# Patient Record
Sex: FEMALE | Race: WHITE | Employment: OTHER | URBAN - NONMETROPOLITAN AREA
[De-identification: names, ages, dates, MRNs, and addresses within clinical notes are randomized per-mention and may not be internally consistent; named-entity substitution may affect disease eponyms.]

---

## 2017-01-23 ENCOUNTER — TELEPHONE (OUTPATIENT)
Age: 47
End: 2017-01-23

## 2017-02-25 ENCOUNTER — NURSE TRIAGE (OUTPATIENT)
Dept: ADMINISTRATIVE | Age: 47
End: 2017-02-25

## 2017-02-28 PROBLEM — I82.402 ACUTE DEEP VEIN THROMBOSIS (DVT) OF LEFT LOWER EXTREMITY (HCC): Status: ACTIVE | Noted: 2017-02-28

## 2017-02-28 PROBLEM — I26.99 ACUTE PULMONARY EMBOLISM (HCC): Status: ACTIVE | Noted: 2017-02-28

## 2017-03-01 PROBLEM — I51.9 LV DYSFUNCTION: Status: ACTIVE | Noted: 2017-03-01

## 2017-03-02 PROBLEM — R00.1 BRADYCARDIA: Status: ACTIVE | Noted: 2017-03-02

## 2017-03-13 ENCOUNTER — NURSE TRIAGE (OUTPATIENT)
Dept: ADMINISTRATIVE | Age: 47
End: 2017-03-13

## 2017-03-18 ENCOUNTER — NURSE TRIAGE (OUTPATIENT)
Dept: ADMINISTRATIVE | Age: 47
End: 2017-03-18

## 2017-05-16 ENCOUNTER — OFFICE VISIT (OUTPATIENT)
Dept: CARDIOLOGY | Age: 47
End: 2017-05-16

## 2017-05-16 ENCOUNTER — TELEPHONE (OUTPATIENT)
Dept: CARDIOLOGY | Age: 47
End: 2017-05-16

## 2017-05-16 VITALS
SYSTOLIC BLOOD PRESSURE: 104 MMHG | HEIGHT: 65 IN | HEART RATE: 84 BPM | WEIGHT: 260.6 LBS | BODY MASS INDEX: 43.42 KG/M2 | DIASTOLIC BLOOD PRESSURE: 70 MMHG

## 2017-05-16 DIAGNOSIS — E78.01 FAMILIAL HYPERCHOLESTEROLEMIA: ICD-10-CM

## 2017-05-16 DIAGNOSIS — I10 ESSENTIAL HYPERTENSION: Primary | ICD-10-CM

## 2017-05-16 DIAGNOSIS — I42.9 CARDIOMYOPATHY (HCC): ICD-10-CM

## 2017-05-16 PROCEDURE — G8427 DOCREV CUR MEDS BY ELIG CLIN: HCPCS | Performed by: NUCLEAR MEDICINE

## 2017-05-16 PROCEDURE — 1036F TOBACCO NON-USER: CPT | Performed by: NUCLEAR MEDICINE

## 2017-05-16 PROCEDURE — G8419 CALC BMI OUT NRM PARAM NOF/U: HCPCS | Performed by: NUCLEAR MEDICINE

## 2017-05-16 PROCEDURE — 99204 OFFICE O/P NEW MOD 45 MIN: CPT | Performed by: NUCLEAR MEDICINE

## 2017-05-16 ASSESSMENT — ENCOUNTER SYMPTOMS
CONSTIPATION: 0
RECTAL PAIN: 0
SHORTNESS OF BREATH: 1
NAUSEA: 0
ABDOMINAL PAIN: 0
DIARRHEA: 0
ANAL BLEEDING: 0
CHEST TIGHTNESS: 1
PHOTOPHOBIA: 0
BACK PAIN: 0
BLOOD IN STOOL: 0
STRIDOR: 0
VOMITING: 0
ABDOMINAL DISTENTION: 0

## 2017-07-24 ENCOUNTER — TELEPHONE (OUTPATIENT)
Dept: CARDIOLOGY | Age: 47
End: 2017-07-24

## 2017-08-16 ENCOUNTER — TELEPHONE (OUTPATIENT)
Dept: CARDIOLOGY CLINIC | Age: 47
End: 2017-08-16

## 2017-09-11 ENCOUNTER — OFFICE VISIT (OUTPATIENT)
Dept: CARDIOLOGY CLINIC | Age: 47
End: 2017-09-11
Payer: MEDICARE

## 2017-09-11 VITALS
WEIGHT: 259 LBS | HEIGHT: 65 IN | DIASTOLIC BLOOD PRESSURE: 72 MMHG | SYSTOLIC BLOOD PRESSURE: 112 MMHG | HEART RATE: 72 BPM | BODY MASS INDEX: 43.15 KG/M2

## 2017-09-11 DIAGNOSIS — R06.09 DYSPNEA ON EXERTION: ICD-10-CM

## 2017-09-11 DIAGNOSIS — R07.2 PRECORDIAL PAIN: Primary | ICD-10-CM

## 2017-09-11 DIAGNOSIS — I10 ESSENTIAL HYPERTENSION: ICD-10-CM

## 2017-09-11 DIAGNOSIS — I42.0 DILATED CARDIOMYOPATHY (HCC): ICD-10-CM

## 2017-09-11 DIAGNOSIS — E78.01 FAMILIAL HYPERCHOLESTEROLEMIA: ICD-10-CM

## 2017-09-11 PROCEDURE — G8417 CALC BMI ABV UP PARAM F/U: HCPCS | Performed by: NUCLEAR MEDICINE

## 2017-09-11 PROCEDURE — 1036F TOBACCO NON-USER: CPT | Performed by: NUCLEAR MEDICINE

## 2017-09-11 PROCEDURE — G8427 DOCREV CUR MEDS BY ELIG CLIN: HCPCS | Performed by: NUCLEAR MEDICINE

## 2017-09-11 PROCEDURE — 99214 OFFICE O/P EST MOD 30 MIN: CPT | Performed by: NUCLEAR MEDICINE

## 2017-09-11 RX ORDER — MAGNESIUM 200 MG
250 TABLET ORAL DAILY
Status: ON HOLD | COMMUNITY
End: 2017-10-09 | Stop reason: ALTCHOICE

## 2017-09-11 RX ORDER — CYCLOBENZAPRINE HCL 5 MG
5 TABLET ORAL 3 TIMES DAILY PRN
COMMUNITY
End: 2022-07-19

## 2017-09-11 RX ORDER — TRAMADOL HYDROCHLORIDE 50 MG/1
50 TABLET ORAL EVERY 6 HOURS PRN
COMMUNITY
End: 2022-08-29

## 2017-09-11 RX ORDER — DOCUSATE SODIUM 100 MG/1
100 CAPSULE, LIQUID FILLED ORAL 2 TIMES DAILY
COMMUNITY

## 2017-09-25 ENCOUNTER — HOSPITAL ENCOUNTER (OUTPATIENT)
Dept: NON INVASIVE DIAGNOSTICS | Age: 47
Discharge: HOME | End: 2017-09-25
Payer: MEDICARE

## 2017-09-25 ENCOUNTER — TELEPHONE (OUTPATIENT)
Dept: CARDIOLOGY CLINIC | Age: 47
End: 2017-09-25

## 2017-09-25 DIAGNOSIS — R06.09 DYSPNEA ON EXERTION: ICD-10-CM

## 2017-09-25 DIAGNOSIS — R07.2 PRECORDIAL PAIN: ICD-10-CM

## 2017-09-25 DIAGNOSIS — I42.0 DILATED CARDIOMYOPATHY (HCC): ICD-10-CM

## 2017-09-25 DIAGNOSIS — R94.39 ABNORMAL STRESS TEST: Primary | ICD-10-CM

## 2017-09-25 LAB
LV EF: 55 %
LVEF MODALITY: NORMAL

## 2017-09-25 PROCEDURE — 6360000002 HC RX W HCPCS

## 2017-09-25 PROCEDURE — 3430000000 HC RX DIAGNOSTIC RADIOPHARMACEUTICAL: Performed by: NUCLEAR MEDICINE

## 2017-09-25 PROCEDURE — 78452 HT MUSCLE IMAGE SPECT MULT: CPT

## 2017-09-25 PROCEDURE — A9500 TC99M SESTAMIBI: HCPCS | Performed by: NUCLEAR MEDICINE

## 2017-09-25 PROCEDURE — 93017 CV STRESS TEST TRACING ONLY: CPT | Performed by: NUCLEAR MEDICINE

## 2017-09-25 PROCEDURE — 93306 TTE W/DOPPLER COMPLETE: CPT

## 2017-09-25 RX ADMIN — Medication 32.4 MILLICURIE: at 10:50

## 2017-09-25 RX ADMIN — Medication 9.4 MILLICURIE: at 09:55

## 2017-10-03 RX ORDER — DIPHENHYDRAMINE HCL 25 MG
25 TABLET ORAL 2 TIMES DAILY
COMMUNITY
End: 2022-08-29

## 2017-10-03 NOTE — PROGRESS NOTES
NPO after midnight  Bring drivers license and insurance information  Wear comfortable clean clothes  Shower morning of and night before with liquid antibacterial soap  Remove jewelry   May have to stay overnight if have PTCA/stent  Bring medications in original bottles  Made aware of visitors limit to 1 at a time  Follow all instructions given by your physician   needed at discharge

## 2017-10-06 ENCOUNTER — PREP FOR PROCEDURE (OUTPATIENT)
Dept: CARDIOLOGY | Age: 47
End: 2017-10-06

## 2017-10-06 RX ORDER — NITROGLYCERIN 0.4 MG/1
0.4 TABLET SUBLINGUAL EVERY 5 MIN PRN
Status: CANCELLED | OUTPATIENT
Start: 2017-10-06

## 2017-10-06 RX ORDER — SODIUM CHLORIDE 0.9 % (FLUSH) 0.9 %
10 SYRINGE (ML) INJECTION PRN
Status: CANCELLED | OUTPATIENT
Start: 2017-10-06

## 2017-10-06 RX ORDER — DIPHENHYDRAMINE HYDROCHLORIDE 50 MG/ML
25 INJECTION INTRAMUSCULAR; INTRAVENOUS ONCE
Status: CANCELLED | OUTPATIENT
Start: 2017-10-06 | End: 2017-10-06

## 2017-10-06 RX ORDER — SODIUM CHLORIDE 0.9 % (FLUSH) 0.9 %
10 SYRINGE (ML) INJECTION EVERY 12 HOURS SCHEDULED
Status: CANCELLED | OUTPATIENT
Start: 2017-10-06

## 2017-10-06 RX ORDER — SODIUM CHLORIDE 9 MG/ML
INJECTION, SOLUTION INTRAVENOUS CONTINUOUS
Status: CANCELLED | OUTPATIENT
Start: 2017-10-06

## 2017-10-09 ENCOUNTER — HOSPITAL ENCOUNTER (OUTPATIENT)
Dept: INPATIENT UNIT | Age: 47
Discharge: HOME | End: 2017-10-09
Attending: NUCLEAR MEDICINE | Admitting: NUCLEAR MEDICINE
Payer: MEDICARE

## 2017-10-09 ENCOUNTER — APPOINTMENT (OUTPATIENT)
Dept: CARDIAC CATH/INVASIVE PROCEDURES | Age: 47
End: 2017-10-09
Attending: NUCLEAR MEDICINE
Payer: MEDICARE

## 2017-10-09 VITALS
HEIGHT: 65 IN | HEART RATE: 80 BPM | RESPIRATION RATE: 16 BRPM | DIASTOLIC BLOOD PRESSURE: 81 MMHG | TEMPERATURE: 98.7 F | OXYGEN SATURATION: 95 % | BODY MASS INDEX: 43.32 KG/M2 | WEIGHT: 260 LBS | SYSTOLIC BLOOD PRESSURE: 112 MMHG

## 2017-10-09 LAB
ABO: NORMAL
ANION GAP SERPL CALCULATED.3IONS-SCNC: 11 MEQ/L (ref 8–16)
ANTIBODY SCREEN: NORMAL
BUN BLDV-MCNC: 12 MG/DL (ref 7–22)
CALCIUM SERPL-MCNC: 9.8 MG/DL (ref 8.5–10.5)
CHLORIDE BLD-SCNC: 101 MEQ/L (ref 98–111)
CHOLESTEROL, TOTAL: 175 MG/DL (ref 100–199)
CO2: 26 MEQ/L (ref 23–33)
CREAT SERPL-MCNC: 0.6 MG/DL (ref 0.4–1.2)
GFR SERPL CREATININE-BSD FRML MDRD: > 90 ML/MIN/1.73M2
GLUCOSE BLD-MCNC: 99 MG/DL (ref 70–108)
HCT VFR BLD CALC: 39.5 % (ref 37–47)
HDLC SERPL-MCNC: 59 MG/DL
HEMOGLOBIN: 13.5 GM/DL (ref 12–16)
LDL CHOLESTEROL CALCULATED: 87 MG/DL
MCH RBC QN AUTO: 30.3 PG (ref 27–31)
MCHC RBC AUTO-ENTMCNC: 34.2 GM/DL (ref 33–37)
MCV RBC AUTO: 88.6 FL (ref 81–99)
PDW BLD-RTO: 14.6 % (ref 11.5–14.5)
PLATELET # BLD: 288 THOU/MM3 (ref 130–400)
PMV BLD AUTO: 7.8 MCM (ref 7.4–10.4)
POTASSIUM SERPL-SCNC: 4.4 MEQ/L (ref 3.5–5.2)
RBC # BLD: 4.46 MILL/MM3 (ref 4.2–5.4)
RH FACTOR: NORMAL
SODIUM BLD-SCNC: 138 MEQ/L (ref 135–145)
TRIGL SERPL-MCNC: 147 MG/DL (ref 0–199)
WBC # BLD: 6.7 THOU/MM3 (ref 4.8–10.8)

## 2017-10-09 PROCEDURE — 86901 BLOOD TYPING SEROLOGIC RH(D): CPT

## 2017-10-09 PROCEDURE — 86850 RBC ANTIBODY SCREEN: CPT

## 2017-10-09 PROCEDURE — 80061 LIPID PANEL: CPT

## 2017-10-09 PROCEDURE — 36415 COLL VENOUS BLD VENIPUNCTURE: CPT

## 2017-10-09 PROCEDURE — 2780000010 HC IMPLANT OTHER

## 2017-10-09 PROCEDURE — 80048 BASIC METABOLIC PNL TOTAL CA: CPT

## 2017-10-09 PROCEDURE — 93005 ELECTROCARDIOGRAM TRACING: CPT | Performed by: PHYSICIAN ASSISTANT

## 2017-10-09 PROCEDURE — C1725 CATH, TRANSLUMIN NON-LASER: HCPCS

## 2017-10-09 PROCEDURE — 93458 L HRT ARTERY/VENTRICLE ANGIO: CPT | Performed by: NUCLEAR MEDICINE

## 2017-10-09 PROCEDURE — A6258 TRANSPARENT FILM >16<=48 IN: HCPCS

## 2017-10-09 PROCEDURE — 85027 COMPLETE CBC AUTOMATED: CPT

## 2017-10-09 PROCEDURE — 2580000003 HC RX 258: Performed by: PHYSICIAN ASSISTANT

## 2017-10-09 PROCEDURE — 86900 BLOOD TYPING SEROLOGIC ABO: CPT

## 2017-10-09 PROCEDURE — C1769 GUIDE WIRE: HCPCS

## 2017-10-09 PROCEDURE — 2500000003 HC RX 250 WO HCPCS

## 2017-10-09 PROCEDURE — C1894 INTRO/SHEATH, NON-LASER: HCPCS

## 2017-10-09 PROCEDURE — 6360000002 HC RX W HCPCS

## 2017-10-09 RX ORDER — TRAZODONE HYDROCHLORIDE 50 MG/1
100 TABLET ORAL NIGHTLY
COMMUNITY
End: 2022-08-29 | Stop reason: SDUPTHER

## 2017-10-09 RX ORDER — SODIUM CHLORIDE 9 MG/ML
INJECTION, SOLUTION INTRAVENOUS CONTINUOUS
Status: DISCONTINUED | OUTPATIENT
Start: 2017-10-09 | End: 2017-10-09 | Stop reason: HOSPADM

## 2017-10-09 RX ORDER — ATROPINE SULFATE 0.4 MG/ML
0.5 AMPUL (ML) INJECTION
Status: DISCONTINUED | OUTPATIENT
Start: 2017-10-09 | End: 2017-10-09 | Stop reason: HOSPADM

## 2017-10-09 RX ORDER — SODIUM CHLORIDE 0.9 % (FLUSH) 0.9 %
10 SYRINGE (ML) INJECTION EVERY 12 HOURS SCHEDULED
Status: DISCONTINUED | OUTPATIENT
Start: 2017-10-09 | End: 2017-10-09 | Stop reason: SDUPTHER

## 2017-10-09 RX ORDER — ONDANSETRON 2 MG/ML
4 INJECTION INTRAMUSCULAR; INTRAVENOUS EVERY 6 HOURS PRN
Status: DISCONTINUED | OUTPATIENT
Start: 2017-10-09 | End: 2017-10-09 | Stop reason: HOSPADM

## 2017-10-09 RX ORDER — DIPHENHYDRAMINE HYDROCHLORIDE 50 MG/ML
25 INJECTION INTRAMUSCULAR; INTRAVENOUS ONCE
Status: DISCONTINUED | OUTPATIENT
Start: 2017-10-09 | End: 2017-10-09 | Stop reason: HOSPADM

## 2017-10-09 RX ORDER — SODIUM CHLORIDE 0.9 % (FLUSH) 0.9 %
10 SYRINGE (ML) INJECTION EVERY 12 HOURS SCHEDULED
Status: DISCONTINUED | OUTPATIENT
Start: 2017-10-09 | End: 2017-10-09 | Stop reason: HOSPADM

## 2017-10-09 RX ORDER — LANOLIN ALCOHOL/MO/W.PET/CERES
10 CREAM (GRAM) TOPICAL NIGHTLY PRN
COMMUNITY

## 2017-10-09 RX ORDER — MULTIVITAMIN WITH IRON
250 TABLET ORAL DAILY
COMMUNITY
End: 2022-08-29

## 2017-10-09 RX ORDER — SODIUM CHLORIDE 0.9 % (FLUSH) 0.9 %
10 SYRINGE (ML) INJECTION PRN
Status: DISCONTINUED | OUTPATIENT
Start: 2017-10-09 | End: 2017-10-09 | Stop reason: HOSPADM

## 2017-10-09 RX ORDER — HYDROXYZINE HYDROCHLORIDE 10 MG/1
10 TABLET, FILM COATED ORAL DAILY
COMMUNITY
End: 2022-08-29

## 2017-10-09 RX ORDER — ACETAMINOPHEN 325 MG/1
650 TABLET ORAL EVERY 4 HOURS PRN
Status: DISCONTINUED | OUTPATIENT
Start: 2017-10-09 | End: 2017-10-09 | Stop reason: HOSPADM

## 2017-10-09 RX ORDER — NITROGLYCERIN 0.4 MG/1
0.4 TABLET SUBLINGUAL EVERY 5 MIN PRN
Status: DISCONTINUED | OUTPATIENT
Start: 2017-10-09 | End: 2017-10-09 | Stop reason: HOSPADM

## 2017-10-09 RX ADMIN — SODIUM CHLORIDE: 9 INJECTION, SOLUTION INTRAVENOUS at 12:14

## 2017-10-09 NOTE — PLAN OF CARE
Problem: Discharge Planning:  Goal: Participates in care planning  Participates in care planning   Outcome: Completed Date Met: 10/09/17  Discharged home  Goal: Discharged to appropriate level of care  Discharged to appropriate level of care   Outcome: Completed Date Met: 10/09/17  Discharged home    Problem: Airway Clearance - Ineffective:  Goal: Ability to maintain a clear airway will improve  Ability to maintain a clear airway will improve   Outcome: Completed Date Met: 10/09/17  Procedure tolerated well    Problem: Tissue Perfusion - Cardiopulmonary, Altered:  Goal: Absence of angina  Absence of angina   Outcome: Completed Date Met: 10/09/17    Goal: Hemodynamic stability will improve  Hemodynamic stability will improve   Outcome: Completed Date Met: 10/09/17  Vitals stable post procedure    Problem: Falls - Risk of  Goal: Absence of falls  Outcome: Completed Date Met: 10/09/17

## 2017-10-09 NOTE — PROGRESS NOTES
Discharged home in stable condition.   Patient and spouse verbalize understanding of discharge instructions and follow-up

## 2017-10-09 NOTE — CONSULTS
[address]  Sedation/Analgesia History & Physical    Pt Name: Gino Tariq  Account number: [de-identified]  MRN: [id number]  YOB: 1970  Provider Performing Procedure: Jose Ann MD [facility]  Primary Care Physician: Poonam Keys MD  Date: 10/9/2017    PRE-PROCEDURE    Code Status: FULL CODE  Brief History/Pre-Procedure Diagnosis:   Angina  Abn stress test      Consent: : I have discussed with the patient risks, benefits, and alternatives (and relevant risks, benefits, and side effects related to alternatives or not receiving care), and likelihood of the success. The patient and/or representative appear to understand and agree to proceed. The discussion encompasses risks, benefits, and side effects related to the alternatives and the risks related to not receiving the proposed care, treatment, and services. MEDICAL HISTORY  []ASHD/ANGINA/MI/CHF   [x]Hypertension  []Diabetes  []Hyperlipidemia  []Smoking  [x]Family Hx of ASHD  []Additional information:       has a past medical history of Anxiety; Arthritis; Asthma; Depression; GERD (gastroesophageal reflux disease); Hyperlipidemia; Hypertension; and Paranoid schizophrenia ([facility] [address] SURGICAL HISTORY   has a past surgical history that includes other surgical history (2012); Ankle surgery (2000); Foot surgery (Right, 07); shoulder surgery (Right, age 15); Dental surgery; Cholecystectomy, laparoscopic (5/4/2015); Upper gastrointestinal endoscopy (10/23/15); Vaginal prolapse repair; and Colonoscopy.   Additional information:       ALLERGIES   Allergies as of 10/09/2017 - Review Complete 10/09/2017   Allergen Reaction Noted    [place] Hives 04/23/2015    Other Palpitations 10/03/2017     Additional information:       MEDICATIONS   Aspirin  [x] 81 mg  [] 325 mg  [] None  Antiplatelet drug therapy use last 5 days  []No []Yes  Coumadin Use Last 5 Days []No []Yes  Other anticoagulant use last 5 days  []No []Yes    Current Facility-Administered Medications:     0.9 % sodium chloride infusion, , Intravenous, Continuous, Reggie Elmore PA-C, Last Rate: 75 mL/hr at 10/09/17 1214    diphenhydrAMINE (BENADRYL) injection 25 mg, 25 mg, Intravenous, Once, Reggie Elmore PA-C    hydrocortisone sodium succinate PF (SOLU-CORTEF) injection 100 mg, 100 mg, Intravenous, Once, Reggie Elmore PA-C    nitroGLYCERIN (NITROSTAT) SL tablet 0.4 mg, 0.4 mg, Sublingual, Q5 Min PRN, Reggie Elmore PA-C    sodium chloride flush 0.9 % injection 10 mL, 10 mL, Intravenous, 2 times per day, Reggie Elmore PA-C  Prior to Admission medications    Medication Sig Start Date End Date Taking?  Authorizing Provider   hydrOXYzine (ATARAX) 10 MG tablet Take 10 mg by mouth daily   Yes Historical Provider, MD   traZODone (DESYREL) 50 MG tablet Take 200 mg by mouth nightly   Yes Historical Provider, MD   melatonin 3 MG TABS tablet Take 3 mg by mouth nightly as needed   Yes Historical Provider, MD   magnesium (MAGNESIUM-OXIDE) 250 MG TABS tablet Take 250 mg by mouth daily   Yes Historical Provider, MD   diphenhydrAMINE (BENADRYL) 25 MG tablet Take 25 mg by mouth nightly   Yes Historical Provider, MD   Flaxseed Linseed, (EQL FLAX SEED OIL) 1000 MG CAPS Take by mouth 3 times daily (with meals)   Yes Historical Provider, MD   traMADol (ULTRAM) 50 MG tablet Take 50 mg by mouth 2 times daily    Yes Historical Provider, MD   docusate sodium (COLACE) 100 MG capsule Take 100 mg by mouth 2 times daily   Yes Historical Provider, MD   cyclobenzaprine (FLEXERIL) 5 MG tablet Take 5 mg by mouth 3 times daily as needed for Muscle spasms   Yes Historical Provider, MD   Black Cohosh-SoyIsoflav-Magnol (ESTROVEN MENOPAUSE RELIEF PO) Take by mouth   Yes Historical Provider, MD   metoprolol tartrate (LOPRESSOR) 25 MG tablet Take 1 tablet by mouth 2 times daily 9/11/17  Yes Frederic Galan MD   apixaban (ELIQUIS) 5 MG TABS tablet Take by mouth 2 times daily   Yes Historical Provider, MD   oxybutynin (DITROPAN) 5 MG tablet Take 5 mg by mouth 2 times daily   Yes Historical Provider, MD   amitriptyline (ELAVIL) 10 MG tablet Take 10 mg by mouth nightly   Yes Historical Provider, MD   gabapentin (NEURONTIN) 300 MG capsule Take 300 mg by mouth nightly   Yes Historical Provider, MD   omeprazole (PRILOSEC) 20 MG capsule Take 1 capsule by mouth Daily 5/23/16  Yes Anirudh Kwok MD   QUEtiapine (SEROQUEL) 50 MG tablet Take 300 mg by mouth nightly    Yes Historical Provider, MD   simvastatin (ZOCOR) 40 MG tablet   Take 40 mg by mouth daily    Yes Historical Provider, MD   LORazepam (ATIVAN) 2 MG tablet Take 2 mg by mouth nightly    Yes Historical Provider, MD   risperiDONE (RISPERDAL) 4 MG tablet Take 4 mg by mouth daily    Yes Historical Provider, MD     Additional information:       VITAL SIGNS   Vitals:    10/09/17 1115   BP: (!) 110/58   Pulse: 84   Resp: 18   Temp: 98.1 °F (36.7 °C)   SpO2: 95%       PHYSICAL:   General: No acute distress  HEENT:  Unremarkable for age  Neck: without increased JVD, carotid pulses 2+ bilaterally without bruits  Heart: RRR, S1 & S2 WNL, S4 gallop, without murmurs or rubs    Lungs: Clear to auscultation    Abdomen: BS present, without HSM, masses, or tenderness    Extremities: without C,C,E.  Pulses 2+ bilaterally  Mental Status: Alert & Oriented        PLANNED PROCEDURE   [x]Cath  []PCI                []Pacemaker/AICD  []JORGE             []Cardioversion []Peripheral angiography/PTA  []Other:      SEDATION  Planned agent:[x]Midazolam []Meperidine [x]Sublimaze []Morphine  []Diazepam  []Other:     ASA Classification:  []1 [x]2 []3 []4 []5  Class 1: A normal healthy patient  Class 2: Pt with mild to moderate systemic disease  Class 3: Severe systemic disease or disturbance  Class 4: Severe systemic disorders that are already life threatening. Class 5: Moribund pt with little chances of survival, for more than 24 hours.   Mallampati I Airway Classification:   []1 [x]2 []3 []4    [x]Pre-procedure diagnostic studies complete and results available. Comment:    [x]Previous sedation/anesthesia experiences assessed. Comment:    [x]The patient is an appropriate candidate to undergo the planned procedure sedation and anesthesia. (Refer to nursing sedation/analgesia documentation record)  [x]Formulation and discussion of sedation/procedure plan, risks, and expectations with patient and/or responsible adult completed. [x]Patient examined immediately prior to the procedure.  (Refer to nursing sedation/analgesia documentation record)    Kenzie Nesbitt MD [facility]  Electronically signed 10/9/2017 at 12:42 PM

## 2017-10-10 ENCOUNTER — TELEPHONE (OUTPATIENT)
Dept: CARDIOLOGY CLINIC | Age: 47
End: 2017-10-10

## 2017-10-10 LAB
EKG ATRIAL RATE: 66 BPM
EKG P AXIS: 40 DEGREES
EKG P-R INTERVAL: 178 MS
EKG Q-T INTERVAL: 428 MS
EKG QRS DURATION: 84 MS
EKG QTC CALCULATION (BAZETT): 448 MS
EKG R AXIS: 66 DEGREES
EKG T AXIS: 58 DEGREES
EKG VENTRICULAR RATE: 66 BPM

## 2017-10-10 NOTE — TELEPHONE ENCOUNTER
Patient has a follow up cath 01/09/18 and she leaves 10/29/17 for the winter to [facility] and will not be back until March. She is requesting to be seen before 10/29/17 or patient stated Dr. Jett Urbano mentioned she may need an inhaler. She stated if she can not get an appointment with Dr. Jett Urbano and he wants her to have an inhaler fax family Dr. Keshia Arreguin that she needs an inhaler.

## 2017-10-11 ENCOUNTER — TELEPHONE (OUTPATIENT)
Dept: CARDIOLOGY CLINIC | Age: 47
End: 2017-10-11

## 2017-10-11 NOTE — TELEPHONE ENCOUNTER
Pt left message on VM re: had a cath Monday and now c/o swelling in hands and fingers. Called pt back. No answer. LM to return call.

## 2017-10-12 NOTE — TELEPHONE ENCOUNTER
Spoke with pt, swelling is gone, and its getting better. Pt states when using it it still hurts some. Pt will contact us next week if she is still having issues.

## 2017-10-24 ENCOUNTER — TELEPHONE (OUTPATIENT)
Dept: CARDIOLOGY CLINIC | Age: 47
End: 2017-10-24

## 2017-10-25 ENCOUNTER — OFFICE VISIT (OUTPATIENT)
Dept: CARDIOLOGY CLINIC | Age: 47
End: 2017-10-25
Payer: MEDICARE

## 2017-10-25 VITALS
WEIGHT: 265 LBS | DIASTOLIC BLOOD PRESSURE: 68 MMHG | BODY MASS INDEX: 44.15 KG/M2 | HEART RATE: 60 BPM | SYSTOLIC BLOOD PRESSURE: 116 MMHG | HEIGHT: 65 IN

## 2017-10-25 DIAGNOSIS — E78.01 FAMILIAL HYPERCHOLESTEROLEMIA: ICD-10-CM

## 2017-10-25 DIAGNOSIS — R06.09 DYSPNEA ON EXERTION: ICD-10-CM

## 2017-10-25 DIAGNOSIS — I10 ESSENTIAL HYPERTENSION: Primary | ICD-10-CM

## 2017-10-25 PROCEDURE — G8484 FLU IMMUNIZE NO ADMIN: HCPCS | Performed by: NUCLEAR MEDICINE

## 2017-10-25 PROCEDURE — 1036F TOBACCO NON-USER: CPT | Performed by: NUCLEAR MEDICINE

## 2017-10-25 PROCEDURE — G8417 CALC BMI ABV UP PARAM F/U: HCPCS | Performed by: NUCLEAR MEDICINE

## 2017-10-25 PROCEDURE — 99213 OFFICE O/P EST LOW 20 MIN: CPT | Performed by: NUCLEAR MEDICINE

## 2017-10-25 PROCEDURE — G8427 DOCREV CUR MEDS BY ELIG CLIN: HCPCS | Performed by: NUCLEAR MEDICINE

## 2017-10-25 NOTE — PROGRESS NOTES
[facility]  [address]  [address]  [address] [address]  Dept: [phone]  Dept Fax: [phone]  Loc: [phone]    Visit Date: 10/25/2017    Qing Temple is a 52 y.o. female who presents today for:  Chief Complaint   Patient presents with    Follow-up     heart cath- no stents.      Hypertension    Hyperlipidemia    Shortness of Breath   cath done   No major CAD  Dyspnea is better with inhalers  Known DVt and PE on eliquis  Has known asthma   BP is stable   Tachycardia is better  No complication       HPI:  HPI  Past Medical History:   Diagnosis Date    Anxiety     Arthritis     Asthma     Depression     GERD (gastroesophageal reflux disease)     Hyperlipidemia     Hypertension     Paranoid schizophrenia ([address]       Past Surgical History:   Procedure Laterality Date    ANKLE SURGERY  2000    CHOLECYSTECTOMY, LAPAROSCOPIC  5/4/2015    LAPAROSCOPIC CHOLECYSTECTOMY    COLONOSCOPY      DENTAL SURGERY      FOOT SURGERY Right 07    OTHER SURGICAL HISTORY  2012    vaginal sling    SHOULDER SURGERY Right age 15   [facility] UPPER GASTROINTESTINAL ENDOSCOPY  10/23/15    Dr. Kelechi Whittaker       Family History   Problem Relation Age of Onset    Depression Mother     High Blood Pressure Mother     Cancer Father      melanoma     Social History   Substance Use Topics    Smoking status: Never Smoker    Smokeless tobacco: Never Used    Alcohol use No      Current Outpatient Prescriptions   Medication Sig Dispense Refill    VENTOLIN  (90 Base) MCG/ACT inhaler       Nutritional Supplements (ESTROVEN PO) Take by mouth      hydrOXYzine (ATARAX) 10 MG tablet Take 10 mg by mouth daily      traZODone (DESYREL) 50 MG tablet Take 200 mg by mouth nightly      melatonin 3 MG TABS tablet Take 3 mg by mouth nightly as needed      magnesium (MAGNESIUM-OXIDE) 250 MG TABS tablet Take 250 mg by mouth daily      diphenhydrAMINE recent active issues  Extremities:   No edema, good peripheral pulses      Objective:  Physical Exam  /68   Pulse 60   Ht 5' 5\" (1.651 m)   Wt 265 lb (120.2 kg)   LMP 02/01/2017 (Approximate)   BMI 44.10 kg/m²   General:   Well developed, well nourished  Lungs:   Clear to auscultation  Heart:    Normal S1 S2, Slight murmur. no rubs, no gallops  Abdomen:   Soft, non tender, no organomegalies, positive bowel sounds  Extremities:   No edema, no cyanosis, good peripheral pulses  Neurological:   Awake, alert, oriented. No obvious focal deficits  Musculoskelatal:  No obvious deformities    Assessment:     1. Essential hypertension     2. Familial hypercholesterolemia     3. Dyspnea on exertion     cardiac stable for now    Plan:  No Follow-up on file. As above  Will see as needed  Continue risk factor modification and medical management  Thank you for allowing me to participate in the care of your patient. Please don't hesitate to contact me regarding any further issues related to the patient care    Orders Placed:  No orders of the defined types were placed in this encounter. Medications Prescribed:  No orders of the defined types were placed in this encounter. Discussed use, benefit, and side effects of prescribed medications. All patient questions answered. Pt voiced understanding. Instructed to continue current medications, diet and exercise. Continue risk factor modification and medical management. Patient agreed with treatment plan. Follow up as directed.     Electronically signed by Sisi Whittaker MD on 10/25/2017 at 10:40 AM

## 2018-07-23 ENCOUNTER — TELEPHONE (OUTPATIENT)
Dept: CARDIOLOGY CLINIC | Age: 48
End: 2018-07-23

## 2018-07-23 NOTE — TELEPHONE ENCOUNTER
Enrique Amdaor called requesting her medical records. Mailed a release of information form to be signed. Once received back, mail medical records to her home in [place].

## 2019-12-17 ENCOUNTER — TELEPHONE (OUTPATIENT)
Dept: CARDIOLOGY CLINIC | Age: 49
End: 2019-12-17

## 2020-11-03 PROBLEM — I10 HYPERTENSION: Status: RESOLVED | Noted: 2020-11-03 | Resolved: 2020-11-03

## 2022-06-23 ENCOUNTER — TELEPHONE (OUTPATIENT)
Dept: CARDIOLOGY CLINIC | Age: 52
End: 2022-06-23

## 2022-06-23 ENCOUNTER — TELEPHONE (OUTPATIENT)
Dept: FAMILY MEDICINE CLINIC | Age: 52
End: 2022-06-23

## 2022-06-23 ENCOUNTER — OFFICE VISIT (OUTPATIENT)
Dept: FAMILY MEDICINE CLINIC | Age: 52
End: 2022-06-23
Payer: MEDICARE

## 2022-06-23 VITALS
HEART RATE: 97 BPM | BODY MASS INDEX: 47.12 KG/M2 | SYSTOLIC BLOOD PRESSURE: 130 MMHG | RESPIRATION RATE: 20 BRPM | WEIGHT: 282.8 LBS | DIASTOLIC BLOOD PRESSURE: 74 MMHG | HEIGHT: 65 IN | OXYGEN SATURATION: 98 % | TEMPERATURE: 97.9 F

## 2022-06-23 DIAGNOSIS — F99 INSOMNIA DUE TO OTHER MENTAL DISORDER: ICD-10-CM

## 2022-06-23 DIAGNOSIS — I10 ESSENTIAL HYPERTENSION: ICD-10-CM

## 2022-06-23 DIAGNOSIS — Z23 NEED FOR TETANUS BOOSTER: ICD-10-CM

## 2022-06-23 DIAGNOSIS — F20.0 PARANOID SCHIZOPHRENIA (HCC): ICD-10-CM

## 2022-06-23 DIAGNOSIS — I51.9 LV DYSFUNCTION: ICD-10-CM

## 2022-06-23 DIAGNOSIS — M89.9 DISORDER OF BONE, UNSPECIFIED: ICD-10-CM

## 2022-06-23 DIAGNOSIS — Z11.4 SCREENING FOR HUMAN IMMUNODEFICIENCY VIRUS WITHOUT PRESENCE OF RISK FACTORS: ICD-10-CM

## 2022-06-23 DIAGNOSIS — Z23 NEED FOR SHINGLES VACCINE: ICD-10-CM

## 2022-06-23 DIAGNOSIS — I82.412 ACUTE DEEP VEIN THROMBOSIS (DVT) OF FEMORAL VEIN OF LEFT LOWER EXTREMITY (HCC): ICD-10-CM

## 2022-06-23 DIAGNOSIS — R79.9 ABNORMAL FINDING OF BLOOD CHEMISTRY, UNSPECIFIED: ICD-10-CM

## 2022-06-23 DIAGNOSIS — Z23 NEED FOR COVID-19 VACCINE: ICD-10-CM

## 2022-06-23 DIAGNOSIS — Z12.31 BREAST CANCER SCREENING BY MAMMOGRAM: ICD-10-CM

## 2022-06-23 DIAGNOSIS — M19.90 ARTHRITIS: ICD-10-CM

## 2022-06-23 DIAGNOSIS — Z12.11 COLON CANCER SCREENING: ICD-10-CM

## 2022-06-23 DIAGNOSIS — Z85.828 HX OF MALIGNANT NEOPLASM OF SKIN: ICD-10-CM

## 2022-06-23 DIAGNOSIS — F51.05 INSOMNIA DUE TO OTHER MENTAL DISORDER: ICD-10-CM

## 2022-06-23 DIAGNOSIS — Z11.59 ENCOUNTER FOR HEPATITIS C SCREENING TEST FOR LOW RISK PATIENT: ICD-10-CM

## 2022-06-23 DIAGNOSIS — M25.561 RIGHT KNEE PAIN, UNSPECIFIED CHRONICITY: ICD-10-CM

## 2022-06-23 DIAGNOSIS — I26.99 PULMONARY EMBOLISM, UNSPECIFIED CHRONICITY, UNSPECIFIED PULMONARY EMBOLISM TYPE, UNSPECIFIED WHETHER ACUTE COR PULMONALE PRESENT (HCC): ICD-10-CM

## 2022-06-23 DIAGNOSIS — G47.30 SLEEP APNEA, UNSPECIFIED TYPE: ICD-10-CM

## 2022-06-23 DIAGNOSIS — G89.29 OTHER CHRONIC PAIN: ICD-10-CM

## 2022-06-23 DIAGNOSIS — R06.09 DYSPNEA ON EXERTION: Primary | ICD-10-CM

## 2022-06-23 DIAGNOSIS — M54.50 LUMBAR BACK PAIN: ICD-10-CM

## 2022-06-23 PROCEDURE — 1036F TOBACCO NON-USER: CPT | Performed by: NURSE PRACTITIONER

## 2022-06-23 PROCEDURE — 3017F COLORECTAL CA SCREEN DOC REV: CPT | Performed by: NURSE PRACTITIONER

## 2022-06-23 PROCEDURE — G8427 DOCREV CUR MEDS BY ELIG CLIN: HCPCS | Performed by: NURSE PRACTITIONER

## 2022-06-23 PROCEDURE — 99204 OFFICE O/P NEW MOD 45 MIN: CPT | Performed by: NURSE PRACTITIONER

## 2022-06-23 PROCEDURE — G8417 CALC BMI ABV UP PARAM F/U: HCPCS | Performed by: NURSE PRACTITIONER

## 2022-06-23 RX ORDER — MEDROXYPROGESTERONE ACETATE 5 MG/1
5 TABLET ORAL DAILY
COMMUNITY
End: 2022-08-29 | Stop reason: SDUPTHER

## 2022-06-23 RX ORDER — DULAGLUTIDE 1.5 MG/.5ML
1.5 INJECTION, SOLUTION SUBCUTANEOUS WEEKLY
COMMUNITY

## 2022-06-23 RX ORDER — ZOSTER VACCINE RECOMBINANT, ADJUVANTED 50 MCG/0.5
0.5 KIT INTRAMUSCULAR SEE ADMIN INSTRUCTIONS
Qty: 0.5 ML | Refills: 0 | Status: SHIPPED | OUTPATIENT
Start: 2022-06-23 | End: 2022-08-29

## 2022-06-23 RX ORDER — PANTOPRAZOLE SODIUM 40 MG/1
40 TABLET, DELAYED RELEASE ORAL DAILY
COMMUNITY
End: 2022-09-28

## 2022-06-23 RX ORDER — TEMAZEPAM 15 MG/1
15 CAPSULE ORAL NIGHTLY PRN
COMMUNITY
End: 2022-08-29

## 2022-06-23 RX ORDER — FLUTICASONE PROPIONATE 50 MCG
1 SPRAY, SUSPENSION (ML) NASAL DAILY
Qty: 32 G | Refills: 1 | Status: SHIPPED | OUTPATIENT
Start: 2022-06-23

## 2022-06-23 RX ORDER — PEN NEEDLE, DIABETIC 31 GX5/16"
NEEDLE, DISPOSABLE MISCELLANEOUS
COMMUNITY

## 2022-06-23 RX ORDER — LORATADINE 10 MG/1
10 TABLET ORAL DAILY
Qty: 30 TABLET | Refills: 5 | Status: SHIPPED | OUTPATIENT
Start: 2022-06-23 | End: 2022-08-29

## 2022-06-23 SDOH — ECONOMIC STABILITY: FOOD INSECURITY: WITHIN THE PAST 12 MONTHS, THE FOOD YOU BOUGHT JUST DIDN'T LAST AND YOU DIDN'T HAVE MONEY TO GET MORE.: OFTEN TRUE

## 2022-06-23 SDOH — ECONOMIC STABILITY: FOOD INSECURITY: WITHIN THE PAST 12 MONTHS, YOU WORRIED THAT YOUR FOOD WOULD RUN OUT BEFORE YOU GOT MONEY TO BUY MORE.: SOMETIMES TRUE

## 2022-06-23 ASSESSMENT — SOCIAL DETERMINANTS OF HEALTH (SDOH): HOW HARD IS IT FOR YOU TO PAY FOR THE VERY BASICS LIKE FOOD, HOUSING, MEDICAL CARE, AND HEATING?: SOMEWHAT HARD

## 2022-06-23 ASSESSMENT — ENCOUNTER SYMPTOMS
ABDOMINAL PAIN: 0
EYE DISCHARGE: 0
ANAL BLEEDING: 0
DIARRHEA: 0
SHORTNESS OF BREATH: 0
ABDOMINAL DISTENTION: 0
RHINORRHEA: 0
SORE THROAT: 0
COLOR CHANGE: 0
BLOOD IN STOOL: 0
NAUSEA: 0
EYE REDNESS: 0
COUGH: 0
APNEA: 1
CONSTIPATION: 0

## 2022-06-23 ASSESSMENT — PATIENT HEALTH QUESTIONNAIRE - PHQ9
1. LITTLE INTEREST OR PLEASURE IN DOING THINGS: 0
6. FEELING BAD ABOUT YOURSELF - OR THAT YOU ARE A FAILURE OR HAVE LET YOURSELF OR YOUR FAMILY DOWN: 0
SUM OF ALL RESPONSES TO PHQ QUESTIONS 1-9: 2
9. THOUGHTS THAT YOU WOULD BE BETTER OFF DEAD, OR OF HURTING YOURSELF: 0
7. TROUBLE CONCENTRATING ON THINGS, SUCH AS READING THE NEWSPAPER OR WATCHING TELEVISION: 0
SUM OF ALL RESPONSES TO PHQ QUESTIONS 1-9: 2
8. MOVING OR SPEAKING SO SLOWLY THAT OTHER PEOPLE COULD HAVE NOTICED. OR THE OPPOSITE, BEING SO FIGETY OR RESTLESS THAT YOU HAVE BEEN MOVING AROUND A LOT MORE THAN USUAL: 0
4. FEELING TIRED OR HAVING LITTLE ENERGY: 1
5. POOR APPETITE OR OVEREATING: 0
SUM OF ALL RESPONSES TO PHQ9 QUESTIONS 1 & 2: 1
SUM OF ALL RESPONSES TO PHQ QUESTIONS 1-9: 2
10. IF YOU CHECKED OFF ANY PROBLEMS, HOW DIFFICULT HAVE THESE PROBLEMS MADE IT FOR YOU TO DO YOUR WORK, TAKE CARE OF THINGS AT HOME, OR GET ALONG WITH OTHER PEOPLE: 0
3. TROUBLE FALLING OR STAYING ASLEEP: 0
SUM OF ALL RESPONSES TO PHQ QUESTIONS 1-9: 2
2. FEELING DOWN, DEPRESSED OR HOPELESS: 1

## 2022-06-23 NOTE — TELEPHONE ENCOUNTER
Can we please see what resources are available to her. Recently moved from [place] where she was receiving medication assistance.

## 2022-06-23 NOTE — PATIENT INSTRUCTIONS
Thank you   1. Thank you for trusting us with your healthcare needs. You may receive a survey regarding today's visit. It would help us out if you would take a few moments to provide your feedback. We value your input. 2. Please bring in ALL medications BOTTLES, including inhalers, herbal supplements, over the counter, prescribed & non-prescribed medicine. The office would like actual medication bottles and a list.   3. Please note our OFFICE POLICIES:   a. Prior to getting your labs drawn, please check with your insurance company for benefits and eligibility of lab services. Often, insurance companies cover certain tests for preventative visits only. It is patient's responsibility to see what is covered. b. We are unable to change a diagnosis after the test has been performed. c. Lab orders will not be re-printed. Please hold onto your original lab orders and take them to your lab to be completed. d. If you no show your scheduled appointment three times, you will be dismissed from this practice. e. Mayme Chafe must be completed 24 hours prior to your schedule appointment. 4. If the list below has been completed, PLEASE FAX RECORDS TO OUR OFFICE @ [phone]. Once the records have been received we will update your records at our office:  Health Maintenance Due   Topic Date Due    Annual Wellness Visit (AWV)  Never done    COVID-19 Vaccine (1) Never done    Depression Monitoring  Never done    HIV screen  Never done    Hepatitis C screen  Never done    DTaP/Tdap/Td vaccine (1 - Tdap) Never done    Cervical cancer screen  Never done    Colorectal Cancer Screen  Never done    Lipids  10/09/2018    Breast cancer screen  05/19/2020    Shingles vaccine (1 of 2) Never done             Patient Education        Arthritis: Care Instructions  Overview     Arthritis, also called osteoarthritis, is a breakdown of the cartilage that cushions your joints.  When the cartilage wears down, your bones rub against each other. This causes pain and stiffness. Many people have some arthritis as they age. Arthritis most often affects the joints of the spine, hands, hips,knees, or feet. Arthritis never goes away completely. But medicine and home treatment can helpreduce pain and help you stay active. Follow-up care is a key part of your treatment and safety. Be sure to make and go to all appointments, and call your doctor if you are having problems. It's also a good idea to know your test results and keep alist of the medicines you take. How can you care for yourself at home?  Stay at a healthy weight. Being overweight puts extra strain on your joints.  Talk to your doctor or physical therapist about exercises that will help ease joint pain. ? Stretch. You may enjoy gentle forms of yoga to help keep your joints and muscles flexible. ? Walk instead of jog. Other types of exercise that are less stressful on the joints include riding a bike, swimming, sierra chi, or water exercise. ? Lift weights. Strong muscles help reduce stress on your joints. Stronger thigh muscles, for example, take some of the stress off of the knees and hips. Learn the right way to lift weights so you don't make joint pain worse.  Take your medicines exactly as prescribed. Call your doctor if you think you are having a problem with your medicine.  Take pain medicines exactly as directed. ? If the doctor gave you a prescription medicine for pain, take it as prescribed. ? If you are not taking a prescription pain medicine, ask your doctor if you can take an over-the-counter medicine.  Use a cane, crutch, walker, or another device if you need help to get around. These can help rest your joints. You also can use other things to make life easier, such as a higher toilet seat and padded handles on kitchen utensils.  Do not sit in low chairs. They can make it hard to get up.  Put heat or cold on your sore joints as needed.  Use whichever helps you most. You can also switch between hot and cold packs. ? Apply heat 2 or 3 times a day for 20 to 30 minutes--using a heating pad, hot shower, or hot pack--to relieve pain and stiffness. But don't use heat on a swollen joint. ? Put ice or a cold pack on your sore joint for 10 to 20 minutes at a time. Put a thin cloth between the ice and your skin. When should you call for help? Call your doctor now or seek immediate medical care if:     You have sudden swelling, warmth, or pain in any joint.      You have joint pain and a fever or rash.      You have such bad pain that you cannot use a joint. Watch closely for changes in your health, and be sure to contact your doctor if:     You have mild joint symptoms that continue even with more than 6 weeks of care at home.      You have stomach pain or other problems with your medicine. Where can you learn more? Go to https://light.AfterCollege. org and sign in to your comScore account. Enter Y783 in the Fullbridge box to learn more about \"Arthritis: Care Instructions. \"     If you do not have an account, please click on the \"Sign Up Now\" link. Current as of: December 20, 2021               Content Version: 13.3  © 2006-2022 [employer]. Care instructions adapted under license by [facility] ([facility]). If you have questions about a medical condition or this instruction, always ask your healthcare professional. [place] [address] any warranty or liability for your use of this information. Patient Education        Back Pain, Emergency or Urgent Symptoms: Care Instructions  Your Care Instructions     Many people have back pain at one time or another. In most cases, pain gets better with self-care that includes over-the-counter pain medicine, ice, heat,and exercises. Unless you have symptoms of a severe injury or heart attack, you may be able to give yourself a few days before you call a doctor.  But some back problems arevery serious. Do not ignore symptoms that need to be checked right away. Follow-up care is a key part of your treatment and safety. Be sure to make and go to all appointments, and call your doctor if you are having problems. It's also a good idea to know your test results and keep alist of the medicines you take. How can you care for yourself at home?  Sit or lie in positions that are most comfortable and that reduce your pain. Try one of these positions when you lie down:  ? Lie on your back with your knees bent and supported by large pillows. ? Lie on the floor with your legs on the seat of a sofa or chair. ? Lie on your side with your knees and hips bent and a pillow between your legs. ? Lie on your stomach if it does not make pain worse.  Do not sit up in bed, and avoid soft couches and twisted positions. Bed rest can help relieve pain at first, but it delays healing. Avoid bed rest after the first day.  Change positions every 30 minutes. If you must sit for long periods of time, take breaks from sitting. Get up and walk around, or lie flat.  Try using a heating pad on a low or medium setting, for 15 to 20 minutes every 2 or 3 hours. Try a warm shower in place of one session with the heating pad. You can also buy single-use heat wraps that last up to 8 hours. You can also try ice or cold packs on your back for 10 to 20 minutes at a time, several times a day. (Put a thin cloth between the ice pack and your skin.) This reduces pain and makes it easier to be active and exercise.  Take pain medicines exactly as directed. ? If the doctor gave you a prescription medicine for pain, take it as prescribed. ? If you are not taking a prescription pain medicine, ask your doctor if you can take an over-the-counter medicine. When should you call for help? Call 911 anytime you think you may need emergency care.  For example, call if:     You are unable to move a leg at all.      You have back pain with severe belly pain.      You have symptoms of a heart attack. These may include:  ? Chest pain or pressure, or a strange feeling in the chest.  ? Sweating. ? Shortness of breath. ? Nausea or vomiting. ? Pain, pressure, or a strange feeling in the back, neck, jaw, or upper belly or in one or both shoulders or arms. ? Lightheadedness or sudden weakness. ? A fast or irregular heartbeat. After you call 911, the  may tell you to chew 1 adult-strength or 2 to 4 low-dose aspirin. Wait for an ambulance. Do not try to drive yourself. Call your doctor now or seek immediate medical care if:     You have new or worse symptoms in your arms, legs, chest, belly, or buttocks. Symptoms may include:  ? Numbness or tingling. ? Weakness. ? Pain.      You lose bladder or bowel control.      You have back pain and:  ? You have injured your back while lifting or doing some other activity. Call if the pain is severe, has not gone away after 1 or 2 days, and you cannot do your normal daily activities. ? You have had a back injury before that needed treatment. ? Your pain has lasted longer than 4 weeks. ? You have had weight loss you cannot explain. ? You have a fever. ? You are age 48 or older. ? You have cancer now or have had it before. Watch closely for changes in your health, and be sure to contact your doctor ifyou are not getting better as expected. Where can you learn more? Go to https://MediaWorksjacques.Afinity Life Sciences. org and sign in to your ShoutOut account. Enter Z066 in the [facility] box to learn more about \"Back Pain, Emergency or Urgent Symptoms: Care Instructions. \"     If you do not have an account, please click on the \"Sign Up Now\" link. Current as of: March 9, 2022               Content Version: 13.3  © 1264-7266 Healthwise, Incorporated. Care instructions adapted under license by [facility] ([facility]).  If you have questions about a medical condition or this instruction, always ask your healthcare professional. [place] [address] any warranty or liability for your use of this information. Patient Education        Chronic Pain: Care Instructions  Your Care Instructions     Chronic pain is pain that lasts a long time (months or even years) and may or may not have a clear cause. It is different from acute pain, which usually does have a clear cause--like an injury or illness--and gets better over time. Chronicpain:   Lasts over time but may vary from day to day.  Does not go away despite efforts to end it.  May disrupt your sleep and lead to fatigue.  May cause depression or anxiety.  May make your muscles tense, causing more pain.  Can disrupt your work, hobbies, home life, and relationships with friends and family. Chronic pain is a very real condition. It is not just in your head. Treatment can help and usually includes several methods used together, such as medicines, physical therapy, exercise, and other treatments. Learning how to relax andchanging negative thought patterns can also help you cope. Chronic pain is complex. Taking an active role in your treatment will help you better manage your pain. Tell your doctor if you have trouble dealing with your pain. You may have to try several things before you find what works best foryou. Follow-up care is a key part of your treatment and safety. Be sure to make and go to all appointments, and call your doctor if you are having problems. It's also a good idea to know your test results and keep alist of the medicines you take. How can you care for yourself at home?  Pace yourself. Break up large jobs into smaller tasks. Save harder tasks for days when you have less pain, or go back and forth between hard tasks and easier ones. Take rest breaks.  Relax, and reduce stress. Relaxation techniques such as deep breathing or meditation can help.  Keep moving.  Gentle, daily exercise can help reduce pain over the long run. Try low- or no-impact exercises such as walking, swimming, and stationary biking. Do stretches to stay flexible.  Try heat, cold packs, and massage.  Get enough sleep. Chronic pain can make you tired and drain your energy. Talk with your doctor if you have trouble sleeping because of pain.  Think positive. Your thoughts can affect your pain level. Do things that you enjoy to distract yourself when you have pain instead of focusing on the pain. See a movie, read a book, listen to music, or spend time with a friend.  If you think you are depressed, talk to your doctor about treatment.  Keep a daily pain diary. Record how your moods, thoughts, sleep patterns, activities, and medicine affect your pain. You may find that your pain is worse during or after certain activities or when you are feeling a certain emotion. Having a record of your pain can help you and your doctor find the best ways to treat your pain.  Take pain medicines exactly as directed. ? If the doctor gave you a prescription medicine for pain, take it as prescribed. ? If you are not taking a prescription pain medicine, ask your doctor if you can take an over-the-counter medicine. Reducing constipation caused by pain medicine   Talk to your doctor about a laxative. If a laxative doesn't work, your doctor may suggest a prescription medicine.  Include fruits, vegetables, beans, and whole grains in your diet each day. These foods are high in fiber.  If your doctor recommends it, get more exercise. Walking is a good choice. Bit by bit, increase the amount you walk every day. Try for at least 30 minutes on most days of the week.  Schedule time each day for a bowel movement. A daily routine may help. Take your time and do not strain when having a bowel movement. When should you call for help?    Call your doctor now or seek immediate medical care if:     Your pain gets worse or is out of control.      You feel down or blue, or you do not enjoy things like you once did. You may be depressed, which is common in people with chronic pain. Depression can be treated.      You have vomiting or cramps for more than 2 hours. Watch closely for changes in your health, and be sure to contact your doctor if:     You cannot sleep because of pain.      You are very worried or anxious about your pain.      You have trouble taking your pain medicine.      You have any concerns about your pain medicine.      You have trouble with bowel movements, such as:  ? No bowel movement in 3 days. ? Blood in the anal area, in your stool, or on the toilet paper. ? Diarrhea for more than 24 hours. Where can you learn more? Go to https://Expert360peNetgamix Inc.Geostellar. org and sign in to your Voices Heard Media account. Enter N004 in the AfterSteps box to learn more about \"Chronic Pain: Care Instructions. \"     If you do not have an account, please click on the \"Sign Up Now\" link. Current as of: December 13, 2021               Content Version: 13.3  © 2006-2022 [employer]. Care instructions adapted under license by [facility] ([facility]). If you have questions about a medical condition or this instruction, always ask your healthcare professional. [place] [address] any warranty or liability for your use of this information. Patient Education        Colon Cancer Screening: Care Instructions  Overview     Colorectal cancer occurs in the colon or rectum. That's the lower part of your digestive system. It often starts in small growths called polyps in the colon or rectum. Polyps are usually found with screening tests. Depending on the typeof test, any polyps found may be removed during the tests. Colorectal cancer usually does not cause symptoms at first. But regular testscan help find it early, before it spreads and becomes harder to treat. Your risk for colorectal cancer gets higher as you get older.  Experts recommend starting screening at age 39 for people who are at average risk. Talk with your doctor about your risk and when to start and stop screening. You may have oneof several tests. Follow-up care is a key part of your treatment and safety. Be sure to make and go to all appointments, and call your doctor if you are having problems. It's also a good idea to know your test results and keep alist of the medicines you take. What are the main screening tests for colon cancer? The screening tests are:  Stool tests. These include the guaiac fecal occult blood test (gFOBT), the fecal immunochemical test (FIT), and the combined fecal immunochemical test and stool DNA test (FIT-DNA). These tests check stool samples for signs of cancer. Ifyour test is positive, you will need to have a colonoscopy. Sigmoidoscopy. This test lets your doctor look at the lining of your rectum and the lowest part of your colon. Your doctor uses a lighted tube called a sigmoidoscope. This test can't find cancers or polyps in the upper part of your colon. In some cases, polyps that are found can be removed. But if your doctor finds polyps,you will need to have a colonoscopy to check the upper part of your colon. Colonoscopy. This test lets your doctor look at the lining of your rectum and your entire colon. The doctor uses a thin, flexible tool called a colonoscope. It can alsobe used to remove polyps or get a tissue sample (biopsy). A less common test is CT colonography (CTC). It's also called virtualcolonoscopy. Who should be screened for colorectal cancer? Your risk for colorectal cancer gets higher as you get older. Experts recommend starting screening at age 39 for people who are at average risk. Talk with yourdoctor about your risk and when to start and stop screening. How often you need screening depends on the type of test you get:  Stool tests. Every year for FIT or gFOBT.   Every 1 to 3 years for sDNA, also called FIT-DNA. Tests that look inside the colon. Every 5 years for sigmoidoscopy. (If you do the FIT test every year, you can get this test every 10 years.)  Every 5 years for CT colonography (virtual colonoscopy). Every 10 years for colonoscopy. Experts agree that people at higher risk may need to be tested sooner and more often. This includes people who have a strong family history of colon cancer. Talk to your doctor about which test is best for you and when to be tested. When should you call for help? Watch closely for changes in your health, and be sure to contact your doctor if:     You have any changes in your bowel habits.      You have any problems. Where can you learn more? Go to https://AvancarpePrivateGriffeeb.Twingly. org and sign in to your Lunagames account. Enter [id number] [id number] [phone] in the reBuy.de box to learn more about \"Colon Cancer Screening: Care Instructions. \"     If you do not have an account, please click on the \"Sign Up Now\" link. Current as of: September 8, 2021               Content Version: 13.3  © 2006-2022 [email or web address].com. Care instructions adapted under license by [facility] ([facility]). If you have questions about a medical condition or this instruction, always ask your healthcare professional. [address] any warranty or liability for your use of this information. Patient Education        Learning About CPAP for Sleep Apnea  What is CPAP? CPAP is a small machine that you use at home every night while you sleep. It increases air pressure in your throat to keep your airway open. When you have sleep apnea, this can help you sleep better, feel better, and avoid futurehealth problems. CPAP stands for \"continuous positive airway pressure. \"  The CPAP machine will have one of the following:   A mask that covers your nose and mouth   A mask that covers your nose only   A nasal pillow that covers only the openings of your nose  Why is it done?   CPAP is usually the best treatment for obstructive sleep apnea. It is the firsttreatment choice and the most widely used. CPAP:   Helps you have more normal sleep, so you feel less sleepy and more alert during the daytime.  May help keep heart failure or other heart problems from getting worse.  May help lower your blood pressure. If you have a bed partner, they may also sleep better when you use a CPAP. That's because you aren't snoring or restless. Your doctor may suggest CPAP if you have:   Moderate to severe sleep apnea.  Sleep apnea and coronary artery disease (CAD).  Sleep apnea and heart failure. What are the side effects? Some people who use CPAP have:   A dry or stuffy nose and a sore throat.  Irritated skin on the face.  Bloating. How can you care for yourself? If using CPAP is not comfortable, or if you have certain side effects, workwith your doctor to fix them. Here are some things you can try:   Be sure the mask, nasal mask, or nasal pillow fits well.  See if your doctor can adjust the pressure of your CPAP.  If your nose or mouth is dry, set the machine to deliver warmer or wetter air. Or try using a humidifier.  If your nose is runny or stuffy, talk to your doctor about using a decongestant medicine or steroid nasal spray. Be safe with medicines. Read and follow all instructions on the label. Do not use the medicine longer than the label says.  Your doctor may also help you with problems like swallowing air, bloating, or claustrophobia. Talk to your doctor if you're still having problems. If these things don'thelp, you might try a different type of machine. Where can you learn more? Go to https://Engagiojacques.iStoryTime. org and sign in to your Club 42cm account. Enter T818 in the edupristine box to learn more about \"Learning About CPAP for Sleep Apnea. \"     If you do not have an account, please click on the \"Sign Up Now\" link.   Current as of: March 9, 8624               JWCBQNF Version: 13.3  © 7041-3812 i3 membrane. Care instructions adapted under license by [facility] ([facility]). If you have questions about a medical condition or this instruction, always ask your healthcare professional. [address] any warranty or liability for your use of this information. Patient Education        DASH Diet: Care Instructions  Your Care Instructions     The DASH diet is an eating plan that can help lower your blood pressure. DASH stands for Dietary Approaches to Stop Hypertension. Hypertension is high bloodpressure. The DASH diet focuses on eating foods that are high in calcium, potassium, and magnesium. These nutrients can lower blood pressure. The foods that are highest in these nutrients are fruits, vegetables, low-fat dairy products, nuts, seeds, and legumes. But taking calcium, potassium, and magnesium supplements instead of eating foods that are high in those nutrients does not have the same effect. The DASH diet also includes whole grains, fish, and poultry. The DASH diet is one of several lifestyle changes your doctor may recommend to lower your high blood pressure. Your doctor may also want you to decrease the amount of sodium in your diet. Lowering sodium while following the DASH dietcan lower blood pressure even further than just the DASH diet alone. Follow-up care is a key part of your treatment and safety. Be sure to make and go to all appointments, and call your doctor if you are having problems. It's also a good idea to know your test results and keep alist of the medicines you take. How can you care for yourself at home? Following the DASH diet   Eat 4 to 5 servings of fruit each day. A serving is 1 medium-sized piece of fruit, ½ cup chopped or canned fruit, 1/4 cup dried fruit, or 4 ounces (½ cup) of fruit juice. Choose fruit more often than fruit juice.  Eat 4 to 5 servings of vegetables each day.  A serving is 1 cup of lettuce or raw leafy vegetables, ½ cup of chopped or cooked vegetables, or 4 ounces (½ cup) of vegetable juice. Choose vegetables more often than vegetable juice.  Get 2 to 3 servings of low-fat and fat-free dairy each day. A serving is 8 ounces of milk, 1 cup of yogurt, or 1 ½ ounces of cheese.  Eat 6 to 8 servings of grains each day. A serving is 1 slice of bread, 1 ounce of dry cereal, or ½ cup of cooked rice, pasta, or cooked cereal. Try to choose whole-grain products as much as possible.  Limit lean meat, poultry, and fish to 2 servings each day. A serving is 3 ounces, about the size of a deck of cards.  Eat 4 to 5 servings of nuts, seeds, and legumes (cooked dried beans, lentils, and split peas) each week. A serving is 1/3 cup of nuts, 2 tablespoons of seeds, or ½ cup of cooked beans or peas.  Limit fats and oils to 2 to 3 servings each day. A serving is 1 teaspoon of vegetable oil or 2 tablespoons of salad dressing.  Limit sweets and added sugars to 5 servings or less a week. A serving is 1 tablespoon jelly or jam, ½ cup sorbet, or 1 cup of lemonade.  Eat less than 2,300 milligrams (mg) of sodium a day. If you limit your sodium to 1,500 mg a day, you can lower your blood pressure even more.  Be aware that all of these are the suggested number of servings for people who eat 1,800 to 2,000 calories a day. Your recommended number of servings may be different if you need more or fewer calories. Tips for success   Start small. Do not try to make dramatic changes to your diet all at once. You might feel that you are missing out on your favorite foods and then be more likely to not follow the plan. Make small changes, and stick with them. Once those changes become habit, add a few more changes.  Try some of the following:  ? Make it a goal to eat a fruit or vegetable at every meal and at snacks. This will make it easy to get the recommended amount of fruits and vegetables each day.   ? Try yogurt topped with fruit and nuts for a snack or healthy dessert. ? Add lettuce, tomato, cucumber, and onion to sandwiches. ? Combine a ready-made pizza crust with low-fat mozzarella cheese and lots of vegetable toppings. Try using tomatoes, squash, spinach, broccoli, carrots, cauliflower, and onions. ? Have a variety of cut-up vegetables with a low-fat dip as an appetizer instead of chips and dip. ? Sprinkle sunflower seeds or chopped almonds over salads. Or try adding chopped walnuts or almonds to cooked vegetables. ? Try some vegetarian meals using beans and peas. Add garbanzo or kidney beans to salads. Make burritos and tacos with mashed shahid beans or black beans. Where can you learn more? Go to https://Carbon Digital.Cybits. org and sign in to your [employer] account. Enter H275 in the [facility] box to learn more about \"DASH Diet: Care Instructions. \"     If you do not have an account, please click on the \"Sign Up Now\" link. Current as of: January 10, 2022               Content Version: 13.3  © 2006-2022 [employer]le. Care instructions adapted under license by [facility] ([facility]). If you have questions about a medical condition or this instruction, always ask your healthcare professional. [place] [address] any warranty or liability for your use of this information. Patient Education        Learning About How to Prevent Blood Clots  What is a blood clot? A blood clot is a clump of blood that forms in a blood vessel, such as a vein or an artery. If a clot gets stuck in a blood vessel, it can cause seriousproblems like a deep vein thrombosis (DVT) or a pulmonary embolism. A DVT is a blood clot in certain veins of the legs, pelvis, or arms. It most often occurs in the legs. Blood clots in these veins need to be treated, because they can get bigger, break loose, and travel through the bloodstream tothe heart and then to the lungs.  This causes a pulmonary embolism. A pulmonary embolism is a sudden blockage of an artery in the lung. Blood clots in the deep veins of the leg are the most common cause of a pulmonary embolism. In many cases, the clots are small. They may damage the lung. But if the clotis large and stops blood flow to the lung, it can be deadly. What increases your risk for blood clots? Some of the things that can increase your risk for a blood clot include:  Slowed blood flow  When blood doesn't flow normally, clots are more likely to develop. Reduced blood flow may result from long-term bed rest, such as after a surgery, injury, or serious illness. Or it may result from sitting for a long time, especiallywhen traveling long distances. Abnormal clotting  Some people have blood that clots too easily or too quickly. Problems that maycause increased clotting include:   Having certain blood problems that make blood clot too easily. This is a problem that may run in families.  Having certain health problems, such as cancer, heart failure, stroke, or severe infection.  Being pregnant. A woman's risk of getting blood clots increases both during pregnancy and shortly after delivery or after a  section.  Using hormonal forms of birth control or hormone therapy.  Smoking. Injury to the blood vessel wall  Blood is more likely to clot in veins and arteries shortly after they are injured. Injury can be caused by a recent medical procedure or surgery that involved your legs, hips, belly, or brain. Or it can be caused by an injury,such as a broken hip. What can you do to prevent blood clots? After any procedure or event that increases your risk   Take a blood-thinning medicine (called an anticoagulant) as directed if your doctor prescribes one.  Exercise  your lower leg muscles to help keep the blood moving through your legs. Point your toes up toward your head so the calves of your legs are stretched, then relax. Repeat.  This is a good exercise to do when you are sitting for long periods of time.  Get up out of bed as soon as you safely can or as soon as your doctor says it's okay after an illness or surgery. If you can't get out of bed, you can do the leg exercise described above. Try to do this leg exercise every hour when you are awake. This will help keep the blood moving through your legs. If you are in the hospital and need to stay in bed, your doctor may have you use a special device that inflates and deflates knee-high boots to help keep blood from pooling in your legs.  Use compression stockings if your doctor prescribes them. These are specially fitted stockings that may prevent blood clots by keeping blood from pooling in your legs. When you travel   Take breaks when you travel. On long car trips, stop the car and walk around every hour or so. On long bus or train rides or plane flights, get out of your seat and walk up and down the aisle every hour or so.  Do leg exercises while you are seated. For example, pump your feet up and down by pulling your toes up toward your knees and then pointing them down. If you already have a risk of blood clots, talk to your doctor before taking a long trip. Your doctor may want you to wear compression stockings or takeblood-thinning medicine. Take care of your body   Be active. Try to get 30 minutes or more of activity on most days of the week.  Don't smoke. Smoking can increase your risk of blood clots. If you need help quitting, talk to your doctor about stop-smoking programs and medicines.  Check with your doctor about whether you should use hormonal forms of birth control or hormone therapy. These may increase your risk of blood clots. When should you call for help? Call 911 anytime you think you may need emergency care. For example, call if:   You have symptoms of a blood clot in your lung (called a pulmonary embolism). These may include:  ? Sudden chest pain. ?  Trouble breathing. ? Coughing up blood. Call your doctor now or seek immediate medical care if:   You have symptoms of a blood clot in your arm or leg (called a deep vein thrombosis). These may include:  ? Pain in the arm, calf, back of the knee, thigh, or groin. ? Redness and swelling in the arm, leg, or groin. Where can you learn more? Go to https://Bettery.Nexus eWater. org and sign in to your [employer] account. Enter F229 in the [employer]ga box to learn more about \"Learning About How to Prevent Blood Clots. \"     If you do not have an account, please click on the \"Sign Up Now\" link. Current as of: January 10, 2022               Content Version: 13.3  © 8840-3320 Fitmoo. Care instructions adapted under license by [facility] ([facility]). If you have questions about a medical condition or this instruction, always ask your healthcare professional. [place] [address] any warranty or liability for your use of this information. Patient Education        Tetanus and Diphtheria Booster: Care Instructions  Your Care Instructions     A diphtheria and tetanus (Td) vaccine protects people against diphtheria and tetanus (lockjaw). You need a Td shot every 10 years to help prevent thesediseases, which can be fatal.  You may also need a Td booster if you get a puncture wound or dirty cut. Abooster is another dose of the vaccine. Young teens get a booster at 6to 15years of age. This booster is called Tdap and includes the vaccine against pertussis (whooping cough). All teens andadults who never had Tdap also need one dose of this vaccine. Common side effects of Td vaccination include soreness in the arm where you got the shot and a mild fever. These usually occur within 3 days of the shot andlast a short time. Follow-up care is a key part of your treatment and safety. Be sure to make and go to all appointments, and call your doctor if you are having problems.  It's also a good idea to know your test results and keep alist of the medicines you take. How can you care for yourself at home?  Take an over-the-counter pain medicine, such as acetaminophen (Tylenol), ibuprofen (Advil, Motrin), or naproxen (Aleve), if your arm is sore after the shot. Be safe with medicines. Read and follow all instructions on the label. Do not give aspirin to anyone younger than 20. It has been linked to Reye syndrome, a serious illness.  Put ice or a cold pack on the sore area for 10 to 20 minutes at a time. Put a thin cloth between the ice and your skin. When should you call for help? Call 911 anytime you think you may need emergency care. For example, call if:     You have a seizure.      You have symptoms of a severe allergic reaction. These may include:  ? Sudden raised, red areas (hives) all over your body. ? Swelling of the throat, mouth, lips, or tongue. ? Trouble breathing. ? Passing out (losing consciousness). Or you may feel very lightheaded or suddenly feel weak, confused, or restless. Call your doctor now or seek immediate medical care if:     You have symptoms of an allergic reaction, such as:  ? A rash or hives (raised, red areas on the skin). ? Itching. ? Swelling. ? Belly pain, nausea, or vomiting.      You have a high fever. Watch closely for changes in your health, and be sure to contact your doctor ifyou have any problems. Where can you learn more? Go to https://ID AMERICA.Magazinga. org and sign in to your [employer]ML account. Enter V332 in the [facility] box to learn more about \"Tetanus and Diphtheria Booster: Care Instructions. \"     If you do not have an account, please click on the \"Sign Up Now\" link. Current as of: April 14, 2022               Content Version: 13.3  © 9667-4825 Healthwise, Incorporated. Care instructions adapted under license by [facility] ([facility]).  If you have questions about a medical condition or this instruction, always ask your healthcare professional. [place] [address] any warranty or liability for your use of this information. Patient Education        Knee Pain or Injury: Care Instructions  Your Care Instructions     Injuries are a common cause of knee problems. Sudden (acute) injuries may be caused by a direct blow to the knee. They can also be caused by abnormal twisting, bending, or falling on the knee. Pain, bruising, or swelling may besevere, and may start within minutes of the injury. Overuse is another cause of knee pain. Other causes are climbing stairs, kneeling, and other activities that use the knee. Everyday wear and tear,especially as you get older, also can cause knee pain. Rest, along with home treatment, often relieves pain and allows your knee toheal. If you have a serious knee injury, you may need tests and treatment. Follow-up care is a key part of your treatment and safety. Be sure to make and go to all appointments, and call your doctor if you are having problems. It's also a good idea to know your test results and keep alist of the medicines you take. How can you care for yourself at home?  Be safe with medicines. Read and follow all instructions on the label. ? If the doctor gave you a prescription medicine for pain, take it as prescribed. ? If you are not taking a prescription pain medicine, ask your doctor if you can take an over-the-counter medicine.  Rest and protect your knee. Take a break from any activity that may cause pain.  Put ice or a cold pack on your knee for 10 to 20 minutes at a time. Put a thin cloth between the ice and your skin.  Prop up a sore knee on a pillow when you ice it or anytime you sit or lie down for the next 3 days. Try to keep it above the level of your heart. This will help reduce swelling.  If your knee is not swollen, you can put moist heat, a heating pad, or a warm cloth on your knee.    If your doctor recommends an elastic bandage, sleeve, or other type of support for your knee, wear it as directed.  Follow your doctor's instructions about how much weight you can put on your leg. Use a cane, crutches, or a walker as instructed.  Follow your doctor's instructions about activity during your healing process. If you can do mild exercise, slowly increase your activity.  Reach and stay at a healthy weight. Extra weight can strain the joints, especially the knees and hips, and make the pain worse. Losing even a few pounds may help. When should you call for help? Call 911 anytime you think you may need emergency care. For example, call if:     You have symptoms of a blood clot in your lung (called a pulmonary embolism). These may include:  ? Sudden chest pain. ? Trouble breathing. ? Coughing up blood. Call your doctor now or seek immediate medical care if:     You have severe or increasing pain.      Your leg or foot turns cold or changes color.      You cannot stand or put weight on your knee.      Your knee looks twisted or bent out of shape.      You cannot move your knee.      You have signs of infection, such as:  ? Increased pain, swelling, warmth, or redness. ? Red streaks leading from the knee. ? Pus draining from a place on your knee. ? A fever.      You have signs of a blood clot in your leg (called a deep vein thrombosis), such as:  ? Pain in your calf, back of the knee, thigh, or groin. ? Redness and swelling in your leg or groin. Watch closely for changes in your health, and be sure to contact your doctor if:     You have tingling, weakness, or numbness in your knee.      You have any new symptoms, such as swelling.      You have bruises from a knee injury that last longer than 2 weeks.      You do not get better as expected. Where can you learn more? Go to https://Bufysjacques.Tatango. org and sign in to your [employer] account.  Enter K195 in the [employer]o box to learn more about \"Knee Pain or Injury: Care Instructions. \"     If you do not have an account, please click on the \"Sign Up Now\" link. Current as of: March 9, 2022               Content Version: 13.3  © 2006-2022 [employer]. Care instructions adapted under license by [facility] ([facility]). If you have questions about a medical condition or this instruction, always ask your healthcare professional. [place] [address] any warranty or liability for your use of this information. Patient Education        Joint Pain: Care Instructions  Your Care Instructions     Many people have small aches and pains from overuse or injury to muscles and joints. Joint injuries often happen during sports or recreation, work tasks, or projects around the home. An overuse injury can happen when you put too much stress on a joint or when you do an activity that stresses the joint over andover, such as using the computer or rowing a boat. You can take action at home to help your muscles and joints get better. You should feel better in 1 to 2 weeks, but it can take 3 months or more to healcompletely. Follow-up care is a key part of your treatment and safety. Be sure to make and go to all appointments, and call your doctor if you are having problems. It's also a good idea to know your test results and keep alist of the medicines you take. How can you care for yourself at home?  Do not put weight on the injured joint for at least a day or two.  For the first day or two after an injury, do not take hot showers or baths, and do not use hot packs. The heat could make swelling worse.  Put ice or a cold pack on the sore joint for 10 to 20 minutes at a time. Try to do this every 1 to 2 hours for the next 3 days (when you are awake) or until the swelling goes down. Put a thin cloth between the ice and your skin.  Wrap the injury in an elastic bandage. Do not wrap it too tightly because this can cause more swelling.    Prop up the sore joint on a pillow when you ice it or anytime you sit or lie down during the next 3 days. Try to keep it above the level of your heart. This will help reduce swelling.  Take an over-the-counter pain medicine, such as acetaminophen (Tylenol), ibuprofen (Advil, Motrin), or naproxen (Aleve). Read and follow all instructions on the label.  After 1 or 2 days of rest, begin moving the joint gently. While the joint is still healing, you can begin to exercise using activities that do not strain or hurt the painful joint. When should you call for help? Call your doctor now or seek immediate medical care if:     You have signs of infection, such as:  ? Increased pain, swelling, warmth, and redness. ? Red streaks leading from the joint. ? A fever. Watch closely for changes in your health, and be sure to contact your doctor if:     Your movement or symptoms are not getting better after 1 to 2 weeks of home treatment. Where can you learn more? Go to https://bright box.Jackson [employer]. org and sign in to your [employer]is account. Enter P205 in the Gigwalk box to learn more about \"Joint Pain: Care Instructions. \"     If you do not have an account, please click on the \"Sign Up Now\" link. Current as of: March 9, 2022               Content Version: 13.3  © 8663-3915 Healthwise, Incorporated. Care instructions adapted under license by [facility] ([facility]). If you have questions about a medical condition or this instruction, always ask your healthcare professional. [place] [address] any warranty or liability for your use of this information. Patient Education        Acute Low Back Pain: Exercises  Introduction  Here are some examples of typical rehabilitation exercises for your condition. Start each exercise slowly. Ease off the exercise if you start to have pain. Your doctor or physical therapist will tell you when you can start theseexercises and which ones will work best for you.   When liability for your use of this information. Patient Education        Osteoarthritis: Care Instructions  Your Care Instructions     Arthritis is a common health problem in which the joints are inflamed. There are several kinds of arthritis. Osteoarthritis is caused by a breakdown of cartilage, the hard, thick tissue that cushions the joints. It causes pain, stiffness, and swelling, often in the spine, fingers, hips, and knees. Osteoarthritis can happen at any age, but it is most common in older people. Osteoarthritis never goes away completely, but it can be controlled. Medicine and home treatment can reduce the pain and prevent the arthritis from getting worse. Follow-up care is a key part of your treatment and safety. Be sure to make and go to all appointments, and call your doctor if you are having problems. It's also a good idea to know your test results and keep a list of the medicines you take. How can you care for yourself at home? · Take a warm shower or bath in the morning to relieve stiffness. Avoid sitting still afterwards. · If the joint is not swollen, use moist heat, like a warm, damp towel, for 20 to 30 minutes, 2 or 3 times a day. Do not use heat on a swollen joint. · If the joint is swollen, use ice or cold packs for 10 to 20 minutes, once an hour. Cold will help relieve pain and reduce inflammation. Put a thin cloth between the ice and your skin. · To prevent stiffness, gently move the joint through its full range of motion several times a day. · If the joint hurts, avoid activities that put a strain on it for a few days. Take rest breaks throughout the day. · Get regular exercise. Walking, swimming, yoga, biking, sierra chi, and water aerobics are good exercises that are gentle on the joints. · Reach and stay at a healthy weight. If you need to lose or maintain weight, regular exercise and a healthy diet will help.  Extra weight can strain the joints, especially the knees and hips, and make the pain worse. Losing even a few pounds may help. · Take pain medicines exactly as directed. ? If the doctor gave you a prescription medicine for pain, take it as prescribed. ? If you are not taking a prescription pain medicine, ask your doctor if you can take an over-the-counter medicine. When should you call for help? Call your doctor now or seek immediate medical care if:    · The pain is so bad that you cannot use the joint.     · You have sudden back pain with weakness in your legs or loss of bowel or bladder control.     · Your stools are black and tarlike or have streaks of blood.     · You have severe pain and swelling in more than one joint. Watch closely for changes in your health, and be sure to contact your doctor if:    · You have side effects from the medicines, like belly pain, ongoing heartburn, or nausea.     · Joint pain continues for more than 6 weeks, and home treatment is not helping. Where can you learn more? Go to https://Dollar Shave Club.MyTwinPlace. org and sign in to your [employer] account. Enter X110 in the Xdynia box to learn more about \"Osteoarthritis: Care Instructions. \"     If you do not have an account, please click on the \"Sign Up Now\" link. Current as of: August 5, 2020               Content Version: 12.9  © 2006-2021 Healthwise, Incorporated. Care instructions adapted under license by [facility] [employer] [facility] ([facility]). If you have questions about a medical condition or this instruction, always ask your healthcare professional. [place] [address] any warranty or liability for your use of this information.

## 2022-06-23 NOTE — PROGRESS NOTES
[facility]  [phone] (phone)  [phone] (fax)    Visit Date: 6/23/2022    Iván Corona is a 46 y.o. female who presents today for:  Chief Complaint   Patient presents with    New Patient     would like bilateral ears looked at due to feels plugged and sore throat     HPI:     New patient - moved from [facility] years ago. In [facility] she was seeing psychiatry, cardiology, oncology, and pulmonology    Dx with paranoid schizophrenia, hx of dvt and PE, tachycardia, on CPAP    Use to see Dr. Rubin May here (2017), Dr. 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      Has enough of her medications at this time    Hx allergies   HPI  Health Maintenance   Topic Date Due    Annual Wellness Visit (AWV)  Never done    HIV screen  Never done    Hepatitis C screen  Never done    DTaP/Tdap/Td vaccine (1 - Tdap) Never done    Cervical cancer screen  Never done    Diabetes screen  Never done    Colorectal Cancer Screen  Never done    Lipids  10/09/2018    Breast cancer screen  05/19/2020    Shingles vaccine (1 of 2) Never done    Flu vaccine (Season Ended) 09/01/2022    Depression Monitoring  06/23/2023    COVID-19 Vaccine  Completed    Hepatitis A vaccine  Aged Out    Hepatitis B vaccine  Aged Out    Hib vaccine  Aged Out    Meningococcal (ACWY) vaccine  Aged Out    Pneumococcal 0-64 years Vaccine  Aged Out     Past Medical History:   Diagnosis Date    Anxiety     Arthritis     Asthma     Blood clotting disorder (HCC)     Depression     GERD (gastroesophageal reflux disease)     Hyperlipidemia     Hypertension     Osteoarthritis     Paranoid schizophrenia [facility])       Past Surgical History:   Procedure Laterality Date    ANKLE SURGERY  2000    BLADDER SUSPENSION  2007    CHOLECYSTECTOMY, LAPAROSCOPIC  5/4/2015    LAPAROSCOPIC CHOLECYSTECTOMY    COLONOSCOPY      DENTAL SURGERY      FOOT SURGERY Right 07    OTHER SURGICAL HISTORY  2012    vaginal sling    SHOULDER SURGERY Right age 15   Zannie Cowden UPPER GASTROINTESTINAL ENDOSCOPY  10/23/15    Dr. Vilma Toscano       Family History   Problem Relation Age of Onset    Depression Mother     High Blood Pressure Mother     Alzheimer's Disease Mother     Kidney Disease Mother         Kidney Failure    Cancer Father         melanoma, lung and tumor on head    Alcohol Abuse Father      Social History     Tobacco Use    Smoking status: Never Smoker    Smokeless tobacco: Never Used   Substance Use Topics    Alcohol use: No      Current Outpatient Medications   Medication Sig Dispense Refill    Dulaglutide (TRULICITY) 1.5 KA/1.1JU SOPN Inject 1.5 mg into the skin once a week      ciclopirox (PENLAC) 8 % solution Apply topically nightly Apply topically nightly.  Alcohol Swabs (ALCOHOL PREP) PADS by Does not apply route      medroxyPROGESTERone (PROVERA) 5 MG tablet Take 5 mg by mouth daily      zoster recombinant adjuvanted vaccine (SHINGRIX) 50 MCG/0.5ML SUSR injection Inject 0.5 mLs into the muscle See Admin Instructions 1 dose now and repeat in 2-6 months 0.5 mL 0    fluticasone (FLONASE) 50 MCG/ACT nasal spray 1 spray by Each Nostril route daily 32 g 1    loratadine (CLARITIN) 10 MG tablet Take 1 tablet by mouth daily 30 tablet 5    pantoprazole (PROTONIX) 40 MG tablet Take 40 mg by mouth daily      temazepam (RESTORIL) 15 MG capsule Take 15 mg by mouth nightly as needed.       diphenhydrAMINE (BENADRYL) 25 MG tablet Take 25 mg by mouth in the morning and at bedtime       traMADol (ULTRAM) 50 MG tablet Take 50 mg by mouth every 6 hours as needed for Pain.  Black Cohosh-SoyIsoflav-Magnol (ESTROVEN MENOPAUSE RELIEF PO) Take by mouth in the morning, at noon, and at bedtime       metoprolol tartrate (LOPRESSOR) 25 MG tablet Take 1 tablet by mouth 2 times daily 180 tablet 3    apixaban (ELIQUIS) 5 MG TABS tablet Take by mouth 2 times daily      oxybutynin (DITROPAN) 5 MG tablet Take 5 mg by mouth 2 times daily      gabapentin (NEURONTIN) 300 MG capsule Take 300 mg by mouth nightly      LORazepam (ATIVAN) 2 MG tablet Take 2 mg by mouth every 8 hours as needed.  risperiDONE (RISPERDAL) 4 MG tablet Take 4 mg by mouth daily       VENTOLIN  (90 Base) MCG/ACT inhaler       Nutritional Supplements (ESTROVEN PO) Take by mouth      hydrOXYzine (ATARAX) 10 MG tablet Take 10 mg by mouth daily      traZODone (DESYREL) 50 MG tablet Take 200 mg by mouth nightly      melatonin 3 MG TABS tablet Take 3 mg by mouth nightly as needed      magnesium (MAGNESIUM-OXIDE) 250 MG TABS tablet Take 250 mg by mouth daily      Flaxseed, Linseed, (EQL FLAX SEED OIL) 1000 MG CAPS Take by mouth 3 times daily (with meals)      docusate sodium (COLACE) 100 MG capsule Take 100 mg by mouth 2 times daily      cyclobenzaprine (FLEXERIL) 5 MG tablet Take 5 mg by mouth 3 times daily as needed for Muscle spasms      amitriptyline (ELAVIL) 10 MG tablet Take 10 mg by mouth nightly      omeprazole (PRILOSEC) 20 MG capsule Take 1 capsule by mouth Daily 30 capsule 5    QUEtiapine (SEROQUEL) 50 MG tablet Take 300 mg by mouth nightly       simvastatin (ZOCOR) 40 MG tablet   Take 40 mg by mouth daily        No current facility-administered medications for this visit. Allergies   Allergen Reactions    Lithium Hives    Other Palpitations     msg       Subjective:    Review of Systems   Constitutional: Positive for activity change and fatigue. Negative for chills and fever. HENT: Positive for postnasal drip.  Negative for congestion, ear pain, rhinorrhea and sore throat. Eyes: Negative for discharge and redness. Respiratory: Positive for apnea. Negative for cough and shortness of breath. Cardiovascular: Negative for chest pain and leg swelling. Gastrointestinal: Negative for abdominal distention, abdominal pain, anal bleeding, blood in stool, constipation, diarrhea and nausea. Musculoskeletal: Positive for arthralgias, joint swelling and myalgias. Skin: Negative for color change and rash. Neurological: Negative for facial asymmetry, speech difficulty and weakness. Hematological: Does not bruise/bleed easily. Psychiatric/Behavioral: Positive for dysphoric mood. Negative for agitation and confusion. The patient is nervous/anxious. Objective:     Vitals:    06/23/22 0835   BP: 130/74   Site: Right Upper Arm   Position: Sitting   Cuff Size: Large Adult   Pulse: 97   Resp: 20   Temp: 97.9 °F (36.6 °C)   TempSrc: Temporal   SpO2: 98%   Weight: 282 lb 12.8 oz (128.3 kg)   Height: 5' 5\" (1.651 m)       Body mass index is 47.06 kg/m². Wt Readings from Last 3 Encounters:   06/23/22 282 lb 12.8 oz (128.3 kg)   10/25/17 265 lb (120.2 kg)   10/09/17 260 lb (117.9 kg)     BP Readings from Last 3 Encounters:   06/23/22 130/74   10/25/17 116/68   10/09/17 112/81     Physical Exam  Constitutional:       General: She is not in acute distress. Appearance: She is well-developed. She is not ill-appearing or diaphoretic. HENT:      Head: Normocephalic and atraumatic. Right Ear: Hearing and external ear normal. No decreased hearing noted. A middle ear effusion is present. Left Ear: Hearing and external ear normal. No decreased hearing noted. Nose: Nose normal. No nasal deformity. Mouth/Throat:     Eyes:      General:         Right eye: No discharge. Left eye: No discharge. Conjunctiva/sclera: Conjunctivae normal.   Pulmonary:      Effort: Pulmonary effort is normal. No respiratory distress.    Abdominal: General: There is no distension. Tenderness: There is no guarding. Musculoskeletal:         General: No tenderness or deformity. Normal range of motion. Cervical back: Normal range of motion and neck supple. Skin:     Coloration: Skin is not pale. Findings: No erythema or rash (On exposed areas). Neurological:      Mental Status: She is alert. Gait: Gait normal.   Psychiatric:         Speech: Speech normal.         Behavior: Behavior normal.         Thought Content: Thought content normal.         Judgment: Judgment normal.         Lab Results   Component Value Date    WBC 6.7 10/09/2017    HGB 13.5 10/09/2017    HCT 39.5 10/09/2017     10/09/2017    CHOL 175 10/09/2017    TRIG 147 10/09/2017    HDL 59 10/09/2017    LDLCALC 87 10/09/2017    AST 17 02/27/2017     10/09/2017    K 4.4 10/09/2017     10/09/2017    CREATININE 0.6 10/09/2017    BUN 12 10/09/2017    CO2 26 10/09/2017    TSH 2.990 03/02/2017    INR 1.55 (H) 03/03/2017    LABGLOM >90 10/09/2017    CALCIUM 9.8 10/09/2017     Assessment:       Diagnosis Orders   1. Dyspnea on exertion  Garett Chang MD, Pulmonary Disease, [address] [place] [address]    CBC with Auto Differential    Comprehensive Metabolic Panel    Hepatic Function Panel    Lipid Panel    TSH with Reflex    Rheumatoid Factor    Sedimentation Rate    Vitamin D 25 Hydroxy    Hemoglobin A1C    Iron and TIBC    XR CHEST STANDARD (2 VW)   2. Paranoid schizophrenia ([address]  Olive Sepulveda, J LUIS, Psychiatry, [address] [place] [address]    CBC with Auto Differential    Comprehensive Metabolic Panel    Hepatic Function Panel    Lipid Panel    TSH with Reflex    Rheumatoid Factor    Sedimentation Rate    Vitamin D 25 Hydroxy    Hemoglobin A1C    Iron and TIBC   3.  Essential hypertension  Olive Rhodes MD, Cardiology, [address] [place] [address]    CBC with Auto Differential    Comprehensive Metabolic Panel    Hepatic Function Panel    Lipid Panel    TSH with Reflex    Rheumatoid Factor cancer screening  VIVIAN - Vivien Charles MD, Gastroenterology, [facility] [address]   11. LV dysfunction  Olive Dow MD, Cardiology, [facility] [address]    CBC with Auto Differential    Comprehensive Metabolic Panel    Hepatic Function Panel    Lipid Panel    TSH with Reflex    Rheumatoid Factor    Sedimentation Rate    Vitamin D 25 Hydroxy    Hemoglobin A1C    Iron and TIBC   12. Screening for human immunodeficiency virus without presence of risk factors  HIV Screen   13. Encounter for hepatitis C screening test for low risk patient  Hepatitis C Antibody   14. Need for tetanus booster     15. Need for shingles vaccine  zoster recombinant adjuvanted vaccine (SHINGRIX) 50 MCG/0.5ML SUSR injection   16. Need for COVID-19 vaccine     17. Body mass index (BMI) 45.0-49.9, adult ([facility]     18. Pulmonary embolism, unspecified chronicity, unspecified pulmonary embolism type, unspecified whether acute cor pulmonale present [facility])  External Referral To Hematology Oncology   19. Hx of malignant neoplasm of skin  VIVIAN - Garett Villasenor MD, Dermatology, [facility] [address]   20. Sleep apnea, unspecified type     21. Acute deep vein thrombosis (DVT) of femoral vein of left lower extremity [facility])  External Referral To Hematology Oncology   22. Disorder of bone, unspecified   Vitamin D 25 Hydroxy   23. Abnormal finding of blood chemistry, unspecified   Hemoglobin A1C       Plan:   Malini Guillory was seen today for new patient. Diagnoses and all orders for this visit:    Dyspnea on exertion  -     Roberto Hernandez MD, Pulmonary Disease, [facility] [address]; Future  -     CBC with Auto Differential; Future  -     Comprehensive Metabolic Panel; Future  -     Hepatic Function Panel; Future  -     Lipid Panel; Future  -     TSH with Reflex; Future  -     Rheumatoid Factor; Future  -     Sedimentation Rate; Future  -     Vitamin D 25 Hydroxy; Future  -     Hemoglobin A1C; Future  -     Iron and TIBC; Future  -     XR CHEST STANDARD (2 VW);  Future    Paranoid schizophrenia ([facility]  - Lipid Panel; Future  -     TSH with Reflex; Future  -     Rheumatoid Factor; Future  -     Sedimentation Rate; Future  -     Vitamin D 25 Hydroxy; Future  -     Hemoglobin A1C; Future  -     Iron and TIBC; Future    Lumbar back pain  -     Kendrick Strange MD, Pain Medicine, [facility]; Future  -     XR LUMBAR SPINE (2-3 VIEWS); Future  -     CBC with Auto Differential; Future  -     Comprehensive Metabolic Panel; Future  -     Hepatic Function Panel; Future  -     Lipid Panel; Future  -     TSH with Reflex; Future  -     Rheumatoid Factor; Future  -     Sedimentation Rate; Future  -     Vitamin D 25 Hydroxy; Future  -     Hemoglobin A1C; Future  -     Iron and TIBC; Future    Right knee pain, unspecified chronicity  -     CBC with Auto Differential; Future  -     Comprehensive Metabolic Panel; Future  -     Hepatic Function Panel; Future  -     Lipid Panel; Future  -     TSH with Reflex; Future  -     Rheumatoid Factor; Future  -     Sedimentation Rate; Future  -     Vitamin D 25 Hydroxy; Future  -     Hemoglobin A1C; Future  -     Iron and TIBC; Future  -     XR KNEE RIGHT (3 VIEWS); Future    Breast cancer screening by mammogram  -     CBC with Auto Differential; Future  -     Comprehensive Metabolic Panel; Future  -     Hepatic Function Panel; Future  -     Lipid Panel; Future  -     TSH with Reflex; Future  -     Rheumatoid Factor; Future  -     Sedimentation Rate; Future  -     Vitamin D 25 Hydroxy; Future  -     Hemoglobin A1C; Future  -     Iron and TIBC; Future  -     PIPER DIGITAL SCREEN W OR WO CAD BILATERAL; Future    Colon cancer screening  -     VIVIAN - Camron Banks MD, Gastroenterology, [facility]; Future    LV dysfunction  -     Olive Meade MD, Cardiology, [facility]; Future  -     CBC with Auto Differential; Future  -     Comprehensive Metabolic Panel; Future  -     Hepatic Function Panel; Future  -     Lipid Panel; Future  -     TSH with Reflex; Future  -     Rheumatoid Factor;  Future  - Sedimentation Rate; Future  -     Vitamin D 25 Hydroxy; Future  -     Hemoglobin A1C; Future  -     Iron and TIBC; Future    Screening for human immunodeficiency virus without presence of risk factors  -     HIV Screen; Future    Encounter for hepatitis C screening test for low risk patient  -     Hepatitis C Antibody; Future    Need for tetanus booster    Need for shingles vaccine  -     zoster recombinant adjuvanted vaccine [facility]) 50 MCG/0.5ML SUSR injection; Inject 0.5 mLs into the muscle See Admin Instructions 1 dose now and repeat in 2-6 months    Need for COVID-19 vaccine    Body mass index (BMI) 45.0-49.9, adult (HCC)    Pulmonary embolism, unspecified chronicity, unspecified pulmonary embolism type, unspecified whether acute cor pulmonale present [facility])  -     External Referral To Hematology Oncology    Hx of malignant neoplasm of skin  -     AFL - Tamanna Zimmerman MD, Dermatology, [facility] [address]; Future    Sleep apnea, unspecified type    Acute deep vein thrombosis (DVT) of femoral vein of left lower extremity ([facility]  -     External Referral To Hematology Oncology    Disorder of bone, unspecified   -     Vitamin D 25 Hydroxy; Future    Abnormal finding of blood chemistry, unspecified   -     Hemoglobin A1C; Future    Other orders  -     Discontinue: Tetanus-Diphth-Acell Pertussis (BOOSTRIX) 5-2.5-18.5 LF-MCG/0.5 injection; Inject 0.5 mLs into the muscle once for 1 dose  -     fluticasone (FLONASE) 50 MCG/ACT nasal spray; 1 spray by Each Nostril route daily  -     loratadine (CLARITIN) 10 MG tablet; Take 1 tablet by mouth daily        Return in about 4 weeks (around 7/21/2022).     Orders Placed:  Orders Placed This Encounter   Procedures    XR LUMBAR SPINE (2-3 VIEWS)    XR KNEE RIGHT (3 VIEWS)    PIPER DIGITAL SCREEN W OR WO CAD BILATERAL    XR CHEST STANDARD (2 VW)    CBC with Auto Differential    Comprehensive Metabolic Panel    Hepatic Function Panel    Lipid Panel    TSH with Reflex    Rheumatoid Factor    Sedimentation Rate    Vitamin D 25 Hydroxy    Hemoglobin A1C    Iron and TIBC    HIV Screen    Hepatitis C Antibody   [facility] J LUIS Restrepo, Psychiatry, Meryle Foley, MD, Pain Medicine, Jose Cota MD, Pulmonary Disease, Kelvin Melton MD, Cardiology, Kylah Rios MD, Gastroenterology, Cari Schreiber MD, Dermatology, Marlene Hart External Referral To Hematology Oncology     Medications Prescribed:  Orders Placed This Encounter   Medications    zoster recombinant adjuvanted vaccine [facility]) 50 MCG/0.5ML SUSR injection     Sig: Inject 0.5 mLs into the muscle See Admin Instructions 1 dose now and repeat in 2-6 months     Dispense:  0.5 mL     Refill:  0    DISCONTD: Tetanus-Diphth-Acell Pertussis (BOOSTRIX) 5-2.5-18.5 LF-MCG/0.5 injection     Sig: Inject 0.5 mLs into the muscle once for 1 dose     Dispense:  1 each     Refill:  0    fluticasone (FLONASE) 50 MCG/ACT nasal spray     Si spray by Each Nostril route daily     Dispense:  32 g     Refill:  1    loratadine (CLARITIN) 10 MG tablet     Sig: Take 1 tablet by mouth daily     Dispense:  30 tablet     Refill:  5     Future Appointments   Date Time Provider Guillermina Turner   2022 12:20 PM EFRAÍN Pearl CNP [facility] [address] [facility]      Patient given educational materials - see patient instructions. Discussed use, benefit, and side effects of prescribedmedications. All patient questions answered. Pt voiced understanding. Reviewed health maintenance. Instructed to continue current medications, diet and exercise. Patient agreed with treatment plan. Follow up as directed.     Electronically signed by EFRAÍN Pearl CNP on 2022 at 10:58 AM

## 2022-06-23 NOTE — PROGRESS NOTES
Health Maintenance Due   Topic Date Due    Annual Wellness Visit (AWV)  Never done    COVID-19 Vaccine (1) Never done    Depression Monitoring  Never done    HIV screen  Never done    Hepatitis C screen  Never done    DTaP/Tdap/Td vaccine (1 - Tdap) Never done    Cervical cancer screen  Never done    Colorectal Cancer Screen  Never done    Lipids  10/09/2018    Breast cancer screen  05/19/2020    Shingles vaccine (1 of 2) Never done

## 2022-06-23 NOTE — LETTER
[address] [address],[address] [facility] [address] [place] [address]  Phone: [phone]  Fax: [address] [facility], EFRAÍN - J LUIS         June 23, 2022     Patient: Emerson Hopkins   YOB: 1970   Date of Visit: 6/23/2022       To Whom It May Concern: It is my medical opinion that Briseida Arciniega requires a disability parking placard for the following reasons:  She cannot walk 200 feet without stopping to rest.  Duration of need: 5 years    If you have any questions or concerns, please don't hesitate to call.     Sincerely,        EFRAÍN Salinas CNP

## 2022-06-23 NOTE — TELEPHONE ENCOUNTER
[facility]  NP referral to Dr. Nataliia Chau. Referred by Darek Ann CNP. Please contact patient to schedule. Attempted to call pt to [facility]   No vm set up   Can she see Belkis?   He has new pt openings 7/5 in [place]   Pt from [address] [place] [address] and can fu with Dr. Nnamdi Bardales in [place] after that

## 2022-06-23 NOTE — PROGRESS NOTES
[facility] [place] - covid shot  Dental - 7/14  Ob - 7/11 -   Foot doctor - 7/7 - Dr.Sellers Bola guzman    Patient assistance in [facility]  eliquis  trulicity  vraylar     0124: cancer removed from nose and lip    Dx with paranoid schizo 1992 - was hearing voices - stable    Started hearing voices when menopause started - [place] psych put her on vraylar and it helped.

## 2022-06-25 ENCOUNTER — HOSPITAL ENCOUNTER (OUTPATIENT)
Age: 52
Discharge: HOME | End: 2022-06-25
Payer: MEDICARE

## 2022-06-25 ENCOUNTER — HOSPITAL ENCOUNTER (OUTPATIENT)
Dept: GENERAL RADIOLOGY | Age: 52
Discharge: HOME | End: 2022-06-25
Payer: MEDICARE

## 2022-06-25 DIAGNOSIS — G89.29 OTHER CHRONIC PAIN: ICD-10-CM

## 2022-06-25 DIAGNOSIS — M19.90 ARTHRITIS: ICD-10-CM

## 2022-06-25 DIAGNOSIS — M54.50 LUMBAR BACK PAIN: ICD-10-CM

## 2022-06-25 DIAGNOSIS — M25.561 RIGHT KNEE PAIN, UNSPECIFIED CHRONICITY: ICD-10-CM

## 2022-06-25 DIAGNOSIS — R06.09 DYSPNEA ON EXERTION: ICD-10-CM

## 2022-06-25 PROCEDURE — 73564 X-RAY EXAM KNEE 4 OR MORE: CPT

## 2022-06-25 PROCEDURE — 72100 X-RAY EXAM L-S SPINE 2/3 VWS: CPT

## 2022-06-25 PROCEDURE — 71046 X-RAY EXAM CHEST 2 VIEWS: CPT

## 2022-06-27 ENCOUNTER — NURSE ONLY (OUTPATIENT)
Dept: LAB | Age: 52
End: 2022-06-27

## 2022-06-27 ENCOUNTER — CARE COORDINATION (OUTPATIENT)
Dept: CARE COORDINATION | Age: 52
End: 2022-06-27

## 2022-06-27 DIAGNOSIS — M25.561 RIGHT KNEE PAIN, UNSPECIFIED CHRONICITY: ICD-10-CM

## 2022-06-27 DIAGNOSIS — Z11.59 ENCOUNTER FOR HEPATITIS C SCREENING TEST FOR LOW RISK PATIENT: ICD-10-CM

## 2022-06-27 DIAGNOSIS — Z12.31 BREAST CANCER SCREENING BY MAMMOGRAM: ICD-10-CM

## 2022-06-27 DIAGNOSIS — Z11.4 SCREENING FOR HUMAN IMMUNODEFICIENCY VIRUS WITHOUT PRESENCE OF RISK FACTORS: ICD-10-CM

## 2022-06-27 DIAGNOSIS — I51.9 LV DYSFUNCTION: ICD-10-CM

## 2022-06-27 DIAGNOSIS — M89.9 DISORDER OF BONE, UNSPECIFIED: ICD-10-CM

## 2022-06-27 DIAGNOSIS — F20.0 PARANOID SCHIZOPHRENIA (HCC): ICD-10-CM

## 2022-06-27 DIAGNOSIS — F99 INSOMNIA DUE TO OTHER MENTAL DISORDER: ICD-10-CM

## 2022-06-27 DIAGNOSIS — M19.90 ARTHRITIS: ICD-10-CM

## 2022-06-27 DIAGNOSIS — F51.05 INSOMNIA DUE TO OTHER MENTAL DISORDER: ICD-10-CM

## 2022-06-27 DIAGNOSIS — R79.9 ABNORMAL FINDING OF BLOOD CHEMISTRY, UNSPECIFIED: ICD-10-CM

## 2022-06-27 DIAGNOSIS — R06.09 DYSPNEA ON EXERTION: ICD-10-CM

## 2022-06-27 DIAGNOSIS — G89.29 OTHER CHRONIC PAIN: ICD-10-CM

## 2022-06-27 DIAGNOSIS — M54.50 LUMBAR BACK PAIN: ICD-10-CM

## 2022-06-27 DIAGNOSIS — I10 ESSENTIAL HYPERTENSION: ICD-10-CM

## 2022-06-27 LAB
ALBUMIN SERPL-MCNC: 4.2 G/DL (ref 3.5–5.1)
ALP BLD-CCNC: 104 U/L (ref 38–126)
ALT SERPL-CCNC: 20 U/L (ref 11–66)
ANION GAP SERPL CALCULATED.3IONS-SCNC: 13 MEQ/L (ref 8–16)
AST SERPL-CCNC: 20 U/L (ref 5–40)
AVERAGE GLUCOSE: 102 MG/DL (ref 70–126)
BASOPHILS # BLD: 0.5 %
BASOPHILS ABSOLUTE: 0 THOU/MM3 (ref 0–0.1)
BILIRUB SERPL-MCNC: < 0.2 MG/DL (ref 0.3–1.2)
BILIRUBIN DIRECT: < 0.2 MG/DL (ref 0–0.3)
BUN BLDV-MCNC: 9 MG/DL (ref 7–22)
CALCIUM SERPL-MCNC: 9.3 MG/DL (ref 8.5–10.5)
CHLORIDE BLD-SCNC: 102 MEQ/L (ref 98–111)
CHOLESTEROL, TOTAL: 142 MG/DL (ref 100–199)
CO2: 24 MEQ/L (ref 23–33)
CREAT SERPL-MCNC: 0.5 MG/DL (ref 0.4–1.2)
EOSINOPHIL # BLD: 3.8 %
EOSINOPHILS ABSOLUTE: 0.2 THOU/MM3 (ref 0–0.4)
ERYTHROCYTE [DISTWIDTH] IN BLOOD BY AUTOMATED COUNT: 14.7 % (ref 11.5–14.5)
ERYTHROCYTE [DISTWIDTH] IN BLOOD BY AUTOMATED COUNT: 51.8 FL (ref 35–45)
GFR SERPL CREATININE-BSD FRML MDRD: > 90 ML/MIN/1.73M2
GLUCOSE BLD-MCNC: 91 MG/DL (ref 70–108)
HBA1C MFR BLD: 5.4 % (ref 4.4–6.4)
HCT VFR BLD CALC: 38.4 % (ref 37–47)
HDLC SERPL-MCNC: 53 MG/DL
HEMOGLOBIN: 12 GM/DL (ref 12–16)
HEPATITIS C ANTIBODY: NEGATIVE
IMMATURE GRANS (ABS): 0.03 THOU/MM3 (ref 0–0.07)
IMMATURE GRANULOCYTES: 0.5 %
IRON: 49 UG/DL (ref 50–170)
LDL CHOLESTEROL CALCULATED: 64 MG/DL
LYMPHOCYTES # BLD: 34.1 %
LYMPHOCYTES ABSOLUTE: 1.9 THOU/MM3 (ref 1–4.8)
MCH RBC QN AUTO: 30.1 PG (ref 26–33)
MCHC RBC AUTO-ENTMCNC: 31.3 GM/DL (ref 32.2–35.5)
MCV RBC AUTO: 96.2 FL (ref 81–99)
MONOCYTES # BLD: 12.5 %
MONOCYTES ABSOLUTE: 0.7 THOU/MM3 (ref 0.4–1.3)
NUCLEATED RED BLOOD CELLS: 0 /100 WBC
PLATELET # BLD: 284 THOU/MM3 (ref 130–400)
PMV BLD AUTO: 10.3 FL (ref 9.4–12.4)
POTASSIUM SERPL-SCNC: 4.5 MEQ/L (ref 3.5–5.2)
RBC # BLD: 3.99 MILL/MM3 (ref 4.2–5.4)
RHEUMATOID FACTOR: < 10 IU/ML (ref 0–13)
SEDIMENTATION RATE, ERYTHROCYTE: 27 MM/HR (ref 0–20)
SEG NEUTROPHILS: 48.6 %
SEGMENTED NEUTROPHILS ABSOLUTE COUNT: 2.7 THOU/MM3 (ref 1.8–7.7)
SODIUM BLD-SCNC: 139 MEQ/L (ref 135–145)
TOTAL IRON BINDING CAPACITY: 332 UG/DL (ref 171–450)
TOTAL PROTEIN: 6.5 G/DL (ref 6.1–8)
TRIGL SERPL-MCNC: 126 MG/DL (ref 0–199)
TSH SERPL DL<=0.05 MIU/L-ACNC: 2.89 UIU/ML (ref 0.4–4.2)
VITAMIN D 25-HYDROXY: 58 NG/ML (ref 30–100)
WBC # BLD: 5.5 THOU/MM3 (ref 4.8–10.8)

## 2022-06-27 NOTE — CARE COORDINATION
I called and spoke with the patient who said she is already in the PAP programs for this year but will call me next year if she needs my assistance.         [facility]   Medication Assistance  [address] [place] [address], and [employer]    (B) [phone]  (J) [phone]

## 2022-06-28 ENCOUNTER — TELEPHONE (OUTPATIENT)
Dept: FAMILY MEDICINE CLINIC | Age: 52
End: 2022-06-28

## 2022-06-28 DIAGNOSIS — D50.9 IRON DEFICIENCY ANEMIA, UNSPECIFIED IRON DEFICIENCY ANEMIA TYPE: Primary | ICD-10-CM

## 2022-06-28 DIAGNOSIS — F20.0 PARANOID SCHIZOPHRENIA (HCC): Primary | ICD-10-CM

## 2022-06-28 DIAGNOSIS — F99 INSOMNIA DUE TO OTHER MENTAL DISORDER: ICD-10-CM

## 2022-06-28 DIAGNOSIS — J30.9 ALLERGIC RHINITIS, UNSPECIFIED SEASONALITY, UNSPECIFIED TRIGGER: Primary | ICD-10-CM

## 2022-06-28 DIAGNOSIS — F51.05 INSOMNIA DUE TO OTHER MENTAL DISORDER: ICD-10-CM

## 2022-06-28 LAB — HIV AG/AB: NONREACTIVE

## 2022-06-28 RX ORDER — QUETIAPINE FUMARATE 50 MG/1
300 TABLET, FILM COATED ORAL NIGHTLY
Qty: 60 TABLET | Status: CANCELLED | OUTPATIENT
Start: 2022-06-28

## 2022-06-28 RX ORDER — METHYLPREDNISOLONE 4 MG/1
TABLET ORAL
Qty: 1 KIT | Refills: 0 | Status: SHIPPED | OUTPATIENT
Start: 2022-06-28 | End: 2022-07-04

## 2022-06-28 RX ORDER — TRAZODONE HYDROCHLORIDE 50 MG/1
200 TABLET ORAL NIGHTLY
Status: CANCELLED | OUTPATIENT
Start: 2022-06-28

## 2022-06-28 RX ORDER — LANOLIN ALCOHOL/MO/W.PET/CERES
325 CREAM (GRAM) TOPICAL 2 TIMES DAILY
Qty: 60 TABLET | Refills: 3 | Status: SHIPPED | OUTPATIENT
Start: 2022-06-28 | End: 2022-08-29 | Stop reason: SDUPTHER

## 2022-06-28 NOTE — TELEPHONE ENCOUNTER
Pt called c/o worseing congestion since her appointment. She has been taking the claritin and using flonase but has been getting worse. She said she gets this often and is generally given a steroid dose pack and antibiotic. Please advise.

## 2022-06-28 NOTE — TELEPHONE ENCOUNTER
----- Message from Brittney Brandon sent at 6/27/2022  9:14 AM EDT -----  Subject: Refill Request    QUESTIONS  Name of Medication? gabapentin (NEURONTIN) 300 MG capsule  Patient-reported dosage and instructions? 300 mg once daily at night  How many days do you have left? 30  Preferred Pharmacy? [email or web address]  Pharmacy phone number (if available)? [phone]  ---------------------------------------------------------------------------  --------------,  Name of Medication? Other - gabapentin   Patient-reported dosage and instructions? 100 mg once daily in the morning     How many days do you have left? 30  Preferred Pharmacy? [facility]  Pharmacy phone number (if available)? [phone]  ---------------------------------------------------------------------------  --------------,  Name of Medication? amitriptyline (ELAVIL) 10 MG tablet  Patient-reported dosage and instructions? pt reports that she takes 100 mg   once daily at night  How many days do you have left? 30  Preferred Pharmacy? [facility] [place] [facility])  Pharmacy phone number (if available)? [phone]  ---------------------------------------------------------------------------  --------------  CALL BACK INFO  What is the best way for the office to contact you? OK to leave message on   voicemail  Preferred Call Back Phone Number? [phone]  ---------------------------------------------------------------------------  --------------  SCRIPT ANSWERS  Relationship to Patient?  Self

## 2022-06-28 NOTE — TELEPHONE ENCOUNTER
I see that [facility] declined her referral, she requested referral to [facility] in [place]. That referral has been placed.     Please provide patient with their phone number so she can call and see about getting an appointment as soon as possible - Phone: [phone]

## 2022-06-28 NOTE — TELEPHONE ENCOUNTER
When I saw her on the 23rd there was no indication to start antibiotics -symptoms appear to be related to allergies.   I can send in a steroid pack

## 2022-06-28 NOTE — TELEPHONE ENCOUNTER
Patient's last appointment was : 6/23/2022  Patient's next appointment is :   Future Appointments   Date Time Provider Guillermina Turner   7/21/2022  9:00 AM [address], MD N [facility] [address]   7/21/2022 12:20 PM [place] Monse, APRN - CNP [facility] [place]   10/4/2022  2:00 PM Raquel Lee APRN - CNP [facility] [address]     Last refilled: n/a    Lab Results   Component Value Date    LABA1C 5.4 06/27/2022     Lab Results   Component Value Date    CHOL 142 06/27/2022    TRIG 126 06/27/2022    HDL 53 06/27/2022    LDLCALC 64 06/27/2022     Lab Results   Component Value Date     06/27/2022    K 4.5 06/27/2022     06/27/2022    CO2 24 06/27/2022    BUN 9 06/27/2022    CREATININE 0.5 06/27/2022    GLUCOSE 91 06/27/2022    CALCIUM 9.3 06/27/2022    PROT 6.5 06/27/2022    LABALBU 4.2 06/27/2022    BILITOT <0.2 (L) 06/27/2022    ALKPHOS 104 06/27/2022    AST 20 06/27/2022    ALT 20 06/27/2022    LABGLOM >90 06/27/2022     Lab Results   Component Value Date    TSH 2.890 06/27/2022     Lab Results   Component Value Date    WBC 5.5 06/27/2022    HGB 12.0 06/27/2022    HCT 38.4 06/27/2022    MCV 96.2 06/27/2022     06/27/2022

## 2022-06-28 NOTE — TELEPHONE ENCOUNTER
Patient's last appointment was : 6/23/2022  Patient's next appointment is :   Future Appointments   Date Time Provider Guillermina Turner   7/21/2022  9:00 AM [address], MD N [facility] [address]   7/21/2022 12:20 PM EFRAÍN Montgomery - CNP [facility] [place]   10/4/2022  2:00 PM Maco Crawley APRN - CNP [facility] [address]     Last refilled: n/a    Lab Results   Component Value Date    LABA1C 5.4 06/27/2022     Lab Results   Component Value Date    CHOL 142 06/27/2022    TRIG 126 06/27/2022    HDL 53 06/27/2022    LDLCALC 64 06/27/2022     Lab Results   Component Value Date     06/27/2022    K 4.5 06/27/2022     06/27/2022    CO2 24 06/27/2022    BUN 9 06/27/2022    CREATININE 0.5 06/27/2022    GLUCOSE 91 06/27/2022    CALCIUM 9.3 06/27/2022    PROT 6.5 06/27/2022    LABALBU 4.2 06/27/2022    BILITOT <0.2 (L) 06/27/2022    ALKPHOS 104 06/27/2022    AST 20 06/27/2022    ALT 20 06/27/2022    LABGLOM >90 06/27/2022     Lab Results   Component Value Date    TSH 2.890 06/27/2022     Lab Results   Component Value Date    WBC 5.5 06/27/2022    HGB 12.0 06/27/2022    HCT 38.4 06/27/2022    MCV 96.2 06/27/2022     06/27/2022

## 2022-06-28 NOTE — TELEPHONE ENCOUNTER
----- Message from Mary Anne Marie sent at 6/27/2022 10:32 AM EDT -----  Subject: Refill Request    QUESTIONS  Name of Medication? QUEtiapine (SEROQUEL) 50 MG tablet  Patient-reported dosage and instructions? 2 per day   How many days do you have left? 30  Preferred Pharmacy? [facility] ([address])  Pharmacy phone number (if available)? [phone]  ---------------------------------------------------------------------------  --------------,  Name of Medication? Other - trazodone 100mg   Patient-reported dosage and instructions? 1 per day   How many days do you have left? 4  Preferred Pharmacy? [facility] ([address])  Pharmacy phone number (if available)? [phone]  ---------------------------------------------------------------------------  --------------  CALL BACK INFO  What is the best way for the office to contact you? OK to leave message on   voicemail  Preferred Call Back Phone Number? [phone]  ---------------------------------------------------------------------------  --------------  SCRIPT ANSWERS  Relationship to Patient?  Self

## 2022-06-28 NOTE — TELEPHONE ENCOUNTER
When I seen her on June 23 she reported to me that she had refills left of all of her medications, please call and clarify that she actually needs these medications.

## 2022-06-29 ENCOUNTER — CARE COORDINATION (OUTPATIENT)
Dept: CARE COORDINATION | Age: 52
End: 2022-06-29

## 2022-06-29 ENCOUNTER — TELEPHONE (OUTPATIENT)
Dept: FAMILY MEDICINE CLINIC | Age: 52
End: 2022-06-29

## 2022-06-29 DIAGNOSIS — F20.0 PARANOID SCHIZOPHRENIA (HCC): Primary | ICD-10-CM

## 2022-06-29 DIAGNOSIS — F51.05 INSOMNIA DUE TO OTHER MENTAL DISORDER: ICD-10-CM

## 2022-06-29 DIAGNOSIS — F20.0 PARANOID SCHIZOPHRENIA (HCC): ICD-10-CM

## 2022-06-29 DIAGNOSIS — F99 INSOMNIA DUE TO OTHER MENTAL DISORDER: ICD-10-CM

## 2022-06-29 RX ORDER — QUETIAPINE FUMARATE 200 MG/1
200 TABLET, FILM COATED ORAL NIGHTLY
Qty: 30 TABLET | Refills: 1 | Status: SHIPPED | OUTPATIENT
Start: 2022-06-29

## 2022-06-29 RX ORDER — GABAPENTIN 100 MG/1
100 CAPSULE ORAL DAILY
Qty: 30 CAPSULE | Refills: 0 | Status: SHIPPED | OUTPATIENT
Start: 2022-06-29 | End: 2022-08-29 | Stop reason: SDUPTHER

## 2022-06-29 RX ORDER — QUETIAPINE FUMARATE 200 MG/1
200 TABLET, FILM COATED ORAL NIGHTLY
Qty: 30 TABLET | Refills: 1 | OUTPATIENT
Start: 2022-06-29

## 2022-06-29 RX ORDER — GABAPENTIN 300 MG/1
300 CAPSULE ORAL NIGHTLY
Qty: 30 CAPSULE | Refills: 0 | Status: SHIPPED | OUTPATIENT
Start: 2022-06-29 | End: 2022-08-29 | Stop reason: SDUPTHER

## 2022-06-29 RX ORDER — AMITRIPTYLINE HYDROCHLORIDE 10 MG/1
10 TABLET, FILM COATED ORAL NIGHTLY
Qty: 30 TABLET | Refills: 0 | Status: SHIPPED | OUTPATIENT
Start: 2022-06-29 | End: 2022-07-07 | Stop reason: CLARIF

## 2022-06-29 NOTE — TELEPHONE ENCOUNTER
----- Message from EFRAÍN Fabian - CNP sent at 6/28/2022 11:39 AM EDT -----  Iron level is low - will send iron supplement to take twice daily - when is she seeing hematology?

## 2022-06-29 NOTE — CARE COORDINATION
Patient is new to [place] office as she just moved here from [place], she thought the office in [place] got her assistance already on Eliquis. She found out that isn't true, I told her for me to help her she must have already spent 3 % of her household income at the pharmacy first. She is going to check with [facility] and let me know.           [facility]   Medication Assistance  [address] [place] [address], and [employer]    (A) [phone]  (F) [phone]

## 2022-06-29 NOTE — TELEPHONE ENCOUNTER
----- Message from EFRAÍN Peterson CNP sent at 6/28/2022 11:46 AM EDT -----  X-ray of the knee shows mild arthritis in all 3 compartments. Small small bone spurs. Is she interested in seeing physical therapy?

## 2022-06-29 NOTE — TELEPHONE ENCOUNTER
Patient's last appointment was : 6/23/2022  Patient's next appointment is :   Future Appointments   Date Time Provider Guillermina Turner   7/21/2022  9:00 AM Heriberto Ponce MD N [facility] [address]   7/21/2022 12:20 PM EFRAÍN Candelaria - CNP [facility]   10/4/2022  2:00 PM Ana Maria Alfonso APRN - J LU[facility]     Last refilled:479/22    Lab Results   Component Value Date    LABA1C 5.4 06/27/2022     Lab Results   Component Value Date    CHOL 142 06/27/2022    TRIG 126 06/27/2022    HDL 53 06/27/2022    LDLCALC 64 06/27/2022     Lab Results   Component Value Date     06/27/2022    K 4.5 06/27/2022     06/27/2022    CO2 24 06/27/2022    BUN 9 06/27/2022    CREATININE 0.5 06/27/2022    GLUCOSE 91 06/27/2022    CALCIUM 9.3 06/27/2022    PROT 6.5 06/27/2022    LABALBU 4.2 06/27/2022    BILITOT <0.2 (L) 06/27/2022    ALKPHOS 104 06/27/2022    AST 20 06/27/2022    ALT 20 06/27/2022    LABGLOM >90 06/27/2022     Lab Results   Component Value Date    TSH 2.890 06/27/2022     Lab Results   Component Value Date    WBC 5.5 06/27/2022    HGB 12.0 06/27/2022    HCT 38.4 06/27/2022    MCV 96.2 06/27/2022     06/27/2022

## 2022-06-29 NOTE — TELEPHONE ENCOUNTER
----- Message from EFRAÍN Durham CNP sent at 6/28/2022 11:47 AM EDT -----  Bone spurs in the lower back, otherwise no abnormalities found.

## 2022-06-29 NOTE — TELEPHONE ENCOUNTER
Donna Waters informed and voiced understanding and at this time does not want to be referred to Physical Therapy and is going to call to schedule for Hematology today.

## 2022-06-30 ENCOUNTER — TELEPHONE (OUTPATIENT)
Dept: FAMILY MEDICINE CLINIC | Age: 52
End: 2022-06-30

## 2022-06-30 DIAGNOSIS — I26.99 PULMONARY EMBOLISM, UNSPECIFIED CHRONICITY, UNSPECIFIED PULMONARY EMBOLISM TYPE, UNSPECIFIED WHETHER ACUTE COR PULMONALE PRESENT (HCC): Primary | ICD-10-CM

## 2022-06-30 DIAGNOSIS — I82.412 ACUTE DEEP VEIN THROMBOSIS (DVT) OF FEMORAL VEIN OF LEFT LOWER EXTREMITY (HCC): ICD-10-CM

## 2022-06-30 NOTE — TELEPHONE ENCOUNTER
----- Message from [employer] sent at 6/30/2022  9:18 AM EDT -----  Subject: Refill Request    QUESTIONS  Name of Medication? Other - eliquis 5 mg   Patient-reported dosage and instructions? 2 x daily   How many days do you have left? 0  Preferred Pharmacy? [facility]  Pharmacy phone number (if available)? [phone]  Additional Information for Provider? Pt would like to do 10mg 1 x daily  ---------------------------------------------------------------------------  --------------  CALL BACK INFO  What is the best way for the office to contact you? OK to leave message on   voicemail  Preferred Call Back Phone Number? [phone]  ---------------------------------------------------------------------------  --------------  SCRIPT ANSWERS  Relationship to Patient?  Self

## 2022-06-30 NOTE — TELEPHONE ENCOUNTER
----- Message from Lis Rahman sent at 6/30/2022  2:14 PM EDT -----  Subject: Referral Request    QUESTIONS   Reason for referral request? Pt needs a referral for a blood specialist to   check her blood clots   Has the physician seen you for this condition before? No   Preferred Specialist (if applicable)? Do you already have an appointment scheduled? No  Additional Information for Provider?   ---------------------------------------------------------------------------  --------------  CALL BACK INFO  What is the best way for the office to contact you? OK to leave message on   voicemail  Preferred Call Back Phone Number? [phone]  ---------------------------------------------------------------------------  --------------  SCRIPT ANSWERS  Relationship to Patient?  Self

## 2022-06-30 NOTE — TELEPHONE ENCOUNTER
Pt states she has a 7/7/22 appt scheduled with [facility] for trazodone. Pt has psych. office info and has been in contact with [facility]. Pt states that all meds sent to refill at last appt on 6/23 in our office will be picked up at [facility] in [place].  Trazodone will be prescribed by [place] [facility] office

## 2022-07-05 ENCOUNTER — TELEPHONE (OUTPATIENT)
Dept: FAMILY MEDICINE CLINIC | Age: 52
End: 2022-07-05

## 2022-07-05 NOTE — TELEPHONE ENCOUNTER
Phone number given to [facility] and she voiced understanding.  Bea Alva MD   [address] [facility], [address]   [phone]

## 2022-07-05 NOTE — TELEPHONE ENCOUNTER
----- Message from Joellen [place] Trevose sent at 7/5/2022 11:50 AM EDT -----  Subject: Message to Provider    QUESTIONS  Information for Provider? pt needs referral for blood clots she says she   does not remember who was referred to her   ---------------------------------------------------------------------------  --------------  [address] [facility] [employer] [facility]  [phone]; OK to leave message on voicemail  ---------------------------------------------------------------------------  --------------  SCRIPT ANSWERS  Relationship to Patient?  Self

## 2022-07-06 ENCOUNTER — TELEPHONE (OUTPATIENT)
Dept: FAMILY MEDICINE CLINIC | Age: 52
End: 2022-07-06

## 2022-07-06 NOTE — TELEPHONE ENCOUNTER
----- Message from Medhat Gallegos sent at 7/6/2022 11:42 AM EDT -----  Subject: Message to Provider    QUESTIONS  Information for Provider? Patient says the prescription for amitriptyline   she got wrong mg got 10 mg instead of the 100 mg she always gets. She   needs 100 mg called into [facility]. call her back to clarify it is   done.   ---------------------------------------------------------------------------  --------------  [address]  [phone]; OK to leave message on voicemail  ---------------------------------------------------------------------------  --------------  SCRIPT ANSWERS  Relationship to Patient?  Self

## 2022-07-06 NOTE — TELEPHONE ENCOUNTER
----- Message from Cyrus Gallardo sent at 7/6/2022 11:42 AM EDT -----  Subject: Message to Provider    QUESTIONS  Information for Provider? Patient says the prescription for amitriptyline   she got wrong mg got 10 mg instead of the 100 mg she always gets. She   needs 100 mg called into [facility]. call her back to clarify it is   done.   ---------------------------------------------------------------------------  --------------  [address]  [phone]; OK to leave message on voicemail  ---------------------------------------------------------------------------  --------------  SCRIPT ANSWERS  Relationship to Patient?  Self

## 2022-07-06 NOTE — TELEPHONE ENCOUNTER
Tried to call patient personally-voicemail box not set up. Please check with patient and pharmacy-if patient is able to bring bottle showing correct dosage, then okay to fill at that dose. May also want to check with preferred pharmacy to confirm correct dosing.   ES

## 2022-07-07 RX ORDER — AMITRIPTYLINE HYDROCHLORIDE 150 MG/1
150 TABLET, FILM COATED ORAL NIGHTLY
Qty: 30 TABLET | Refills: 0 | Status: SHIPPED | OUTPATIENT
Start: 2022-07-07

## 2022-07-07 NOTE — TELEPHONE ENCOUNTER
Dr. Betty Bundy,   Pharmacist through [facility] mail order Cindy Angela states that Ray Rausch is taking 150 mg of Amitriptaline 1 tab at bedtime last ordered by Dr. Db Plummer In [place], [place] and is due for a refill. Please Advise and send to [facility] in [place]. Leisa Fernandes

## 2022-07-19 ENCOUNTER — OFFICE VISIT (OUTPATIENT)
Dept: PHYSICAL MEDICINE AND REHAB | Age: 52
End: 2022-07-19
Payer: MEDICARE

## 2022-07-19 ENCOUNTER — TELEPHONE (OUTPATIENT)
Dept: FAMILY MEDICINE CLINIC | Age: 52
End: 2022-07-19

## 2022-07-19 VITALS
HEIGHT: 65 IN | BODY MASS INDEX: 46.98 KG/M2 | SYSTOLIC BLOOD PRESSURE: 130 MMHG | DIASTOLIC BLOOD PRESSURE: 70 MMHG | WEIGHT: 282 LBS | HEART RATE: 70 BPM

## 2022-07-19 DIAGNOSIS — M47.816 LUMBAR SPONDYLOSIS: Primary | ICD-10-CM

## 2022-07-19 DIAGNOSIS — M53.3 SACROILIAC JOINT PAIN: ICD-10-CM

## 2022-07-19 DIAGNOSIS — G89.4 CHRONIC PAIN SYNDROME: ICD-10-CM

## 2022-07-19 DIAGNOSIS — M53.3 SI (SACROILIAC) JOINT DYSFUNCTION: ICD-10-CM
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
## 2022-07-19 PROCEDURE — 99205 OFFICE O/P NEW HI 60 MIN: CPT | Performed by: NURSE PRACTITIONER

## 2022-07-19 NOTE — PROGRESS NOTES
Pain/PM&R Clinic Note     Encounter Date: 7/19/22    Subjective:   Chief Complaint:   Chief Complaint   Patient presents with    New Patient     Lumbar pain       History of Present Illness:   Jane Ohara is a 46 y.o. female seen in the clinic initially on 7/19/2022 upon request from EFRAÍN Craig for her history of low back pain. She has personal medical history of hypertension, PE 2017, DVT 2020, long term anticoagulation, paranoid schizophrenia, obesity, depression, anxiety, asthma, GERD, sleep apnea, arthritis. Patient reports she has bilateral low back pain, goes into the buttocks hip area, does not go past the knee, does not radiate to the feet. She does report right side is worse. She states the pain has been occurring for years and years, denies any accident, injury, trauma that caused the pain. She states the pain has been worsening over the last 1 to 2 years. Describes the pain as a constant ache, intermittent stabbing pain. She does report occasional weakness in the legs, but feels like this is worse when it is hot out. She states she feels like she will have breathing issues when it is very hot, and that causes her to tense up and then pain will flareup. She reports they previously lived in [place], did move back to [place] due to the heat and breathing issues. She denies any numbness, tingling, loss of bowel or bladder control. She reports that she did formal physical therapy in 2018 in [place], mild relief during therapy. She states she does not do any type of home exercises or home program for strengthening. She does report that she had a fall few weeks ago, was walking backwards into her apartment and tripped. She does not use any ice or heat or topicals to the area for pain control. She reports she had an MRI 1 to 2 years ago in [place], was told she does have bruising in the back and told to take Aleve.   She reports that walking, standing, bending, doing dishes, cooking all caused the pain to worsen. Sitting, resting at times will help her pain. Pain scale : at worst pain is 10/10, at best pain is 3/10. History of Interventions:   Surgery: No previous lumbar/cervical surgeries  Injections: None    Current Treatment Medications:   Neurontin- no relief  Tramadol- mild relief, has it for her foot  Tylenol- mild relief     Historical Treatment Medications:   Flexeril- mild relief    Imaging:  PROCEDURE: XR LUMBAR SPINE (2-3 VIEWS)       CLINICAL INFORMATION: Arthritis, Other chronic pain, Lumbar back pain, low back pain. COMPARISON: Lumbar spine x-ray 3/11/2015. TECHNIQUE: AP and lateral views of the lumbar spine. FINDINGS:               The lumbar vertebral bodies are normally aligned. There are no compression fractures. There are small anterior osteophytic at the L2-3 and L3-4 levels. The disc heights are relatively well-maintained. The facet joints are within normal limits. No    suspicious osseous lesions are present. The sacrum and sacroiliac joints appear normal.  There are surgical clips from a prior cholecystectomy. There is no significant change compared to the prior study. Impression    Small anterior osteophytes associated with the L2-3 and L3-4 levels. No other abnormalities.        Past Medical History:   Diagnosis Date    Anxiety     Arthritis     Asthma     Blood clotting disorder ([address]     Depression     GERD (gastroesophageal reflux disease)     Hyperlipidemia     Hypertension     Osteoarthritis     Paranoid schizophrenia ([address]        Past Surgical History:   Procedure Laterality Date    ANKLE SURGERY  2000    BLADDER SUSPENSION  2007    CHOLECYSTECTOMY, LAPAROSCOPIC  5/4/2015    LAPAROSCOPIC CHOLECYSTECTOMY    COLONOSCOPY      DENTAL SURGERY      FOOT SURGERY Right 07    OTHER SURGICAL HISTORY  2012    vaginal sling    SHOULDER SURGERY Right age 15    UPPER GASTROINTESTINAL ENDOSCOPY  10/23/15    Dr. Sylvain Saucedo Oral, EVERY 6 HOURS PRN    traZODone (DESYREL) 200 mg, Oral, NIGHTLY    Trulicity 1.5 mg, SubCUTAneous, WEEKLY    VENTOLIN  (90 Base) MCG/ACT inhaler No dose, route, or frequency recorded. Allergies   Allergen Reactions    Lithium Hives    Other Palpitations     msg       Review of Systems:   Constitutional: negative for weight changes or fevers  Genitourinary: negative for bowel/bladder incontinence   Musculoskeletal: positive for low back pain, SI pain  Neurological: negative for any leg weakness or numbness/tingling  Behavioral/Psych: negative for anxiety/depression   All other systems reviewed and are negative    Objective:     Vitals:    07/19/22 1203   BP: 130/70   Pulse: 70       Constitutional: Pleasant, no acute distress   Head: Normocephalic, atraumatic   Eyes: Conjunctivae normal   Neck: Supple, symmetrical   Lungs: Normal respiratory effort, non-labored breathing   Cardiovascular: Limbs warm and well perfused   Abdomen: Non-protruded   Musculoskeletal: Muscle bulk symmetric, no atrophy, no gross deformities   · Lower Extremities: ROM WNL. · Thorax: No paraspinal tenderness bilaterally. No scoliosis or kyphosis. · Lumbar Spine: ROM WNL. Lumbar paraspinals non-tender to palpation bilaterally. SLR neg bilaterally. LOREN neg bilaterally. GAENSLEN neg bilaterally. Negative facet loading bilaterally. Bilateral SI joints tender to palpation. SI Distraction maneuver positive on left/right side. Bilateral greater trochanters non-tender to palpation. Neurological: Cranial nerves II-XII grossly intact. · Gait - antalgic gait. Ambulates without assistive device.    · Motor: 5/5 muscle strength in bilateral hip flexion, knee flexion, knee extension, ankle dorsiflexion, and ankle plantar flexion   · Sensory: LT sensation intact in lower limbs   · Reflexes: 2+ symmetrical in bilateral achilles, 2+ bilateral patellar, negative ankle clonus  Skin: No rashes or lesions present   Psychological: Cooperative, no exaggerated pain behaviors       Assessment:    Diagnosis Orders   1. Lumbar spondylosis  MRI LUMBAR SPINE WO CONTRAST      2. Fall, initial encounter  MRI LUMBAR SPINE WO CONTRAST      3. Sacroiliac joint pain        4. SI (sacroiliac) joint dysfunction        5. Chronic pain syndrome             Miriam Pastrana is a 46 y. o.female presenting to the pain clinic for evaluation of low back pain. She has significant SI pain and positive SI dysfunction test maneuvers. Discussed possible procedures to assist with pain control, such as; SI injection series, lumbar facet series at bilateral L4-5, L5-S1. Due to recent fall and complains of pain worsening, we will first obtain lumbar MRI. We will see the patient back in 4 weeks, after MRI is completed. With her history of anxiety, depression, paranoid schizophrenia and current medication she is not a candidate for any type of narcotics. Discussed procedures only and possible retrial of physical therapy in the future as well as home exercise program.  She is also educated on healthy diet, weight loss to assist with pain control as well. Plan: The following treatment recommendations and plan were discussed in detail with Miriam Pastrana. Imaging:   I have reviewed patients imaging of lumbar XR  and results were discussed with patient today. Analgesics: The patient is currently managing their pain with the use of Tramadol which is prescribed by Dr Burnard Goltz, podiatry. We recommend that the patient follow the recommendation of the prescribing provider with regard to the above medication(s) and contact their prescribing provider for refills if needed. The side effect profile of long-term opioid use was discussed and recommended emphasis on multimodal strategies for pain management. Patient is taking Acetaminophen. Patient informed that the maximum amount of acetaminophen taken on a regular basis should only be 4000 mg per day. Adjuvants:   None    OARRS reviewed, pain contract agreement signed    Interventions:   None    Anticoagulation/NPO Recommendations:   None    Multidisciplinary Pain Management:   In the presence of complex, chronic, and multi-factorial pain, the importance of a multidisciplinary approach to pain management in the patients management regimen was emphasized and discussed in great detail. PHYSICAL THERAPY: Patient is advised to see a physical therapist for gentle stretching exercises and conditioning exercises for management of pain. PSYCHOLOGY: Patient is advised to see a clinical pain psychologist, for the psychosocial aspect of pain care through coping skills, relaxation strategies, cognitive group therapy etc.   OBESITY: Patient is advised to seek nutrition consult to help in managing their weight to decrease its impact on pain. The patient was also advised to continue physical therapy and stretching exercises at home and cognitive behavioral and/or group therapy. Referrals:  Lumbar MRI    Prescriptions Written This Visit:   None    Follow-up:   After MRI    It was my pleasure to evaluate Donna Waters today. I spent over 60 minutes evaluating this patient, reviewing previous notes and images and completing documentation.        Chetan Day, APRN - CNP   Interventional Pain Management/PM&R   [place]

## 2022-07-19 NOTE — TELEPHONE ENCOUNTER
----- Message from [place] sent at 7/19/2022  8:07 AM EDT -----  Subject: Appointment Request    Reason for Call: Established Patient Appointment needed: Routine Existing   Condition Follow Up    QUESTIONS    Reason for appointment request? Available appointments did not meet   patient need     Additional Information for Provider? Pt is calling this morning to see if   she can come in with her  on Thursday at 1:30pm to be seen . Pt    name is Joyce López. Please call pt back.  Pt provider doesn't   have anything until the end of August. Pt doesn't want to wait that long.   ---------------------------------------------------------------------------  --------------  [id number]  [phone]; OK to leave message on voicemail  ---------------------------------------------------------------------------  --------------  SCRIPT ANSWERS  COVID Screen: Leah James

## 2022-07-21 ENCOUNTER — TELEPHONE (OUTPATIENT)
Dept: ONCOLOGY | Age: 52
End: 2022-07-21

## 2022-07-21 NOTE — TELEPHONE ENCOUNTER
Pt called in regarding her appt with us. Pt is unable to make it to the original appt.      Pt oked new appt date and time

## 2022-07-22 ENCOUNTER — HOSPITAL ENCOUNTER (OUTPATIENT)
Age: 52
Setting detail: SPECIMEN
Discharge: HOME | End: 2022-07-22
Payer: MEDICARE

## 2022-07-22 LAB
INFLUENZA A: NOT DETECTED
INFLUENZA B: NOT DETECTED
SARS-COV-2 RNA, RT PCR: NOT DETECTED

## 2022-07-22 PROCEDURE — 87636 SARSCOV2 & INF A&B AMP PRB: CPT

## 2022-07-25 ENCOUNTER — TELEPHONE (OUTPATIENT)
Dept: FAMILY MEDICINE CLINIC | Age: 52
End: 2022-07-25

## 2022-07-25 NOTE — TELEPHONE ENCOUNTER
----- Message from José Antonio Joshi sent at 7/22/2022  8:09 AM EDT -----  Subject: Message to Provider    QUESTIONS  Information for Provider? Urgent, pt's appt soon, does not want a mychart,   wants a phone call or link.   ---------------------------------------------------------------------------  --------------  [id number] [email or web address]  [phone]; OK to leave message on voicemail  ---------------------------------------------------------------------------  --------------  SCRIPT ANSWERS  Relationship to Patient?  Self

## 2022-07-26 ENCOUNTER — TELEPHONE (OUTPATIENT)
Dept: FAMILY MEDICINE CLINIC | Age: 52
End: 2022-07-26

## 2022-07-26 NOTE — TELEPHONE ENCOUNTER
Pt called and stated her blood sugar is in the low 70s. Just making you aware. She has an appointment on 08/24/22. Eating something with protein brings in back up. I instructed pt that two big meals of protein may not be ok. Try and spread the meals out into a smaller portion through out the day. I am scheduling her with someone asap. If sugars got o low dont drive as it is not safe and go to ER with any concerns.  She didn't want to come in today     Scheduled for tomorrow with dr Juan Farley

## 2022-07-26 NOTE — PROGRESS NOTES
Amadeo Deluna (:  1970) is a 46 y.o. female,Established patient, here for evaluation of the following chief complaint(s):  Follow-up (Diabetes checkup /)         ASSESSMENT/PLAN:  1. Low blood sugar reading    Only reportedly low sugars, readings are in 70s and had symptoms with normal sugars otherwise. Appears to have normal blood sugars. Patient has myriad of medications and needs to continue weaning off of benzos as this more likely cause of symptoms. Will have close followup to ensure this is completed. Return in about 4 weeks (around 2022). Subjective   SUBJECTIVE/OBJECTIVE:  HPI    Recent URI  Did have Augmentin prescribed recently for this. Improving, stable. AMS  Patient called recently stating she was symptomatic from glucose of 70. Not on any hypoglycemic agents. (Has trulicity but this is not a hypoglycemic agent.)  Per medication review, many psych medications causing sedation. Likely cause of reported symptoms. She is being seen at [facility] for this. Asked to followup for medication titration. Iron Deficiency  Noted to 49 on recent lab. Started on iron recently    Morbid Obesity  Uncontrolled, to discuss at next visit      Review of Systems   Constitutional:  Negative for appetite change and unexpected weight change. HENT:  Negative for congestion and hearing loss. Eyes:  Negative for pain and visual disturbance. Respiratory:  Negative for cough and shortness of breath. Cardiovascular:  Negative for chest pain and leg swelling. Gastrointestinal:  Negative for abdominal pain, constipation and diarrhea. Genitourinary:  Negative for difficulty urinating, dysuria, hematuria, menstrual problem, pelvic pain, vaginal bleeding, vaginal discharge and vaginal pain. Musculoskeletal:  Negative for arthralgias and myalgias. Psychiatric/Behavioral:  Negative for behavioral problems and sleep disturbance.          Objective   Physical Exam  Vitals and nursing note reviewed. Constitutional:       General: She is not in acute distress. HENT:      Head: Normocephalic and atraumatic. Nose: Nose normal.      Mouth/Throat:      Mouth: Mucous membranes are moist.      Pharynx: Oropharynx is clear. Eyes:      Extraocular Movements: Extraocular movements intact. Pupils: Pupils are equal, round, and reactive to light. Cardiovascular:      Rate and Rhythm: Normal rate and regular rhythm. Pulses: Normal pulses. Heart sounds: Normal heart sounds. Pulmonary:      Effort: Pulmonary effort is normal.      Breath sounds: Normal breath sounds. Abdominal:      Palpations: Abdomen is soft. Tenderness: There is no abdominal tenderness. Musculoskeletal:         General: No signs of injury. Normal range of motion. Cervical back: Normal range of motion and neck supple. Skin:     General: Skin is warm and dry. Capillary Refill: Capillary refill takes less than 2 seconds. Neurological:      General: No focal deficit present. Mental Status: She is alert and oriented to person, place, and time. Mental status is at baseline. Psychiatric:         Mood and Affect: Mood normal.         Behavior: Behavior normal.              An electronic signature was used to authenticate this note.     --Rocio Carolina MD

## 2022-07-27 ENCOUNTER — OFFICE VISIT (OUTPATIENT)
Dept: FAMILY MEDICINE CLINIC | Age: 52
End: 2022-07-27
Payer: MEDICARE

## 2022-07-27 VITALS
WEIGHT: 288 LBS | OXYGEN SATURATION: 97 % | SYSTOLIC BLOOD PRESSURE: 126 MMHG | HEIGHT: 65 IN | RESPIRATION RATE: 16 BRPM | TEMPERATURE: 99.1 F | BODY MASS INDEX: 47.98 KG/M2 | DIASTOLIC BLOOD PRESSURE: 82 MMHG | HEART RATE: 92 BPM

## 2022-07-27 DIAGNOSIS — E16.2 LOW BLOOD SUGAR READING: Primary | ICD-10-CM

## 2022-07-27 PROCEDURE — 1036F TOBACCO NON-USER: CPT | Performed by: STUDENT IN AN ORGANIZED HEALTH CARE EDUCATION/TRAINING PROGRAM

## 2022-07-27 PROCEDURE — 3017F COLORECTAL CA SCREEN DOC REV: CPT | Performed by: STUDENT IN AN ORGANIZED HEALTH CARE EDUCATION/TRAINING PROGRAM

## 2022-07-27 PROCEDURE — G8427 DOCREV CUR MEDS BY ELIG CLIN: HCPCS | Performed by: STUDENT IN AN ORGANIZED HEALTH CARE EDUCATION/TRAINING PROGRAM

## 2022-07-27 PROCEDURE — 99213 OFFICE O/P EST LOW 20 MIN: CPT | Performed by: STUDENT IN AN ORGANIZED HEALTH CARE EDUCATION/TRAINING PROGRAM

## 2022-07-27 PROCEDURE — G8417 CALC BMI ABV UP PARAM F/U: HCPCS | Performed by: STUDENT IN AN ORGANIZED HEALTH CARE EDUCATION/TRAINING PROGRAM

## 2022-07-27 ASSESSMENT — ENCOUNTER SYMPTOMS
SHORTNESS OF BREATH: 0
ABDOMINAL PAIN: 0
EYE PAIN: 0
COUGH: 0
CONSTIPATION: 0
DIARRHEA: 0

## 2022-07-27 NOTE — PROGRESS NOTES
S: 46 y.o. female with   Chief Complaint   Patient presents with    Follow-up     Diabetes checkup            Reviewed hx and ROS in detail with resident prior to exam.  See notes for additional details. BP Readings from Last 3 Encounters:   07/27/22 126/82   07/19/22 130/70   06/23/22 130/74     Wt Readings from Last 3 Encounters:   07/27/22 288 lb (130.6 kg)   07/19/22 282 lb (127.9 kg)   06/23/22 282 lb 12.8 oz (128.3 kg)           O: VS:  height is 5' 5\" (1.651 m) and weight is 288 lb (130.6 kg). Her skin temperature is 99.1 °F (37.3 °C). Her blood pressure is 126/82 and her pulse is 92. Her respiration is 16 and oxygen saturation is 97%. Diagnosis Orders   1. Low blood sugar reading            Plan  Symptoms do not appear to be from abnormally low blood sugars per resident's note I agree with psych this is a medication issue continue follow-up with psychiatry and weaning of meds but prompt follow-up if symptoms return      Health Maintenance Due   Topic Date Due    Annual Wellness Visit (AWV)  Never done    DTaP/Tdap/Td vaccine (1 - Tdap) Never done    Cervical cancer screen  Never done    Colorectal Cancer Screen  Never done    Breast cancer screen  05/19/2020    Shingles vaccine (1 of 2) Never done    COVID-19 Vaccine (4 - Booster for Valentino Flattery series) 03/30/2022         Attending Physician Statement  I have discussed the case, including pertinent history and exam findings with the resident. I agree with the documented assessment and plan as documented by the resident.   GC modifier added to this encounter      Gagandeep Welsh MD 7/28/2022 7:40 AM

## 2022-07-29 ENCOUNTER — HOSPITAL ENCOUNTER (OUTPATIENT)
Dept: MRI IMAGING | Age: 52
Discharge: HOME | End: 2022-07-29
Payer: MEDICARE
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
## 2022-07-29 DIAGNOSIS — M47.816 LUMBAR SPONDYLOSIS: ICD-10-CM

## 2022-07-29 PROCEDURE — 72148 MRI LUMBAR SPINE W/O DYE: CPT

## 2022-08-01 ENCOUNTER — TELEPHONE (OUTPATIENT)
Dept: FAMILY MEDICINE CLINIC | Age: 52
End: 2022-08-01

## 2022-08-01 ENCOUNTER — TELEPHONE (OUTPATIENT)
Dept: OBGYN CLINIC | Age: 52
End: 2022-08-01

## 2022-08-01 ENCOUNTER — TELEPHONE (OUTPATIENT)
Dept: PHYSICAL MEDICINE AND REHAB | Age: 52
End: 2022-08-01

## 2022-08-01 NOTE — TELEPHONE ENCOUNTER
This sounds like a good plan, should probably continue previous medications and plan in mean time. Thanks!

## 2022-08-01 NOTE — TELEPHONE ENCOUNTER
Patient states she stopped taking 2 menopause medications and blood glucose is not as low now. This am it is 112.

## 2022-08-02 ENCOUNTER — TELEPHONE (OUTPATIENT)
Dept: PHYSICAL MEDICINE AND REHAB | Age: 52
End: 2022-08-02

## 2022-08-02 NOTE — TELEPHONE ENCOUNTER
----- Message from [facility], APRN - CNP sent at 8/1/2022  7:24 AM EDT -----  No acute findings, will review at follow up . Notif.  Pt. Of results as per Marleni Hilario NP

## 2022-08-04 ENCOUNTER — OFFICE VISIT (OUTPATIENT)
Dept: PHYSICAL MEDICINE AND REHAB | Age: 52
End: 2022-08-04
Payer: MEDICARE

## 2022-08-04 ENCOUNTER — TELEPHONE (OUTPATIENT)
Dept: PHYSICAL MEDICINE AND REHAB | Age: 52
End: 2022-08-04

## 2022-08-04 VITALS
BODY MASS INDEX: 47.98 KG/M2 | SYSTOLIC BLOOD PRESSURE: 118 MMHG | WEIGHT: 288 LBS | HEART RATE: 80 BPM | HEIGHT: 65 IN | DIASTOLIC BLOOD PRESSURE: 62 MMHG

## 2022-08-04 DIAGNOSIS — G89.4 CHRONIC PAIN SYNDROME: ICD-10-CM

## 2022-08-04 DIAGNOSIS — M53.3 SI (SACROILIAC) JOINT DYSFUNCTION: ICD-10-CM

## 2022-08-04 DIAGNOSIS — M47.816 LUMBAR SPONDYLOSIS: ICD-10-CM

## 2022-08-04 DIAGNOSIS — M53.3 SACROILIAC JOINT PAIN: Primary | ICD-10-CM

## 2022-08-04 PROCEDURE — 3017F COLORECTAL CA SCREEN DOC REV: CPT | Performed by: NURSE PRACTITIONER

## 2022-08-04 PROCEDURE — G8417 CALC BMI ABV UP PARAM F/U: HCPCS | Performed by: NURSE PRACTITIONER

## 2022-08-04 PROCEDURE — G8427 DOCREV CUR MEDS BY ELIG CLIN: HCPCS | Performed by: NURSE PRACTITIONER

## 2022-08-04 PROCEDURE — 99214 OFFICE O/P EST MOD 30 MIN: CPT | Performed by: NURSE PRACTITIONER

## 2022-08-04 PROCEDURE — 1036F TOBACCO NON-USER: CPT | Performed by: NURSE PRACTITIONER

## 2022-08-04 NOTE — PATIENT INSTRUCTIONS
Learning About Medial Branch Block and Neurotomy  What are medial branch block and neurotomy? Facet joints connect your vertebrae to each other. Problems in these joints can cause chronic (long-term) pain in the neck or back. Medial branch nerves are the nerves that carry many of the pain messages from your facet joints. Radiofrequency medial branch neurotomy is a type of medial branch neurotomy that is used to relieve arthritis pain. It uses radio waves to damage nerves in your neck or back so that they can no longer send pain messages to your brain. Before your doctor knows if a neurotomy will help you, you will get a medial branch block to find out if certain nerves are the ones that are a source of your pain. You will need two separate visits to the outpatient center or hospital to have both procedures. You will need someone to drive you home. How is a medial branch block done? The doctor will use a tiny needle to numb the skin where you will get the block. Then the doctor puts the block needle into the numbed area. You may feel some pressure, but you should not feel pain. Using fluoroscopy (live X-ray) to guide the needle, the doctor injects medicine onto one or more nerves to make them numb. If you get relief from your pain in the next 4 to 6 hours, it's a sign that those nerves may be contributing to your pain. The relief will last only a short time. You may then have a medial branch neurotomy at a later visit to try to get longer relief. How is a medial branch neurotomy done? The doctor will use a tiny needle to numb the skin where you will get the neurotomy. Then the doctor puts the neurotomy needle into the numbed area. You may feel some pressure. Using fluoroscopy (live X-ray) to guide the needle, the doctor sends radio waves through the needle to the nerve for 60 to 90 seconds. The radio waves heat the nerve, which damages it. The doctor may do this several times.  And more than one nerve may be treated. How long do medial branch block and neurotomy take? It takes 20 to 30 minutes to get the block. You can go home after the doctor watches you for about an hour. It takes 45 to 90 minutes to get a neurotomy, depending on how many nerves are heated. You will probably go home 30 to 60 minutes after the procedure. What can you expect after a neurotomy? You will get instructions on how to report how much pain you have when you are at home. You may feel a little sore or tender at the injection site at first. But after a successful neurotomy, most people have pain relief right away. It often lasts for several months, but your pain may come back. If your pain does come back, it may mean that the damaged nerve has healed and can send pain messages again. Or it can mean that a different nerve is causing pain. Your doctor will discuss your options with you. Follow-up care is a key part of your treatment and safety. Be sure to make and go to all appointments, and call your doctor if you are having problems. It's also a good idea to know your test results and keep a list of the medicines you take. Where can you learn more? Go to https://[email or web address] org and sign in to your [employer] account. Enter J762 in the [facility] box to learn more about \"Learning About Medial Branch Block and Neurotomy. \"     If you do not have an account, please click on the \"Sign Up Now\" link. Current as of: April 8, 2021               Content Version: 13.1  © 2006-2021 Healthwise, Incorporated. Care instructions adapted under license by [facility] ([facility]). If you have questions about a medical condition or this instruction, always ask your healthcare professional. [address] any warranty or liability for your use of this information.

## 2022-08-04 NOTE — PROGRESS NOTES
[facility] [address] [place] [address]  Dept: [phone]  Dept Fax: [phone]  Loc: [phone]    Visit Date: 8/4/2022    Les Austin is a 46 y.o. female who is referred for pain management evaluation and treatment per Dr. Olsen ref. provider found. [employer] Questions   1. In the last three months, have you felt you should cut down or stop drinking or using drugs? Yes []        No [x]     2. In the last three months, has anyone annoyed you or gotten on your nerves by telling you to cut down or stop drinking or using drugs? Yes []        No [x]     3. In the last three months, have you felt guilty or bad about how much you drink or use drugs? Yes []        No [x]     4. In the last three months, have you been waking up wanting to have an alcoholic drink or use drugs? Yes []        No [x]        Opioid Risk Tool:  Clinician Form       1. Family History of Substance Abuse: Female Male    Alcohol   []1   []3    Illegal drugs   []2   []3    Prescription drugs     []4   []4   2. Personal History of Substance Abuse:          Alcohol   []3   []3    Illegal drugs   []4   []4    Prescription drugs     []5   []5   3. Age (zachary box if between 12 and 39):     []1   []1   4. History of Preadolescent Sexual Abuse:     []3   []0   5. Psychological Disease:      Attention deficit disorder, obsessive-compulsive disorder, bipolar, schizophrenia   [x]2   []2      Depression     []1   []1    Scoring Totals 2      Total Score  Low Risk  Moderate Risk  High Risk   Risk Category   0 - 3   4 - 7   8 or Above      Patient states symptoms interfere with:  A.  General Activity:  yes   B. Mood: yes    C. Walking Ability:   yes   D. Normal Work (Includes both work outside the home and housework):   yes    E.  Relations with Other People:  yes   F. Sleep:   yes   G.  Enjoyment of Life:  yes Chronic Pain/PM&R Clinic Note     Encounter Date: 8/4/22    Subjective:   Chief Complaint:   Chief Complaint   Patient presents with    Follow-up     F/U MRI       History of Present Illness:   Keke Hidalgo is a 46 y.o. female seen in the clinic initially on 7/19/2022 upon request from EFRAÍN Cevallos for her history of low back pain. She has personal medical history of hypertension, PE 2017, DVT 2020, long term anticoagulation, paranoid schizophrenia, obesity, depression, anxiety, asthma, GERD, sleep apnea, arthritis. Patient reports she has bilateral low back pain, goes into the buttocks hip area, does not go past the knee, does not radiate to the feet. She does report right side is worse. She states the pain has been occurring for years and years, denies any accident, injury, trauma that caused the pain. She states the pain has been worsening over the last 1 to 2 years. Describes the pain as a constant ache, intermittent stabbing pain. She does report occasional weakness in the legs, but feels like this is worse when it is hot out. She states she feels like she will have breathing issues when it is very hot, and that causes her to tense up and then pain will flareup. She reports they previously lived in [place], did move back to [place] due to the heat and breathing issues. She denies any numbness, tingling, loss of bowel or bladder control. She reports that she did formal physical therapy in 2018 in [place], mild relief during therapy. She states she does not do any type of home exercises or home program for strengthening. She does report that she had a fall few weeks ago, was walking backwards into her apartment and tripped. She does not use any ice or heat or topicals to the area for pain control. She reports she had an MRI 1 to 2 years ago in [place], was told she does have bruising in the back and told to take Aleve.   She reports that walking, standing, bending, doing dishes, cooking all caused the pain to worsen. Sitting, resting at times will help her pain. Pain scale : at worst pain is 10/10, at best pain is 3/10. Today, 8/4/2022, patient presents for scheduled follow up to review MRI. She saw her PCP and got treated for a  sinus infection, with antibiotic, and feels much better. She completed the antibiotic and no concerns. She denies any recent falls since being seen. Was working at the house and bending over which she noticed made her pain worse, states it makes it hard to do housework. No new numbnes, tingling. She would like to discuss injections for pain control. She reports that she has plenty of the Tramadol from the podiatrist, uses rarely, only when very severe pain. History of Interventions:   Surgery: No previous lumbar/cervical surgeries  Injections: None    Current Treatment Medications:   Neurontin- no relief  Tramadol- mild relief, has it for her foot  Tylenol- mild relief     Historical Treatment Medications:   Flexeril- mild relief    Imaging:  PROCEDURE: XR LUMBAR SPINE (2-3 VIEWS)       CLINICAL INFORMATION: Arthritis, Other chronic pain, Lumbar back pain, low back pain. COMPARISON: Lumbar spine x-ray 3/11/2015. TECHNIQUE: AP and lateral views of the lumbar spine. FINDINGS:               The lumbar vertebral bodies are normally aligned. There are no compression fractures. There are small anterior osteophytic at the L2-3 and L3-4 levels. The disc heights are relatively well-maintained. The facet joints are within normal limits. No    suspicious osseous lesions are present. The sacrum and sacroiliac joints appear normal.  There are surgical clips from a prior cholecystectomy. There is no significant change compared to the prior study. Impression    Small anterior osteophytes associated with the L2-3 and L3-4 levels. No other abnormalities.      PROCEDURE: MRI LUMBAR SPINE WO CONTRAST       CLINICAL INFORMATION: Lumbar spondylosis, Fall, initial encounter. COMPARISON: Plain radiographs dated 25 June 2022. .       TECHNIQUE: Sagittal and axial T1 and T2-weighted images were obtained through the lumbar spine. FINDINGS:           The lumbar vertebral bodies are normally aligned. There is a vertebral body hemangioma in the T12 vertebral body. There is a small area of focal fatty deposition versus vertebral body hemangioma in the L1 vertebral body. .  There is no bone marrow edema. There are no compression fractures. No pars defects are noted. .       The distal spinal cord, conus medullaris and cauda equina are normal.        There are no gross abnormalities in the visualized aspects of the distal thoracic spine. On the axial images, at T12-L1, there is no disc herniation, canal or foraminal stenosis. At L1-L2, there is no disc herniation, canal or foraminal stenosis. At L2-L3, there is no disc herniation, canal or foraminal stenosis. At L3-L4, there is no disc herniation, canal or foraminal stenosis. At L4-L5, there is a minimally bulging disc. This results in mild canal and bilateral foraminal stenosis. At L5-S1, there is no disc herniation, canal or foraminal stenosis       There is degenerative change involving the sacroiliac joints bilaterally. There is an area of focal fatty deposition versus vertebral body hemangioma in the right S1 sacral segment. .               Impression       1. No evidence for an acute compression fracture. 2. There is mild canal and bilateral foraminal stenosis at L4-5.   3. There is degenerative change involving the sacroiliac joints bilaterally. 4. There is a vertebral body hemangioma in the T12 vertebral body. 5. There are areas of focal fatty deposition versus vertebral body hemangiomata in L1 vertebral body and right S1 sacral segment. **This report has been created using voice recognition software.  It may contain NIGHTLY    gabapentin (NEURONTIN) 100 mg, Oral, DAILY    hydrOXYzine HCl (ATARAX) 10 mg, Oral, DAILY    loratadine (CLARITIN) 10 mg, Oral, DAILY    LORazepam (ATIVAN) 2 mg, Oral, EVERY 8 HOURS PRN    magnesium (MAGNESIUM-OXIDE) 250 mg, Oral, DAILY    medroxyPROGESTERone (PROVERA) 5 mg, Oral, DAILY    melatonin 3 mg, Oral, NIGHTLY PRN    metoprolol tartrate (LOPRESSOR) 25 mg, Oral, 2 TIMES DAILY    Nutritional Supplements (ESTROVEN PO) Oral    omeprazole (PRILOSEC) 20 mg, Oral, DAILY    oxybutynin (DITROPAN) 5 mg, Oral, 2 TIMES DAILY    pantoprazole (PROTONIX) 40 mg, Oral, DAILY    QUEtiapine (SEROQUEL) 200 mg, Oral, NIGHTLY    risperiDONE (RISPERDAL) 4 mg, Oral, DAILY    Shingrix 50 mcg, IntraMUSCular, SEE ADMIN INSTRUCTIONS, 1 dose now and repeat in 2-6 months    simvastatin (ZOCOR) 40 mg, Oral, DAILY    temazepam (RESTORIL) 15 mg, Oral, NIGHTLY PRN    traMADol (ULTRAM) 50 mg, Oral, EVERY 6 HOURS PRN    traZODone (DESYREL) 200 mg, Oral, NIGHTLY    Trulicity 1.5 mg, SubCUTAneous, WEEKLY    VENTOLIN  (90 Base) MCG/ACT inhaler No dose, route, or frequency recorded.        Allergies   Allergen Reactions    Lithium Hives    Other Palpitations     msg       Review of Systems:   Constitutional: negative for weight changes or fevers  Genitourinary: negative for bowel/bladder incontinence   Musculoskeletal: positive for low back pain, SI pain  Neurological: negative for any leg weakness or numbness/tingling  Behavioral/Psych: negative for anxiety/depression   All other systems reviewed and are negative    Objective:     Vitals:    08/04/22 1043   BP: 118/62   Pulse: 80       Constitutional: Pleasant, no acute distress   Head: Normocephalic, atraumatic   Eyes: Conjunctivae normal   Neck: Supple, symmetrical   Lungs: Normal respiratory effort, non-labored breathing   Cardiovascular: Limbs warm and well perfused   Abdomen: Non-protruded   Musculoskeletal: Muscle bulk symmetric, no atrophy, no gross deformities   · Lower Extremities: ROM WNL. · Thorax: No paraspinal tenderness bilaterally. No scoliosis or kyphosis. · Lumbar Spine: ROM WNL. Lumbar paraspinals non-tender to palpation bilaterally. SLR neg bilaterally. LOREN neg bilaterally. GAENSLEN neg bilaterally. Negative facet loading bilaterally. Bilateral SI joints tender to palpation. SI Distraction maneuver positive on left/right side. Bilateral greater trochanters non-tender to palpation. Neurological: Cranial nerves II-XII grossly intact. · Gait - antalgic gait. Ambulates without assistive device. · Motor: 5/5 muscle strength in bilateral hip flexion, knee flexion, knee extension, ankle dorsiflexion, and ankle plantar flexion   · Sensory: LT sensation intact in lower limbs   · Reflexes: 2+ symmetrical in bilateral achilles, 2+ bilateral patellar, negative ankle clonus  Skin: No rashes or lesions present   Psychological: Cooperative, no exaggerated pain behaviors       Assessment:    Diagnosis Orders   1. Sacroiliac joint pain  CHG FLUOR NEEDLE/CATH SPINE/PARASPINAL DX/THER ADDON    MD INJECT SI JOINT ARTHRGRPHY&/ANES/STEROID W/IMAGE      2. SI (sacroiliac) joint dysfunction  CHG FLUOR NEEDLE/CATH SPINE/PARASPINAL DX/THER ADDON    MD INJECT SI JOINT ARTHRGRPHY&/ANES/STEROID W/IMAGE      3. Lumbar spondylosis        4. Chronic pain syndrome               Ray Rausch is a 46 y. o.female presenting to the pain clinic for evaluation of low back pain. She has significant SI pain and positive SI dysfunction test maneuvers. Discussed possible procedures to assist with pain control, such as; SI injection series, lumbar facet series at bilateral L4-5, L5-S1. Due to recent fall and complains of pain worsening, we will first obtain lumbar MRI. We will see the patient back in 4 weeks, after MRI is completed. With her history of anxiety, depression, paranoid schizophrenia and current medication she is not a candidate for any type of narcotics.   Discussed procedures only and possible retrial of physical therapy in the future as well as home exercise program.  She is also educated on healthy diet, weight loss to assist with pain control as well. Reviewed MRI images with patient, discussed procedures available. Her most pain is tolerable bilateral SI areas. Discussed bilateral SI injections, with plans for radiofrequency ablation at the same levels. She would like to proceed with this. Discussed future procedures at bilateral L4-5, L5-S1 of the lumbar facet series. She is okay to continue her tramadol as needed from Dr. Blake Desir. Educated to use only when absolutely necessary, used as possible, and to utilize alternate therapies prior to using. Plan: The following treatment recommendations and plan were discussed in detail with Amadeo Regi. Imaging:   I have reviewed patients imaging of lumbar XR, mri  and results were discussed with patient today. Analgesics: The patient is currently managing their pain with the use of Tramadol which is prescribed by Dr Blake Desir, podiatry. We recommend that the patient follow the recommendation of the prescribing provider with regard to the above medication(s) and contact their prescribing provider for refills if needed. The side effect profile of long-term opioid use was discussed and recommended emphasis on multimodal strategies for pain management. Patient is taking Acetaminophen. Patient informed that the maximum amount of acetaminophen taken on a regular basis should only be 4000 mg per day. Adjuvants:   None      Interventions: With examination consistent with bilateral sacroiliac dysfunction/pain, we will proceed with a bilateral sacroiliac joint injection. The risks and benefits were discussed in detail with the patient. Patient wants to proceed with the injection Dr Nadir Doe    Anticoagulation/NPO Recommendations:   Patient does need to hold any medications prior to the procedure.  Eliquis- per Dr Sujit  HX blood clots 2017, 2020   Patient will need to be NPO x 8 hours prior to the procedure. We will start an IV prior to the procedure    Multidisciplinary Pain Management:   In the presence of complex, chronic, and multi-factorial pain, the importance of a multidisciplinary approach to pain management in the patients management regimen was emphasized and discussed in great detail. PHYSICAL THERAPY: Patient is advised to see a physical therapist for gentle stretching exercises and conditioning exercises for management of pain. PSYCHOLOGY: Patient is advised to see a clinical pain psychologist, for the psychosocial aspect of pain care through coping skills, relaxation strategies, cognitive group therapy etc.   OBESITY: Patient is advised to seek nutrition consult to help in managing their weight to decrease its impact on pain. The patient was also advised to continue physical therapy and stretching exercises at home and cognitive behavioral and/or group therapy. Referrals:  None    Prescriptions Written This Visit:   None    Follow-up:   After procedures    It was my pleasure to evaluate Donna Waters today. I spent over 35 minutes evaluating this patient, reviewing previous notes and images and completing documentation.        Leti Martínez, APRN - CNP   Interventional Pain Management/PM&R   [place]

## 2022-08-05 ENCOUNTER — TELEPHONE (OUTPATIENT)
Dept: FAMILY MEDICINE CLINIC | Age: 52
End: 2022-08-05

## 2022-08-05 DIAGNOSIS — J01.90 ACUTE BACTERIAL SINUSITIS: ICD-10-CM

## 2022-08-05 DIAGNOSIS — E16.2 LOW BLOOD SUGAR READING: Primary | ICD-10-CM

## 2022-08-05 DIAGNOSIS — B96.89 ACUTE BACTERIAL SINUSITIS: ICD-10-CM

## 2022-08-05 RX ORDER — AMOXICILLIN AND CLAVULANATE POTASSIUM 875; 125 MG/1; MG/1
1 TABLET, FILM COATED ORAL 2 TIMES DAILY
Qty: 14 TABLET | Refills: 0 | Status: SHIPPED | OUTPATIENT
Start: 2022-08-05 | End: 2022-08-12

## 2022-08-05 NOTE — TELEPHONE ENCOUNTER
We can refill it but concern something else may be going on (possiblely may need herpes medication as that may be from virus, hard to tell without seeing her) and that this antibiotic may cause diarrhea without providing benefit. Take with probiotics and would encourage making it to next visit or moving up if needed. Otherwise continue current plan.

## 2022-08-05 NOTE — TELEPHONE ENCOUNTER
Patient states Alphonso Reid gave her antibiotic for a sinus infection and she would like it refilled because her lip was broke out and it was helping that also but it hasnt completely cleared up and is out of the antibiotic

## 2022-08-10 ENCOUNTER — TELEPHONE (OUTPATIENT)
Dept: FAMILY MEDICINE CLINIC | Age: 52
End: 2022-08-10

## 2022-08-10 NOTE — TELEPHONE ENCOUNTER
----- Message from [place] Michelle sent at 8/10/2022  1:34 PM EDT -----  Subject: Message to Provider    QUESTIONS  Information for Provider? Pt called in to verify if paperwork has been   faxed over to her pain management provider. ---------------------------------------------------------------------------  --------------  Vee Cabrera [facility]  [phone]; OK to leave message on voicemail  ---------------------------------------------------------------------------  --------------  SCRIPT ANSWERS  Relationship to Patient?  Self

## 2022-08-12 ENCOUNTER — TELEPHONE (OUTPATIENT)
Dept: FAMILY MEDICINE CLINIC | Age: 52
End: 2022-08-12

## 2022-08-12 NOTE — TELEPHONE ENCOUNTER
----- Message from Marshall Clarke sent at 8/11/2022  2:08 PM EDT -----  Subject: Message to Provider    QUESTIONS  Information for Provider? Patient would like to speak with a nurse to go   over the medications she is taking. During the visits they usually go to   fast for her to understand and know exactly what they are taking. Please   call.  ---------------------------------------------------------------------------  --------------  Ayanna TATE  [phone]; OK to leave message on voicemail  ---------------------------------------------------------------------------  --------------  SCRIPT ANSWERS  Relationship to Patient?  Self

## 2022-08-17 ENCOUNTER — TELEPHONE (OUTPATIENT)
Dept: FAMILY MEDICINE CLINIC | Age: 52
End: 2022-08-17

## 2022-08-17 DIAGNOSIS — G47.19 EXCESSIVE DAYTIME SLEEPINESS: Primary | ICD-10-CM

## 2022-08-17 NOTE — TELEPHONE ENCOUNTER
----- Message from Leonora Sports sent at 8/17/2022 10:46 AM EDT -----  Subject: Referral Request    Reason for referral request? Pt is requesting a sleep study to be ordered. Please contact pt. Provider patient wants to be referred to(if known):     Provider Phone Number(if known):     Additional Information for Provider?   ---------------------------------------------------------------------------  --------------  [id number] [facility]    [phone]; OK to leave message on voicemail  ---------------------------------------------------------------------------  --------------

## 2022-08-18 ENCOUNTER — TELEPHONE (OUTPATIENT)
Dept: PHYSICAL MEDICINE AND REHAB | Age: 52
End: 2022-08-18

## 2022-08-18 NOTE — TELEPHONE ENCOUNTER
Patient called and stated that she has an upcoming procedure 09/12/2022. She said that she is in a lot of pain and does not want to take any of the previously prescribed tramodol she has if she doesn't have to (she said this was prescribed by a previous provider before she was seen by Miguel Ayers). She is wanting to know if it would be possible for her to be prescribed a muscle relaxer to help her to get to the procedure?     DOLV 08/04/2022 DONV 09/26/2022    Please advise  Nila Zhang [address] [place] [address], [address] [address]  N [phone] - F [phone]

## 2022-08-19 NOTE — TELEPHONE ENCOUNTER
Lm on pt vm advising her that Moon Mcgowan is not in the office today, advised patient to call this office back on Monday and to visit ER if pain continues/worsens.

## 2022-08-22 ENCOUNTER — TELEPHONE (OUTPATIENT)
Dept: FAMILY MEDICINE CLINIC | Age: 52
End: 2022-08-22

## 2022-08-22 NOTE — TELEPHONE ENCOUNTER
Left detailed VM informing pt that the forms were faxed and to let us know if they did not receive them. (Harrison Job per signed HIPAA).

## 2022-08-22 NOTE — TELEPHONE ENCOUNTER
Per 8/12/2022 Telephone encounter, patient requested to review her medication list. Called and spoke with patient regarding reviewing her medication list. Patient expressed that she did not want to review her medications at this time and said she can review this list at her next appointment or will call the [address] [place] [address] if she needs to review it prior to next appointment. Thanked patient for her time and offered for her to call with future questions or concerns. Prior to calling, medications were reviewed and the following recommendations/reconciliation points were identified. Confirm patient is still taking the following medications as they may be over-the-counter medications. Also need to confirm directions  Diphenhydramine  Docusate  Ferrous sulfate  Flaxseed/Linseed oil  Loratadine   Melatonin  Estroven   May need to remove the following medications from list  Simvastatin - last filled 2019  Omeprazole - last filled 2019 though could be buying over-the-counter  Hydroxyzine 10mg - last filled 2019  The following medications have been recently filled, per dispense report, but are not on the patient's medication list  Celebrex  Estradiol   Famotidine  Hydrocortisone cream  Rosuvastatin  Triamcinolone cream  Hydrochlorothiazide     Jack Mehta, PharmD  8/22/2022 1:51 PM

## 2022-08-22 NOTE — TELEPHONE ENCOUNTER
Patient is calling and states that someone left her a voicemail but she couldn't understand it and she doesn't understand why she's being offered an appt for tomorrow.

## 2022-08-22 NOTE — TELEPHONE ENCOUNTER
Patient returned call, she does not have the money for another copay. Please call her again to discuss.

## 2022-08-23 ENCOUNTER — TELEPHONE (OUTPATIENT)
Dept: PULMONOLOGY | Age: 52
End: 2022-08-23

## 2022-08-23 NOTE — TELEPHONE ENCOUNTER
Returning patient vm she left on our office machine. She just stated to call back at [phone]. I am returning the vm. I advised pt to call our office back.

## 2022-08-25 ENCOUNTER — TELEPHONE (OUTPATIENT)
Dept: FAMILY MEDICINE CLINIC | Age: 52
End: 2022-08-25

## 2022-08-25 ASSESSMENT — ENCOUNTER SYMPTOMS
SORE THROAT: 0
DIARRHEA: 0
VOMITING: 0
SHORTNESS OF BREATH: 0
BLOOD IN STOOL: 0
NAUSEA: 0
EYE REDNESS: 0

## 2022-08-25 NOTE — PROGRESS NOTES
GLUCOSE 91 06/27/2022    CALCIUM 9.3 06/27/2022    PROT 6.5 06/27/2022    LABALBU 4.2 06/27/2022    BILITOT <0.2 (L) 06/27/2022    ALKPHOS 104 06/27/2022    AST 20 06/27/2022    ALT 20 06/27/2022    LABGLOM >90 06/27/2022       Lab Results   Component Value Date    WBC 5.5 06/27/2022    HGB 12.0 06/27/2022    HCT 38.4 06/27/2022    MCV 96.2 06/27/2022     06/27/2022       Past Medical History:     Past Medical History:   Diagnosis Date    Anxiety     Arthritis     Asthma     Blood clotting disorder ([facility] [address]     2017, 2021    Depression     Flexural eczema     GERD (gastroesophageal reflux disease)     Hyperlipidemia     Osteoarthritis     Paranoid schizophrenia ([facility] [address]         Past Surgical History:     Past Surgical History:   Procedure Laterality Date    ANKLE SURGERY Left 2000    BLADDER SUSPENSION  2007    CHOLECYSTECTOMY, LAPAROSCOPIC  05/04/2015    LAPAROSCOPIC CHOLECYSTECTOMY    COLONOSCOPY      DENTAL SURGERY      FOOT SURGERY Right 2007    OTHER SURGICAL HISTORY  2012    vaginal sling    SHOULDER SURGERY Right age 15    UPPER GASTROINTESTINAL ENDOSCOPY  10/23/2015    Dr. Pat Barrientos          Family History:     Family History   Problem Relation Age of Onset    Depression Mother     High Blood Pressure Mother     Alzheimer's Disease Mother     Kidney Disease Mother         Kidney Failure    Cancer Father         melanoma, lung and tumor on head    Alcohol Abuse Father        Social History:     Social:    Social History     Socioeconomic History    Marital status:      Spouse name: Not on file    Number of children: Not on file    Years of education: Not on file    Highest education level: Not on file   Occupational History    Not on file   Tobacco Use    Smoking status: Never    Smokeless tobacco: Never   Substance and Sexual Activity    Alcohol use: No    Drug use: No    Sexual activity: Never   Other Topics Concern    Not on file   Social History Narrative    Not on file     Social Determinants of Health     Financial Resource Strain: Medium Risk    Difficulty of Paying Living Expenses: Somewhat hard   Food Insecurity: Food Insecurity Present    Worried About Running Out of Food in the Last Year: Sometimes true    Ran Out of Food in the Last Year: Often true   Transportation Needs: Not on file   Physical Activity: Not on file   Stress: Not on file   Social Connections: Not on file   Intimate Partner Violence: Not on file   Housing Stability: Not on file       Allergies:        Lithium and Other      Medications:       Current Outpatient Medications   Medication Sig Dispense Refill    hydrOXYzine HCl (ATARAX) 50 MG tablet Take 50 mg by mouth 3 times daily as needed for Itching      Nutritional Supplements (ESTROVEN PO) Take 1 tablet by mouth daily      Inulin (FIBER CHOICE PREBIOTIC FIBER) 1.5 g CHEW chewable tablet Take 2 tablets by mouth as needed      gabapentin (NEURONTIN) 300 MG capsule Take 1 capsule by mouth nightly for 30 days. 90 capsule 1    gabapentin (NEURONTIN) 100 MG capsule Take 1 capsule by mouth daily for 30 days.  90 capsule 1    ferrous sulfate (FE TABS) 325 (65 Fe) MG EC tablet Take 1 tablet by mouth 2 times daily 180 tablet 1    medroxyPROGESTERone (PROVERA) 5 MG tablet Take 1 tablet by mouth daily 90 tablet 1    metoprolol tartrate (LOPRESSOR) 25 MG tablet Take 1 tablet by mouth 2 times daily 180 tablet 3    omeprazole (PRILOSEC) 40 MG delayed release capsule Take 1 capsule by mouth Daily 90 capsule 1    oxybutynin (DITROPAN) 5 MG tablet Take 1 tablet by mouth 2 times daily 180 tablet 1    simvastatin (ZOCOR) 40 MG tablet Take 1 tablet by mouth daily 90 tablet 1    traZODone (DESYREL) 150 MG tablet Take 1 tablet by mouth nightly 90 tablet 1    amoxicillin (AMOXIL) 875 MG tablet Take 1 tablet by mouth 2 times daily for 10 days 20 tablet 0    famotidine (PEPCID) 20 MG tablet Take 1 tablet by mouth 2 times daily 180 tablet 1    TRUEplus Lancets 30G MISC Inject 30 g into the skin 3 times daily (with meals)      amitriptyline (ELAVIL) 150 MG tablet Take 1 tablet by mouth nightly 30 tablet 0    apixaban (ELIQUIS) 5 MG TABS tablet Take 1 tablet by mouth 2 times daily 60 tablet 2    QUEtiapine (SEROQUEL) 200 MG tablet Take 1 tablet by mouth nightly 30 tablet 1    Dulaglutide (TRULICITY) 1.5 KU/8.8HO SOPN Inject 1.5 mg into the skin once a week      ciclopirox (PENLAC) 8 % solution Apply topically nightly Apply topically nightly. Alcohol Swabs (ALCOHOL PREP) PADS by Does not apply route      fluticasone (FLONASE) 50 MCG/ACT nasal spray 1 spray by Each Nostril route daily 32 g 1    pantoprazole (PROTONIX) 40 MG tablet Take 40 mg by mouth daily      melatonin 3 MG TABS tablet Take 10 mg by mouth nightly as needed      docusate sodium (COLACE) 100 MG capsule Take 100 mg by mouth 2 times daily      LORazepam (ATIVAN) 2 MG tablet Take 2 mg by mouth every 8 hours as needed. risperiDONE (RISPERDAL) 4 MG tablet Take 4 mg by mouth 2 times daily      hydrocortisone (HYTONE) 2.5 % lotion        No current facility-administered medications for this visit. Review of Systems:      Review of Systems   Constitutional:  Negative for chills and fever. HENT:  Positive for congestion and rhinorrhea. Negative for sore throat. Eyes:  Negative for discharge and redness. Respiratory:  Positive for cough. Negative for shortness of breath. Cardiovascular:  Negative for chest pain and palpitations. Gastrointestinal:  Positive for constipation. Negative for blood in stool, diarrhea, nausea and vomiting. Genitourinary:  Negative for dysuria and frequency. Musculoskeletal:  Positive for arthralgias, back pain and myalgias. Neurological:  Negative for weakness and headaches. Psychiatric/Behavioral:  Positive for dysphoric mood and sleep disturbance. The patient is nervous/anxious.         Physical Exam:     Vitals:  Blood pressure 110/74, pulse 86, temperature 98.4 °F (36.9 °C), temperature source Temporal, resp. rate 16, height 5' 5\" (1.651 m), weight 285 lb 6.4 oz (129.5 kg), last menstrual period 02/23/2017, SpO2 95 %, not currently breastfeeding. Physical Exam  Vitals and nursing note reviewed. Constitutional:       General: She is not in acute distress. Appearance: She is well-developed. She is obese. HENT:      Head: Normocephalic and atraumatic. Nose: Congestion present. Mouth/Throat:      Pharynx: Posterior oropharyngeal erythema present. Eyes:      Conjunctiva/sclera: Conjunctivae normal.   Cardiovascular:      Rate and Rhythm: Normal rate and regular rhythm. Heart sounds: Normal heart sounds. Pulmonary:      Effort: Pulmonary effort is normal. No respiratory distress. Breath sounds: Normal breath sounds. No wheezing. Abdominal:      General: Bowel sounds are normal. There is no distension. Palpations: Abdomen is soft. Tenderness: There is no abdominal tenderness. Musculoskeletal:      Cervical back: Normal range of motion and neck supple. Skin:     General: Skin is warm and dry. Neurological:      Mental Status: She is alert and oriented to person, place, and time. Psychiatric:         Behavior: Behavior normal.       Assessment:/Plan     Donna was seen today for new patient, congestion, otalgia and cough. Diagnoses and all orders for this visit:    Tachycardia  -     Her HR has been controlled so will continue on Lopressor. F/u with Dr. Ayden ValeroN. -     metoprolol tartrate (LOPRESSOR) 25 MG tablet; Take 1 tablet by mouth 2 times daily    Pure hypercholesterolemia  -     simvastatin (ZOCOR) 40 MG tablet; Take 1 tablet by mouth daily        -     A healthy diet and routine physical activity encouraged. Iron deficiency anemia, unspecified iron deficiency anemia type  -     ferrous sulfate (FE TABS) 325 (65 Fe) MG EC tablet;  Take 1 tablet by mouth 2 times daily    Gastroesophageal reflux disease, unspecified whether esophagitis present  -     She has been having more symptoms so will add Pepcid to the Prilosec. -     omeprazole (PRILOSEC) 40 MG delayed release capsule; Take 1 capsule by mouth Daily    Screening for colon cancer  -     [facility] - Duke Siegel MD, Gastroenterology, [facility]    Primary insomnia        -     Will increase the Trazodone to 150 mg nightly and stop the Temazepam.    Acute bacterial sinusitis  -     amoxicillin (AMOXIL) 875 MG tablet; Take 1 tablet by mouth 2 times daily for 10 days      Return in about 6 months (around 2/28/2023) for tachycardia, GERD, HLD. or sooner if any new issues occur.     Electronically signed by Linda Wolf MD on 8/29/2022 at 3:27 PM

## 2022-08-25 NOTE — TELEPHONE ENCOUNTER
Spoke to pt and she states that she is doing good now and doesn't know what cause the episode. I ensured she was certain her bile was a dark green color. I told her that this may be because her stomach was empty and this can be the color of bile when you vomit on an empty stomach, but I told her I would ask Dr. Carlita Martin to be certain there is no other concern or reason. Please advise.

## 2022-08-25 NOTE — TELEPHONE ENCOUNTER
----- Message from Tremaynebrant Juan sent at 8/25/2022  8:17 AM EDT -----  Subject: Message to Provider    QUESTIONS  Information for Provider? Pt requesting a call back from a nurse in   regards to vomiting a dark green bile. Pt would like to discuss her   medication she is taking.   ---------------------------------------------------------------------------  --------------  Felisha TATE  [phone]; OK to leave message on voicemail  ---------------------------------------------------------------------------  --------------  SCRIPT ANSWERS  Relationship to Patient?  Self

## 2022-08-29 ENCOUNTER — OFFICE VISIT (OUTPATIENT)
Dept: FAMILY MEDICINE CLINIC | Age: 52
End: 2022-08-29
Payer: COMMERCIAL

## 2022-08-29 VITALS
SYSTOLIC BLOOD PRESSURE: 110 MMHG | OXYGEN SATURATION: 95 % | DIASTOLIC BLOOD PRESSURE: 74 MMHG | HEART RATE: 86 BPM | BODY MASS INDEX: 47.55 KG/M2 | HEIGHT: 65 IN | TEMPERATURE: 98.4 F | RESPIRATION RATE: 16 BRPM | WEIGHT: 285.4 LBS

## 2022-08-29 DIAGNOSIS — K21.9 GASTROESOPHAGEAL REFLUX DISEASE, UNSPECIFIED WHETHER ESOPHAGITIS PRESENT: ICD-10-CM

## 2022-08-29 DIAGNOSIS — B96.89 ACUTE BACTERIAL SINUSITIS: ICD-10-CM

## 2022-08-29 DIAGNOSIS — E78.00 PURE HYPERCHOLESTEROLEMIA: ICD-10-CM

## 2022-08-29 DIAGNOSIS — Z12.11 SCREENING FOR COLON CANCER: ICD-10-CM

## 2022-08-29 DIAGNOSIS — R00.0 TACHYCARDIA: Primary | ICD-10-CM

## 2022-08-29 DIAGNOSIS — J01.90 ACUTE BACTERIAL SINUSITIS: ICD-10-CM

## 2022-08-29 DIAGNOSIS — D50.9 IRON DEFICIENCY ANEMIA, UNSPECIFIED IRON DEFICIENCY ANEMIA TYPE: ICD-10-CM

## 2022-08-29 DIAGNOSIS — F51.01 PRIMARY INSOMNIA: ICD-10-CM

## 2022-08-29 PROCEDURE — 99214 OFFICE O/P EST MOD 30 MIN: CPT | Performed by: FAMILY MEDICINE

## 2022-08-29 RX ORDER — HYDROCORTISONE 25 MG/ML
LOTION TOPICAL
COMMUNITY
Start: 2022-08-19

## 2022-08-29 RX ORDER — HYDROXYZINE 50 MG/1
50 TABLET, FILM COATED ORAL 3 TIMES DAILY PRN
COMMUNITY
End: 2022-09-01 | Stop reason: SDUPTHER

## 2022-08-29 RX ORDER — SIMVASTATIN 40 MG
40 TABLET ORAL DAILY
Qty: 90 TABLET | Refills: 1 | Status: SHIPPED | OUTPATIENT
Start: 2022-08-29

## 2022-08-29 RX ORDER — AMOXICILLIN 875 MG/1
875 TABLET, COATED ORAL 2 TIMES DAILY
Qty: 20 TABLET | Refills: 0 | Status: SHIPPED | OUTPATIENT
Start: 2022-08-29 | End: 2022-09-08

## 2022-08-29 RX ORDER — FAMOTIDINE 20 MG/1
20 TABLET, FILM COATED ORAL 2 TIMES DAILY
Qty: 180 TABLET | Refills: 1 | Status: SHIPPED | OUTPATIENT
Start: 2022-08-29 | End: 2022-10-03 | Stop reason: ALTCHOICE

## 2022-08-29 RX ORDER — OMEPRAZOLE 40 MG/1
40 CAPSULE, DELAYED RELEASE ORAL DAILY
Qty: 90 CAPSULE | Refills: 1 | Status: SHIPPED | OUTPATIENT
Start: 2022-08-29

## 2022-08-29 RX ORDER — MEDROXYPROGESTERONE ACETATE 5 MG/1
5 TABLET ORAL DAILY
Qty: 90 TABLET | Refills: 1 | Status: SHIPPED | OUTPATIENT
Start: 2022-08-29

## 2022-08-29 RX ORDER — GABAPENTIN 300 MG/1
300 CAPSULE ORAL NIGHTLY
Qty: 90 CAPSULE | Refills: 1 | Status: SHIPPED | OUTPATIENT
Start: 2022-08-29 | End: 2022-11-02

## 2022-08-29 RX ORDER — GABAPENTIN 100 MG/1
100 CAPSULE ORAL DAILY
Qty: 90 CAPSULE | Refills: 1 | Status: SHIPPED | OUTPATIENT
Start: 2022-08-29 | End: 2022-11-02

## 2022-08-29 RX ORDER — LANOLIN ALCOHOL/MO/W.PET/CERES
325 CREAM (GRAM) TOPICAL 2 TIMES DAILY
Qty: 180 TABLET | Refills: 1 | Status: SHIPPED | OUTPATIENT
Start: 2022-08-29

## 2022-08-29 RX ORDER — TEMAZEPAM 15 MG/1
15 CAPSULE ORAL NIGHTLY PRN
COMMUNITY
End: 2022-08-29

## 2022-08-29 RX ORDER — OXYBUTYNIN CHLORIDE 5 MG/1
5 TABLET ORAL 2 TIMES DAILY
Qty: 180 TABLET | Refills: 1 | Status: SHIPPED | OUTPATIENT
Start: 2022-08-29

## 2022-08-29 RX ORDER — PANTOPRAZOLE SODIUM 40 MG/1
40 TABLET, DELAYED RELEASE ORAL DAILY
Qty: 90 TABLET | Refills: 1 | Status: CANCELLED | OUTPATIENT
Start: 2022-08-29

## 2022-08-29 RX ORDER — TRAZODONE HYDROCHLORIDE 150 MG/1
150 TABLET ORAL NIGHTLY
Qty: 90 TABLET | Refills: 1 | Status: SHIPPED | OUTPATIENT
Start: 2022-08-29 | End: 2022-11-27

## 2022-08-29 ASSESSMENT — PATIENT HEALTH QUESTIONNAIRE - PHQ9
SUM OF ALL RESPONSES TO PHQ QUESTIONS 1-9: 0
SUM OF ALL RESPONSES TO PHQ QUESTIONS 1-9: 0
1. LITTLE INTEREST OR PLEASURE IN DOING THINGS: 0
2. FEELING DOWN, DEPRESSED OR HOPELESS: 0
SUM OF ALL RESPONSES TO PHQ QUESTIONS 1-9: 0
SUM OF ALL RESPONSES TO PHQ QUESTIONS 1-9: 0
SUM OF ALL RESPONSES TO PHQ9 QUESTIONS 1 & 2: 0

## 2022-08-29 ASSESSMENT — ENCOUNTER SYMPTOMS
RHINORRHEA: 1
COUGH: 1
BACK PAIN: 1
CONSTIPATION: 1
EYE DISCHARGE: 0

## 2022-08-31 ENCOUNTER — TELEPHONE (OUTPATIENT)
Dept: FAMILY MEDICINE CLINIC | Age: 52
End: 2022-08-31

## 2022-08-31 RX ORDER — AMOXICILLIN AND CLAVULANATE POTASSIUM 875; 125 MG/1; MG/1
1 TABLET, FILM COATED ORAL 2 TIMES DAILY
Qty: 20 TABLET | Refills: 0 | Status: SHIPPED | OUTPATIENT
Start: 2022-08-31 | End: 2022-10-17 | Stop reason: SDUPTHER

## 2022-08-31 NOTE — TELEPHONE ENCOUNTER
Pt called the office stating for the last week she has been coughing up green mucus when she rinses her mouth out. Pt also complains of chest tightness from time to time but states that her medications were recently changed and isn't sure if this could related. Pt denies any exposure to anyone with covid and states that she hasn't tried anything OTC at this time as she feels that she has a sinus infection. Pt stated that in the past, she would call and office would call her in something to help with this. Advised to pt that message would be sent to PCP to be addressed. Pharmacy- [id number].

## 2022-09-01 RX ORDER — HYDROXYZINE 50 MG/1
50 TABLET, FILM COATED ORAL 3 TIMES DAILY PRN
Qty: 90 TABLET | Refills: 2 | Status: SHIPPED | OUTPATIENT
Start: 2022-09-01

## 2022-09-06 ENCOUNTER — TELEPHONE (OUTPATIENT)
Dept: FAMILY MEDICINE CLINIC | Age: 52
End: 2022-09-06

## 2022-09-06 NOTE — TELEPHONE ENCOUNTER
Pt called stating the [facility] wants her to take her Lorazepam 2mg once a day. She states she usually takes it twice a day, but the psych won't fill it for that amount. She wants you to fill it. Please advise.

## 2022-09-06 NOTE — TELEPHONE ENCOUNTER
Notified pt. She states she has been only getting 3-4 hours of sleep a night. She is unable to take melatonin b/c of her upcoming back surgery. She wants to know what she can do.

## 2022-09-07 ENCOUNTER — TELEPHONE (OUTPATIENT)
Dept: PHYSICAL MEDICINE AND REHAB | Age: 52
End: 2022-09-07

## 2022-09-15 ENCOUNTER — OFFICE VISIT (OUTPATIENT)
Dept: FAMILY MEDICINE CLINIC | Age: 52
End: 2022-09-15
Payer: COMMERCIAL

## 2022-09-15 VITALS
TEMPERATURE: 97.3 F | RESPIRATION RATE: 14 BRPM | BODY MASS INDEX: 47.43 KG/M2 | SYSTOLIC BLOOD PRESSURE: 124 MMHG | WEIGHT: 285 LBS | DIASTOLIC BLOOD PRESSURE: 70 MMHG | HEART RATE: 92 BPM

## 2022-09-15 DIAGNOSIS — H65.92 MIDDLE EAR EFFUSION, LEFT: Primary | ICD-10-CM

## 2022-09-15 PROCEDURE — 99213 OFFICE O/P EST LOW 20 MIN: CPT | Performed by: FAMILY MEDICINE

## 2022-09-15 RX ORDER — GLUCOSAM/CHON-MSM1/C/MANG/BOSW 500-416.6
TABLET ORAL
Qty: 100 EACH | Refills: 5 | Status: SHIPPED | OUTPATIENT
Start: 2022-09-15

## 2022-09-15 RX ORDER — CALCIUM CITRATE/VITAMIN D3 200MG-6.25
1 TABLET ORAL DAILY
Qty: 100 EACH | Refills: 5 | Status: SHIPPED | OUTPATIENT
Start: 2022-09-15

## 2022-09-15 RX ORDER — CALCIUM CITRATE/VITAMIN D3 200MG-6.25
1 TABLET ORAL DAILY
COMMUNITY
End: 2022-09-15 | Stop reason: SDUPTHER

## 2022-09-15 RX ORDER — GLUCOSAM/CHON-MSM1/C/MANG/BOSW 500-416.6
TABLET ORAL
COMMUNITY
End: 2022-09-15 | Stop reason: SDUPTHER

## 2022-09-15 ASSESSMENT — ENCOUNTER SYMPTOMS: RHINORRHEA: 0

## 2022-09-15 NOTE — PROGRESS NOTES
[address]  [address] [place] [address]  Phone:  [phone]          Name: Светлана Cedeno  : 1970    Chief Complaint   Patient presents with    Ear Fullness     Pressure in left ear        HPI:     Светлана Cedeno is a 46 y.o. female who presents today for evaluation of fullness in her left ear. She's had this intermittently for for years. When she was in [place] she saw ENT who said she has some hearing loss, but didn't find anything else. Current Outpatient Medications:     hydrOXYzine HCl (ATARAX) 50 MG tablet, Take 1 tablet by mouth 3 times daily as needed for Itching, Disp: 90 tablet, Rfl: 2    Nutritional Supplements (ESTROVEN PO), Take 1 tablet by mouth daily, Disp: , Rfl:     Inulin 1.5 g CHEW chewable tablet, Take 2 tablets by mouth as needed, Disp: , Rfl:     gabapentin (NEURONTIN) 300 MG capsule, Take 1 capsule by mouth nightly for 30 days. , Disp: 90 capsule, Rfl: 1    gabapentin (NEURONTIN) 100 MG capsule, Take 1 capsule by mouth daily for 30 days. , Disp: 90 capsule, Rfl: 1    ferrous sulfate (FE TABS) 325 (65 Fe) MG EC tablet, Take 1 tablet by mouth 2 times daily, Disp: 180 tablet, Rfl: 1    medroxyPROGESTERone (PROVERA) 5 MG tablet, Take 1 tablet by mouth daily, Disp: 90 tablet, Rfl: 1    metoprolol tartrate (LOPRESSOR) 25 MG tablet, Take 1 tablet by mouth 2 times daily, Disp: 180 tablet, Rfl: 3    omeprazole (PRILOSEC) 40 MG delayed release capsule, Take 1 capsule by mouth Daily, Disp: 90 capsule, Rfl: 1    oxybutynin (DITROPAN) 5 MG tablet, Take 1 tablet by mouth 2 times daily, Disp: 180 tablet, Rfl: 1    simvastatin (ZOCOR) 40 MG tablet, Take 1 tablet by mouth daily, Disp: 90 tablet, Rfl: 1    hydrocortisone (HYTONE) 2.5 % lotion, , Disp: , Rfl:     traZODone (DESYREL) 150 MG tablet, Take 1 tablet by mouth nightly, Disp: 90 tablet, Rfl: 1    famotidine (PEPCID) 20 MG tablet, Take 1 tablet by mouth 2 times daily, Disp: 180 tablet, Rfl: 1    TRUEplus Lancets 30G MISC, Inject 30 g into the skin 3 times daily (with meals), Disp: , Rfl:     amitriptyline (ELAVIL) 150 MG tablet, Take 1 tablet by mouth nightly, Disp: 30 tablet, Rfl: 0    apixaban (ELIQUIS) 5 MG TABS tablet, Take 1 tablet by mouth 2 times daily, Disp: 60 tablet, Rfl: 2    QUEtiapine (SEROQUEL) 200 MG tablet, Take 1 tablet by mouth nightly, Disp: 30 tablet, Rfl: 1    Dulaglutide (TRULICITY) 1.5 VF/7.5AB SOPN, Inject 1.5 mg into the skin once a week, Disp: , Rfl:     ciclopirox (PENLAC) 8 % solution, Apply topically nightly Apply topically nightly., Disp: , Rfl:     Alcohol Swabs (ALCOHOL PREP) PADS, by Does not apply route, Disp: , Rfl:     fluticasone (FLONASE) 50 MCG/ACT nasal spray, 1 spray by Each Nostril route daily, Disp: 32 g, Rfl: 1    pantoprazole (PROTONIX) 40 MG tablet, Take 40 mg by mouth daily, Disp: , Rfl:     melatonin 3 MG TABS tablet, Take 10 mg by mouth nightly as needed, Disp: , Rfl:     docusate sodium (COLACE) 100 MG capsule, Take 100 mg by mouth 2 times daily, Disp: , Rfl:     LORazepam (ATIVAN) 2 MG tablet, Take 2 mg by mouth every 8 hours as needed. , Disp: , Rfl:     risperiDONE (RISPERDAL) 4 MG tablet, Take 4 mg by mouth 2 times daily, Disp: , Rfl:     Allergies   Allergen Reactions    Lithium Hives    Other Palpitations     msg       Subjective:      Review of Systems   Constitutional:  Negative for chills and fever. HENT:  Positive for ear pain. Negative for congestion, ear discharge and rhinorrhea. Objective:     /70 (Site: Left Upper Arm, Position: Sitting, Cuff Size: Large Adult)   Pulse 92   Temp 97.3 °F (36.3 °C) (Temporal)   Resp 14   Wt 285 lb (129.3 kg)   LMP 02/23/2017 (Exact Date)   BMI 47.43 kg/m²     Physical Exam  Vitals and nursing note reviewed. Constitutional:       General: She is not in acute distress. Appearance: She is well-developed. HENT:      Head: Normocephalic and atraumatic.       Right Ear: Tympanic membrane, ear canal and external ear normal.      Left Ear: Ear canal and external ear normal. A middle ear effusion is present. Nose: Nose normal.   Eyes:      Conjunctiva/sclera: Conjunctivae normal.   Cardiovascular:      Rate and Rhythm: Normal rate and regular rhythm. Heart sounds: Normal heart sounds. Pulmonary:      Effort: Pulmonary effort is normal. No respiratory distress. Breath sounds: Normal breath sounds. No wheezing. Musculoskeletal:      Cervical back: Normal range of motion and neck supple. Skin:     General: Skin is warm and dry. Neurological:      Mental Status: She is alert and oriented to person, place, and time. Psychiatric:         Behavior: Behavior normal.       Assessment/Plan:     Donna was seen today for ear fullness. Diagnoses and all orders for this visit:    Middle ear effusion, left        -     Will treat with Flonase for likely Eustachian tube dysfunction. She was instructed on proper use. Return if symptoms worsen or fail to improve.     Electronically signed by Les Martinez MD on 9/15/2022 at 9:08 AM

## 2022-09-22 ENCOUNTER — OFFICE VISIT (OUTPATIENT)
Dept: FAMILY MEDICINE CLINIC | Age: 52
End: 2022-09-22
Payer: COMMERCIAL

## 2022-09-22 ENCOUNTER — TELEPHONE (OUTPATIENT)
Dept: FAMILY MEDICINE CLINIC | Age: 52
End: 2022-09-22

## 2022-09-22 VITALS
HEIGHT: 65 IN | OXYGEN SATURATION: 96 % | HEART RATE: 78 BPM | TEMPERATURE: 98.3 F | SYSTOLIC BLOOD PRESSURE: 110 MMHG | RESPIRATION RATE: 16 BRPM | BODY MASS INDEX: 47.88 KG/M2 | WEIGHT: 287.4 LBS | DIASTOLIC BLOOD PRESSURE: 72 MMHG

## 2022-09-22 DIAGNOSIS — M79.672 CHRONIC PAIN IN LEFT FOOT: ICD-10-CM

## 2022-09-22 DIAGNOSIS — H91.92 HEARING LOSS OF LEFT EAR, UNSPECIFIED HEARING LOSS TYPE: Primary | ICD-10-CM

## 2022-09-22 DIAGNOSIS — G89.29 CHRONIC PAIN IN LEFT FOOT: ICD-10-CM

## 2022-09-22 PROCEDURE — 99213 OFFICE O/P EST LOW 20 MIN: CPT | Performed by: FAMILY MEDICINE

## 2022-09-22 NOTE — TELEPHONE ENCOUNTER
[facility] sent fax stating rx for test strips directions (test daily) do not match lancets (test daily and prn 4 x daily)    Gave verbal ok to change test strips to daily and prn per symptoms 4x daily max.   Dispense 90 days each and 3 refills each    Ok'd per Dr Marv Moore

## 2022-09-27 ENCOUNTER — TELEPHONE (OUTPATIENT)
Dept: FAMILY MEDICINE CLINIC | Age: 52
End: 2022-09-27

## 2022-09-27 NOTE — TELEPHONE ENCOUNTER
Pt called stating that she is not feeling the best. Pt has been congested since Sunday. Advised pt to take OTC cold medication and try a humidifier in the bedroom.  Pt states that she does not have money to buy a humidifier and wants to know if dr. Jose Ling will call in a z-pack

## 2022-09-28 ENCOUNTER — OFFICE VISIT (OUTPATIENT)
Dept: PHYSICAL MEDICINE AND REHAB | Age: 52
End: 2022-09-28
Payer: COMMERCIAL

## 2022-09-28 VITALS
DIASTOLIC BLOOD PRESSURE: 64 MMHG | HEIGHT: 65 IN | WEIGHT: 287 LBS | BODY MASS INDEX: 47.82 KG/M2 | SYSTOLIC BLOOD PRESSURE: 98 MMHG

## 2022-09-28 DIAGNOSIS — M47.816 LUMBAR SPONDYLOSIS: ICD-10-CM

## 2022-09-28 DIAGNOSIS — M53.3 SACROILIAC JOINT PAIN: Primary | ICD-10-CM

## 2022-09-28 DIAGNOSIS — G89.4 CHRONIC PAIN SYNDROME: ICD-10-CM

## 2022-09-28 DIAGNOSIS — M53.3 SI (SACROILIAC) JOINT DYSFUNCTION: ICD-10-CM

## 2022-09-28 PROCEDURE — 99214 OFFICE O/P EST MOD 30 MIN: CPT | Performed by: NURSE PRACTITIONER

## 2022-09-28 RX ORDER — LIDOCAINE 4 G/G
1 PATCH TOPICAL DAILY
Qty: 30 PATCH | Refills: 0 | Status: CANCELLED | OUTPATIENT
Start: 2022-09-28 | End: 2022-10-28

## 2022-09-28 RX ORDER — LIDOCAINE 50 MG/G
1 PATCH TOPICAL DAILY
Qty: 30 PATCH | Refills: 0 | Status: SHIPPED | OUTPATIENT
Start: 2022-09-28 | End: 2022-10-28

## 2022-09-28 NOTE — PROGRESS NOTES
Chronic Pain/PM&R Clinic Note     Encounter Date: 9/28/22    Subjective:   Chief Complaint:   Chief Complaint   Patient presents with    Follow-up     Back pain has increased, having sharp pains     Discuss Medications     Lidocaine patches        History of Present Illness:   Jc Kaba is a 46 y.o. female seen in the clinic initially on 7/19/2022 upon request from EFRAÍN Raymond for her history of low back pain. She has personal medical history of hypertension, PE 2017, DVT 2020, long term anticoagulation, paranoid schizophrenia, obesity, depression, anxiety, asthma, GERD, sleep apnea, arthritis. Patient reports she has bilateral low back pain, goes into the buttocks hip area, does not go past the knee, does not radiate to the feet. She does report right side is worse. She states the pain has been occurring for years and years, denies any accident, injury, trauma that caused the pain. She states the pain has been worsening over the last 1 to 2 years. Describes the pain as a constant ache, intermittent stabbing pain. She does report occasional weakness in the legs, but feels like this is worse when it is hot out. She states she feels like she will have breathing issues when it is very hot, and that causes her to tense up and then pain will flareup. She reports they previously lived in [place], did move back to [place] due to the heat and breathing issues. She denies any numbness, tingling, loss of bowel or bladder control. She reports that she did formal physical therapy in 2018 in [place], mild relief during therapy. She states she does not do any type of home exercises or home program for strengthening. She does report that she had a fall few weeks ago, was walking backwards into her apartment and tripped. She does not use any ice or heat or topicals to the area for pain control.   She reports she had an MRI 1 to 2 years ago in [place], was told she does have bruising in the back and told normally aligned. There are no compression fractures. There are small anterior osteophytic at the L2-3 and L3-4 levels. The disc heights are relatively well-maintained. The facet joints are within normal limits. No    suspicious osseous lesions are present. The sacrum and sacroiliac joints appear normal.  There are surgical clips from a prior cholecystectomy. There is no significant change compared to the prior study. Impression    Small anterior osteophytes associated with the L2-3 and L3-4 levels. No other abnormalities. PROCEDURE: MRI LUMBAR SPINE WO CONTRAST       CLINICAL INFORMATION: Lumbar spondylosis, Fall, initial encounter. COMPARISON: Plain radiographs dated 25 June 2022. .       TECHNIQUE: Sagittal and axial T1 and T2-weighted images were obtained through the lumbar spine. FINDINGS:           The lumbar vertebral bodies are normally aligned. There is a vertebral body hemangioma in the T12 vertebral body. There is a small area of focal fatty deposition versus vertebral body hemangioma in the L1 vertebral body. .  There is no bone marrow edema. There are no compression fractures. No pars defects are noted. .       The distal spinal cord, conus medullaris and cauda equina are normal.        There are no gross abnormalities in the visualized aspects of the distal thoracic spine. On the axial images, at T12-L1, there is no disc herniation, canal or foraminal stenosis. At L1-L2, there is no disc herniation, canal or foraminal stenosis. At L2-L3, there is no disc herniation, canal or foraminal stenosis. At L3-L4, there is no disc herniation, canal or foraminal stenosis. At L4-L5, there is a minimally bulging disc. This results in mild canal and bilateral foraminal stenosis. At L5-S1, there is no disc herniation, canal or foraminal stenosis       There is degenerative change involving the sacroiliac joints bilaterally.        There is an area of focal fatty deposition versus vertebral body hemangioma in the right S1 sacral segment. .               Impression       1. No evidence for an acute compression fracture. 2. There is mild canal and bilateral foraminal stenosis at L4-5.   3. There is degenerative change involving the sacroiliac joints bilaterally. 4. There is a vertebral body hemangioma in the T12 vertebral body. 5. There are areas of focal fatty deposition versus vertebral body hemangiomata in L1 vertebral body and right S1 sacral segment. **This report has been created using voice recognition software. It may contain minor errors which are inherent in voice recognition technology. **       Final report electronically signed by DR Janas Opitz on 7/29/2022 4:13 PM           Past Medical History:   Diagnosis Date    Anxiety     Arthritis     Asthma     Blood clotting disorder ([facility] [address]     2017, 2021    Depression     Flexural eczema     GERD (gastroesophageal reflux disease)     Hyperlipidemia     Osteoarthritis     Paranoid schizophrenia ([facility] [address]        Past Surgical History:   Procedure Laterality Date    ANKLE SURGERY Left 2000    BLADDER SUSPENSION  2007    CHOLECYSTECTOMY, LAPAROSCOPIC  05/04/2015    LAPAROSCOPIC CHOLECYSTECTOMY    COLONOSCOPY      DENTAL SURGERY      FOOT SURGERY Right 2007    OTHER SURGICAL HISTORY  2012    vaginal sling    SHOULDER SURGERY Right age 15    UPPER GASTROINTESTINAL ENDOSCOPY  10/23/2015    Dr. Trinity Palumbo         Family History   Problem Relation Age of Onset    Depression Mother     High Blood Pressure Mother     Alzheimer's Disease Mother     Kidney Disease Mother         Kidney Failure    Cancer Father         melanoma, lung and tumor on head    Alcohol Abuse Father          Medications & Allergies:   Current Outpatient Medications   Medication Instructions    Alcohol Swabs (ALCOHOL PREP) PADS Does not apply    amitriptyline (ELAVIL) 150 mg, Oral, NIGHTLY apixaban (ELIQUIS) 5 mg, Oral, 2 TIMES DAILY    Blood Glucose Monitoring Suppl (TRUE METRIX METER) w/Device KIT Dispense 1 glucometer kit. blood glucose test strips (TRUE METRIX BLOOD GLUCOSE TEST) strip 1 each, In Vitro, DAILY, As needed. ciclopirox (PENLAC) 8 % solution Topical, NIGHTLY, Apply topically nightly. docusate sodium (COLACE) 100 mg, Oral, 2 TIMES DAILY    famotidine (PEPCID) 20 mg, Oral, 2 TIMES DAILY    ferrous sulfate (FE TABS) 325 mg, Oral, 2 TIMES DAILY    fluticasone (FLONASE) 50 MCG/ACT nasal spray 1 spray, Each Nostril, DAILY    gabapentin (NEURONTIN) 300 mg, Oral, NIGHTLY    gabapentin (NEURONTIN) 100 mg, Oral, DAILY    hydrocortisone (HYTONE) 2.5 % lotion No dose, route, or frequency recorded. hydrOXYzine HCl (ATARAX) 50 mg, Oral, 3 TIMES DAILY PRN    Inulin 1.5 g CHEW chewable tablet 2 tablets, Oral, PRN    lidocaine (LIDODERM) 5 % 1 patch, TransDERmal, DAILY, 12 hours on, 12 hours off. LORazepam (ATIVAN) 2 mg, Oral, EVERY 8 HOURS PRN    medroxyPROGESTERone (PROVERA) 5 mg, Oral, DAILY    melatonin 10 mg, Oral, NIGHTLY PRN    metoprolol tartrate (LOPRESSOR) 25 mg, Oral, 2 TIMES DAILY    Nutritional Supplements (ESTROVEN PO) 1 tablet, Oral, DAILY    omeprazole (PRILOSEC) 40 mg, Oral, DAILY    oxybutynin (DITROPAN) 5 mg, Oral, 2 TIMES DAILY    QUEtiapine (SEROQUEL) 200 mg, Oral, NIGHTLY    risperiDONE (RISPERDAL) 4 mg, Oral, 2 TIMES DAILY    simvastatin (ZOCOR) 40 mg, Oral, DAILY    traZODone (DESYREL) 150 mg, Oral, NIGHTLY    TRUEplus Lancets 33G MISC Test daily and as needed for symptoms (max 4x/day).     Trulicity 1.5 mg, SubCUTAneous, WEEKLY       Allergies   Allergen Reactions    Lithium Hives    Other Palpitations     msg       Review of Systems:   Constitutional: negative for weight changes or fevers  Genitourinary: negative for bowel/bladder incontinence   Musculoskeletal: positive for low back pain, SI pain  Neurological: negative for any leg weakness or numbness/tingling  Behavioral/Psych: negative for anxiety/depression   All other systems reviewed and are negative    Objective:     Vitals:    09/28/22 0804   BP: 98/64       Constitutional: Pleasant, no acute distress   Head: Normocephalic, atraumatic   Eyes: Conjunctivae normal   Neck: Supple, symmetrical   Lungs: Normal respiratory effort, non-labored breathing   Cardiovascular: Limbs warm and well perfused   Abdomen: Non-protruded   Musculoskeletal: Muscle bulk symmetric, no atrophy, no gross deformities   · Lower Extremities: ROM WNL. · Thorax: No paraspinal tenderness bilaterally. No scoliosis or kyphosis. · Lumbar Spine: ROM WNL. Lumbar paraspinals non-tender to palpation bilaterally. SLR neg bilaterally. LOREN neg bilaterally. GAENSLEN neg bilaterally. Negative facet loading bilaterally. Bilateral SI joints tender to palpation. SI Distraction maneuver positive on left/right side. Bilateral greater trochanters non-tender to palpation. Neurological: Cranial nerves II-XII grossly intact. · Gait - antalgic gait. Ambulates without assistive device. · Motor: 5/5 muscle strength in bilateral hip flexion, knee flexion, knee extension, ankle dorsiflexion, and ankle plantar flexion   · Sensory: LT sensation intact in lower limbs   · Reflexes: 2+ symmetrical in bilateral achilles, 2+ bilateral patellar, negative ankle clonus  Skin: No rashes or lesions present   Psychological: Cooperative, no exaggerated pain behaviors       Assessment:    Diagnosis Orders   1. Sacroiliac joint pain        2. SI (sacroiliac) joint dysfunction        3. Lumbar spondylosis        4. Chronic pain syndrome                 Lorenzo Denise is a 46 y. o.female presenting to the pain clinic for evaluation of low back pain. She has significant SI pain and positive SI dysfunction test maneuvers. Discussed possible procedures to assist with pain control, such as; SI injection series, lumbar facet series at bilateral L4-5, L5-S1.   Due amount of acetaminophen taken on a regular basis should only be 4000 mg per day. Adjuvants: For musculoskeletal pain, start Lidocaine patches daily    Interventions: With examination consistent with bilateral sacroiliac dysfunction/pain, we will proceed with a bilateral sacroiliac joint injection. The risks and benefits were discussed in detail with the patient. Patient wants to proceed with the injection Dr Galan Likes    Anticoagulation/NPO Recommendations:   Patient does need to hold any medications prior to the procedure. Eliquis- per Dr Barber Mediate blood clots 2017, 2020   Patient will need to be NPO x 8 hours prior to the procedure. We will start an IV prior to the procedure    Multidisciplinary Pain Management:   In the presence of complex, chronic, and multi-factorial pain, the importance of a multidisciplinary approach to pain management in the patients management regimen was emphasized and discussed in great detail. PHYSICAL THERAPY: Patient is advised to see a physical therapist for gentle stretching exercises and conditioning exercises for management of pain. PSYCHOLOGY: Patient is advised to see a clinical pain psychologist, for the psychosocial aspect of pain care through coping skills, relaxation strategies, cognitive group therapy etc.   OBESITY: Patient is advised to seek nutrition consult to help in managing their weight to decrease its impact on pain. The patient was also advised to continue physical therapy and stretching exercises at home and cognitive behavioral and/or group therapy. Referrals:  None    Prescriptions Written This Visit:   None    Follow-up:   2 months    It was my pleasure to evaluate Donna Waters today. I spent over 35 minutes evaluating this patient, reviewing previous notes and images and completing documentation.        Damon Lee, APRN - CNP   Interventional Pain Management/PM&R   [place]

## 2022-10-03 ENCOUNTER — HOSPITAL ENCOUNTER (OUTPATIENT)
Dept: INFUSION THERAPY | Age: 52
Discharge: HOME | End: 2022-10-03
Payer: COMMERCIAL

## 2022-10-03 ENCOUNTER — OFFICE VISIT (OUTPATIENT)
Dept: ONCOLOGY | Age: 52
End: 2022-10-03
Payer: COMMERCIAL

## 2022-10-03 VITALS
HEIGHT: 65 IN | RESPIRATION RATE: 16 BRPM | BODY MASS INDEX: 47.92 KG/M2 | OXYGEN SATURATION: 94 % | SYSTOLIC BLOOD PRESSURE: 127 MMHG | WEIGHT: 287.6 LBS | DIASTOLIC BLOOD PRESSURE: 77 MMHG | HEART RATE: 94 BPM | TEMPERATURE: 97.8 F

## 2022-10-03 VITALS
HEART RATE: 94 BPM | RESPIRATION RATE: 16 BRPM | OXYGEN SATURATION: 94 % | TEMPERATURE: 97.8 F | DIASTOLIC BLOOD PRESSURE: 77 MMHG | SYSTOLIC BLOOD PRESSURE: 127 MMHG

## 2022-10-03 DIAGNOSIS — I82.412 ACUTE DEEP VEIN THROMBOSIS (DVT) OF FEMORAL VEIN OF LEFT LOWER EXTREMITY (HCC): Primary | ICD-10-CM

## 2022-10-03 DIAGNOSIS — I82.412 ACUTE DEEP VEIN THROMBOSIS (DVT) OF FEMORAL VEIN OF LEFT LOWER EXTREMITY (HCC): ICD-10-CM

## 2022-10-03 LAB
ABSOLUTE IMMATURE GRANULOCYTE: 0.02 THOU/MM3 (ref 0–0.07)
BASINOPHIL, AUTOMATED: 0 % (ref 0–3)
BASOPHILS ABSOLUTE: 0 THOU/MM3 (ref 0–0.1)
EOSINOPHILS ABSOLUTE: 0.3 THOU/MM3 (ref 0–0.4)
EOSINOPHILS RELATIVE PERCENT: 4 % (ref 0–4)
HCT VFR BLD CALC: 40.2 % (ref 37–47)
HEMOGLOBIN: 13.4 GM/DL (ref 12–16)
IMMATURE GRANULOCYTES: 0 %
LYMPHOCYTES # BLD: 27 % (ref 15–47)
LYMPHOCYTES ABSOLUTE: 1.8 THOU/MM3 (ref 1–4.8)
MCH RBC QN AUTO: 30.7 PG (ref 26–33)
MCHC RBC AUTO-ENTMCNC: 33.3 GM/DL (ref 32.2–35.5)
MCV RBC AUTO: 92 FL (ref 81–99)
MONOCYTES ABSOLUTE: 0.6 THOU/MM3 (ref 0.4–1.3)
MONOCYTES: 10 % (ref 0–12)
PDW BLD-RTO: 13.7 % (ref 11.5–14.5)
PLATELET # BLD: 287 THOU/MM3 (ref 130–400)
PMV BLD AUTO: 9.1 FL (ref 9.4–12.4)
RBC # BLD: 4.37 MILL/MM3 (ref 4.2–5.4)
SEG NEUTROPHILS: 59 % (ref 43–75)
SEGMENTED NEUTROPHILS ABSOLUTE COUNT: 3.9 THOU/MM3 (ref 1.8–7.7)
WBC # BLD: 6.6 THOU/MM3 (ref 4.8–10.8)

## 2022-10-03 PROCEDURE — 86147 CARDIOLIPIN ANTIBODY EA IG: CPT

## 2022-10-03 PROCEDURE — 86146 BETA-2 GLYCOPROTEIN ANTIBODY: CPT

## 2022-10-03 PROCEDURE — 85300 ANTITHROMBIN III ACTIVITY: CPT

## 2022-10-03 PROCEDURE — 85025 COMPLETE CBC W/AUTO DIFF WBC: CPT

## 2022-10-03 PROCEDURE — 85305 CLOT INHIBIT PROT S TOTAL: CPT

## 2022-10-03 PROCEDURE — 99205 OFFICE O/P NEW HI 60 MIN: CPT | Performed by: INTERNAL MEDICINE

## 2022-10-03 PROCEDURE — 99211 OFF/OP EST MAY X REQ PHY/QHP: CPT

## 2022-10-03 PROCEDURE — 36415 COLL VENOUS BLD VENIPUNCTURE: CPT

## 2022-10-03 PROCEDURE — 85302 CLOT INHIBIT PROT C ANTIGEN: CPT

## 2022-10-03 ASSESSMENT — ENCOUNTER SYMPTOMS
SORE THROAT: 0
BLOOD IN STOOL: 0
TROUBLE SWALLOWING: 0
ABDOMINAL PAIN: 0
BACK PAIN: 1
ANAL BLEEDING: 0
SHORTNESS OF BREATH: 0
COUGH: 0

## 2022-10-03 NOTE — PROGRESS NOTES
[address] [address]  Dept: [phone]  Loc: [address] [place] [address]  1970   EFRAÍN Carr*   Silva Rose MD       Guera Powell is a 46 y.o.  female with history of venous thromboembolism    CHIEF COMPLAINT  Chief Complaint   Patient presents with    New Patient     DVT, PE          HISTORY OF PRESENT ILLNESS    2/27/2017 through 3/3/2017. She presented with pain in her left upper leg and describes her leg is black and blue and swollen. She was admitted to the hospital with extensive deep vein thrombosis in the left leg. The patient had been on oral estrogen prior to admission treated with heparin drip and was transitioned to Lovenox started on Coumadin but opted to be treated with Eliquis and referred to Dr. Stefanie Apley.    9/25/2017. Echocardiogram showed ejection fraction estimated at 55% with normal overall left ventricular function. Cardiac catheterization performed with no major coronary artery disease. Shortness of breath better with inhaler for known asthma. Subsequently moved to [place]. 7/20/2020. Contracted COVID and was hospitalized for about a week. She said that she received convalescent plasma and did not require intubation or ventilation. Three days after discharge she had a virtual visit because of shortness of breath and was told to go to the emergency department. There she was found to have 3 clots in her lungs and legs and was rehospitalized. She has been on Eliquis 2.5 mg p.o. twice daily ever since that time.    2021. 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 June 2022. Last menstrual period. Started on Provera. Reports subsequent weight gain from her baseline of about 240 to her current weight of 287. Patient says that she is physically active. She walks her dog and does her housecleaning. She has had one fall in June of this year. She says that when she exerts herself she does have some increased shortness of breath. Rest of her review of systems shows that she has had some difficulty with her vision but is getting new glasses. She has had decrease in hearing. She denies any chest pain. She does have an occasional cough. She has occasional tachycardia. She gets mammography. She denies nausea, vomiting, constipation, diarrhea and bleeding other than bleeding gums related to dental problems. She said that her mother's sister has had a blood clot in the past.  The patient does not have any children. She had 1 brother and 4 sisters. No one else in the family has had blood clotting problems. She has been living in [place] for several years but returned in June 2022. Established with LOLIS Mtz at the family medicine residency clinic who noted that the patient had been seeing psychiatry cardiology oncology and pulmonology in [place]. Complaints included fatigue, postnasal drip, arthralgias myalgias and anxiety. Diagnoses at the end that visit included dyspnea on exertion, paranoid schizophrenia, essential hypertension, insomnia, arthritis, chronic back pain, knee pain, left ventricular dysfunction, elevated body mass index, previous pulmonary embolus, previous history of skin cancer, sleep apnea, previous deep vein thrombosis, vitamin D deficiency and need for vaccinations and routine screenings. She was referred to the clinic here for follow-up of her DVT with PE. Laboratory testing showed that her iron was low. X-rays of her spine and back showed degenerative change. Chest x-ray was normal.  MRI of the lumbar spine done on 7/29/2022 showed mild canal and bilateral foraminal stenosis. 8/29/2022. Established with Rashard Haile MD.  Patient being established with multiple physicians for her multiple chronic ailments.     Comorbidities include history of paranoid schizophrenia, anxiety and depression, obesity, gastritis, hypertension and hyperlipidemia. She also has overactive bladder, periodic sciatica, obstructive sleep apnea, and chronic insomnia. Family history was positive for melanoma in her father. She says that she does not smoke or drink alcohol currently. She did drink alcohol when she was about 21 but not since that time. She has had recent weight gain from 240 to her current weight of 287. She has had night sweats. She says that her vision is getting worse as is her hearing. She noted increased heart rate especially when she stopped her beta-blocker which was restarted. She has had hot flashes and appears that she had a recent heavy menstrual period. She is following up with OB/GYN. She says that she has had some gum bleeding since she has been on her Eliquis but she has had no other significant bleeding. MONITORING PARAMETERS    Physical examinations, venous Dopplers, CAT scans. PAST MEDICAL HISTORY  Past Medical History:   Diagnosis Date    Anxiety     Arthritis     Asthma     Blood clotting disorder ([facility] [address]     2017, 2021    Depression     Flexural eczema     GERD (gastroesophageal reflux disease)     Hyperlipidemia     Osteoarthritis     Paranoid schizophrenia ([facility] [address]    Status post cholecystectomy    REVIEW OF SYSTEMS  Review of Systems   Constitutional:  Positive for diaphoresis. Negative for chills, fatigue and fever. HENT:  Negative for mouth sores, nosebleeds, postnasal drip, sore throat and trouble swallowing. Eyes:  Negative for visual disturbance. Respiratory:  Negative for cough and shortness of breath. Cardiovascular:  Positive for palpitations and leg swelling. Gastrointestinal:  Negative for abdominal pain, anal bleeding, blood in stool, constipation, nausea and vomiting. Endocrine: Negative for polyuria. Genitourinary:  Negative for dysuria, hematuria, menstrual problem, vaginal bleeding, vaginal discharge and vaginal pain.    Musculoskeletal: Positive for arthralgias, back pain and joint swelling. Skin:  Negative for rash. Neurological:  Negative for seizures, syncope, weakness, numbness and headaches. Hematological:  Negative for adenopathy. Psychiatric/Behavioral:  Negative for dysphoric mood, self-injury, sleep disturbance and suicidal ideas. The patient is not nervous/anxious (does well as long as she takes her medicine) and is not hyperactive. FAMILY HISTORY  Family History   Problem Relation Age of Onset    Depression Mother     High Blood Pressure Mother     Alzheimer's Disease Mother     Kidney Disease Mother         Kidney Failure    Cancer Father         melanoma, lung and tumor on head    Alcohol Abuse Father         SOCIAL HISTORY  Social History     Socioeconomic History    Marital status:      Spouse name: Not on file    Number of children: Not on file    Years of education: Not on file    Highest education level: Not on file   Occupational History    Not on file   Tobacco Use    Smoking status: Never    Smokeless tobacco: Never   Substance and Sexual Activity    Alcohol use: Not Currently    Drug use: No    Sexual activity: Never   Other Topics Concern    Not on file   Social History Narrative    Not on file     Social Determinants of Health     Financial Resource Strain: Medium Risk    Difficulty of Paying Living Expenses: Somewhat hard   Food Insecurity: Food Insecurity Present    Worried About Running Out of Food in the Last Year: Sometimes true    Ran Out of Food in the Last Year: Often true   Transportation Needs: Not on file   Physical Activity: Not on file   Stress: Not on file   Social Connections: Not on file   Intimate Partner Violence: Not on file   Housing Stability: Not on file        CURRENT MEDICATIONS  Current Outpatient Medications   Medication Sig Dispense Refill    lidocaine (LIDODERM) 5 % Place 1 patch onto the skin daily 12 hours on, 12 hours off.  30 patch 0    Blood Glucose Monitoring Suppl (TRUE METRIX METER) w/Device KIT Dispense 1 glucometer kit. 1 kit 0    blood glucose test strips (TRUE METRIX BLOOD GLUCOSE TEST) strip 1 each by In Vitro route daily As needed. 100 each 5    TRUEplus Lancets 33G MISC Test daily and as needed for symptoms (max 4x/day). 100 each 5    hydrOXYzine HCl (ATARAX) 50 MG tablet Take 1 tablet by mouth 3 times daily as needed for Itching 90 tablet 2    gabapentin (NEURONTIN) 300 MG capsule Take 1 capsule by mouth nightly for 30 days. 90 capsule 1    gabapentin (NEURONTIN) 100 MG capsule Take 1 capsule by mouth daily for 30 days. 90 capsule 1    ferrous sulfate (FE TABS) 325 (65 Fe) MG EC tablet Take 1 tablet by mouth 2 times daily 180 tablet 1    medroxyPROGESTERone (PROVERA) 5 MG tablet Take 1 tablet by mouth daily 90 tablet 1    metoprolol tartrate (LOPRESSOR) 25 MG tablet Take 1 tablet by mouth 2 times daily 180 tablet 3    omeprazole (PRILOSEC) 40 MG delayed release capsule Take 1 capsule by mouth Daily 90 capsule 1    oxybutynin (DITROPAN) 5 MG tablet Take 1 tablet by mouth 2 times daily 180 tablet 1    simvastatin (ZOCOR) 40 MG tablet Take 1 tablet by mouth daily 90 tablet 1    hydrocortisone (HYTONE) 2.5 % lotion       traZODone (DESYREL) 150 MG tablet Take 1 tablet by mouth nightly 90 tablet 1    amitriptyline (ELAVIL) 150 MG tablet Take 1 tablet by mouth nightly 30 tablet 0    apixaban (ELIQUIS) 5 MG TABS tablet Take 1 tablet by mouth 2 times daily (Patient taking differently: Take 2.5 mg by mouth 2 times daily) 60 tablet 2    QUEtiapine (SEROQUEL) 200 MG tablet Take 1 tablet by mouth nightly 30 tablet 1    Dulaglutide (TRULICITY) 1.5 FB/2.4HR SOPN Inject 1.5 mg into the skin once a week      ciclopirox (PENLAC) 8 % solution Apply topically nightly Apply topically nightly.  (Patient not taking: Reported on 10/6/2022)      Alcohol Swabs (ALCOHOL PREP) PADS by Does not apply route      fluticasone (FLONASE) 50 MCG/ACT nasal spray 1 spray by Each Nostril route daily 32 g 1 melatonin 3 MG TABS tablet Take 10 mg by mouth nightly as needed      docusate sodium (COLACE) 100 MG capsule Take 100 mg by mouth 2 times daily      LORazepam (ATIVAN) 2 MG tablet Take 2 mg by mouth every 8 hours as needed. risperiDONE (RISPERDAL) 4 MG tablet Take 4 mg by mouth 2 times daily      Nutritional Supplements (ESTROVEN PO) Take 1 tablet by mouth daily      Inulin 1.5 g CHEW chewable tablet Take 2 tablets by mouth as needed (Patient not taking: Reported on 10/6/2022)       No current facility-administered medications for this visit. OARRS    PDMP Monitoring: She has prescriptions for tramadol, gabapentin, temazepam and lorazepam.  She is under follow-up with pain management. Last PDMP Sunday Dimmer as Reviewed [facility]):  Review User Review Instant Review Result   Hank Prather 10/9/2022  8:03 AM Reviewed PDMP [1]       Urine Drug Screenings (1 yr)    No resulted procedures found. Medication Contract and Consent for Opioid Use Documents Filed        No documents found                     PHYSICAL EXAM    Physical Exam  Vitals and nursing note reviewed. Exam conducted with a chaperone present. Constitutional:       General: She is not in acute distress. Appearance: Normal appearance. She is obese. She is not ill-appearing, toxic-appearing or diaphoretic. Comments: The patient is a well-developed well-nourished overweight  female who is in no acute distress. She is alert, attentive and appropriate. HENT:      Head: Normocephalic and atraumatic. Nose: Nose normal.      Mouth/Throat:      Mouth: Mucous membranes are moist.      Pharynx: Oropharynx is clear. No oropharyngeal exudate or posterior oropharyngeal erythema. Eyes:      General: No scleral icterus. Conjunctiva/sclera: Conjunctivae normal.      Pupils: Pupils are equal, round, and reactive to light. Cardiovascular:      Rate and Rhythm: Normal rate and regular rhythm. Pulses: Normal pulses.       Heart sounds: Normal heart sounds. No murmur heard. Pulmonary:      Effort: Pulmonary effort is normal. No respiratory distress. Breath sounds: Normal breath sounds. No stridor. No wheezing, rhonchi or rales. Chest:      Chest wall: No tenderness. Breasts:     Left: Inverted nipple present. Abdominal:      General: Abdomen is flat. Bowel sounds are normal. There is no distension. Palpations: Abdomen is soft. There is no mass. Tenderness: There is no abdominal tenderness. There is no guarding. Hernia: No hernia is present. Comments: Abdomen is large. There is no gross organomegaly masses or ascites. Musculoskeletal:         General: Normal range of motion. Cervical back: Normal range of motion and neck supple. No rigidity or tenderness. Right lower leg: No edema. Left lower leg: No edema. Lymphadenopathy:      Cervical: No cervical adenopathy. Skin:     General: Skin is warm and dry. Coloration: Skin is not jaundiced or pale. Findings: No bruising, erythema or lesion. Neurological:      General: No focal deficit present. Mental Status: She is alert and oriented to person, place, and time. Mental status is at baseline. Cranial Nerves: No cranial nerve deficit. Gait: Gait normal.   Psychiatric:         Mood and Affect: Mood normal.         Behavior: Behavior normal.         Thought Content: Thought content normal.         Judgment: Judgment normal.   There is no current evidence of swelling or active vein thrombosis or cord formation in her legs. LABS/IMAGING    CBC on 10/3/2022 showed white count of 6.6 with a hemoglobin of 13.4 hematocrit 40.2 platelet count was 945,494. White blood cell differential count showed 59 polys 27 lymphs 10 monos 4 eosinophils. Serum chemistries were normal.  Cholesterol is normal.  Liver function tests are normal.  Hemoglobin A1c was 5.4. TSH was 2.8.     Iron was 49 with a TIBC of 332 giving a saturation of 14%.  She had been started on oral iron supplementation. Antithrombin III activity was normal.  Protein C antigen was normal.  Protein S was above the upper limits of normal showing no deficiency. Anticardiolipin IgG antibody was negative. IgM was moderately elevated. We can check this again in 12 weeks. .  Rheumatoid factor was negative. Beta-2 glycoprotein IgG was negative. Beta-2 glycoprotein IgM was normal.      PATHOLOGY/GENETICS    Deep vein thrombosis and pulmonary embolus    ASSESSMENT and PLAN    1. History of deep vein thrombosis and pulmonary embolus. From her history it appears that she has had 2 precipitated events. So far her hypercoagulability panel is not diagnostic. She has had some elevation of her phospholipid IgM antibody which can be checked again in 12 weeks. I do not see evidence of factor V Leiden or prothrombin gene mutation testing. We will also perform this. She remains on Eliquis 2.5 mg twice daily. She is obese and wants to lose weight. She says that she has gained a lot of weight on Provera. I asked her to discuss this with her primary care physician and OB/GYN. We will see her again in about 12 weeks and repeat her anti-phospholipid antibody studies and perform factor V Leiden and prothrombin gene mutation assays. 2.  Multiple comorbidities. The patient is on multiple medications and follows with multiple physicians. We will be glad to follow her with them.         Jaylan Sanchez MD 10/9/2022 8:07 AM

## 2022-10-03 NOTE — PATIENT INSTRUCTIONS
Reviewed labs and medical history. Discussed current blood clot status. Will check labs for anticoagulation issues. Will talk to Dr. Daniel Simons about possible stopping Provera related to weight management. 4. Will call with lab results. 5. Return to see MD in 2 months.

## 2022-10-04 ENCOUNTER — CARE COORDINATION (OUTPATIENT)
Dept: CARE COORDINATION | Age: 52
End: 2022-10-04

## 2022-10-04 NOTE — PROGRESS NOTES
[address]  [address] [place] [address]  Phone:  [phone]          Name: Guera Powell  : 1970    Chief Complaint   Patient presents with    Discuss Medications       HPI:     Guera Powell is a 46 y.o. female who presents today to discuss her medications. She follows with hematology for her h/o blood clots and was recently told by Dr. Zaid Espinosa that the Provera she's been taking for the past year for menopausal hot flashes from her physician in [place] is causing her weight gain. She's wondering if she can discontinue it. Current Outpatient Medications:     lidocaine (LIDODERM) 5 %, Place 1 patch onto the skin daily 12 hours on, 12 hours off., Disp: 30 patch, Rfl: 0    Blood Glucose Monitoring Suppl (TRUE METRIX METER) w/Device KIT, Dispense 1 glucometer kit., Disp: 1 kit, Rfl: 0    blood glucose test strips (TRUE METRIX BLOOD GLUCOSE TEST) strip, 1 each by In Vitro route daily As needed. , Disp: 100 each, Rfl: 5    TRUEplus Lancets 33G MISC, Test daily and as needed for symptoms (max 4x/day). , Disp: 100 each, Rfl: 5    hydrOXYzine HCl (ATARAX) 50 MG tablet, Take 1 tablet by mouth 3 times daily as needed for Itching, Disp: 90 tablet, Rfl: 2    Nutritional Supplements (ESTROVEN PO), Take 1 tablet by mouth daily, Disp: , Rfl:     gabapentin (NEURONTIN) 300 MG capsule, Take 1 capsule by mouth nightly for 30 days. , Disp: 90 capsule, Rfl: 1    gabapentin (NEURONTIN) 100 MG capsule, Take 1 capsule by mouth daily for 30 days. , Disp: 90 capsule, Rfl: 1    ferrous sulfate (FE TABS) 325 (65 Fe) MG EC tablet, Take 1 tablet by mouth 2 times daily, Disp: 180 tablet, Rfl: 1    medroxyPROGESTERone (PROVERA) 5 MG tablet, Take 1 tablet by mouth daily, Disp: 90 tablet, Rfl: 1    metoprolol tartrate (LOPRESSOR) 25 MG tablet, Take 1 tablet by mouth 2 times daily, Disp: 180 tablet, Rfl: 3    omeprazole (PRILOSEC) 40 MG delayed release capsule, Take 1 capsule by mouth Daily, Disp: 90 capsule, Rfl: 1    oxybutynin (DITROPAN) 5 MG tablet, Take 1 tablet by mouth 2 times daily, Disp: 180 tablet, Rfl: 1    simvastatin (ZOCOR) 40 MG tablet, Take 1 tablet by mouth daily, Disp: 90 tablet, Rfl: 1    hydrocortisone (HYTONE) 2.5 % lotion, , Disp: , Rfl:     traZODone (DESYREL) 150 MG tablet, Take 1 tablet by mouth nightly, Disp: 90 tablet, Rfl: 1    amitriptyline (ELAVIL) 150 MG tablet, Take 1 tablet by mouth nightly, Disp: 30 tablet, Rfl: 0    apixaban (ELIQUIS) 5 MG TABS tablet, Take 1 tablet by mouth 2 times daily (Patient taking differently: Take 2.5 mg by mouth 2 times daily), Disp: 60 tablet, Rfl: 2    QUEtiapine (SEROQUEL) 200 MG tablet, Take 1 tablet by mouth nightly, Disp: 30 tablet, Rfl: 1    Dulaglutide (TRULICITY) 1.5 NC/5.9ZN SOPN, Inject 1.5 mg into the skin once a week, Disp: , Rfl:     Alcohol Swabs (ALCOHOL PREP) PADS, by Does not apply route, Disp: , Rfl:     fluticasone (FLONASE) 50 MCG/ACT nasal spray, 1 spray by Each Nostril route daily, Disp: 32 g, Rfl: 1    melatonin 3 MG TABS tablet, Take 10 mg by mouth nightly as needed, Disp: , Rfl:     docusate sodium (COLACE) 100 MG capsule, Take 100 mg by mouth 2 times daily, Disp: , Rfl:     LORazepam (ATIVAN) 2 MG tablet, Take 2 mg by mouth every 8 hours as needed. , Disp: , Rfl:     risperiDONE (RISPERDAL) 4 MG tablet, Take 4 mg by mouth 2 times daily, Disp: , Rfl:     Inulin 1.5 g CHEW chewable tablet, Take 2 tablets by mouth as needed (Patient not taking: Reported on 10/6/2022), Disp: , Rfl:     ciclopirox (PENLAC) 8 % solution, Apply topically nightly Apply topically nightly. (Patient not taking: Reported on 10/6/2022), Disp: , Rfl:     Allergies   Allergen Reactions    Msg Shortness Of Breath    Codeine Other (See Comments)    Cyclobenzaprine Other (See Comments)    Lithium Hives    Other Palpitations     msg       Subjective:      Review of Systems   Endocrine: Positive for heat intolerance.        Objective:     /84 (Site: Left Upper Arm, Position: Sitting, Cuff Size: Large Adult)   Pulse 87   Temp 98.7 °F (37.1 °C) (Temporal)   Resp 16   Ht 5' 5\" (1.651 m)   Wt 285 lb (129.3 kg)   LMP 02/23/2017 (Exact Date)   SpO2 96%   BMI 47.43 kg/m²     Physical Exam  Vitals and nursing note reviewed. Constitutional:       General: She is not in acute distress. Appearance: She is well-developed. HENT:      Head: Normocephalic and atraumatic. Nose: Nose normal.   Eyes:      Conjunctiva/sclera: Conjunctivae normal.   Cardiovascular:      Rate and Rhythm: Normal rate and regular rhythm. Heart sounds: Normal heart sounds. Pulmonary:      Effort: Pulmonary effort is normal. No respiratory distress. Breath sounds: Normal breath sounds. No wheezing. Abdominal:      General: Bowel sounds are normal. There is no distension. Palpations: Abdomen is soft. Tenderness: There is no abdominal tenderness. Musculoskeletal:      Cervical back: Normal range of motion and neck supple. Skin:     General: Skin is warm and dry. Neurological:      Mental Status: She is alert and oriented to person, place, and time. Psychiatric:         Behavior: Behavior normal.       Assessment/Plan:     Donna was seen today for discuss medications. Diagnoses and all orders for this visit:    Hot flashes        -    Will wean the Provera and start on black cohash to help with her hot flashes without the weight gain. Return if symptoms worsen or fail to improve.     Electronically signed by Dhara Lundberg MD on 10/6/2022 at 1:16 PM

## 2022-10-05 NOTE — CARE COORDINATION
Patient called me yesterday needing assistance on Vrylar, stated a [facility] provider does write this medication for her. When I started working on the application today I can not locate this medication on her list, I left her a message to please call me back to clarify who the prescriber of the medication is. I can only work with [employer] to help when it comes to the PAP programs.         [facility]   Medication Assistance  [address] [facility], and [employer]    V) [phone] (g) [phone]

## 2022-10-06 ENCOUNTER — OFFICE VISIT (OUTPATIENT)
Dept: FAMILY MEDICINE CLINIC | Age: 52
End: 2022-10-06
Payer: COMMERCIAL

## 2022-10-06 VITALS
SYSTOLIC BLOOD PRESSURE: 132 MMHG | OXYGEN SATURATION: 96 % | RESPIRATION RATE: 16 BRPM | HEIGHT: 65 IN | TEMPERATURE: 98.7 F | BODY MASS INDEX: 47.48 KG/M2 | WEIGHT: 285 LBS | DIASTOLIC BLOOD PRESSURE: 84 MMHG | HEART RATE: 87 BPM

## 2022-10-06 DIAGNOSIS — R23.2 HOT FLASHES: Primary | ICD-10-CM

## 2022-10-06 LAB
B2 GLYCOPROTEIN I (IGG)AB: < 10 SGU
B2 GLYCOPROTEIN I (IGM)AB: 15 SMU
CARDIOLIPIN AB IGM: 22 MPL
CARDIOLIPIN ANTIBODY, IGG: < 10 GPL

## 2022-10-06 PROCEDURE — 99213 OFFICE O/P EST LOW 20 MIN: CPT | Performed by: FAMILY MEDICINE

## 2022-10-08 LAB
ANTITHROMBIN ACTIVITY: 119 % (ref 76–128)
PROTEIN C ANTIGEN: > 95 % (ref 63–153)
PROTEIN S ANTIGEN, TOTAL: 156 % (ref 63–126)

## 2022-10-09 ASSESSMENT — ENCOUNTER SYMPTOMS
NAUSEA: 0
VOMITING: 0
CONSTIPATION: 0

## 2022-10-10 ENCOUNTER — TELEPHONE (OUTPATIENT)
Dept: ONCOLOGY | Age: 52
End: 2022-10-10

## 2022-10-11 LAB
PROTHROMBIN G20210A: NEGATIVE
PT PCR SPECIMEN: NORMAL

## 2022-10-14 ENCOUNTER — TELEPHONE (OUTPATIENT)
Dept: PHYSICAL MEDICINE AND REHAB | Age: 52
End: 2022-10-14

## 2022-10-14 NOTE — TELEPHONE ENCOUNTER
Patient is calling regarding her prescriptions. She says that the pharmacy she was using she had zero copays and they transferred all of her prescriptions to [facility] with out her knowledge or consent. She said that they told her in order to get the tramadol prescription sent back to the other pharmacy they would need a new script from her doctor. Patient was advised that the providers are gone for the day. She voiced understanding and asked that a nurse f/u with her on Monday.

## 2022-10-14 NOTE — TELEPHONE ENCOUNTER
Patient is calling in with an update. She said she was getting pain patches from [facility], but she started working part time and the patches aren't really helping. She still has tramadol 50 mg 1 tablet twice daily from her previous doctor in [place] that she is taking and those seem to be helping her for now. And she said she has plenty. She also said she told the office she had this prescription. She is keeping her 11/28/2022 appt, and she said she still wants to have her procedure/injections but needs to wait until January when she has her new insurance. Please call her if needed.

## 2022-10-17 ENCOUNTER — TELEPHONE (OUTPATIENT)
Dept: FAMILY MEDICINE CLINIC | Age: 52
End: 2022-10-17

## 2022-10-17 ENCOUNTER — NURSE ONLY (OUTPATIENT)
Dept: FAMILY MEDICINE CLINIC | Age: 52
End: 2022-10-17
Payer: COMMERCIAL

## 2022-10-17 DIAGNOSIS — R30.0 DYSURIA: Primary | ICD-10-CM

## 2022-10-17 LAB
BILIRUBIN URINE: NEGATIVE
BLOOD URINE, POC: NEGATIVE
CHARACTER, URINE: CLEAR
COLOR, URINE: YELLOW
GLUCOSE URINE: NEGATIVE MG/DL
KETONES, URINE: NEGATIVE
LEUKOCYTE CLUMPS, URINE: ABNORMAL
NITRITE, URINE: NEGATIVE
PH, URINE: 6.5 (ref 5–9)
PROTEIN, URINE: NEGATIVE MG/DL
SPECIFIC GRAVITY, URINE: 1.01 (ref 1–1.03)
UROBILINOGEN, URINE: 0.2 EU/DL (ref 0–1)

## 2022-10-17 PROCEDURE — 99211 OFF/OP EST MAY X REQ PHY/QHP: CPT | Performed by: NURSE PRACTITIONER

## 2022-10-17 PROCEDURE — 81003 URINALYSIS AUTO W/O SCOPE: CPT | Performed by: NURSE PRACTITIONER

## 2022-10-17 RX ORDER — AMOXICILLIN AND CLAVULANATE POTASSIUM 875; 125 MG/1; MG/1
1 TABLET, FILM COATED ORAL 2 TIMES DAILY
Qty: 20 TABLET | Refills: 0 | Status: SHIPPED | OUTPATIENT
Start: 2022-10-17 | End: 2022-10-27

## 2022-10-17 NOTE — TELEPHONE ENCOUNTER
Pt started with what she thinks is a bladder infection on Thursday. Symptoms include fatigue, lower back pain and burning with urination. She had left over antibiotic, cephaelaxin 500mg that she started taking. She has taken 2 doses. She voices her symptoms are better. She is calling asking if you want a urine sample still?

## 2022-10-17 NOTE — PROGRESS NOTES
Frequent urination, dysuria, abdominal, back pain, urinary incontinence x 7 days       URINE CULTURED

## 2022-10-18 NOTE — TELEPHONE ENCOUNTER
Tramadol was not prescribed by our office.  JORGE and Jennifer's note show it is being filled by her podiatrist.     EFRAÍN Adame - CNP

## 2022-10-19 ENCOUNTER — HOSPITAL ENCOUNTER (OUTPATIENT)
Dept: MAMMOGRAPHY | Age: 52
Discharge: HOME | End: 2022-10-19
Payer: COMMERCIAL

## 2022-10-19 DIAGNOSIS — Z12.39 BREAST SCREENING: ICD-10-CM

## 2022-10-19 LAB — URINE CULTURE, ROUTINE: NORMAL

## 2022-10-19 PROCEDURE — 77063 BREAST TOMOSYNTHESIS BI: CPT

## 2022-10-20 NOTE — PROGRESS NOTES
[address]  [address] [place] [address]  Phone:  [phone]          Name: Nicolle Arriaga  : 1970    Chief Complaint   Patient presents with    Blood Sugar Problem       HPI:     Nicolle Arriaga is a 46 y.o. female who presents today for follow up of T2DM. Her last HbA1c was 5.4% in . She only takes Trulicity. Some weeks her BGs are in the 100s, and some weeks it drops to 80s-90s so she doesn't take it. She feels hypoglycemic in the 80s. She doesn't know when to take it. Current Outpatient Medications:     Cariprazine HCl (VRAYLAR) 6 MG CAPS capsule, Take 1 capsule by mouth daily, Disp: 90 capsule, Rfl: 1    naproxen (NAPROSYN) 375 MG tablet, Take 1 tablet by mouth 2 times daily as needed for Pain, Disp: 60 tablet, Rfl: 0    lidocaine (LIDODERM) 5 %, Place 1 patch onto the skin daily 12 hours on, 12 hours off., Disp: 30 patch, Rfl: 0    Blood Glucose Monitoring Suppl (TRUE METRIX METER) w/Device KIT, Dispense 1 glucometer kit., Disp: 1 kit, Rfl: 0    blood glucose test strips (TRUE METRIX BLOOD GLUCOSE TEST) strip, 1 each by In Vitro route daily As needed. , Disp: 100 each, Rfl: 5    TRUEplus Lancets 33G MISC, Test daily and as needed for symptoms (max 4x/day). , Disp: 100 each, Rfl: 5    hydrOXYzine HCl (ATARAX) 50 MG tablet, Take 1 tablet by mouth 3 times daily as needed for Itching, Disp: 90 tablet, Rfl: 2    Nutritional Supplements (ESTROVEN PO), Take 1 tablet by mouth daily, Disp: , Rfl:     ferrous sulfate (FE TABS) 325 (65 Fe) MG EC tablet, Take 1 tablet by mouth 2 times daily, Disp: 180 tablet, Rfl: 1    medroxyPROGESTERone (PROVERA) 5 MG tablet, Take 1 tablet by mouth daily, Disp: 90 tablet, Rfl: 1    metoprolol tartrate (LOPRESSOR) 25 MG tablet, Take 1 tablet by mouth 2 times daily, Disp: 180 tablet, Rfl: 3    omeprazole (PRILOSEC) 40 MG delayed release capsule, Take 1 capsule by mouth Daily, Disp: 90 capsule, Rfl: 1    oxybutynin (DITROPAN) 5 MG tablet, Take 1 tablet by mouth 2 times daily, Disp: 180 tablet, Rfl: 1    simvastatin (ZOCOR) 40 MG tablet, Take 1 tablet by mouth daily, Disp: 90 tablet, Rfl: 1    hydrocortisone (HYTONE) 2.5 % lotion, , Disp: , Rfl:     traZODone (DESYREL) 150 MG tablet, Take 1 tablet by mouth nightly, Disp: 90 tablet, Rfl: 1    amitriptyline (ELAVIL) 150 MG tablet, Take 1 tablet by mouth nightly, Disp: 30 tablet, Rfl: 0    apixaban (ELIQUIS) 5 MG TABS tablet, Take 1 tablet by mouth 2 times daily (Patient taking differently: Take 2.5 mg by mouth 2 times daily), Disp: 60 tablet, Rfl: 2    QUEtiapine (SEROQUEL) 200 MG tablet, Take 1 tablet by mouth nightly, Disp: 30 tablet, Rfl: 1    Dulaglutide (TRULICITY) 1.5 ZN/8.7SO SOPN, Inject 1.5 mg into the skin once a week, Disp: , Rfl:     Alcohol Swabs (ALCOHOL PREP) PADS, by Does not apply route, Disp: , Rfl:     fluticasone (FLONASE) 50 MCG/ACT nasal spray, 1 spray by Each Nostril route daily, Disp: 32 g, Rfl: 1    melatonin 3 MG TABS tablet, Take 10 mg by mouth nightly as needed, Disp: , Rfl:     docusate sodium (COLACE) 100 MG capsule, Take 100 mg by mouth 2 times daily, Disp: , Rfl:     LORazepam (ATIVAN) 2 MG tablet, Take 2 mg by mouth every 8 hours as needed. , Disp: , Rfl:     risperiDONE (RISPERDAL) 4 MG tablet, Take 4 mg by mouth 2 times daily, Disp: , Rfl:     amoxicillin-clavulanate (AUGMENTIN) 875-125 MG per tablet, Take 1 tablet by mouth 2 times daily for 10 days (Patient not taking: Reported on 10/24/2022), Disp: 20 tablet, Rfl: 0    Inulin 1.5 g CHEW chewable tablet, Take 2 tablets by mouth as needed (Patient not taking: No sig reported), Disp: , Rfl:     gabapentin (NEURONTIN) 300 MG capsule, Take 1 capsule by mouth nightly for 30 days. , Disp: 90 capsule, Rfl: 1    gabapentin (NEURONTIN) 100 MG capsule, Take 1 capsule by mouth daily for 30 days. , Disp: 90 capsule, Rfl: 1    ciclopirox (PENLAC) 8 % solution, Apply topically nightly Apply topically nightly.  (Patient not taking: No mg/week. A healthy diet and routine physical activity encouraged. Return in about 3 months (around 1/24/2023) for diabetes.     Electronically signed by Refugio Rahman MD on 10/24/2022 at 9:02 AM

## 2022-10-21 ENCOUNTER — TELEPHONE (OUTPATIENT)
Dept: PHYSICAL MEDICINE AND REHAB | Age: 52
End: 2022-10-21

## 2022-10-21 RX ORDER — NAPROXEN 375 MG/1
375 TABLET ORAL 2 TIMES DAILY PRN
Qty: 60 TABLET | Refills: 0 | Status: SHIPPED | OUTPATIENT
Start: 2022-10-21 | End: 2022-11-20

## 2022-10-21 NOTE — TELEPHONE ENCOUNTER
Naproxen does come in 375 mg tablets, I set this up for BID for 30 days and was sent to [facility] in [place]

## 2022-10-21 NOTE — TELEPHONE ENCOUNTER
Pt. Called and wants to know since will not prescribe ultram if will send script in for Naproxen 375mg. Does not come in that dose? ?  Please advise

## 2022-10-24 ENCOUNTER — OFFICE VISIT (OUTPATIENT)
Dept: FAMILY MEDICINE CLINIC | Age: 52
End: 2022-10-24
Payer: COMMERCIAL

## 2022-10-24 VITALS
HEIGHT: 65 IN | BODY MASS INDEX: 47.98 KG/M2 | HEART RATE: 78 BPM | OXYGEN SATURATION: 98 % | TEMPERATURE: 97.8 F | RESPIRATION RATE: 16 BRPM | SYSTOLIC BLOOD PRESSURE: 100 MMHG | WEIGHT: 288 LBS | DIASTOLIC BLOOD PRESSURE: 60 MMHG

## 2022-10-24 DIAGNOSIS — E11.9 TYPE 2 DIABETES MELLITUS WITHOUT COMPLICATION, WITHOUT LONG-TERM CURRENT USE OF INSULIN (HCC): Primary | ICD-10-CM

## 2022-10-24 PROCEDURE — 3044F HG A1C LEVEL LT 7.0%: CPT | Performed by: FAMILY MEDICINE

## 2022-10-24 PROCEDURE — 99213 OFFICE O/P EST LOW 20 MIN: CPT | Performed by: FAMILY MEDICINE

## 2022-10-24 RX ORDER — TRAZODONE HYDROCHLORIDE 50 MG/1
TABLET ORAL
Qty: 60 TABLET | Refills: 5 | Status: SHIPPED | OUTPATIENT
Start: 2022-10-24

## 2022-10-28 ENCOUNTER — HOSPITAL ENCOUNTER (OUTPATIENT)
Dept: WOMENS IMAGING | Age: 52
Discharge: HOME | End: 2022-10-28

## 2022-10-28 ENCOUNTER — TELEMEDICINE (OUTPATIENT)
Dept: FAMILY MEDICINE CLINIC | Age: 52
End: 2022-10-28
Payer: COMMERCIAL

## 2022-10-28 DIAGNOSIS — J01.01 ACUTE RECURRENT MAXILLARY SINUSITIS: Primary | ICD-10-CM

## 2022-10-28 DIAGNOSIS — Z12.31 VISIT FOR SCREENING MAMMOGRAM: ICD-10-CM

## 2022-10-28 PROCEDURE — 99213 OFFICE O/P EST LOW 20 MIN: CPT | Performed by: NURSE PRACTITIONER

## 2022-10-28 RX ORDER — METHYLPREDNISOLONE 4 MG/1
TABLET ORAL
Qty: 1 KIT | Refills: 0 | Status: SHIPPED | OUTPATIENT
Start: 2022-10-28 | End: 2022-11-03

## 2022-10-28 RX ORDER — AMOXICILLIN AND CLAVULANATE POTASSIUM 875; 125 MG/1; MG/1
1 TABLET, FILM COATED ORAL 2 TIMES DAILY
Qty: 20 TABLET | Refills: 0 | Status: SHIPPED | OUTPATIENT
Start: 2022-10-28 | End: 2022-11-02

## 2022-10-28 ASSESSMENT — ENCOUNTER SYMPTOMS
RESPIRATORY NEGATIVE: 1
EYES NEGATIVE: 1
GASTROINTESTINAL NEGATIVE: 1

## 2022-10-28 NOTE — LETTER
[address]  [address] [facility] [address]  Phone: [phone]  Fax: [phone]    EFRAÍN Rordiguez CNP        October 31, 2022     Patient: Josie Villarreal   YOB: 1970   Date of Visit: 10/28/2022       To Whom It May Concern:    Akiko Landry was seen via a telemedicine visit on Friday 10/28/22. If you have any questions or concerns, please don't hesitate to call.     Sincerely,        EFRAÍN Rodriguez - CNP

## 2022-10-28 NOTE — PROGRESS NOTES
10/28/2022    TELEHEALTH EVALUATION -- Audio/Visual (During OMARB-26 public health emergency)    HPI:    Daniel Vragas (:  1970) has requested an audio/video evaluation for the following concern(s):    Pt has visit for congestion. Started a few days ago . Slight cough. Some sore throat. No ear pain. Review of Systems   Constitutional: Negative. HENT:  Positive for congestion. Eyes: Negative. Respiratory: Negative. Cardiovascular: Negative. Gastrointestinal: Negative. Musculoskeletal: Negative. Skin: Negative. Neurological: Negative. Prior to Visit Medications    Medication Sig Taking? Authorizing Provider   amoxicillin-clavulanate (AUGMENTIN) 875-125 MG per tablet Take 1 tablet by mouth 2 times daily for 10 days Yes EFRAÍN Messer CNP   methylPREDNISolone (MEDROL DOSEPACK) 4 MG tablet Take by mouth. Yes EFRAÍN Messer CNP   Cariprazine HCl (VRAYLAR) 6 MG CAPS capsule Take 1 capsule by mouth daily  Guille Bassett MD   traZODone (DESYREL) 50 MG tablet Take 1-2 tabs nightly PRN insomnia. Guille Bassett MD   naproxen (NAPROSYN) 375 MG tablet Take 1 tablet by mouth 2 times daily as needed for Pain  EFRAÍN De Luna CNP   lidocaine (LIDODERM) 5 % Place 1 patch onto the skin daily 12 hours on, 12 hours off. EFRAÍN De Luna CNP   Blood Glucose Monitoring Suppl (TRUE METRIX METER) w/Device KIT Dispense 1 glucometer kit. Guille Bassett MD   blood glucose test strips (TRUE METRIX BLOOD GLUCOSE TEST) strip 1 each by In Vitro route daily As needed. Guille Bassett MD   TRUEplus Lancets 33G MISC Test daily and as needed for symptoms (max 4x/day).   Guille Bassett MD   hydrOXYzine HCl (ATARAX) 50 MG tablet Take 1 tablet by mouth 3 times daily as needed for Itching  Guille Bassett MD   Nutritional Supplements (ESTROVEN PO) Take 1 tablet by mouth daily  Historical Provider, MD   Inulin 1.5 g CHEW chewable tablet Take 2 tablets by mouth as needed  Patient not taking: No sig reported  Historical Provider, MD   gabapentin (NEURONTIN) 300 MG capsule Take 1 capsule by mouth nightly for 30 days. Kelechi Garcia MD   gabapentin (NEURONTIN) 100 MG capsule Take 1 capsule by mouth daily for 30 days. Kelechi Garcia MD   ferrous sulfate (FE TABS) 325 (65 Fe) MG EC tablet Take 1 tablet by mouth 2 times daily  Kelechi Garcia MD   medroxyPROGESTERone (PROVERA) 5 MG tablet Take 1 tablet by mouth daily  Kelechi Garcia MD   metoprolol tartrate (LOPRESSOR) 25 MG tablet Take 1 tablet by mouth 2 times daily  Kelechi Garcia MD   omeprazole (PRILOSEC) 40 MG delayed release capsule Take 1 capsule by mouth Daily  Kelechi Garcia MD   oxybutynin (DITROPAN) 5 MG tablet Take 1 tablet by mouth 2 times daily  Kelechi Garcia MD   simvastatin (ZOCOR) 40 MG tablet Take 1 tablet by mouth daily  Kelechi Garcia MD   hydrocortisone (HYTONE) 2.5 % lotion   Historical Provider, MD   traZODone (DESYREL) 150 MG tablet Take 1 tablet by mouth nightly  Kelechi Garcia MD   amitriptyline (ELAVIL) 150 MG tablet Take 1 tablet by mouth nightly  Edwin Lopez MD   apixaban (ELIQUIS) 5 MG TABS tablet Take 1 tablet by mouth 2 times daily  Patient taking differently: Take 2.5 mg by mouth 2 times daily  EFRAÍN Rainey CNP   QUEtiapine (SEROQUEL) 200 MG tablet Take 1 tablet by mouth nightly  EFRAÍN Rae CNP   Dulaglutide (TRULICITY) 1.4 YO/5.1TD SOPN Inject 1.5 mg into the skin once a week  Historical Provider, MD   ciclopirox (PENLAC) 8 % solution Apply topically nightly Apply topically nightly.   Patient not taking: No sig reported  Historical Provider, MD   Alcohol Swabs (ALCOHOL PREP) PADS by Does not apply route  Historical Provider, MD   fluticasone (FLONASE) 50 MCG/ACT nasal spray 1 spray by Each Nostril route daily  EFRAÍN Rae CNP   melatonin 3 MG TABS tablet Take 10 mg by mouth nightly as needed  Historical Provider, MD   docusate sodium (COLACE) 100 MG capsule Take 100 mg by mouth 2 times daily  Historical Provider, MD   LORazepam (ATIVAN) 2 MG tablet Take 2 mg by mouth every 8 hours as needed.    Historical Provider, MD   risperiDONE (RISPERDAL) 4 MG tablet Take 4 mg by mouth 2 times daily  Historical Provider, MD       Social History     Tobacco Use    Smoking status: Never    Smokeless tobacco: Never   Substance Use Topics    Alcohol use: Not Currently    Drug use: No        Allergies   Allergen Reactions    Msg Shortness Of Breath    Codeine Other (See Comments)    Cyclobenzaprine Other (See Comments)    Lithium Hives    Other Palpitations     msg   ,   Past Medical History:   Diagnosis Date    Anxiety     Arthritis     Asthma     Blood clotting disorder ([facility]     2017, 2021    Depression     Flexural eczema     GERD (gastroesophageal reflux disease)     Hyperlipidemia     Osteoarthritis     Paranoid schizophrenia ([facility] [address]    ,   Past Surgical History:   Procedure Laterality Date    ANKLE SURGERY Left 2000    BLADDER SUSPENSION  2007    CHOLECYSTECTOMY, LAPAROSCOPIC  05/04/2015    LAPAROSCOPIC CHOLECYSTECTOMY    COLONOSCOPY      DENTAL SURGERY      FOOT SURGERY Right 2007    OTHER SURGICAL HISTORY  2012    vaginal sling    SHOULDER SURGERY Right age 15    UPPER GASTROINTESTINAL ENDOSCOPY  10/23/2015    Dr. Andrew Amaral     ,   Social History     Tobacco Use    Smoking status: Never    Smokeless tobacco: Never   Substance Use Topics    Alcohol use: Not Currently    Drug use: No   ,   Family History   Problem Relation Age of Onset    Depression Mother     High Blood Pressure Mother     Alzheimer's Disease Mother     Kidney Disease Mother         Kidney Failure    Cancer Father         melanoma, lung and tumor on head    Alcohol Abuse Father     Breast Cancer Paternal Aunt 54       PHYSICAL EXAMINATION:  [ INSTRUCTIONS:  \"[x]\" Indicates a positive item \"[]\" Indicates a negative item  -- DELETE ALL ITEMS NOT EXAMINED]  Vital Signs: (As obtained by patient/caregiver or practitioner observation)    Blood pressure-  Heart rate-    Respiratory rate-    Temperature-  Pulse oximetry-     Constitutional: [x] Appears well-developed and well-nourished [] No apparent distress      [] Abnormal-   Mental status  [] Alert and awake  [] Oriented to person/place/time []Able to follow commands      Eyes:  EOM    []  Normal  [] Abnormal-  Sclera  []  Normal  [] Abnormal -         Discharge []  None visible  [] Abnormal -    HENT:   [] Normocephalic, atraumatic. [x] Abnormal congested   [] Mouth/Throat: Mucous membranes are moist.     External Ears [] Normal  [] Abnormal-     Neck: [] No visualized mass     Pulmonary/Chest: [x] Respiratory effort normal.  [] No visualized signs of difficulty breathing or respiratory distress        [] Abnormal-      Musculoskeletal:   [] Normal gait with no signs of ataxia         [] Normal range of motion of neck        [] Abnormal-       Neurological:        [] No Facial Asymmetry (Cranial nerve 7 motor function) (limited exam to video visit)          [] No gaze palsy        [] Abnormal-         Skin:        [] No significant exanthematous lesions or discoloration noted on facial skin         [] Abnormal-            Psychiatric:       [] Normal Affect [] No Hallucinations        [] Abnormal-     Other pertinent observable physical exam findings-     ASSESSMENT/PLAN:  1. Acute recurrent maxillary sinusitis  See orders  Advised to call back directly if there are further questions, or if these symptoms fail to improve as anticipated or worsen. - amoxicillin-clavulanate (AUGMENTIN) 875-125 MG per tablet; Take 1 tablet by mouth 2 times daily for 10 days  Dispense: 20 tablet; Refill: 0  - methylPREDNISolone (MEDROL DOSEPACK) 4 MG tablet; Take by mouth. Dispense: 1 kit; Refill: 0    No follow-ups on file.     Jnaina Paulogrlesly, was evaluated through a synchronous (real-time) audio-video encounter. The patient (or guardian if applicable) is aware that this is a billable service, which includes applicable co-pays. This Virtual Visit was conducted with patient's (and/or legal guardian's) consent. The visit was conducted pursuant to the emergency declaration under the [facility], [facility] authority and the [employer] Act. Patient identification was verified, and a caregiver was present when appropriate. The patient was located at Home: [address] [place] [address]. Provider was located at [place] [address]): [address] [place] [address] [address] [place],  [address] [facility]. Total time spent on this encounter: Not billed by time    --EFRAÍN Kaur CNP on 10/28/2022 at 1:16 PM    An electronic signature was used to authenticate this note.

## 2022-11-01 ENCOUNTER — TELEPHONE (OUTPATIENT)
Dept: FAMILY MEDICINE CLINIC | Age: 52
End: 2022-11-01

## 2022-11-01 DIAGNOSIS — R06.83 SNORING: Primary | ICD-10-CM

## 2022-11-01 NOTE — TELEPHONE ENCOUNTER
----- Message from Jason Cervantes sent at 11/1/2022  8:54 AM EDT -----  Subject: Referral Request    Reason for referral request? In home sleep study  Provider patient wants to be referred to(if known):     Provider Phone Number(if known): Additional Information for Provider? Patient states her [facility]  would   like her to have a sleep study and states PCP will need to send the   referral to [facility].  Phone: [phone] Fax: [phone]   ---------------------------------------------------------------------------  --------------  [facility]    [phone]; OK to leave message on voicemail  ---------------------------------------------------------------------------  --------------

## 2022-11-02 ENCOUNTER — OFFICE VISIT (OUTPATIENT)
Dept: FAMILY MEDICINE CLINIC | Age: 52
End: 2022-11-02
Payer: COMMERCIAL

## 2022-11-02 VITALS
TEMPERATURE: 97.8 F | HEART RATE: 71 BPM | HEIGHT: 65 IN | SYSTOLIC BLOOD PRESSURE: 110 MMHG | WEIGHT: 283.6 LBS | RESPIRATION RATE: 16 BRPM | BODY MASS INDEX: 47.25 KG/M2 | OXYGEN SATURATION: 99 % | DIASTOLIC BLOOD PRESSURE: 78 MMHG

## 2022-11-02 DIAGNOSIS — M72.2 PLANTAR FASCIITIS OF LEFT FOOT: ICD-10-CM

## 2022-11-02 DIAGNOSIS — H65.92 LEFT OTITIS MEDIA WITH EFFUSION: Primary | ICD-10-CM

## 2022-11-02 PROCEDURE — 3074F SYST BP LT 130 MM HG: CPT | Performed by: NURSE PRACTITIONER

## 2022-11-02 PROCEDURE — 3078F DIAST BP <80 MM HG: CPT | Performed by: NURSE PRACTITIONER

## 2022-11-02 PROCEDURE — 99213 OFFICE O/P EST LOW 20 MIN: CPT | Performed by: NURSE PRACTITIONER

## 2022-11-02 RX ORDER — AZITHROMYCIN 250 MG/1
TABLET, FILM COATED ORAL
Qty: 1 PACKET | Refills: 0 | Status: SHIPPED | OUTPATIENT
Start: 2022-11-02

## 2022-11-02 ASSESSMENT — ENCOUNTER SYMPTOMS
RESPIRATORY NEGATIVE: 1
GASTROINTESTINAL NEGATIVE: 1
EYES NEGATIVE: 1

## 2022-11-02 NOTE — PROGRESS NOTES
Imani Springer is a 46 y.o. female whopresents today for :  Chief Complaint   Patient presents with    Otalgia       HPI:     HPI  Started with ear pain yesterday. Then had ear drainage    Also wishes for left heal to be checked. Has been sore if walking barefoot.   Feels better with brace     Patient Active Problem List   Diagnosis    Depression    Anxiety    Hyperlipidemia    Paranoid schizophrenia ([address]    Acalculous cholecystitis    Epigastric pain    Obesity    Pulmonary embolism (HCC)    Acute deep vein thrombosis (DVT) of left lower extremity (HCC)    LV dysfunction    Bradycardia    Essential hypertension    Morbid obesity due to excess calories ([address]    Dyspnea on exertion    Precordial pain        Past Medical History:   Diagnosis Date    Anxiety     Arthritis     Asthma     Blood clotting disorder ([address]     2017, 2021    Depression     Flexural eczema     GERD (gastroesophageal reflux disease)     Hyperlipidemia     Osteoarthritis     Paranoid schizophrenia ([address]       Past Surgical History:   Procedure Laterality Date    ANKLE SURGERY Left 2000    BLADDER SUSPENSION  2007    CHOLECYSTECTOMY, LAPAROSCOPIC  05/04/2015    LAPAROSCOPIC CHOLECYSTECTOMY    COLONOSCOPY      DENTAL SURGERY      FOOT SURGERY Right 2007    OTHER SURGICAL HISTORY  2012    vaginal sling    SHOULDER SURGERY Right age 15    UPPER GASTROINTESTINAL ENDOSCOPY  10/23/2015    Dr. Mook Tapia       Family History   Problem Relation Age of Onset    Depression Mother     High Blood Pressure Mother     Alzheimer's Disease Mother     Kidney Disease Mother         Kidney Failure    Cancer Father         melanoma, lung and tumor on head    Alcohol Abuse Father     Breast Cancer Paternal Aunt 54     Social History     Tobacco Use    Smoking status: Never    Smokeless tobacco: Never   Substance Use Topics    Alcohol use: Not Currently      Current Outpatient Medications   Medication Sig Dispense Refill azithromycin (ZITHROMAX) 250 MG tablet Take 2 tabs (500 mg) on Day 1, and take 1 tab (250 mg) on days 2 through 5. 1 packet 0    Cariprazine HCl (VRAYLAR) 6 MG CAPS capsule Take 1 capsule by mouth daily 90 capsule 1    traZODone (DESYREL) 50 MG tablet Take 1-2 tabs nightly PRN insomnia. 60 tablet 5    naproxen (NAPROSYN) 375 MG tablet Take 1 tablet by mouth 2 times daily as needed for Pain 60 tablet 0    Blood Glucose Monitoring Suppl (TRUE METRIX METER) w/Device KIT Dispense 1 glucometer kit. 1 kit 0    blood glucose test strips (TRUE METRIX BLOOD GLUCOSE TEST) strip 1 each by In Vitro route daily As needed. 100 each 5    TRUEplus Lancets 33G MISC Test daily and as needed for symptoms (max 4x/day). 100 each 5    hydrOXYzine HCl (ATARAX) 50 MG tablet Take 1 tablet by mouth 3 times daily as needed for Itching 90 tablet 2    Nutritional Supplements (ESTROVEN PO) Take 1 tablet by mouth daily      gabapentin (NEURONTIN) 300 MG capsule Take 1 capsule by mouth nightly for 30 days. 90 capsule 1    gabapentin (NEURONTIN) 100 MG capsule Take 1 capsule by mouth daily for 30 days.  90 capsule 1    ferrous sulfate (FE TABS) 325 (65 Fe) MG EC tablet Take 1 tablet by mouth 2 times daily 180 tablet 1    metoprolol tartrate (LOPRESSOR) 25 MG tablet Take 1 tablet by mouth 2 times daily 180 tablet 3    omeprazole (PRILOSEC) 40 MG delayed release capsule Take 1 capsule by mouth Daily 90 capsule 1    oxybutynin (DITROPAN) 5 MG tablet Take 1 tablet by mouth 2 times daily 180 tablet 1    simvastatin (ZOCOR) 40 MG tablet Take 1 tablet by mouth daily 90 tablet 1    hydrocortisone (HYTONE) 2.5 % lotion       traZODone (DESYREL) 150 MG tablet Take 1 tablet by mouth nightly 90 tablet 1    amitriptyline (ELAVIL) 150 MG tablet Take 1 tablet by mouth nightly 30 tablet 0    apixaban (ELIQUIS) 5 MG TABS tablet Take 1 tablet by mouth 2 times daily (Patient taking differently: Take 2.5 mg by mouth 2 times daily) 60 tablet 2    QUEtiapine (SEROQUEL) 200 MG tablet Take 1 tablet by mouth nightly 30 tablet 1    Dulaglutide (TRULICITY) 1.5 ZP/7.9RG SOPN Inject 1.5 mg into the skin once a week      Alcohol Swabs (ALCOHOL PREP) PADS by Does not apply route      fluticasone (FLONASE) 50 MCG/ACT nasal spray 1 spray by Each Nostril route daily 32 g 1    melatonin 3 MG TABS tablet Take 10 mg by mouth nightly as needed      LORazepam (ATIVAN) 2 MG tablet Take 2 mg by mouth every 8 hours as needed. risperiDONE (RISPERDAL) 4 MG tablet Take 4 mg by mouth 2 times daily      methylPREDNISolone (MEDROL DOSEPACK) 4 MG tablet Take by mouth. 1 kit 0    Inulin 1.5 g CHEW chewable tablet Take 2 tablets by mouth as needed (Patient not taking: No sig reported)      medroxyPROGESTERone (PROVERA) 5 MG tablet Take 1 tablet by mouth daily (Patient not taking: Reported on 11/2/2022) 90 tablet 1    ciclopirox (PENLAC) 8 % solution Apply topically nightly Apply topically nightly. (Patient not taking: No sig reported)      docusate sodium (COLACE) 100 MG capsule Take 100 mg by mouth 2 times daily (Patient not taking: Reported on 11/2/2022)       No current facility-administered medications for this visit.      Allergies   Allergen Reactions    Msg Shortness Of Breath    Codeine Other (See Comments)    Cyclobenzaprine Other (See Comments)    Lithium Hives    Other Palpitations     msg     Health Maintenance   Topic Date Due    Cervical cancer screen  Never done    COVID-19 Vaccine (5 - Booster for Moderna series) 09/10/2022    DTaP/Tdap/Td vaccine (1 - Tdap) 10/06/2023 (Originally 5/19/1989)    Lipids  06/27/2023    Depression Monitoring  08/29/2023    Breast cancer screen  10/19/2024    Colorectal Cancer Screen  07/09/2025    Pneumococcal 0-64 years Vaccine (3 - PPSV23 if available, else PCV20) 05/19/2035    Flu vaccine  Completed    Shingles vaccine  Completed    Hepatitis C screen  Completed    HIV screen  Completed    Hepatitis A vaccine  Aged Out    Hib vaccine  Aged Out Meningococcal (ACWY) vaccine  Aged Out       Subjective:     Review of Systems   Constitutional: Negative. HENT:  Positive for ear pain. Eyes: Negative. Respiratory: Negative. Cardiovascular: Negative. Gastrointestinal: Negative. Musculoskeletal:  Positive for myalgias. Skin: Negative. Neurological: Negative. Objective:     Vitals:    11/02/22 1032   BP: 110/78   Site: Left Upper Arm   Position: Sitting   Cuff Size: Large Adult   Pulse: 71   Resp: 16   Temp: 97.8 °F (36.6 °C)   TempSrc: Temporal   SpO2: 99%   Weight: 283 lb 9.6 oz (128.6 kg)   Height: 5' 5\" (1.651 m)       Physical Exam  Constitutional:       Appearance: She is well-developed. HENT:      Head: Normocephalic. Right Ear: Tympanic membrane and external ear normal.      Left Ear: External ear normal. A middle ear effusion is present. Tympanic membrane is erythematous. Nose: Nose normal.   Cardiovascular:      Rate and Rhythm: Normal rate and regular rhythm. Heart sounds: Normal heart sounds. No murmur heard. No friction rub. No gallop. Pulmonary:      Effort: Pulmonary effort is normal.      Breath sounds: Normal breath sounds. No wheezing or rales. Abdominal:      General: Bowel sounds are normal.      Palpations: Abdomen is soft. Tenderness: There is no abdominal tenderness. There is no guarding. Musculoskeletal:         General: Normal range of motion. Cervical back: Normal range of motion and neck supple. Legs:    Lymphadenopathy:      Cervical: No cervical adenopathy. Skin:     General: Skin is warm. Neurological:      Mental Status: She is alert and oriented to person, place, and time. Deep Tendon Reflexes: Reflexes are normal and symmetric. Assessment:      Diagnosis Orders   1. Left otitis media with effusion  azithromycin (ZITHROMAX) 250 MG tablet      2. Plantar fasciitis of left foot            Plan:      No follow-ups on file.      No orders of the defined types were placed in this encounter. Orders Placed This Encounter   Medications    azithromycin (ZITHROMAX) 250 MG tablet     Sig: Take 2 tabs (500 mg) on Day 1, and take 1 tab (250 mg) on days 2 through 5. Dispense:  1 packet     Refill:  0        See orders  For heel recommend heel lift, ice and stretching   Patient given educational materials - seepatient instructions. Discussed use, benefit, and side effects of prescribed medications. All patient questions answered. Pt voiced understanding. Patient agreed withtreatment plan. Follow up as directed.      Electronically signed by EFRAÍN Espana CNP on 11/2/2022 at 5:05 PM

## 2022-11-09 ENCOUNTER — TELEPHONE (OUTPATIENT)
Dept: FAMILY MEDICINE CLINIC | Age: 52
End: 2022-11-09

## 2022-11-09 ENCOUNTER — HOSPITAL ENCOUNTER (OUTPATIENT)
Age: 52
Setting detail: SPECIMEN
Discharge: HOME | End: 2022-11-09

## 2022-11-09 DIAGNOSIS — R09.89 RUNNY NOSE: Primary | ICD-10-CM

## 2022-11-09 NOTE — TELEPHONE ENCOUNTER
Pt thinks she has covid  she has been sick for a while. Sore throat and runny nose.  sick too. Covid testing ordered.

## 2022-11-09 NOTE — TELEPHONE ENCOUNTER
Pt is having runny nose and fatigue for past two days. At home covid test is negative. Pt asking for advice.

## 2022-11-15 RX ORDER — LIDOCAINE 50 MG/G
1 PATCH TOPICAL DAILY
Qty: 30 PATCH | Refills: 3 | Status: SHIPPED | OUTPATIENT
Start: 2022-11-15 | End: 2022-12-15

## 2022-11-15 NOTE — TELEPHONE ENCOUNTER
OARRS reviewed. UDS: Negative. Last seen: 9/28/2022.  Follow-up:   Future Appointments   Date Time Provider Guillermina Turner   11/21/2022  2:45 PM Shannon Vizcarra MD [facility] [address]   1/12/2023 11:30 AM Leonardo Martínez MD [facility] [address]

## 2022-11-15 NOTE — TELEPHONE ENCOUNTER
Meena Seen called requesting a refill on the following medications:  Requested Prescriptions     Pending Prescriptions Disp Refills    lidocaine (LIDODERM) 5 % 30 patch 0     Sig: Place 1 patch onto the skin daily 12 hours on, 12 hours off. Pharmacy verified:[facility]   . pv      Date of last visit:   Date of next visit (if applicable): Visit date not found

## 2022-11-18 RX ORDER — HYDROXYZINE 50 MG/1
TABLET, FILM COATED ORAL
Qty: 90 TABLET | Refills: 2 | Status: SHIPPED | OUTPATIENT
Start: 2022-11-18

## 2022-11-23 ENCOUNTER — TELEPHONE (OUTPATIENT)
Dept: FAMILY MEDICINE CLINIC | Age: 52
End: 2022-11-23

## 2022-11-23 RX ORDER — AMOXICILLIN AND CLAVULANATE POTASSIUM 875; 125 MG/1; MG/1
1 TABLET, FILM COATED ORAL 2 TIMES DAILY
Qty: 20 TABLET | Refills: 0 | Status: SHIPPED | OUTPATIENT
Start: 2022-11-23 | End: 2022-12-03

## 2022-11-23 NOTE — TELEPHONE ENCOUNTER
Patient called stating she has green drainage and congestion in her sinuses. Asking if she needs to have a med called in. She has had the symptoms for the last few days, no other symptoms noted. Please advise.

## 2022-11-27 ENCOUNTER — APPOINTMENT (OUTPATIENT)
Dept: GENERAL RADIOLOGY | Age: 52
End: 2022-11-27
Payer: COMMERCIAL

## 2022-11-27 ENCOUNTER — HOSPITAL ENCOUNTER (EMERGENCY)
Age: 52
Discharge: HOME | End: 2022-11-27
Attending: EMERGENCY MEDICINE
Payer: COMMERCIAL

## 2022-11-27 VITALS
TEMPERATURE: 97.4 F | OXYGEN SATURATION: 96 % | DIASTOLIC BLOOD PRESSURE: 65 MMHG | RESPIRATION RATE: 14 BRPM | HEART RATE: 60 BPM | SYSTOLIC BLOOD PRESSURE: 108 MMHG

## 2022-11-27 DIAGNOSIS — S50.02XA CONTUSION OF LEFT ELBOW, INITIAL ENCOUNTER: Primary | ICD-10-CM

## 2022-11-27 PROCEDURE — 73080 X-RAY EXAM OF ELBOW: CPT

## 2022-11-27 PROCEDURE — 99283 EMERGENCY DEPT VISIT LOW MDM: CPT

## 2022-11-27 ASSESSMENT — PAIN DESCRIPTION - ORIENTATION: ORIENTATION: LEFT

## 2022-11-27 ASSESSMENT — PAIN - FUNCTIONAL ASSESSMENT: PAIN_FUNCTIONAL_ASSESSMENT: 0-10

## 2022-11-27 ASSESSMENT — PAIN DESCRIPTION - LOCATION: LOCATION: ELBOW

## 2022-11-27 ASSESSMENT — PAIN SCALES - GENERAL: PAINLEVEL_OUTOF10: 8

## 2022-11-27 NOTE — DISCHARGE INSTRUCTIONS
Over the counter pain medication as needed. Wrap your elbow as needed for protection and comfort. Follow-up with your doctor as needed.

## 2022-11-27 NOTE — ED NOTES
Pt given discharge instructions. Verbalized understanding and left ambulatory per self. Pt in stable condition.       Arnol De León RN  11/27/22 5332

## 2022-11-28 ASSESSMENT — ENCOUNTER SYMPTOMS: BACK PAIN: 0

## 2022-11-28 NOTE — PROGRESS NOTES
[address]  [address] [place] [address]  Phone:  [phone]          Name: Ruthie Moreno  : 1970    Chief Complaint   Patient presents with    6 Month Follow-Up       HPI:     Ruthie Moreno is a 46 y.o. female who presents today for follow up of HLD, anxiety, and depression. She feels she's been eating decently healthy and is staying active working. She lost 5 lbs since October. She states that she was started on Trulicity while in [place] to help with weight loss, but her BGs are usually in the 70s-80s. Her last HbA1c was 5.4%. Lab Results   Component Value Date    CHOL 142 2022    TRIG 126 2022    HDL 53 2022    LDLCALC 64 2022     Lab Results   Component Value Date    ALT 20 2022    AST 20 2022        She thinks her mood has been stable. She's disappointed that she fell over the weekend. Saturday she was at work and tripped over stripping on the floor and fell into the pie case. She went to the ER and was told it's bursitis. This is a workman's comp case. She has arthritis in her lower back and in her foot. Tramadol helps keep her pain controlled. She was following with pain management but doesn't want more injections or surgeries so doesn't want to follow up there.       Current Outpatient Medications:     traMADol (ULTRAM) 50 MG tablet, Take 50 mg by mouth every 6 hours as needed for Pain., Disp: , Rfl:     hydrOXYzine HCl (ATARAX) 50 MG tablet, TAKE 1 TABLET THREE TIMES DAILY AS NEEDED FOR ITCHING, Disp: 90 tablet, Rfl: 2    lidocaine (LIDODERM) 5 %, Place 1 patch onto the skin daily 12 hours on, 12 hours off., Disp: 30 patch, Rfl: 3    Cariprazine HCl (VRAYLAR) 6 MG CAPS capsule, Take 1 capsule by mouth daily, Disp: 90 capsule, Rfl: 1    traZODone (DESYREL) 50 MG tablet, Take 1-2 tabs nightly PRN insomnia., Disp: 60 tablet, Rfl: 5    naproxen (NAPROSYN) 375 MG tablet, Take 1 tablet by mouth 2 times daily as needed for Pain, Disp: 60 tablet, Rfl: 0    Blood Glucose Monitoring Suppl (TRUE METRIX METER) w/Device KIT, Dispense 1 glucometer kit., Disp: 1 kit, Rfl: 0    blood glucose test strips (TRUE METRIX BLOOD GLUCOSE TEST) strip, 1 each by In Vitro route daily As needed. , Disp: 100 each, Rfl: 5    TRUEplus Lancets 33G MISC, Test daily and as needed for symptoms (max 4x/day). , Disp: 100 each, Rfl: 5    Nutritional Supplements (ESTROVEN PO), Take 1 tablet by mouth daily, Disp: , Rfl:     gabapentin (NEURONTIN) 300 MG capsule, Take 1 capsule by mouth nightly for 30 days. , Disp: 90 capsule, Rfl: 1    gabapentin (NEURONTIN) 100 MG capsule, Take 1 capsule by mouth daily for 30 days. , Disp: 90 capsule, Rfl: 1    ferrous sulfate (FE TABS) 325 (65 Fe) MG EC tablet, Take 1 tablet by mouth 2 times daily, Disp: 180 tablet, Rfl: 1    metoprolol tartrate (LOPRESSOR) 25 MG tablet, Take 1 tablet by mouth 2 times daily, Disp: 180 tablet, Rfl: 3    omeprazole (PRILOSEC) 40 MG delayed release capsule, Take 1 capsule by mouth Daily, Disp: 90 capsule, Rfl: 1    oxybutynin (DITROPAN) 5 MG tablet, Take 1 tablet by mouth 2 times daily, Disp: 180 tablet, Rfl: 1    simvastatin (ZOCOR) 40 MG tablet, Take 1 tablet by mouth daily, Disp: 90 tablet, Rfl: 1    hydrocortisone (HYTONE) 2.5 % lotion, , Disp: , Rfl:     traZODone (DESYREL) 150 MG tablet, Take 1 tablet by mouth nightly, Disp: 90 tablet, Rfl: 1    amitriptyline (ELAVIL) 150 MG tablet, Take 1 tablet by mouth nightly, Disp: 30 tablet, Rfl: 0    apixaban (ELIQUIS) 5 MG TABS tablet, Take 1 tablet by mouth 2 times daily (Patient taking differently: Take 2.5 mg by mouth 2 times daily), Disp: 60 tablet, Rfl: 2    QUEtiapine (SEROQUEL) 200 MG tablet, Take 1 tablet by mouth nightly, Disp: 30 tablet, Rfl: 1    Dulaglutide (TRULICITY) 1.5 ZA/8.0UZ SOPN, Inject 1.5 mg into the skin once a week, Disp: , Rfl:     Alcohol Swabs (ALCOHOL PREP) PADS, by Does not apply route, Disp: , Rfl:     fluticasone (FLONASE) 50 MCG/ACT nasal spray, 1 spray by Each Nostril route daily, Disp: 32 g, Rfl: 1    melatonin 3 MG TABS tablet, Take 10 mg by mouth nightly as needed, Disp: , Rfl:     docusate sodium (COLACE) 100 MG capsule, Take 100 mg by mouth 2 times daily, Disp: , Rfl:     LORazepam (ATIVAN) 2 MG tablet, Take 2 mg by mouth every 8 hours as needed. , Disp: , Rfl:     risperiDONE (RISPERDAL) 4 MG tablet, Take 4 mg by mouth 2 times daily, Disp: , Rfl:     amoxicillin-clavulanate (AUGMENTIN) 875-125 MG per tablet, Take 1 tablet by mouth 2 times daily for 10 days, Disp: 20 tablet, Rfl: 0    azithromycin (ZITHROMAX) 250 MG tablet, Take 2 tabs (500 mg) on Day 1, and take 1 tab (250 mg) on days 2 through 5., Disp: 1 packet, Rfl: 0    Inulin 1.5 g CHEW chewable tablet, Take 2 tablets by mouth as needed, Disp: , Rfl:     medroxyPROGESTERone (PROVERA) 5 MG tablet, Take 1 tablet by mouth daily, Disp: 90 tablet, Rfl: 1    ciclopirox (PENLAC) 8 % solution, Apply topically nightly Apply topically nightly., Disp: , Rfl:     Allergies   Allergen Reactions    Msg Shortness Of Breath    Codeine Other (See Comments)    Cyclobenzaprine Other (See Comments)    Lithium Hives    Other Palpitations     msg       Subjective:      Review of Systems   HENT:  Positive for congestion. Respiratory:  Negative for cough and shortness of breath. Cardiovascular:  Negative for chest pain and palpitations. Gastrointestinal:  Negative for blood in stool, constipation, diarrhea, nausea and vomiting. Genitourinary:  Negative for hematuria. Musculoskeletal:  Positive for arthralgias, back pain and myalgias. Psychiatric/Behavioral:  Positive for dysphoric mood. Negative for self-injury and suicidal ideas. The patient is nervous/anxious.         Objective:     /64 (Site: Right Upper Arm, Position: Sitting, Cuff Size: Large Adult)   Pulse 67   Temp 98 °F (36.7 °C) (Temporal)   Resp 20   Ht 5' 5\" (1.651 m)   Wt 283 lb 6.4 oz (128.5 kg)   LMP 02/23/2017 (Exact Date)   SpO2 (!) 8%   BMI 47.16 kg/m²     Physical Exam  Vitals and nursing note reviewed. Constitutional:       General: She is not in acute distress. Appearance: She is well-developed. HENT:      Head: Normocephalic and atraumatic. Nose: Nose normal.   Eyes:      Conjunctiva/sclera: Conjunctivae normal.   Cardiovascular:      Rate and Rhythm: Normal rate and regular rhythm. Heart sounds: Normal heart sounds. Pulmonary:      Effort: Pulmonary effort is normal. No respiratory distress. Breath sounds: Normal breath sounds. No wheezing. Abdominal:      General: Bowel sounds are normal. There is no distension. Palpations: Abdomen is soft. Tenderness: There is no abdominal tenderness. Musculoskeletal:      Cervical back: Normal range of motion and neck supple. Skin:     General: Skin is warm and dry. Neurological:      Mental Status: She is alert and oriented to person, place, and time. Psychiatric:         Behavior: Behavior normal.       Assessment/Plan:     Donna was seen today for 6 month follow-up. Diagnoses and all orders for this visit:    Type 2 diabetes mellitus without complication, without long-term current use of insulin (HCC)        -     Her last HbA1c was 5.4% and she's having episodes of hypoglycemia so will hold the Trulicity and work on a healthy diet and routine physical activity. Pure hypercholesterolemia        -     A healthy diet and routine physical activity encouraged. Anxiety and depression        -     Her mood has been stable overall so will continue on current medications. Return in about 6 months (around 5/29/2023) for diabetes.     Electronically signed by Michaelle Jamison MD on 11/29/2022 at 11:24 AM

## 2022-11-28 NOTE — ED PROVIDER NOTES
THREE TIMES DAILY AS NEEDED FOR ITCHING, Disp-90 tablet, R-2Normal      lidocaine (LIDODERM) 5 % Place 1 patch onto the skin daily 12 hours on, 12 hours off., Disp-30 patch, R-3Normal      azithromycin (ZITHROMAX) 250 MG tablet Take 2 tabs (500 mg) on Day 1, and take 1 tab (250 mg) on days 2 through 5., Disp-1 packet, R-0Normal      Cariprazine HCl (VRAYLAR) 6 MG CAPS capsule Take 1 capsule by mouth daily, Disp-90 capsule, R-1NO PRINT      !! traZODone (DESYREL) 50 MG tablet Take 1-2 tabs nightly PRN insomnia., Disp-60 tablet, R-5Normal      naproxen (NAPROSYN) 375 MG tablet Take 1 tablet by mouth 2 times daily as needed for Pain, Disp-60 tablet, R-0Normal      Blood Glucose Monitoring Suppl (TRUE METRIX METER) w/Device KIT Dispense 1 glucometer kit., Disp-1 kit, R-0Normal      blood glucose test strips (TRUE METRIX BLOOD GLUCOSE TEST) strip 1 each by In Vitro route daily As needed. , Disp-100 each, R-5Normal      TRUEplus Lancets 33G MISC Disp-100 each, R-5, NormalTest daily and as needed for symptoms (max 4x/day). Nutritional Supplements (ESTROVEN PO) Take 1 tablet by mouth dailyHistorical Med      Inulin 1.5 g CHEW chewable tablet Take 2 tablets by mouth as neededHistorical Med      gabapentin (NEURONTIN) 300 MG capsule Take 1 capsule by mouth nightly for 30 days. , Disp-90 capsule, R-1Normal      gabapentin (NEURONTIN) 100 MG capsule Take 1 capsule by mouth daily for 30 days. , Disp-90 capsule, R-1Normal      ferrous sulfate (FE TABS) 325 (65 Fe) MG EC tablet Take 1 tablet by mouth 2 times daily, Disp-180 tablet, R-1Normal      medroxyPROGESTERone (PROVERA) 5 MG tablet Take 1 tablet by mouth daily, Disp-90 tablet, R-1Normal      metoprolol tartrate (LOPRESSOR) 25 MG tablet Take 1 tablet by mouth 2 times daily, Disp-180 tablet, R-3Normal      omeprazole (PRILOSEC) 40 MG delayed release capsule Take 1 capsule by mouth Daily, Disp-90 capsule, R-1Normal      oxybutynin (DITROPAN) 5 MG tablet Take 1 tablet by mouth 2 times daily, Disp-180 tablet, R-1Normal      simvastatin (ZOCOR) 40 MG tablet Take 1 tablet by mouth daily, Disp-90 tablet, R-1Normal      hydrocortisone (HYTONE) 2.5 % lotion Historical Med      !! traZODone (DESYREL) 150 MG tablet Take 1 tablet by mouth nightly, Disp-90 tablet, R-1Normal      amitriptyline (ELAVIL) 150 MG tablet Take 1 tablet by mouth nightly, Disp-30 tablet, R-0Normal      apixaban (ELIQUIS) 5 MG TABS tablet Take 1 tablet by mouth 2 times daily, Disp-60 tablet, R-2Normal      QUEtiapine (SEROQUEL) 200 MG tablet Take 1 tablet by mouth nightly, Disp-30 tablet, R-1Normal      Dulaglutide (TRULICITY) 1.5 WL/1.8BW SOPN Inject 1.5 mg into the skin once a weekHistorical Med      ciclopirox (PENLAC) 8 % solution Apply topically nightly Apply topically nightly., Topical, NIGHTLY, Historical Med      Alcohol Swabs (ALCOHOL PREP) PADS Historical Med      fluticasone (FLONASE) 50 MCG/ACT nasal spray 1 spray by Each Nostril route daily, Disp-32 g, R-1Normal      melatonin 3 MG TABS tablet Take 10 mg by mouth nightly as neededHistorical Med      docusate sodium (COLACE) 100 MG capsule Take 100 mg by mouth 2 times dailyHistorical Med      LORazepam (ATIVAN) 2 MG tablet Take 2 mg by mouth every 8 hours as needed. Historical Med      risperiDONE (RISPERDAL) 4 MG tablet Take 4 mg by mouth 2 times dailyHistorical Med       !! - Potential duplicate medications found. Please discuss with provider. ALLERGIES    is allergic to msg, codeine, cyclobenzaprine, lithium, and other. FAMILY HISTORY    She indicated that her mother is . She indicated that her father is . She indicated that her paternal aunt is alive. family history includes Alcohol Abuse in her father; Alzheimer's Disease in her mother; Breast Cancer (age of onset: 54) in her paternal aunt; Cancer in her father; Depression in her mother; High Blood Pressure in her mother; Kidney Disease in her mother.     SOCIAL HISTORY reports that she has never smoked. She has never used smokeless tobacco. She reports that she does not currently use alcohol. She reports that she does not use drugs. PHYSICAL EXAM       INITIAL VITALS: /65   Pulse 60   Temp 97.4 °F (36.3 °C) (Oral)   Resp 14   LMP 02/23/2017 (Exact Date)   SpO2 96%      Physical Exam  Vitals and nursing note reviewed. Exam conducted with a chaperone present. Constitutional:       General: She is not in acute distress. HENT:      Head: Atraumatic. Eyes:      Pupils: Pupils are equal, round, and reactive to light. Cardiovascular:      Pulses: Normal pulses. Musculoskeletal:         General: Tenderness (Left olecranon area, bruised, not swollen or erythematous, no deformity. Full range of motion left elbow.) present. Skin:     General: Skin is warm and dry. Capillary Refill: Capillary refill takes less than 2 seconds. Neurological:      General: No focal deficit present. Mental Status: She is alert and oriented to person, place, and time. Psychiatric:         Behavior: Behavior normal.          RADIOLOGY:    XR ELBOW LEFT (MIN 3 VIEWS)   Final Result      No fracture or dislocation. Final report electronically signed by Dr. Anamaria Yao on 11/27/2022 1:19 PM             Vitals:    Vitals:    11/27/22 1325   BP: 108/65   Pulse: 60   Resp: 14   Temp: 97.4 °F (36.3 °C)   TempSrc: Oral   SpO2: 96%       EMERGENCY DEPARTMENT COURSE:    She was notified of normal elbow x-ray. An Ace wrap was applied.,  Normal neurovascular status before and after placement. She will follow-up with Workmen's Comp. Potential signs and symptoms of olecranon bursitis discussed with the patient. FINAL IMPRESSION      1.  Contusion of left elbow, initial encounter        DISPOSITION/PLAN    DISPOSITION Decision To Discharge 11/27/2022 01:34:45 PM      PATIENT REFERRED TO:    Miesha Lambert MD  [address] [id number]  [phone]      As needed      DISCHARGE MEDICATIONS:    Discharge Medication List as of 11/27/2022  1:42 PM             (Please note that portions of this note were completed with a voice recognition program.  Efforts were made to edit the dictations but occasionally words are mis-transcribed.)       Porsche Marquez MD  11/28/22 0300

## 2022-11-29 ENCOUNTER — OFFICE VISIT (OUTPATIENT)
Dept: FAMILY MEDICINE CLINIC | Age: 52
End: 2022-11-29
Payer: MEDICARE

## 2022-11-29 VITALS
TEMPERATURE: 98 F | HEART RATE: 67 BPM | WEIGHT: 283.4 LBS | SYSTOLIC BLOOD PRESSURE: 112 MMHG | DIASTOLIC BLOOD PRESSURE: 64 MMHG | HEIGHT: 65 IN | BODY MASS INDEX: 47.22 KG/M2 | RESPIRATION RATE: 20 BRPM | OXYGEN SATURATION: 8 %

## 2022-11-29 DIAGNOSIS — F41.9 ANXIETY AND DEPRESSION: ICD-10-CM

## 2022-11-29 DIAGNOSIS — F32.A ANXIETY AND DEPRESSION: ICD-10-CM

## 2022-11-29 DIAGNOSIS — E11.9 TYPE 2 DIABETES MELLITUS WITHOUT COMPLICATION, WITHOUT LONG-TERM CURRENT USE OF INSULIN (HCC): Primary | ICD-10-CM

## 2022-11-29 DIAGNOSIS — E78.00 PURE HYPERCHOLESTEROLEMIA: ICD-10-CM

## 2022-11-29 PROCEDURE — 3078F DIAST BP <80 MM HG: CPT | Performed by: FAMILY MEDICINE

## 2022-11-29 PROCEDURE — 99214 OFFICE O/P EST MOD 30 MIN: CPT | Performed by: FAMILY MEDICINE

## 2022-11-29 PROCEDURE — 3017F COLORECTAL CA SCREEN DOC REV: CPT | Performed by: FAMILY MEDICINE

## 2022-11-29 PROCEDURE — 3044F HG A1C LEVEL LT 7.0%: CPT | Performed by: FAMILY MEDICINE

## 2022-11-29 PROCEDURE — G8427 DOCREV CUR MEDS BY ELIG CLIN: HCPCS | Performed by: FAMILY MEDICINE

## 2022-11-29 PROCEDURE — G8482 FLU IMMUNIZE ORDER/ADMIN: HCPCS | Performed by: FAMILY MEDICINE

## 2022-11-29 PROCEDURE — 1036F TOBACCO NON-USER: CPT | Performed by: FAMILY MEDICINE

## 2022-11-29 PROCEDURE — G8417 CALC BMI ABV UP PARAM F/U: HCPCS | Performed by: FAMILY MEDICINE

## 2022-11-29 PROCEDURE — 2022F DILAT RTA XM EVC RTNOPTHY: CPT | Performed by: FAMILY MEDICINE

## 2022-11-29 PROCEDURE — 3074F SYST BP LT 130 MM HG: CPT | Performed by: FAMILY MEDICINE

## 2022-11-29 RX ORDER — TRAMADOL HYDROCHLORIDE 50 MG/1
50 TABLET ORAL EVERY 6 HOURS PRN
COMMUNITY

## 2022-11-29 ASSESSMENT — ENCOUNTER SYMPTOMS
BLOOD IN STOOL: 0
VOMITING: 0
COUGH: 0
BACK PAIN: 1
SHORTNESS OF BREATH: 0
NAUSEA: 0
CONSTIPATION: 0
DIARRHEA: 0

## 2022-12-06 ENCOUNTER — TELEPHONE (OUTPATIENT)
Dept: FAMILY MEDICINE CLINIC | Age: 52
End: 2022-12-06

## 2022-12-06 RX ORDER — VALACYCLOVIR HYDROCHLORIDE 1 G/1
1000 TABLET, FILM COATED ORAL 3 TIMES DAILY
Qty: 21 TABLET | Refills: 0 | Status: SHIPPED | OUTPATIENT
Start: 2022-12-06 | End: 2022-12-13

## 2022-12-06 NOTE — TELEPHONE ENCOUNTER
Pt previously had lip precancer in 2020 that derm took a spot off. She voiced she is having sores on her mouth. She is not sure if they are cold sores.  Asking if you can send something to pharmacy or does she need to go back to specialist.

## 2022-12-07 ENCOUNTER — TELEPHONE (OUTPATIENT)
Dept: FAMILY MEDICINE CLINIC | Age: 52
End: 2022-12-07

## 2022-12-07 NOTE — LETTER
[address] [facility]  [address] [facility] [address]  Phone: [phone]  Fax: [phone]    Bill Yousif MD        December 7, 2022     Patient: Lissette Farias   YOB: 1970   Date of Visit: 12/7/2022       To Whom It May Concern: It is my medical opinion that Gaston Bullard is to be excused from work on 12/5/22. She may return to work on 12/9/22. If you have any questions or concerns, please don't hesitate to call.     Sincerely,        Bill Yousif MD

## 2022-12-15 ENCOUNTER — TELEPHONE (OUTPATIENT)
Dept: FAMILY MEDICINE CLINIC | Age: 52
End: 2022-12-15

## 2022-12-15 NOTE — TELEPHONE ENCOUNTER
Pt is already taking tylenol PM. I asked her what she wants. She voiced while she was in [place] she was taking temazepam and it helped. Pharmacy [facility] [place]. She is asking if you want to see her for her lip again. It still  has a blister on it.

## 2022-12-15 NOTE — TELEPHONE ENCOUNTER
Pt asking for help to sleep. She has not sleep in the past 3 nights. She is taking all her medications as directed and  melatonin 10mg nightly.

## 2022-12-16 ENCOUNTER — TELEPHONE (OUTPATIENT)
Dept: FAMILY MEDICINE CLINIC | Age: 52
End: 2022-12-16

## 2022-12-16 DIAGNOSIS — R06.83 SNORING: Primary | ICD-10-CM

## 2022-12-16 DIAGNOSIS — F51.01 PRIMARY INSOMNIA: ICD-10-CM

## 2022-12-16 NOTE — TELEPHONE ENCOUNTER
Pt already had 3 pulmonology referrals in the system. She will contact one of those offices to schedule.

## 2022-12-20 ENCOUNTER — TELEPHONE (OUTPATIENT)
Dept: FAMILY MEDICINE CLINIC | Age: 52
End: 2022-12-20

## 2022-12-20 ASSESSMENT — ENCOUNTER SYMPTOMS
EYE DISCHARGE: 1
EYE ITCHING: 1
SHORTNESS OF BREATH: 0

## 2022-12-20 NOTE — TELEPHONE ENCOUNTER
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 She is using aquaphor. Asking if you have any other recommendations.

## 2022-12-20 NOTE — PROGRESS NOTES
[address]  [address] [place] [address]  Phone:  [phone]          Name: Olga Ge  : 1970    Chief Complaint   Patient presents with    Nasal Congestion       HPI:     Olga Ge is a 46 y.o. female who presents today for evaluation of nasal congestion, cough, and watery eyes for the past week. She's had frontal headaches and maxillary sinus pressure. No sore throat. No fevers. No diarrhea, nausea, or vomiting. No body aches. Current Outpatient Medications:     famotidine (PEPCID) 20 MG tablet, Take 20 mg by mouth 2 times daily, Disp: , Rfl:     celecoxib (CELEBREX) 400 MG capsule, Take 400 mg by mouth daily, Disp: , Rfl:     hydroCHLOROthiazide (HYDRODIURIL) 25 MG tablet, Take 25 mg by mouth daily, Disp: , Rfl:     traZODone (DESYREL) 50 MG tablet, Take 1-2 tabs nightly PRN insomnia., Disp: 60 tablet, Rfl: 5    amoxicillin-clavulanate (AUGMENTIN) 875-125 MG per tablet, Take 1 tablet by mouth 2 times daily for 10 days, Disp: 20 tablet, Rfl: 0    traMADol (ULTRAM) 50 MG tablet, Take 50 mg by mouth every 6 hours as needed for Pain., Disp: , Rfl:     hydrOXYzine HCl (ATARAX) 50 MG tablet, TAKE 1 TABLET THREE TIMES DAILY AS NEEDED FOR ITCHING, Disp: 90 tablet, Rfl: 2    Cariprazine HCl (VRAYLAR) 6 MG CAPS capsule, Take 1 capsule by mouth daily, Disp: 90 capsule, Rfl: 1    naproxen (NAPROSYN) 375 MG tablet, Take 1 tablet by mouth 2 times daily as needed for Pain, Disp: 60 tablet, Rfl: 0    Blood Glucose Monitoring Suppl (TRUE METRIX METER) w/Device KIT, Dispense 1 glucometer kit., Disp: 1 kit, Rfl: 0    blood glucose test strips (TRUE METRIX BLOOD GLUCOSE TEST) strip, 1 each by In Vitro route daily As needed. , Disp: 100 each, Rfl: 5    TRUEplus Lancets 33G MISC, Test daily and as needed for symptoms (max 4x/day). , Disp: 100 each, Rfl: 5    Nutritional Supplements (ESTROVEN PO), Take 1 tablet by mouth daily, Disp: , Rfl:     gabapentin (NEURONTIN) 300 MG capsule, Take 1 capsule by mouth nightly for 30 days. , Disp: 90 capsule, Rfl: 1    gabapentin (NEURONTIN) 100 MG capsule, Take 1 capsule by mouth daily for 30 days. , Disp: 90 capsule, Rfl: 1    ferrous sulfate (FE TABS) 325 (65 Fe) MG EC tablet, Take 1 tablet by mouth 2 times daily, Disp: 180 tablet, Rfl: 1    metoprolol tartrate (LOPRESSOR) 25 MG tablet, Take 1 tablet by mouth 2 times daily, Disp: 180 tablet, Rfl: 3    omeprazole (PRILOSEC) 40 MG delayed release capsule, Take 1 capsule by mouth Daily, Disp: 90 capsule, Rfl: 1    oxybutynin (DITROPAN) 5 MG tablet, Take 1 tablet by mouth 2 times daily, Disp: 180 tablet, Rfl: 1    simvastatin (ZOCOR) 40 MG tablet, Take 1 tablet by mouth daily, Disp: 90 tablet, Rfl: 1    hydrocortisone (HYTONE) 2.5 % lotion, , Disp: , Rfl:     traZODone (DESYREL) 150 MG tablet, Take 1 tablet by mouth nightly, Disp: 90 tablet, Rfl: 1    amitriptyline (ELAVIL) 150 MG tablet, Take 1 tablet by mouth nightly, Disp: 30 tablet, Rfl: 0    apixaban (ELIQUIS) 5 MG TABS tablet, Take 1 tablet by mouth 2 times daily (Patient taking differently: Take 2.5 mg by mouth 2 times daily), Disp: 60 tablet, Rfl: 2    QUEtiapine (SEROQUEL) 200 MG tablet, Take 1 tablet by mouth nightly, Disp: 30 tablet, Rfl: 1    Alcohol Swabs (ALCOHOL PREP) PADS, by Does not apply route, Disp: , Rfl:     fluticasone (FLONASE) 50 MCG/ACT nasal spray, 1 spray by Each Nostril route daily, Disp: 32 g, Rfl: 1    melatonin 3 MG TABS tablet, Take 10 mg by mouth nightly as needed, Disp: , Rfl:     docusate sodium (COLACE) 100 MG capsule, Take 100 mg by mouth 2 times daily, Disp: , Rfl:     LORazepam (ATIVAN) 2 MG tablet, Take 2 mg by mouth every 8 hours as needed. , Disp: , Rfl:     risperiDONE (RISPERDAL) 4 MG tablet, Take 4 mg by mouth 2 times daily, Disp: , Rfl:     azithromycin (ZITHROMAX) 250 MG tablet, Take 2 tabs (500 mg) on Day 1, and take 1 tab (250 mg) on days 2 through 5.  (Patient not taking: Reported on 12/21/2022), Disp: 1 packet, Rfl: 0    Inulin 1.5 g CHEW chewable tablet, Take 2 tablets by mouth as needed (Patient not taking: Reported on 12/21/2022), Disp: , Rfl:     medroxyPROGESTERone (PROVERA) 5 MG tablet, Take 1 tablet by mouth daily (Patient not taking: Reported on 12/21/2022), Disp: 90 tablet, Rfl: 1    Dulaglutide (TRULICITY) 1.5 BELINDA/8.1NR SOPN, Inject 1.5 mg into the skin once a week (Patient not taking: Reported on 12/21/2022), Disp: , Rfl:     ciclopirox (PENLAC) 8 % solution, Apply topically nightly Apply topically nightly. (Patient not taking: Reported on 12/21/2022), Disp: , Rfl:     Allergies   Allergen Reactions    Msg Shortness Of Breath    Codeine Other (See Comments)    Cyclobenzaprine Other (See Comments)    Lithium Hives    Other Palpitations     msg       Subjective:      Review of Systems   Constitutional:  Negative for fever. HENT:  Positive for congestion, rhinorrhea and sinus pressure. Eyes:  Positive for discharge and itching. Respiratory:  Positive for cough. Negative for shortness of breath and wheezing. Neurological:  Positive for headaches. Objective:     /63 (Site: Left Upper Arm, Position: Sitting, Cuff Size: Large Adult)   Pulse 77   Temp 97.5 °F (36.4 °C) (Temporal)   Resp 17   Ht 5' 5\" (1.651 m)   Wt 286 lb (129.7 kg)   LMP 02/23/2017 (Exact Date)   SpO2 99%   BMI 47.59 kg/m²     Physical Exam  Vitals reviewed. Constitutional:       General: She is not in acute distress. Appearance: She is well-developed. HENT:      Head: Normocephalic and atraumatic. Right Ear: Tympanic membrane, ear canal and external ear normal.      Left Ear: Tympanic membrane, ear canal and external ear normal.      Nose: Congestion and rhinorrhea present. Eyes:      Conjunctiva/sclera: Conjunctivae normal.   Cardiovascular:      Rate and Rhythm: Normal rate and regular rhythm. Heart sounds: Normal heart sounds.    Pulmonary:      Effort: Pulmonary effort is normal. No respiratory distress. Breath sounds: Normal breath sounds. No wheezing. Abdominal:      General: Bowel sounds are normal. There is no distension. Palpations: Abdomen is soft. Tenderness: There is no abdominal tenderness. Musculoskeletal:      Cervical back: Normal range of motion and neck supple. Skin:     General: Skin is warm and dry. Neurological:      Mental Status: She is alert and oriented to person, place, and time. Psychiatric:         Behavior: Behavior normal.       Assessment/Plan:     Donna was seen today for nasal congestion. Diagnoses and all orders for this visit:    Acute bacterial sinusitis  -     Will treat with rest, increased hydration, and OTC symptomatic medication. -     amoxicillin-clavulanate (AUGMENTIN) 875-125 MG per tablet; Take 1 tablet by mouth 2 times daily for 10 days      Return if symptoms worsen or fail to improve.     Electronically signed by John Larkin MD on 12/21/2022 at 11:31 AM

## 2022-12-21 ENCOUNTER — OFFICE VISIT (OUTPATIENT)
Dept: FAMILY MEDICINE CLINIC | Age: 52
End: 2022-12-21
Payer: MEDICARE

## 2022-12-21 ENCOUNTER — TELEPHONE (OUTPATIENT)
Dept: PULMONOLOGY | Age: 52
End: 2022-12-21

## 2022-12-21 VITALS
DIASTOLIC BLOOD PRESSURE: 63 MMHG | HEART RATE: 77 BPM | WEIGHT: 286 LBS | BODY MASS INDEX: 47.65 KG/M2 | TEMPERATURE: 97.5 F | OXYGEN SATURATION: 99 % | HEIGHT: 65 IN | RESPIRATION RATE: 17 BRPM | SYSTOLIC BLOOD PRESSURE: 110 MMHG

## 2022-12-21 DIAGNOSIS — B96.89 ACUTE BACTERIAL SINUSITIS: Primary | ICD-10-CM

## 2022-12-21 DIAGNOSIS — J01.90 ACUTE BACTERIAL SINUSITIS: Primary | ICD-10-CM

## 2022-12-21 PROCEDURE — G8417 CALC BMI ABV UP PARAM F/U: HCPCS | Performed by: FAMILY MEDICINE

## 2022-12-21 PROCEDURE — 1036F TOBACCO NON-USER: CPT | Performed by: FAMILY MEDICINE

## 2022-12-21 PROCEDURE — 3074F SYST BP LT 130 MM HG: CPT | Performed by: FAMILY MEDICINE

## 2022-12-21 PROCEDURE — 3017F COLORECTAL CA SCREEN DOC REV: CPT | Performed by: FAMILY MEDICINE

## 2022-12-21 PROCEDURE — 99213 OFFICE O/P EST LOW 20 MIN: CPT | Performed by: FAMILY MEDICINE

## 2022-12-21 PROCEDURE — G8427 DOCREV CUR MEDS BY ELIG CLIN: HCPCS | Performed by: FAMILY MEDICINE

## 2022-12-21 PROCEDURE — 3078F DIAST BP <80 MM HG: CPT | Performed by: FAMILY MEDICINE

## 2022-12-21 PROCEDURE — G8482 FLU IMMUNIZE ORDER/ADMIN: HCPCS | Performed by: FAMILY MEDICINE

## 2022-12-21 RX ORDER — HYDROCHLOROTHIAZIDE 25 MG/1
25 TABLET ORAL DAILY
COMMUNITY

## 2022-12-21 RX ORDER — AMOXICILLIN AND CLAVULANATE POTASSIUM 875; 125 MG/1; MG/1
1 TABLET, FILM COATED ORAL 2 TIMES DAILY
Qty: 20 TABLET | Refills: 0 | Status: SHIPPED | OUTPATIENT
Start: 2022-12-21 | End: 2022-12-31

## 2022-12-21 RX ORDER — TRAZODONE HYDROCHLORIDE 50 MG/1
TABLET ORAL
Qty: 60 TABLET | Refills: 5 | Status: SHIPPED | OUTPATIENT
Start: 2022-12-21

## 2022-12-21 RX ORDER — CELECOXIB 400 MG/1
400 CAPSULE ORAL DAILY
COMMUNITY

## 2022-12-21 RX ORDER — FAMOTIDINE 20 MG/1
20 TABLET, FILM COATED ORAL 2 TIMES DAILY
COMMUNITY

## 2022-12-21 ASSESSMENT — ENCOUNTER SYMPTOMS
WHEEZING: 0
COUGH: 1
SINUS PRESSURE: 1
RHINORRHEA: 1

## 2022-12-21 NOTE — TELEPHONE ENCOUNTER
Pt/t called and cancelled her sleep consult bc she is sleeping better. She stated her pcp agreed with this. Consult cancelled.

## 2022-12-22 ENCOUNTER — TELEPHONE (OUTPATIENT)
Dept: FAMILY MEDICINE CLINIC | Age: 52
End: 2022-12-22

## 2022-12-22 RX ORDER — METHYLPREDNISOLONE 4 MG/1
TABLET ORAL
Qty: 1 KIT | Refills: 0 | Status: SHIPPED | OUTPATIENT
Start: 2022-12-22 | End: 2022-12-28

## 2022-12-22 NOTE — TELEPHONE ENCOUNTER
Pt calling back to update you. Her nose is so congested she can stand it. She is asking for you to send steroids to [facility].

## 2022-12-28 ENCOUNTER — TELEPHONE (OUTPATIENT)
Dept: FAMILY MEDICINE CLINIC | Age: 52
End: 2022-12-28

## 2022-12-28 NOTE — TELEPHONE ENCOUNTER
Pt called wanting to know why she has been congested the last 3 months. She stated that it has occurred since she moved here from [place]. She stated that she is waking up every AM and spitting up green phlegm. Please advise.

## 2023-01-03 NOTE — PROGRESS NOTES
[address]  [address] [place] [address]  Phone:  [phone]          Name: Les Austin  : 1970    Chief Complaint   Patient presents with    Diabetes       HPI:     Les Austin is a 46 y.o. female who presents today for follow up of medication-induced diabetes. Her last HbA1c was 5.4% in . She's acknowledges that she hasn't been eating well with lots of cheese and crackers. She states that she was skinny but was started on Risperidone for paranoid schizophrenia which has caused her to gain weight. Her weight is up today, but she thinks it's partially because of the brace she has on her left ankle. She's had ankle pain since living in [place]. She's been wearing the brace more because if she doesn't her foot hurts. Now her right foot is sore and she'll be getting another brace at [facility]. Her  wants her to quit working but she wants to keep working to afford her lifestyle. Current Outpatient Medications:     famotidine (PEPCID) 20 MG tablet, Take 20 mg by mouth 2 times daily, Disp: , Rfl:     celecoxib (CELEBREX) 400 MG capsule, Take 400 mg by mouth daily, Disp: , Rfl:     hydroCHLOROthiazide (HYDRODIURIL) 25 MG tablet, Take 25 mg by mouth daily, Disp: , Rfl:     traZODone (DESYREL) 50 MG tablet, Take 1-2 tabs nightly PRN insomnia., Disp: 60 tablet, Rfl: 5    traMADol (ULTRAM) 50 MG tablet, Take 50 mg by mouth every 6 hours as needed for Pain., Disp: , Rfl:     hydrOXYzine HCl (ATARAX) 50 MG tablet, TAKE 1 TABLET THREE TIMES DAILY AS NEEDED FOR ITCHING, Disp: 90 tablet, Rfl: 2    Cariprazine HCl (VRAYLAR) 6 MG CAPS capsule, Take 1 capsule by mouth daily, Disp: 90 capsule, Rfl: 1    Blood Glucose Monitoring Suppl (TRUE METRIX METER) w/Device KIT, Dispense 1 glucometer kit., Disp: 1 kit, Rfl: 0    blood glucose test strips (TRUE METRIX BLOOD GLUCOSE TEST) strip, 1 each by In Vitro route daily As needed. , Disp: 100 each, Rfl: 5    TRUEplus Lancets 33G MISC, Test daily and as needed for symptoms (max 4x/day). , Disp: 100 each, Rfl: 5    Nutritional Supplements (ESTROVEN PO), Take 1 tablet by mouth daily, Disp: , Rfl:     ferrous sulfate (FE TABS) 325 (65 Fe) MG EC tablet, Take 1 tablet by mouth 2 times daily, Disp: 180 tablet, Rfl: 1    metoprolol tartrate (LOPRESSOR) 25 MG tablet, Take 1 tablet by mouth 2 times daily, Disp: 180 tablet, Rfl: 3    omeprazole (PRILOSEC) 40 MG delayed release capsule, Take 1 capsule by mouth Daily, Disp: 90 capsule, Rfl: 1    oxybutynin (DITROPAN) 5 MG tablet, Take 1 tablet by mouth 2 times daily, Disp: 180 tablet, Rfl: 1    simvastatin (ZOCOR) 40 MG tablet, Take 1 tablet by mouth daily, Disp: 90 tablet, Rfl: 1    hydrocortisone (HYTONE) 2.5 % lotion, , Disp: , Rfl:     amitriptyline (ELAVIL) 150 MG tablet, Take 1 tablet by mouth nightly, Disp: 30 tablet, Rfl: 0    QUEtiapine (SEROQUEL) 200 MG tablet, Take 1 tablet by mouth nightly, Disp: 30 tablet, Rfl: 1    Alcohol Swabs (ALCOHOL PREP) PADS, by Does not apply route, Disp: , Rfl:     fluticasone (FLONASE) 50 MCG/ACT nasal spray, 1 spray by Each Nostril route daily, Disp: 32 g, Rfl: 1    melatonin 3 MG TABS tablet, Take 10 mg by mouth nightly as needed, Disp: , Rfl:     docusate sodium (COLACE) 100 MG capsule, Take 100 mg by mouth 2 times daily, Disp: , Rfl:     LORazepam (ATIVAN) 2 MG tablet, Take 2 mg by mouth every 8 hours as needed. , Disp: , Rfl:     risperiDONE (RISPERDAL) 4 MG tablet, Take 4 mg by mouth 2 times daily, Disp: , Rfl:     naproxen (NAPROSYN) 375 MG tablet, Take 1 tablet by mouth 2 times daily as needed for Pain, Disp: 60 tablet, Rfl: 0    gabapentin (NEURONTIN) 300 MG capsule, Take 1 capsule by mouth nightly for 30 days. , Disp: 90 capsule, Rfl: 1    gabapentin (NEURONTIN) 100 MG capsule, Take 1 capsule by mouth daily for 30 days. , Disp: 90 capsule, Rfl: 1    medroxyPROGESTERone (PROVERA) 5 MG tablet, Take 1 tablet by mouth daily (Patient not taking: No sig reported), Disp: 90 tablet, Rfl: 1    traZODone (DESYREL) 150 MG tablet, Take 1 tablet by mouth nightly, Disp: 90 tablet, Rfl: 1    Allergies   Allergen Reactions    Msg Shortness Of Breath    Codeine Other (See Comments)    Cyclobenzaprine Other (See Comments)    Lithium Hives    Other Palpitations     msg       Subjective:      Review of Systems   Constitutional:  Negative for chills and fever. Cardiovascular:  Negative for chest pain and palpitations. Endocrine: Negative for polydipsia, polyphagia and polyuria. Musculoskeletal:  Positive for arthralgias and gait problem. Objective:     /70 (Site: Left Upper Arm, Position: Sitting, Cuff Size: Large Adult)   Pulse 68   Temp 97.2 °F (36.2 °C) (Temporal)   Resp 16   Ht 5' 5\" (1.651 m)   Wt 290 lb (131.5 kg)   LMP 02/23/2017 (Exact Date)   SpO2 100%   BMI 48.26 kg/m²     Physical Exam  Vitals and nursing note reviewed. Constitutional:       General: She is not in acute distress. Appearance: She is well-developed. HENT:      Head: Normocephalic and atraumatic. Nose: Nose normal.   Eyes:      Conjunctiva/sclera: Conjunctivae normal.   Cardiovascular:      Rate and Rhythm: Normal rate and regular rhythm. Heart sounds: Normal heart sounds. Pulmonary:      Effort: Pulmonary effort is normal. No respiratory distress. Breath sounds: Normal breath sounds. No wheezing. Abdominal:      General: Bowel sounds are normal. There is no distension. Palpations: Abdomen is soft. Musculoskeletal:      Cervical back: Normal range of motion and neck supple. Skin:     General: Skin is warm and dry. Neurological:      Mental Status: She is alert and oriented to person, place, and time. Psychiatric:         Behavior: Behavior normal.       Assessment/Plan:     Donna was seen today for diabetes.     Diagnoses and all orders for this visit:    Type 2 diabetes mellitus without complication, without long-term current use of insulin (HCC)  -     Her HbA1c is 5.4% which is well-controlled so will continue to monitor. A healthy diet and increased physical activity encouraged. -     POCT glycosylated hemoglobin (Hb A1C)    Primary insomnia        -     She is sleeping better on the Trazodone so will continue on it. Return in about 6 months (around 7/4/2023) for Medicare AWV.     Electronically signed by Shea Vergara MD on 1/4/2023 at 10:31 AM

## 2023-01-04 ENCOUNTER — OFFICE VISIT (OUTPATIENT)
Dept: FAMILY MEDICINE CLINIC | Age: 53
End: 2023-01-04
Payer: MEDICARE

## 2023-01-04 VITALS
BODY MASS INDEX: 48.32 KG/M2 | RESPIRATION RATE: 16 BRPM | SYSTOLIC BLOOD PRESSURE: 102 MMHG | HEIGHT: 65 IN | OXYGEN SATURATION: 100 % | HEART RATE: 68 BPM | DIASTOLIC BLOOD PRESSURE: 70 MMHG | WEIGHT: 290 LBS | TEMPERATURE: 97.2 F

## 2023-01-04 DIAGNOSIS — E11.9 TYPE 2 DIABETES MELLITUS WITHOUT COMPLICATION, WITHOUT LONG-TERM CURRENT USE OF INSULIN (HCC): Primary | ICD-10-CM

## 2023-01-04 DIAGNOSIS — F51.01 PRIMARY INSOMNIA: ICD-10-CM

## 2023-01-04 LAB — HBA1C MFR BLD: 5.4 %

## 2023-01-04 PROCEDURE — 3074F SYST BP LT 130 MM HG: CPT | Performed by: FAMILY MEDICINE

## 2023-01-04 PROCEDURE — 3017F COLORECTAL CA SCREEN DOC REV: CPT | Performed by: FAMILY MEDICINE

## 2023-01-04 PROCEDURE — 2022F DILAT RTA XM EVC RTNOPTHY: CPT | Performed by: FAMILY MEDICINE

## 2023-01-04 PROCEDURE — G8417 CALC BMI ABV UP PARAM F/U: HCPCS | Performed by: FAMILY MEDICINE

## 2023-01-04 PROCEDURE — 83036 HEMOGLOBIN GLYCOSYLATED A1C: CPT | Performed by: FAMILY MEDICINE

## 2023-01-04 PROCEDURE — G8482 FLU IMMUNIZE ORDER/ADMIN: HCPCS | Performed by: FAMILY MEDICINE

## 2023-01-04 PROCEDURE — 3078F DIAST BP <80 MM HG: CPT | Performed by: FAMILY MEDICINE

## 2023-01-04 PROCEDURE — 3044F HG A1C LEVEL LT 7.0%: CPT | Performed by: FAMILY MEDICINE

## 2023-01-04 PROCEDURE — G8427 DOCREV CUR MEDS BY ELIG CLIN: HCPCS | Performed by: FAMILY MEDICINE

## 2023-01-04 PROCEDURE — 99214 OFFICE O/P EST MOD 30 MIN: CPT | Performed by: FAMILY MEDICINE

## 2023-01-04 PROCEDURE — 1036F TOBACCO NON-USER: CPT | Performed by: FAMILY MEDICINE

## 2023-01-04 ASSESSMENT — PATIENT HEALTH QUESTIONNAIRE - PHQ9
SUM OF ALL RESPONSES TO PHQ QUESTIONS 1-9: 0
7. TROUBLE CONCENTRATING ON THINGS, SUCH AS READING THE NEWSPAPER OR WATCHING TELEVISION: 0
SUM OF ALL RESPONSES TO PHQ QUESTIONS 1-9: 0
9. THOUGHTS THAT YOU WOULD BE BETTER OFF DEAD, OR OF HURTING YOURSELF: 0
2. FEELING DOWN, DEPRESSED OR HOPELESS: 0
SUM OF ALL RESPONSES TO PHQ QUESTIONS 1-9: 0
SUM OF ALL RESPONSES TO PHQ QUESTIONS 1-9: 0
4. FEELING TIRED OR HAVING LITTLE ENERGY: 0
8. MOVING OR SPEAKING SO SLOWLY THAT OTHER PEOPLE COULD HAVE NOTICED. OR THE OPPOSITE, BEING SO FIGETY OR RESTLESS THAT YOU HAVE BEEN MOVING AROUND A LOT MORE THAN USUAL: 0
5. POOR APPETITE OR OVEREATING: 0
3. TROUBLE FALLING OR STAYING ASLEEP: 0
6. FEELING BAD ABOUT YOURSELF - OR THAT YOU ARE A FAILURE OR HAVE LET YOURSELF OR YOUR FAMILY DOWN: 0
10. IF YOU CHECKED OFF ANY PROBLEMS, HOW DIFFICULT HAVE THESE PROBLEMS MADE IT FOR YOU TO DO YOUR WORK, TAKE CARE OF THINGS AT HOME, OR GET ALONG WITH OTHER PEOPLE: 0

## 2023-01-10 ASSESSMENT — ENCOUNTER SYMPTOMS
SHORTNESS OF BREATH: 0
COUGH: 0

## 2023-01-10 NOTE — PROGRESS NOTES
[address]  [address] [place] [address]  Phone:  [phone]          Name: Mary Ferraro  : 1970    Chief Complaint   Patient presents with    Ear Problem     Left      Rash     Left groin       HPI:     Mary Ferraro is a 46 y.o. female who presents today for evaluation of left ear pain and a rash in her groin. She has pressure in her left ear. It's not present constantly, but she noticed it especially when she hears loud noises. She has a rash on her left lower abdomen in the pannus. 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 Current Outpatient Medications:     nystatin (MYCOSTATIN) 965579 UNIT/GM powder, Apply 3 times daily. , Disp: 60 g, Rfl: 1    famotidine (PEPCID) 20 MG tablet, Take 20 mg by mouth 2 times daily, Disp: , Rfl:     celecoxib (CELEBREX) 400 MG capsule, Take 400 mg by mouth daily, Disp: , Rfl:     hydroCHLOROthiazide (HYDRODIURIL) 25 MG tablet, Take 25 mg by mouth daily, Disp: , Rfl:     traZODone (DESYREL) 50 MG tablet, Take 1-2 tabs nightly PRN insomnia., Disp: 60 tablet, Rfl: 5    traMADol (ULTRAM) 50 MG tablet, Take 50 mg by mouth every 6 hours as needed for Pain., Disp: , Rfl:     hydrOXYzine HCl (ATARAX) 50 MG tablet, TAKE 1 TABLET THREE TIMES DAILY AS NEEDED FOR ITCHING, Disp: 90 tablet, Rfl: 2    Cariprazine HCl (VRAYLAR) 6 MG CAPS capsule, Take 1 capsule by mouth daily, Disp: 90 capsule, Rfl: 1    Blood Glucose Monitoring Suppl (TRUE METRIX METER) w/Device KIT, Dispense 1 glucometer kit., Disp: 1 kit, Rfl: 0    blood glucose test strips (TRUE METRIX BLOOD GLUCOSE TEST) strip, 1 each by In Vitro route daily As needed. , Disp: 100 each, Rfl: 5    TRUEplus Lancets 33G MISC, Test daily and as needed for symptoms (max 4x/day). , Disp: 100 each, Rfl: 5    Nutritional Supplements (ESTROVEN PO), Take 1 tablet by mouth daily, Disp: , Rfl:     ferrous sulfate (FE TABS) 325 (65 Fe) MG EC tablet, Take 1 tablet by mouth 2 times daily, Disp: 180 tablet, Rfl: 1    medroxyPROGESTERone (PROVERA) 5 MG tablet, Take 1 tablet by mouth daily, Disp: 90 tablet, Rfl: 1    metoprolol tartrate (LOPRESSOR) 25 MG tablet, Take 1 tablet by mouth 2 times daily, Disp: 180 tablet, Rfl: 3    omeprazole (PRILOSEC) 40 MG delayed release capsule, Take 1 capsule by mouth Daily, Disp: 90 capsule, Rfl: 1    oxybutynin (DITROPAN) 5 MG tablet, Take 1 tablet by mouth 2 times daily, Disp: 180 tablet, Rfl: 1    simvastatin (ZOCOR) 40 MG tablet, Take 1 tablet by mouth daily, Disp: 90 tablet, Rfl: 1    hydrocortisone (HYTONE) 2.5 % lotion, , Disp: , Rfl:     amitriptyline (ELAVIL) 150 MG tablet, Take 1 tablet by mouth nightly, Disp: 30 tablet, Rfl: 0    QUEtiapine (SEROQUEL) 200 MG tablet, Take 1 tablet by mouth nightly, Disp: 30 tablet, Rfl: 1    Alcohol Swabs (ALCOHOL PREP) PADS, by Does not apply route, Disp: , Rfl:     fluticasone (FLONASE) 50 MCG/ACT nasal spray, 1 spray by Each Nostril route daily, Disp: 32 g, Rfl: 1    melatonin 3 MG TABS tablet, Take 10 mg by mouth nightly as needed, Disp: , Rfl:     docusate sodium (COLACE) 100 MG capsule, Take 100 mg by mouth 2 times daily, Disp: , Rfl:     LORazepam (ATIVAN) 2 MG tablet, Take 2 mg by mouth every 8 hours as needed. , Disp: , Rfl:     risperiDONE (RISPERDAL) 4 MG tablet, Take 4 mg by mouth 2 times daily, Disp: , Rfl:     naproxen (NAPROSYN) 375 MG tablet, Take 1 tablet by mouth 2 times daily as needed for Pain, Disp: 60 tablet, Rfl: 0    gabapentin (NEURONTIN) 300 MG capsule, Take 1 capsule by mouth nightly for 30 days. , Disp: 90 capsule, Rfl: 1    gabapentin (NEURONTIN) 100 MG capsule, Take 1 capsule by mouth daily for 30 days. , Disp: 90 capsule, Rfl: 1    traZODone (DESYREL) 150 MG tablet, Take 1 tablet by mouth nightly, Disp: 90 tablet, Rfl: 1    Allergies   Allergen Reactions    Msg Shortness Of Breath    Codeine Other (See Comments)    Cyclobenzaprine Other (See Comments)    Lithium Hives    Other Palpitations     msg       Subjective:      Review of Systems   Constitutional:  Negative for chills and fever. HENT:  Positive for ear pain. Negative for congestion and hearing loss. Respiratory:  Negative for cough and shortness of breath. Objective:     /64 (Site: Right Upper Arm, Position: Sitting, Cuff Size: Large Adult)   Pulse 66   Temp 97.3 °F (36.3 °C) (Temporal)   Resp 16   Wt 284 lb (128.8 kg)   LMP 02/23/2017 (Exact Date)   SpO2 97%   BMI 47.26 kg/m²     Physical Exam  Vitals and nursing note reviewed. Constitutional:       General: She is not in acute distress. Appearance: She is well-developed. HENT:      Head: Normocephalic and atraumatic. Right Ear: Tympanic membrane, ear canal and external ear normal.      Left Ear: Tympanic membrane, ear canal and external ear normal.      Nose: Nose normal.   Eyes:      Conjunctiva/sclera: Conjunctivae normal.   Cardiovascular:      Rate and Rhythm: Normal rate and regular rhythm. Heart sounds: Normal heart sounds. Pulmonary:      Effort: Pulmonary effort is normal. No respiratory distress. Breath sounds: Normal breath sounds. No wheezing. Abdominal:      General: Bowel sounds are normal. There is no distension. Palpations: Abdomen is soft. Tenderness: There is no abdominal tenderness. Musculoskeletal:      Cervical back: Normal range of motion and neck supple. Skin:     General: Skin is warm and dry. Findings: Rash (erythematous rash under abdominal pannus on left) present. Neurological:      Mental Status: She is alert and oriented to person, place, and time. Psychiatric:         Behavior: Behavior normal.       Assessment/Plan:     Donna was seen today for ear problem and rash. Diagnoses and all orders for this visit:    Dysfunction of left eustachian tube        -     Will treat with Flonase nasal spray which she has at home.     Yeast infection  -     nystatin (MYCOSTATIN) 828121 UNIT/GM powder; Apply 3 times daily. Return if symptoms worsen or fail to improve.     Electronically signed by Chris Marquez MD on 1/11/2023 at 9:28 AM

## 2023-01-11 ENCOUNTER — OFFICE VISIT (OUTPATIENT)
Dept: FAMILY MEDICINE CLINIC | Age: 53
End: 2023-01-11

## 2023-01-11 VITALS
BODY MASS INDEX: 47.26 KG/M2 | HEART RATE: 66 BPM | TEMPERATURE: 97.3 F | RESPIRATION RATE: 16 BRPM | SYSTOLIC BLOOD PRESSURE: 112 MMHG | WEIGHT: 284 LBS | OXYGEN SATURATION: 97 % | DIASTOLIC BLOOD PRESSURE: 64 MMHG

## 2023-01-11 DIAGNOSIS — B37.9 YEAST INFECTION: ICD-10-CM

## 2023-01-11 DIAGNOSIS — H69.82 DYSFUNCTION OF LEFT EUSTACHIAN TUBE: Primary | ICD-10-CM

## 2023-01-11 RX ORDER — NYSTATIN 100000 [USP'U]/G
POWDER TOPICAL
Qty: 60 G | Refills: 1 | Status: SHIPPED | OUTPATIENT
Start: 2023-01-11

## 2023-01-16 ENCOUNTER — TELEPHONE (OUTPATIENT)
Dept: ONCOLOGY | Age: 53
End: 2023-01-16

## 2023-01-16 NOTE — TELEPHONE ENCOUNTER
PT CALLED STATING SHE NEEDS APPT TO BE ON A Tuesday OR Thursday, GOT HER APPT RESCHEDULED TO Thursday February 9TH AT 3:30PM.

## 2023-01-18 ENCOUNTER — TELEPHONE (OUTPATIENT)
Dept: FAMILY MEDICINE CLINIC | Age: 53
End: 2023-01-18

## 2023-01-18 DIAGNOSIS — G47.19 EXCESSIVE DAYTIME SLEEPINESS: Primary | ICD-10-CM

## 2023-01-18 RX ORDER — VALACYCLOVIR HYDROCHLORIDE 1 G/1
1000 TABLET, FILM COATED ORAL 3 TIMES DAILY
Qty: 21 TABLET | Refills: 0 | Status: SHIPPED | OUTPATIENT
Start: 2023-01-18 | End: 2023-01-25

## 2023-01-18 NOTE — TELEPHONE ENCOUNTER
Refill pending. Pt states she needs it due to her lip being broken out again. She has an appt with a derm in march.

## 2023-01-23 DIAGNOSIS — K21.9 GASTROESOPHAGEAL REFLUX DISEASE, UNSPECIFIED WHETHER ESOPHAGITIS PRESENT: ICD-10-CM

## 2023-01-23 RX ORDER — OMEPRAZOLE 40 MG/1
40 CAPSULE, DELAYED RELEASE ORAL DAILY
Qty: 90 CAPSULE | Refills: 1 | Status: SHIPPED | OUTPATIENT
Start: 2023-01-23

## 2023-01-23 RX ORDER — TRAZODONE HYDROCHLORIDE 150 MG/1
150 TABLET ORAL NIGHTLY
Qty: 90 TABLET | Refills: 1 | Status: SHIPPED | OUTPATIENT
Start: 2023-01-23 | End: 2023-04-23

## 2023-01-30 ENCOUNTER — OFFICE VISIT (OUTPATIENT)
Dept: FAMILY MEDICINE CLINIC | Age: 53
End: 2023-01-30
Payer: MEDICARE

## 2023-01-30 VITALS
DIASTOLIC BLOOD PRESSURE: 70 MMHG | HEART RATE: 68 BPM | SYSTOLIC BLOOD PRESSURE: 124 MMHG | TEMPERATURE: 98.8 F | RESPIRATION RATE: 14 BRPM

## 2023-01-30 DIAGNOSIS — B96.89 ACUTE BACTERIAL SINUSITIS: Primary | ICD-10-CM

## 2023-01-30 DIAGNOSIS — R09.81 NASAL CONGESTION: ICD-10-CM

## 2023-01-30 DIAGNOSIS — J01.90 ACUTE BACTERIAL SINUSITIS: Primary | ICD-10-CM

## 2023-01-30 PROCEDURE — 87426 SARSCOV CORONAVIRUS AG IA: CPT | Performed by: FAMILY MEDICINE

## 2023-01-30 PROCEDURE — 3074F SYST BP LT 130 MM HG: CPT | Performed by: FAMILY MEDICINE

## 2023-01-30 PROCEDURE — 87804 INFLUENZA ASSAY W/OPTIC: CPT | Performed by: FAMILY MEDICINE

## 2023-01-30 PROCEDURE — G8482 FLU IMMUNIZE ORDER/ADMIN: HCPCS | Performed by: FAMILY MEDICINE

## 2023-01-30 PROCEDURE — G8427 DOCREV CUR MEDS BY ELIG CLIN: HCPCS | Performed by: FAMILY MEDICINE

## 2023-01-30 PROCEDURE — 3017F COLORECTAL CA SCREEN DOC REV: CPT | Performed by: FAMILY MEDICINE

## 2023-01-30 PROCEDURE — 3078F DIAST BP <80 MM HG: CPT | Performed by: FAMILY MEDICINE

## 2023-01-30 PROCEDURE — G8417 CALC BMI ABV UP PARAM F/U: HCPCS | Performed by: FAMILY MEDICINE

## 2023-01-30 PROCEDURE — 99213 OFFICE O/P EST LOW 20 MIN: CPT | Performed by: FAMILY MEDICINE

## 2023-01-30 PROCEDURE — 1036F TOBACCO NON-USER: CPT | Performed by: FAMILY MEDICINE

## 2023-01-30 RX ORDER — AMOXICILLIN 500 MG/1
500 CAPSULE ORAL 2 TIMES DAILY
Qty: 20 CAPSULE | Refills: 0 | Status: SHIPPED | OUTPATIENT
Start: 2023-01-30 | End: 2023-02-09

## 2023-01-30 RX ORDER — GUAIFENESIN 600 MG/1
600 TABLET, EXTENDED RELEASE ORAL 2 TIMES DAILY
Qty: 20 TABLET | Refills: 0 | Status: SHIPPED | OUTPATIENT
Start: 2023-01-30 | End: 2023-02-09

## 2023-01-30 ASSESSMENT — ENCOUNTER SYMPTOMS
COUGH: 1
SORE THROAT: 1
SHORTNESS OF BREATH: 0

## 2023-01-30 NOTE — PROGRESS NOTES
[address]  [address] [place] [address]  Phone:  [phone]          Name: Meena Seen  : 1970    Chief Complaint   Patient presents with    Pharyngitis     X 6 days       Congestion    Fever       HPI:     Meena Seen is a 46 y.o. female who presents today for evaluation of a cough, sore throat, and nasal congestion. She started with a sore throat last week and had a fever. No SOB or wheezing. She's been eating fine. Today her BG was 84 fasting and she felt low. Current Outpatient Medications:     amoxicillin (AMOXIL) 500 MG capsule, Take 1 capsule by mouth 2 times daily for 10 days, Disp: 20 capsule, Rfl: 0    guaiFENesin (MUCINEX) 600 MG extended release tablet, Take 1 tablet by mouth 2 times daily for 10 days, Disp: 20 tablet, Rfl: 0    omeprazole (PRILOSEC) 40 MG delayed release capsule, Take 1 capsule by mouth Daily, Disp: 90 capsule, Rfl: 1    traZODone (DESYREL) 150 MG tablet, Take 1 tablet by mouth nightly, Disp: 90 tablet, Rfl: 1    nystatin (MYCOSTATIN) 013650 UNIT/GM powder, Apply 3 times daily. , Disp: 60 g, Rfl: 1    famotidine (PEPCID) 20 MG tablet, Take 20 mg by mouth 2 times daily, Disp: , Rfl:     celecoxib (CELEBREX) 400 MG capsule, Take 400 mg by mouth daily, Disp: , Rfl:     hydroCHLOROthiazide (HYDRODIURIL) 25 MG tablet, Take 25 mg by mouth daily, Disp: , Rfl:     traZODone (DESYREL) 50 MG tablet, Take 1-2 tabs nightly PRN insomnia., Disp: 60 tablet, Rfl: 5    traMADol (ULTRAM) 50 MG tablet, Take 50 mg by mouth every 6 hours as needed for Pain., Disp: , Rfl:     hydrOXYzine HCl (ATARAX) 50 MG tablet, TAKE 1 TABLET THREE TIMES DAILY AS NEEDED FOR ITCHING, Disp: 90 tablet, Rfl: 2    Cariprazine HCl (VRAYLAR) 6 MG CAPS capsule, Take 1 capsule by mouth daily, Disp: 90 capsule, Rfl: 1    Blood Glucose Monitoring Suppl (TRUE METRIX METER) w/Device KIT, Dispense 1 glucometer kit., Disp: 1 kit, Rfl: 0    blood glucose test strips (TRUE METRIX BLOOD GLUCOSE TEST) strip, 1 each by In Vitro route daily As needed. , Disp: 100 each, Rfl: 5    TRUEplus Lancets 33G MISC, Test daily and as needed for symptoms (max 4x/day). , Disp: 100 each, Rfl: 5    Nutritional Supplements (ESTROVEN PO), Take 1 tablet by mouth daily, Disp: , Rfl:     ferrous sulfate (FE TABS) 325 (65 Fe) MG EC tablet, Take 1 tablet by mouth 2 times daily, Disp: 180 tablet, Rfl: 1    medroxyPROGESTERone (PROVERA) 5 MG tablet, Take 1 tablet by mouth daily, Disp: 90 tablet, Rfl: 1    metoprolol tartrate (LOPRESSOR) 25 MG tablet, Take 1 tablet by mouth 2 times daily, Disp: 180 tablet, Rfl: 3    oxybutynin (DITROPAN) 5 MG tablet, Take 1 tablet by mouth 2 times daily, Disp: 180 tablet, Rfl: 1    simvastatin (ZOCOR) 40 MG tablet, Take 1 tablet by mouth daily, Disp: 90 tablet, Rfl: 1    hydrocortisone (HYTONE) 2.5 % lotion, , Disp: , Rfl:     amitriptyline (ELAVIL) 150 MG tablet, Take 1 tablet by mouth nightly, Disp: 30 tablet, Rfl: 0    QUEtiapine (SEROQUEL) 200 MG tablet, Take 1 tablet by mouth nightly, Disp: 30 tablet, Rfl: 1    Alcohol Swabs (ALCOHOL PREP) PADS, by Does not apply route, Disp: , Rfl:     fluticasone (FLONASE) 50 MCG/ACT nasal spray, 1 spray by Each Nostril route daily, Disp: 32 g, Rfl: 1    melatonin 3 MG TABS tablet, Take 10 mg by mouth nightly as needed, Disp: , Rfl:     docusate sodium (COLACE) 100 MG capsule, Take 100 mg by mouth 2 times daily, Disp: , Rfl:     LORazepam (ATIVAN) 2 MG tablet, Take 2 mg by mouth every 8 hours as needed. , Disp: , Rfl:     risperiDONE (RISPERDAL) 4 MG tablet, Take 4 mg by mouth 2 times daily, Disp: , Rfl:     naproxen (NAPROSYN) 375 MG tablet, Take 1 tablet by mouth 2 times daily as needed for Pain, Disp: 60 tablet, Rfl: 0    gabapentin (NEURONTIN) 300 MG capsule, Take 1 capsule by mouth nightly for 30 days. , Disp: 90 capsule, Rfl: 1    gabapentin (NEURONTIN) 100 MG capsule, Take 1 capsule by mouth daily for 30 days. , Disp: 90 capsule, Rfl: 1    Allergies Allergen Reactions    Msg Shortness Of Breath    Codeine Other (See Comments)    Cyclobenzaprine Other (See Comments)    Lithium Hives    Other Palpitations     msg       Subjective:      Review of Systems   Constitutional:  Positive for activity change, appetite change, chills, fatigue and fever. HENT:  Positive for congestion and sore throat. Respiratory:  Positive for cough. Negative for shortness of breath. Objective:     /70 (Site: Left Upper Arm, Position: Sitting, Cuff Size: Large Adult)   Pulse 68   Temp 98.8 °F (37.1 °C) (Temporal)   Resp 14   LMP 02/23/2017 (Exact Date)     Physical Exam  Vitals and nursing note reviewed. Constitutional:       General: She is not in acute distress. Appearance: She is well-developed. HENT:      Head: Normocephalic and atraumatic. Right Ear: Tympanic membrane, ear canal and external ear normal.      Left Ear: Tympanic membrane, ear canal and external ear normal.      Nose: Congestion present. Mouth/Throat:      Pharynx: Posterior oropharyngeal erythema present. No oropharyngeal exudate. Eyes:      Conjunctiva/sclera: Conjunctivae normal.   Cardiovascular:      Rate and Rhythm: Normal rate and regular rhythm. Heart sounds: Normal heart sounds. Pulmonary:      Effort: Pulmonary effort is normal. No respiratory distress. Breath sounds: Normal breath sounds. No wheezing. Abdominal:      General: Bowel sounds are normal. There is no distension. Palpations: Abdomen is soft. Tenderness: There is no abdominal tenderness. Musculoskeletal:      Cervical back: Normal range of motion and neck supple. Skin:     General: Skin is warm and dry. Neurological:      Mental Status: She is alert and oriented to person, place, and time. Psychiatric:         Behavior: Behavior normal.       Assessment/Plan:     Donna was seen today for pharyngitis, congestion and fever.     Diagnoses and all orders for this visit:    Acute bacterial sinusitis  -     amoxicillin (AMOXIL) 500 MG capsule; Take 1 capsule by mouth 2 times daily for 10 days  -     guaiFENesin (MUCINEX) 600 MG extended release tablet; Take 1 tablet by mouth 2 times daily for 10 days  -     POCT COVID-19, Antigen--NEGATIVE  -     POCT Influenza A/B--NEGATIVE      Return if symptoms worsen or fail to improve.     Electronically signed by Lamont White MD on 1/30/2023 at 2:36 PM

## 2023-02-01 ENCOUNTER — TELEPHONE (OUTPATIENT)
Dept: FAMILY MEDICINE CLINIC | Age: 53
End: 2023-02-01

## 2023-02-01 DIAGNOSIS — N39.3 STRESS INCONTINENCE (FEMALE) (MALE): Primary | ICD-10-CM

## 2023-02-01 NOTE — TELEPHONE ENCOUNTER
Patient called stating she has been taking the Oxybutin TID and the stress incontinence is not better at night time. Please advise.

## 2023-02-02 ENCOUNTER — TELEPHONE (OUTPATIENT)
Dept: FAMILY MEDICINE CLINIC | Age: 53
End: 2023-02-02

## 2023-02-02 NOTE — TELEPHONE ENCOUNTER
Pt called stating that her voice is still hoarse. She is still congested and her nasal discharge is green and she is tired. She stated she was told to call if her sx's did not improve.

## 2023-02-09 NOTE — PROGRESS NOTES
[address]  [address] [place] [address]  Phone:  [phone]          Name: Mani Agosto  : 1970    Chief Complaint   Patient presents with    Follow-up       HPI:     Mani Agosto is a 46 y.o. female who presents today for evaluation of vomiting and congestion. She reports that on  she woke up vomiting. She worked over the weekend and  when she came home from work she was tired and congested. She woke up yesterday congested and full in the head. She had a cough that was not productive. She worked last night and wore her mask and the steam from it seemed to help her congestion. Today she's still hoarse and her throat is still sore. No fevers. She had chills . She's had a dry lesion on her right lower lip for the past few years and would like to see dermatology given her history of skin cancer. Current Outpatient Medications:     brompheniramine-pseudoephedrine-DM (BROMFED DM) 2-30-10 MG/5ML syrup, Take 5 mLs by mouth 4 times daily as needed for Congestion, Disp: 140 mL, Rfl: 0    omeprazole (PRILOSEC) 40 MG delayed release capsule, Take 1 capsule by mouth Daily, Disp: 90 capsule, Rfl: 1    traZODone (DESYREL) 150 MG tablet, Take 1 tablet by mouth nightly, Disp: 90 tablet, Rfl: 1    nystatin (MYCOSTATIN) 804110 UNIT/GM powder, Apply 3 times daily. , Disp: 60 g, Rfl: 1    famotidine (PEPCID) 20 MG tablet, Take 20 mg by mouth 2 times daily, Disp: , Rfl:     celecoxib (CELEBREX) 400 MG capsule, Take 400 mg by mouth daily, Disp: , Rfl:     hydroCHLOROthiazide (HYDRODIURIL) 25 MG tablet, Take 25 mg by mouth daily, Disp: , Rfl:     traZODone (DESYREL) 50 MG tablet, Take 1-2 tabs nightly PRN insomnia., Disp: 60 tablet, Rfl: 5    traMADol (ULTRAM) 50 MG tablet, Take 50 mg by mouth every 6 hours as needed for Pain., Disp: , Rfl:     hydrOXYzine HCl (ATARAX) 50 MG tablet, TAKE 1 TABLET THREE TIMES DAILY AS NEEDED FOR ITCHING, Disp: 90 tablet, Rfl: 2 Cariprazine HCl (VRAYLAR) 6 MG CAPS capsule, Take 1 capsule by mouth daily, Disp: 90 capsule, Rfl: 1    Blood Glucose Monitoring Suppl (TRUE METRIX METER) w/Device KIT, Dispense 1 glucometer kit., Disp: 1 kit, Rfl: 0    blood glucose test strips (TRUE METRIX BLOOD GLUCOSE TEST) strip, 1 each by In Vitro route daily As needed. , Disp: 100 each, Rfl: 5    TRUEplus Lancets 33G MISC, Test daily and as needed for symptoms (max 4x/day). , Disp: 100 each, Rfl: 5    Nutritional Supplements (ESTROVEN PO), Take 1 tablet by mouth daily, Disp: , Rfl:     ferrous sulfate (FE TABS) 325 (65 Fe) MG EC tablet, Take 1 tablet by mouth 2 times daily, Disp: 180 tablet, Rfl: 1    medroxyPROGESTERone (PROVERA) 5 MG tablet, Take 1 tablet by mouth daily, Disp: 90 tablet, Rfl: 1    metoprolol tartrate (LOPRESSOR) 25 MG tablet, Take 1 tablet by mouth 2 times daily, Disp: 180 tablet, Rfl: 3    oxybutynin (DITROPAN) 5 MG tablet, Take 1 tablet by mouth 2 times daily, Disp: 180 tablet, Rfl: 1    simvastatin (ZOCOR) 40 MG tablet, Take 1 tablet by mouth daily, Disp: 90 tablet, Rfl: 1    hydrocortisone (HYTONE) 2.5 % lotion, , Disp: , Rfl:     amitriptyline (ELAVIL) 150 MG tablet, Take 1 tablet by mouth nightly, Disp: 30 tablet, Rfl: 0    QUEtiapine (SEROQUEL) 200 MG tablet, Take 1 tablet by mouth nightly, Disp: 30 tablet, Rfl: 1    Alcohol Swabs (ALCOHOL PREP) PADS, by Does not apply route, Disp: , Rfl:     fluticasone (FLONASE) 50 MCG/ACT nasal spray, 1 spray by Each Nostril route daily, Disp: 32 g, Rfl: 1    melatonin 3 MG TABS tablet, Take 10 mg by mouth nightly as needed, Disp: , Rfl:     docusate sodium (COLACE) 100 MG capsule, Take 100 mg by mouth 2 times daily, Disp: , Rfl:     LORazepam (ATIVAN) 2 MG tablet, Take 2 mg by mouth every 8 hours as needed.  , Disp: , Rfl:     risperiDONE (RISPERDAL) 4 MG tablet, Take 4 mg by mouth 2 times daily, Disp: , Rfl:     naproxen (NAPROSYN) 375 MG tablet, Take 1 tablet by mouth 2 times daily as needed for Pain, Disp: 60 tablet, Rfl: 0    gabapentin (NEURONTIN) 300 MG capsule, Take 1 capsule by mouth nightly for 30 days. , Disp: 90 capsule, Rfl: 1    gabapentin (NEURONTIN) 100 MG capsule, Take 1 capsule by mouth daily for 30 days. , Disp: 90 capsule, Rfl: 1    Allergies   Allergen Reactions    Msg Shortness Of Breath    Codeine Other (See Comments)    Cyclobenzaprine Other (See Comments)    Lithium Hives    Other Palpitations     msg       Subjective:      Review of Systems   Constitutional:  Negative for chills and fever. HENT:  Positive for congestion and sinus pressure. Respiratory:  Positive for cough. Gastrointestinal:  Positive for vomiting. Objective:     /74 (Site: Right Upper Arm, Position: Sitting, Cuff Size: Large Adult)   Pulse 67   Temp 97 °F (36.1 °C) (Temporal)   Resp 16   Ht 5' 5\" (1.651 m)   Wt 285 lb (129.3 kg)   LMP 02/23/2017 (Exact Date)   SpO2 99%   BMI 47.43 kg/m²     Physical Exam  Vitals and nursing note reviewed. Constitutional:       General: She is not in acute distress. Appearance: She is well-developed. HENT:      Head: Normocephalic and atraumatic. Nose: Nose normal.   Eyes:      Conjunctiva/sclera: Conjunctivae normal.   Cardiovascular:      Rate and Rhythm: Normal rate and regular rhythm. Heart sounds: Normal heart sounds. Pulmonary:      Effort: Pulmonary effort is normal. No respiratory distress. Breath sounds: Normal breath sounds. No wheezing. Abdominal:      General: Bowel sounds are normal. There is no distension. Palpations: Abdomen is soft. Tenderness: There is no abdominal tenderness. Musculoskeletal:      Cervical back: Normal range of motion and neck supple. Skin:     General: Skin is warm and dry. Neurological:      Mental Status: She is alert and oriented to person, place, and time. Psychiatric:         Behavior: Behavior normal.       Assessment/Plan:     Donna was seen today for follow-up.     Diagnoses and all orders for this visit:    Viral URI  -     brompheniramine-pseudoephedrine-DM (BROMFED DM) 2-30-10 MG/5ML syrup; Take 5 mLs by mouth 4 times daily as needed for Congestion    Lesion of lip  -     AFL - Leandro Ott MD, Dermatology, [facility]      Return if symptoms worsen or fail to improve.     Electronically signed by Leonardo Carter MD on 2/21/2023 at 1:27 PM

## 2023-02-20 ENCOUNTER — TELEPHONE (OUTPATIENT)
Dept: FAMILY MEDICINE CLINIC | Age: 53
End: 2023-02-20

## 2023-02-20 NOTE — TELEPHONE ENCOUNTER
Patient called requesting an appt-no openings today. Patient c/o sinus congestion x 1 week. Patient states she has been taking mucinex with no relief. Patient c/o sore throat, hot and cold chills yesterday.     Please advise

## 2023-02-21 ENCOUNTER — OFFICE VISIT (OUTPATIENT)
Dept: FAMILY MEDICINE CLINIC | Age: 53
End: 2023-02-21
Payer: MEDICARE

## 2023-02-21 VITALS
HEIGHT: 65 IN | TEMPERATURE: 97 F | WEIGHT: 285 LBS | DIASTOLIC BLOOD PRESSURE: 74 MMHG | BODY MASS INDEX: 47.48 KG/M2 | OXYGEN SATURATION: 99 % | RESPIRATION RATE: 16 BRPM | SYSTOLIC BLOOD PRESSURE: 124 MMHG | HEART RATE: 67 BPM

## 2023-02-21 DIAGNOSIS — K13.0 LESION OF LIP: ICD-10-CM

## 2023-02-21 DIAGNOSIS — J06.9 VIRAL URI: Primary | ICD-10-CM

## 2023-02-21 PROCEDURE — G8482 FLU IMMUNIZE ORDER/ADMIN: HCPCS | Performed by: FAMILY MEDICINE

## 2023-02-21 PROCEDURE — 99214 OFFICE O/P EST MOD 30 MIN: CPT | Performed by: FAMILY MEDICINE

## 2023-02-21 PROCEDURE — 3017F COLORECTAL CA SCREEN DOC REV: CPT | Performed by: FAMILY MEDICINE

## 2023-02-21 PROCEDURE — G8427 DOCREV CUR MEDS BY ELIG CLIN: HCPCS | Performed by: FAMILY MEDICINE

## 2023-02-21 PROCEDURE — G8417 CALC BMI ABV UP PARAM F/U: HCPCS | Performed by: FAMILY MEDICINE

## 2023-02-21 PROCEDURE — 1036F TOBACCO NON-USER: CPT | Performed by: FAMILY MEDICINE

## 2023-02-21 PROCEDURE — 3074F SYST BP LT 130 MM HG: CPT | Performed by: FAMILY MEDICINE

## 2023-02-21 PROCEDURE — 3078F DIAST BP <80 MM HG: CPT | Performed by: FAMILY MEDICINE

## 2023-02-21 RX ORDER — BROMPHENIRAMINE MALEATE, PSEUDOEPHEDRINE HYDROCHLORIDE, AND DEXTROMETHORPHAN HYDROBROMIDE 2; 30; 10 MG/5ML; MG/5ML; MG/5ML
5 SYRUP ORAL 4 TIMES DAILY PRN
Qty: 140 ML | Refills: 0 | Status: SHIPPED | OUTPATIENT
Start: 2023-02-21

## 2023-02-21 SDOH — ECONOMIC STABILITY: FOOD INSECURITY: WITHIN THE PAST 12 MONTHS, THE FOOD YOU BOUGHT JUST DIDN'T LAST AND YOU DIDN'T HAVE MONEY TO GET MORE.: NEVER TRUE

## 2023-02-21 SDOH — ECONOMIC STABILITY: FOOD INSECURITY: WITHIN THE PAST 12 MONTHS, YOU WORRIED THAT YOUR FOOD WOULD RUN OUT BEFORE YOU GOT MONEY TO BUY MORE.: NEVER TRUE

## 2023-02-21 SDOH — ECONOMIC STABILITY: INCOME INSECURITY: HOW HARD IS IT FOR YOU TO PAY FOR THE VERY BASICS LIKE FOOD, HOUSING, MEDICAL CARE, AND HEATING?: NOT HARD AT ALL

## 2023-02-21 SDOH — ECONOMIC STABILITY: HOUSING INSECURITY
IN THE LAST 12 MONTHS, WAS THERE A TIME WHEN YOU DID NOT HAVE A STEADY PLACE TO SLEEP OR SLEPT IN A SHELTER (INCLUDING NOW)?: NO

## 2023-02-21 ASSESSMENT — ENCOUNTER SYMPTOMS
VOMITING: 1
SINUS PRESSURE: 1
COUGH: 1

## 2023-02-27 DIAGNOSIS — E78.00 PURE HYPERCHOLESTEROLEMIA: ICD-10-CM

## 2023-02-27 RX ORDER — GABAPENTIN 100 MG/1
100 CAPSULE ORAL DAILY
Qty: 90 CAPSULE | Refills: 1 | Status: SHIPPED | OUTPATIENT
Start: 2023-02-27 | End: 2023-03-29

## 2023-02-27 RX ORDER — GABAPENTIN 300 MG/1
300 CAPSULE ORAL NIGHTLY
Qty: 90 CAPSULE | Refills: 1 | Status: SHIPPED | OUTPATIENT
Start: 2023-02-27 | End: 2023-03-29

## 2023-02-27 RX ORDER — ESTRADIOL 1 MG/1
1 TABLET ORAL DAILY
COMMUNITY
End: 2023-02-28 | Stop reason: SDUPTHER

## 2023-02-27 RX ORDER — HYDROXYZINE 50 MG/1
TABLET, FILM COATED ORAL
Qty: 90 TABLET | Refills: 2 | Status: SHIPPED | OUTPATIENT
Start: 2023-02-27

## 2023-02-27 RX ORDER — SIMVASTATIN 40 MG
40 TABLET ORAL DAILY
Qty: 90 TABLET | Refills: 1 | Status: SHIPPED | OUTPATIENT
Start: 2023-02-27

## 2023-02-27 RX ORDER — ESTRADIOL 1 MG/1
1 TABLET ORAL DAILY
Qty: 21 TABLET | OUTPATIENT
Start: 2023-02-27

## 2023-02-27 NOTE — TELEPHONE ENCOUNTER
----- Message from Juliettegreta Dawn sent at 2/27/2023 10:45 AM EST -----  Subject: Refill Request    QUESTIONS  Name of Medication? Other - estradiol 1 mg tablet  Patient-reported dosage and instructions? 1 tablet once a day  How many days do you have left? 14  Preferred Pharmacy? [facility] ([address] )  Pharmacy phone number (if available)? [phone]  Additional Information for Provider? 90 day refill. Did not find this on   her list but she said she had them filled by provider in [place] and needs   more of them and they should be on her list.  ---------------------------------------------------------------------------  --------------  [address]  What is the best way for the office to contact you? OK to leave message on   voicemail  Preferred Call Back Phone Number? [phone]  ---------------------------------------------------------------------------  --------------  SCRIPT ANSWERS  Relationship to Patient?  Self

## 2023-02-27 NOTE — TELEPHONE ENCOUNTER
Refills pending. They're are also requests for estradiol tabs and famotidine tabs.  These medications are not on her med list and strengths were not specified on the request.

## 2023-02-28 ENCOUNTER — TELEMEDICINE (OUTPATIENT)
Dept: FAMILY MEDICINE CLINIC | Age: 53
End: 2023-02-28
Payer: MEDICARE

## 2023-02-28 ENCOUNTER — TELEPHONE (OUTPATIENT)
Dept: FAMILY MEDICINE CLINIC | Age: 53
End: 2023-02-28

## 2023-02-28 DIAGNOSIS — B96.89 ACUTE BACTERIAL SINUSITIS: Primary | ICD-10-CM

## 2023-02-28 DIAGNOSIS — J01.90 ACUTE BACTERIAL SINUSITIS: Primary | ICD-10-CM

## 2023-02-28 PROCEDURE — G8427 DOCREV CUR MEDS BY ELIG CLIN: HCPCS | Performed by: FAMILY MEDICINE

## 2023-02-28 PROCEDURE — 99213 OFFICE O/P EST LOW 20 MIN: CPT | Performed by: FAMILY MEDICINE

## 2023-02-28 PROCEDURE — 3017F COLORECTAL CA SCREEN DOC REV: CPT | Performed by: FAMILY MEDICINE

## 2023-02-28 RX ORDER — DOXYCYCLINE HYCLATE 100 MG
100 TABLET ORAL 2 TIMES DAILY
Qty: 20 TABLET | Refills: 0 | Status: SHIPPED | OUTPATIENT
Start: 2023-02-28 | End: 2023-03-10

## 2023-02-28 RX ORDER — ESTRADIOL 1 MG/1
1 TABLET ORAL DAILY
Qty: 21 TABLET | Refills: 5 | Status: SHIPPED | OUTPATIENT
Start: 2023-02-28

## 2023-02-28 ASSESSMENT — ENCOUNTER SYMPTOMS
SHORTNESS OF BREATH: 1
SINUS PAIN: 1
WHEEZING: 0
SORE THROAT: 1
COUGH: 1
SINUS PRESSURE: 1

## 2023-02-28 NOTE — PROGRESS NOTES
[address]  [address] [place] [address]  Phone:  [phone]       2023    TELEHEALTH EVALUATION -- Audio/Visual (During ENURC-58 public health emergency)    HPI:    Zhang Doan (:  1970) has requested an audio/video evaluation for the following concern(s):  cough, SOB    Donna has been coughing and congested for over 1 week. Her cough is dry but making her SOB. Her pulse ox is 96% and her HR is 69. Her throat is sore. No fevers. Review of Systems   Constitutional:  Positive for activity change and appetite change. HENT:  Positive for congestion, sinus pressure, sinus pain and sore throat. Respiratory:  Positive for cough and shortness of breath. Negative for wheezing. Prior to Visit Medications    Medication Sig Taking? Authorizing Provider   estradiol (ESTRACE) 1 MG tablet Take 1 tablet by mouth daily Yes Donna Rausch MD   doxycycline hyclate (VIBRA-TABS) 100 MG tablet Take 1 tablet by mouth 2 times daily for 10 days Yes Donna Rausch MD   hydrOXYzine HCl (ATARAX) 50 MG tablet TAKE 1 TABLET THREE TIMES DAILY AS NEEDED FOR ITCHING  Donna Rausch MD   gabapentin (NEURONTIN) 100 MG capsule Take 1 capsule by mouth daily for 30 days. Donna Rausch MD   gabapentin (NEURONTIN) 300 MG capsule Take 1 capsule by mouth nightly for 30 days. Donna Rausch MD   simvastatin (ZOCOR) 40 MG tablet Take 1 tablet by mouth daily  Donna Rausch MD   brompheniramine-pseudoephedrine-DM (BROMFED DM) 2-30-10 MG/5ML syrup Take 5 mLs by mouth 4 times daily as needed for Congestion  Donna Rausch MD   omeprazole (PRILOSEC) 40 MG delayed release capsule Take 1 capsule by mouth Daily  Donna Rausch MD   traZODone (DESYREL) 150 MG tablet Take 1 tablet by mouth nightly  Donna Rausch MD   nystatin (MYCOSTATIN) 586659 UNIT/GM powder Apply 3 times daily.   Donna aRusch MD   famotidine (PEPCID) 20 MG tablet Take 20 mg by mouth 2 times daily  Historical Provider, MD   celecoxib (CELEBREX) 400 MG capsule Take 400 mg by mouth daily  Historical Provider, MD   hydroCHLOROthiazide (HYDRODIURIL) 25 MG tablet Take 25 mg by mouth daily  Historical Provider, MD   traZODone (DESYREL) 50 MG tablet Take 1-2 tabs nightly PRN insomnia. Fausto Acevedo MD   traMADol (ULTRAM) 50 MG tablet Take 50 mg by mouth every 6 hours as needed for Pain. Historical Provider, MD   Cariprazine HCl (VRAYLAR) 6 MG CAPS capsule Take 1 capsule by mouth daily  Fausto Acevedo MD   naproxen (NAPROSYN) 375 MG tablet Take 1 tablet by mouth 2 times daily as needed for Pain  EFRAÍN Patiño CNP   Blood Glucose Monitoring Suppl (TRUE METRIX METER) w/Device KIT Dispense 1 glucometer kit. Fausto Acevedo MD   blood glucose test strips (TRUE METRIX BLOOD GLUCOSE TEST) strip 1 each by In Vitro route daily As needed. Fausto Acevedo MD   TRUEplus Lancets 33G MISC Test daily and as needed for symptoms (max 4x/day).   Fausto Acevedo MD   Nutritional Supplements (ESTROVEN PO) Take 1 tablet by mouth daily  Historical Provider, MD   ferrous sulfate (FE TABS) 325 (65 Fe) MG EC tablet Take 1 tablet by mouth 2 times daily  Fausto Acevedo MD   medroxyPROGESTERone (PROVERA) 5 MG tablet Take 1 tablet by mouth daily  Fausto Acevedo MD   metoprolol tartrate (LOPRESSOR) 25 MG tablet Take 1 tablet by mouth 2 times daily  Fausto Acevedo MD   oxybutynin (DITROPAN) 5 MG tablet Take 1 tablet by mouth 2 times daily  Fausto Acevedo MD   hydrocortisone (HYTONE) 2.5 % lotion   Historical Provider, MD   amitriptyline (ELAVIL) 150 MG tablet Take 1 tablet by mouth nightly  Cindi Song MD   QUEtiapine (SEROQUEL) 200 MG tablet Take 1 tablet by mouth nightly  EFRAÍN Reynolds CNP   Alcohol Swabs (ALCOHOL PREP) PADS by Does not apply route  Historical Provider, MD   fluticasone (FLONASE) 50 MCG/ACT nasal spray 1 spray by Each Nostril route daily  EFRAÍN Silva - CNP   melatonin 3 MG TABS tablet Take 10 mg by mouth nightly as needed  Historical Provider, MD   docusate sodium (COLACE) 100 MG capsule Take 100 mg by mouth 2 times daily  Historical Provider, MD   LORazepam (ATIVAN) 2 MG tablet Take 2 mg by mouth every 8 hours as needed. Historical Provider, MD   risperiDONE (RISPERDAL) 4 MG tablet Take 4 mg by mouth 2 times daily  Historical Provider, MD       Social History     Tobacco Use    Smoking status: Never    Smokeless tobacco: Never   Substance Use Topics    Alcohol use: Not Currently    Drug use: No        Allergies   Allergen Reactions    Msg Shortness Of Breath    Codeine Other (See Comments)    Cyclobenzaprine Other (See Comments)    Lithium Hives    Other Palpitations     msg   ,   Past Medical History:   Diagnosis Date    Anxiety     Arthritis     Asthma     Blood clotting disorder ([facility]     2017, 2021    Depression     Flexural eczema     GERD (gastroesophageal reflux disease)     Hyperlipidemia     Osteoarthritis     Paranoid schizophrenia ([facility] [address] [facility]        PHYSICAL EXAMINATION:    Constitutional: [x] Appears well-developed and well-nourished [x] No apparent distress      [] Abnormal-   Mental status  [x] Alert and awake  [] Oriented to person/place/time []Able to follow commands      Eyes:  EOM    [x]  Normal  [] Abnormal-  Sclera  [x]  Normal  [] Abnormal -         Discharge [x]  None visible  [] Abnormal -    HENT:   [x] Normocephalic, atraumatic.   [] Abnormal   [x] Mouth/Throat: Mucous membranes are moist.     External Ears [] Normal  [] Abnormal-     Neck: [] No visualized mass     Pulmonary/Chest: [x] Respiratory effort normal.  [x] No visualized signs of difficulty breathing or respiratory distress        [] Abnormal-      Musculoskeletal:   [] Normal gait with no signs of ataxia         [] Normal range of motion of neck        [] Abnormal-       Neurological:        [] No Facial Asymmetry (Cranial nerve 7 motor function) (limited exam to video visit)          [] No gaze palsy        [] Abnormal-         Skin:        [] No significant exanthematous lesions or discoloration noted on facial skin         [] Abnormal-            Psychiatric:       [] Normal Affect [] No Hallucinations        [] Abnormal-       ASSESSMENT/PLAN:  1. Acute bacterial sinusitis  - She's had cough and congestion for more than 1 week that are not improving so will start on antibiotics and rest, increase fluids, and use OTC medications as needed.  - doxycycline hyclate (VIBRA-TABS) 100 MG tablet; Take 1 tablet by mouth 2 times daily for 10 days  Dispense: 20 tablet; Refill: 0    Return if symptoms worsen or fail to improve.    Donna Waters is a 52 y.o. female being evaluated by a Virtual Visit (video visit) encounter to address concerns as mentioned above.  A caregiver was present when appropriate. Due to this being a TeleHealth encounter (During COVID-19 public health emergency), evaluation of the following organ systems was limited: Vitals/Constitutional/EENT/Resp/CV/GI//MS/Neuro/Skin/Heme-Lymph-Imm.  Pursuant to the emergency declaration under the Lind Act and the National Emergencies Act, 1135 waiver authority and the Coronavirus Preparedness and Response Supplemental Appropriations Act, this Virtual Visit was conducted with patient's (and/or legal guardian's) consent, to reduce the patient's risk of exposure to COVID-19 and provide necessary medical care.  The patient (and/or legal guardian) has also been advised to contact this office for worsening conditions or problems, and seek emergency medical treatment and/or call 911 if deemed necessary.     Patient identification was verified at the start of the visit: Yes    Services were provided through a video synchronous discussion virtually to substitute for in-person clinic visit. Patient and provider were located at their individual homes.    --Flavia Rebolledo MD on 2/28/2023 at 2:18  PM    An electronic signature was used to authenticate this note.

## 2023-02-28 NOTE — TELEPHONE ENCOUNTER
Pt voiced she is having so much sinus congestion she can breath and is having SOB. Pulse ox is 97. She is taking the cough syrup.

## 2023-03-01 ENCOUNTER — HOSPITAL ENCOUNTER (OUTPATIENT)
Age: 53
Discharge: HOME | End: 2023-03-01
Payer: MEDICARE

## 2023-03-01 ENCOUNTER — TELEPHONE (OUTPATIENT)
Dept: FAMILY MEDICINE CLINIC | Age: 53
End: 2023-03-01

## 2023-03-01 DIAGNOSIS — J11.1 INFLUENZA: Primary | ICD-10-CM

## 2023-03-01 DIAGNOSIS — R05.1 ACUTE COUGH: ICD-10-CM

## 2023-03-01 LAB
FLUAV RNA RESP QL NAA+PROBE: NOT DETECTED
FLUBV RNA RESP QL NAA+PROBE: NOT DETECTED
SARS-COV-2 RNA RESP QL NAA+PROBE: NOT DETECTED

## 2023-03-01 PROCEDURE — 87636 SARSCOV2 & INF A&B AMP PRB: CPT

## 2023-03-04 DIAGNOSIS — K21.9 GASTROESOPHAGEAL REFLUX DISEASE, UNSPECIFIED WHETHER ESOPHAGITIS PRESENT: ICD-10-CM

## 2023-03-06 RX ORDER — OMEPRAZOLE 40 MG/1
40 CAPSULE, DELAYED RELEASE ORAL DAILY
Qty: 100 CAPSULE | Refills: 2 | Status: SHIPPED | OUTPATIENT
Start: 2023-03-06

## 2023-03-09 ENCOUNTER — TELEPHONE (OUTPATIENT)
Dept: ONCOLOGY | Age: 53
End: 2023-03-09

## 2023-03-09 NOTE — TELEPHONE ENCOUNTER
Pt calling the office stating she would like to have you send in a script for Eliquis 2.5 mg BID so she can see how much the cost is. She is scheduled to see Dr. Ramirez 3/17. She is worried she will not be able to afford the medication.

## 2023-03-10 RX ORDER — FAMOTIDINE 20 MG/1
20 TABLET, FILM COATED ORAL 2 TIMES DAILY
Qty: 90 TABLET | Refills: 1 | Status: SHIPPED | OUTPATIENT
Start: 2023-03-10

## 2023-03-10 NOTE — TELEPHONE ENCOUNTER
Spoke with patient. She has been taking the pills daily. She will talk to Dr Millie Anderson at her appt 3/17.

## 2023-03-13 ENCOUNTER — HOSPITAL ENCOUNTER (EMERGENCY)
Age: 53
Discharge: HOME | End: 2023-03-13
Attending: EMERGENCY MEDICINE
Payer: MEDICARE

## 2023-03-13 VITALS
SYSTOLIC BLOOD PRESSURE: 115 MMHG | OXYGEN SATURATION: 98 % | DIASTOLIC BLOOD PRESSURE: 55 MMHG | TEMPERATURE: 97.2 F | RESPIRATION RATE: 16 BRPM | HEART RATE: 101 BPM | WEIGHT: 280 LBS | HEIGHT: 65 IN | BODY MASS INDEX: 46.65 KG/M2

## 2023-03-13 DIAGNOSIS — J40 BRONCHITIS: Primary | ICD-10-CM

## 2023-03-13 PROCEDURE — 6370000000 HC RX 637 (ALT 250 FOR IP): Performed by: EMERGENCY MEDICINE

## 2023-03-13 PROCEDURE — 99283 EMERGENCY DEPT VISIT LOW MDM: CPT | Performed by: EMERGENCY MEDICINE

## 2023-03-13 RX ORDER — AZITHROMYCIN 250 MG/1
TABLET, FILM COATED ORAL
Qty: 4 TABLET | Refills: 0 | Status: SHIPPED | OUTPATIENT
Start: 2023-03-13 | End: 2023-03-17

## 2023-03-13 RX ORDER — BENZONATATE 100 MG/1
200 CAPSULE ORAL ONCE
Status: COMPLETED | OUTPATIENT
Start: 2023-03-13 | End: 2023-03-13

## 2023-03-13 RX ORDER — AZITHROMYCIN 250 MG/1
500 TABLET, FILM COATED ORAL ONCE
Status: COMPLETED | OUTPATIENT
Start: 2023-03-13 | End: 2023-03-13

## 2023-03-13 RX ORDER — BENZONATATE 200 MG/1
200 CAPSULE ORAL 3 TIMES DAILY PRN
Qty: 30 CAPSULE | Refills: 0 | Status: SHIPPED | OUTPATIENT
Start: 2023-03-13 | End: 2023-03-23

## 2023-03-13 RX ADMIN — AZITHROMYCIN MONOHYDRATE 500 MG: 250 TABLET ORAL at 21:07

## 2023-03-13 RX ADMIN — BENZONATATE 200 MG: 100 CAPSULE ORAL at 21:07

## 2023-03-13 NOTE — Clinical Note
Terry Gentile was seen and treated in our emergency department on 3/13/2023. She may return to work on 03/15/2023. If you have any questions or concerns, please don't hesitate to call.       Rika Bailey MD

## 2023-03-14 ASSESSMENT — ENCOUNTER SYMPTOMS
SORE THROAT: 0
NAUSEA: 0
ABDOMINAL PAIN: 0
COUGH: 1

## 2023-03-14 NOTE — ED NOTES
Discharge teaching and instructions for condition explained to patient. AVS reviewed. Went over prescriptions with patient. Patient voiced understanding regarding prescriptions, follow up appointments and care of self at home. Pt discharged to home in stable condition per 's care.      Marvin French RN  03/13/23 [id number]

## 2023-03-14 NOTE — ED NOTES
Pt presents to the front window with complaints of more shortness of breath than usual. Has been feeling more SOB for the past few days and wants to make sure she is okay.       Michael Reeder RN  03/13/23 2053

## 2023-03-14 NOTE — ED PROVIDER NOTES
[facility]  eMERGENCY dEPARTMENT eNCOUnter             [address] COMPLAINT    Chief Complaint   Patient presents with    Shortness of Breath     For the past few days       Nurses Notes reviewed and I agree except as noted in the HPI. HPI    Meena Seen is a 46 y.o. female who presents with increasing cough, chest congestion, intermittent shortness of breath for several days. She has occasional wheezing, and has an inhaler for that. No chest pain, no resting dyspnea. She has a history of PE, and is on Eliquis, compliant with therapy. She is bringing up some sputum. She has had a cough for over 2 weeks, but symptoms have worsened for 2 or 3 days. REVIEW OF SYSTEMS      Review of Systems   Constitutional:  Positive for malaise/fatigue. Negative for fever. HENT:  Positive for congestion. Negative for sore throat. Respiratory:  Positive for cough. Cardiovascular:  Negative for chest pain and palpitations. Gastrointestinal:  Negative for abdominal pain and nausea. Neurological:  Negative for dizziness and headaches. All other systems reviewed and are negative. PAST MEDICAL HISTORY     has a past medical history of Anxiety, Arthritis, Asthma, Blood clotting disorder ([address] Depression, Flexural eczema, GERD (gastroesophageal reflux disease), Hyperlipidemia, Osteoarthritis, and Paranoid schizophrenia ([address] SURGICAL HISTORY     has a past surgical history that includes other surgical history (2012); Ankle surgery (Left, 2000); Foot surgery (Right, 2007); shoulder surgery (Right, age 15); Dental surgery; Cholecystectomy, laparoscopic (05/04/2015); Upper gastrointestinal endoscopy (10/23/2015); Vaginal prolapse repair; Colonoscopy; and bladder suspension (2007).     CURRENT MEDICATIONS    Discharge Medication List as of 3/13/2023  9:05 PM        CONTINUE these medications which have NOT CHANGED    Details   famotidine (PEPCID) 20 MG tablet Take 1 tablet by mouth 2 times daily, Disp-90 tablet, R-1Normal      omeprazole (PRILOSEC) 40 MG delayed release capsule TAKE 1 CAPSULE BY MOUTH DAILY, Disp-100 capsule, R-2Requesting 1 year supplyNormal      estradiol (ESTRACE) 1 MG tablet Take 1 tablet by mouth daily, Disp-21 tablet, R-5Normal      !! gabapentin (NEURONTIN) 100 MG capsule Take 1 capsule by mouth daily for 30 days. , Disp-90 capsule, R-1Normal      !! gabapentin (NEURONTIN) 300 MG capsule Take 1 capsule by mouth nightly for 30 days. , Disp-90 capsule, R-1Normal      simvastatin (ZOCOR) 40 MG tablet Take 1 tablet by mouth daily, Disp-90 tablet, R-1Normal      hydrOXYzine HCl (ATARAX) 50 MG tablet TAKE 1 TABLET THREE TIMES DAILY AS NEEDED FOR ITCHING, Disp-90 tablet, R-2Normal      brompheniramine-pseudoephedrine-DM (BROMFED DM) 2-30-10 MG/5ML syrup Take 5 mLs by mouth 4 times daily as needed for Congestion, Disp-140 mL, R-0Normal      !! traZODone (DESYREL) 150 MG tablet Take 1 tablet by mouth nightly, Disp-90 tablet, R-1Normal      nystatin (MYCOSTATIN) 506649 UNIT/GM powder Apply 3 times daily. , Disp-60 g, R-1, Normal      celecoxib (CELEBREX) 400 MG capsule Take 400 mg by mouth dailyHistorical Med      hydroCHLOROthiazide (HYDRODIURIL) 25 MG tablet Take 25 mg by mouth dailyHistorical Med      !! traZODone (DESYREL) 50 MG tablet Take 1-2 tabs nightly PRN insomnia., Disp-60 tablet, R-5Normal      traMADol (ULTRAM) 50 MG tablet Take 50 mg by mouth every 6 hours as needed for Pain. Historical Med      Cariprazine HCl (VRAYLAR) 6 MG CAPS capsule Take 1 capsule by mouth daily, Disp-90 capsule, R-1NO PRINT      naproxen (NAPROSYN) 375 MG tablet Take 1 tablet by mouth 2 times daily as needed for Pain, Disp-60 tablet, R-0Normal      Blood Glucose Monitoring Suppl (TRUE METRIX METER) w/Device KIT Dispense 1 glucometer kit., Disp-1 kit, R-0Normal      blood glucose test strips (TRUE METRIX BLOOD GLUCOSE TEST) strip 1 each by In Vitro route daily As needed. , Disp-100 each, R-5Normal      TRUEplus Lancets 33G MISC Disp-100 each, R-5, NormalTest daily and as needed for symptoms (max 4x/day). Nutritional Supplements (ESTROVEN PO) Take 1 tablet by mouth dailyHistorical Med      ferrous sulfate (FE TABS) 325 (65 Fe) MG EC tablet Take 1 tablet by mouth 2 times daily, Disp-180 tablet, R-1Normal      medroxyPROGESTERone (PROVERA) 5 MG tablet Take 1 tablet by mouth daily, Disp-90 tablet, R-1Normal      metoprolol tartrate (LOPRESSOR) 25 MG tablet Take 1 tablet by mouth 2 times daily, Disp-180 tablet, R-3Normal      oxybutynin (DITROPAN) 5 MG tablet Take 1 tablet by mouth 2 times daily, Disp-180 tablet, R-1Normal      hydrocortisone (HYTONE) 2.5 % lotion Historical Med      amitriptyline (ELAVIL) 150 MG tablet Take 1 tablet by mouth nightly, Disp-30 tablet, R-0Normal      QUEtiapine (SEROQUEL) 200 MG tablet Take 1 tablet by mouth nightly, Disp-30 tablet, R-1Normal      Alcohol Swabs (ALCOHOL PREP) PADS Historical Med      fluticasone (FLONASE) 50 MCG/ACT nasal spray 1 spray by Each Nostril route daily, Disp-32 g, R-1Normal      melatonin 3 MG TABS tablet Take 10 mg by mouth nightly as neededHistorical Med      docusate sodium (COLACE) 100 MG capsule Take 100 mg by mouth 2 times dailyHistorical Med      LORazepam (ATIVAN) 2 MG tablet Take 2 mg by mouth every 8 hours as needed. Historical Med      risperiDONE (RISPERDAL) 4 MG tablet Take 4 mg by mouth 2 times dailyHistorical Med       !! - Potential duplicate medications found. Please discuss with provider. ALLERGIES    is allergic to msg, codeine, cyclobenzaprine, lithium, and other. FAMILY HISTORY    She indicated that her mother is . She indicated that her father is . She indicated that her paternal aunt is alive. family history includes Alcohol Abuse in her father; Alzheimer's Disease in her mother; Breast Cancer (age of onset: 54) in her paternal aunt;  Cancer in her father; Depression in her mother; High Blood Pressure in her mother; Kidney Disease in her mother. SOCIAL HISTORY     reports that she has never smoked. She has never used smokeless tobacco. She reports that she does not currently use alcohol. She reports that she does not use drugs. PHYSICAL EXAM       INITIAL VITALS: BP (!) 115/55   Pulse (!) 101   Temp 97.2 °F (36.2 °C)   Resp 16   Ht 5' 5\" (1.651 m)   Wt 280 lb (127 kg)   LMP 02/23/2017 (Exact Date)   SpO2 98%   BMI 46.59 kg/m²      Physical Exam  Constitutional:       General: She is not in acute distress. Appearance: She is well-developed. HENT:      Mouth/Throat:      Mouth: Mucous membranes are moist.      Pharynx: No pharyngeal swelling or oropharyngeal exudate. Eyes:      Pupils: Pupils are equal, round, and reactive to light. Cardiovascular:      Rate and Rhythm: Normal rate and regular rhythm. Heart sounds: No murmur heard. Pulmonary:      Comments: Decreased breath sounds, prolonged prior Tory phase, clear, moist cough noted. Musculoskeletal:      Cervical back: Neck supple. Right lower leg: No tenderness. No edema. Left lower leg: No tenderness. No edema. Lymphadenopathy:      Cervical: No cervical adenopathy. Skin:     General: Skin is warm and dry. Neurological:      General: No focal deficit present. Mental Status: She is alert and oriented to person, place, and time. Psychiatric:         Behavior: Behavior normal.          Vitals:    Vitals:    03/13/23 2146   BP: (!) 115/55   Pulse: (!) 101   Resp: 16   Temp: 97.2 °F (36.2 °C)   SpO2: 98%   Weight: 280 lb (127 kg)   Height: 5' 5\" (1.651 m)       EMERGENCY DEPARTMENT COURSE:    First dose of Zithromax and benzonatate given. Plan of care discussed with the patient. She will follow-up with her own provider.     MIPS: Antibiotics given for bronchitis, due to ill for 2 weeks without improvement, comorbidity of reactive airways disease, presumed secondary bacterial infection. FINAL IMPRESSION      1.  Bronchitis        DISPOSITION/PLAN    DISPOSITION Decision To Discharge 03/13/2023 08:54:02 PM      PATIENT REFERRED TO:    Chucho Ojeda MD  [facility] [address]  [address] [place] [address]  [phone]    Schedule an appointment as soon as possible for a visit       DISCHARGE MEDICATIONS:    Discharge Medication List as of 3/13/2023  9:05 PM        START taking these medications    Details   azithromycin (ZITHROMAX Z-EVETTE) 250 MG tablet One daily for the next four days, first dose given in ED., Disp-4 tablet, R-0Normal      benzonatate (TESSALON) 200 MG capsule Take 1 capsule by mouth 3 times daily as needed for Cough, Disp-30 capsule, R-0Normal                (Please note that portions of this note were completed with a voice recognition program.  Efforts were made to edit the dictations but occasionally words are mis-transcribed.)       Pablo Recio MD  03/14/23 0226

## 2023-03-14 NOTE — DISCHARGE INSTRUCTIONS
Medication as prescribed. Rest, drink plenty of fluids. Continue medications your doctor has prescribed.

## 2023-03-16 ENCOUNTER — TELEPHONE (OUTPATIENT)
Dept: FAMILY MEDICINE CLINIC | Age: 53
End: 2023-03-16

## 2023-03-17 ENCOUNTER — OFFICE VISIT (OUTPATIENT)
Dept: ONCOLOGY | Age: 53
End: 2023-03-17
Payer: MEDICARE

## 2023-03-17 ENCOUNTER — SOCIAL WORK (OUTPATIENT)
Dept: INFUSION THERAPY | Age: 53
End: 2023-03-17

## 2023-03-17 ENCOUNTER — HOSPITAL ENCOUNTER (OUTPATIENT)
Dept: INFUSION THERAPY | Age: 53
Discharge: HOME | End: 2023-03-17
Payer: MEDICARE

## 2023-03-17 VITALS
WEIGHT: 283 LBS | SYSTOLIC BLOOD PRESSURE: 122 MMHG | RESPIRATION RATE: 18 BRPM | TEMPERATURE: 98.2 F | HEART RATE: 73 BPM | OXYGEN SATURATION: 96 % | DIASTOLIC BLOOD PRESSURE: 77 MMHG | BODY MASS INDEX: 47.15 KG/M2 | HEIGHT: 65 IN

## 2023-03-17 VITALS
DIASTOLIC BLOOD PRESSURE: 77 MMHG | RESPIRATION RATE: 18 BRPM | HEART RATE: 73 BPM | TEMPERATURE: 98.2 F | SYSTOLIC BLOOD PRESSURE: 122 MMHG

## 2023-03-17 DIAGNOSIS — I82.412 ACUTE DEEP VEIN THROMBOSIS (DVT) OF FEMORAL VEIN OF LEFT LOWER EXTREMITY (HCC): ICD-10-CM

## 2023-03-17 DIAGNOSIS — I82.412 ACUTE DEEP VEIN THROMBOSIS (DVT) OF FEMORAL VEIN OF LEFT LOWER EXTREMITY (HCC): Primary | ICD-10-CM

## 2023-03-17 PROCEDURE — 99211 OFF/OP EST MAY X REQ PHY/QHP: CPT

## 2023-03-17 PROCEDURE — 36415 COLL VENOUS BLD VENIPUNCTURE: CPT

## 2023-03-17 PROCEDURE — 86147 CARDIOLIPIN ANTIBODY EA IG: CPT

## 2023-03-17 PROCEDURE — 99214 OFFICE O/P EST MOD 30 MIN: CPT | Performed by: INTERNAL MEDICINE

## 2023-03-17 PROCEDURE — 3078F DIAST BP <80 MM HG: CPT | Performed by: INTERNAL MEDICINE

## 2023-03-17 PROCEDURE — 81241 F5 GENE: CPT

## 2023-03-17 PROCEDURE — 3074F SYST BP LT 130 MM HG: CPT | Performed by: INTERNAL MEDICINE

## 2023-03-17 NOTE — PROGRESS NOTES
Oncology Social Work    Date: 3/17/2023  Time: 11:41 AM  Name: Olga Ge  MRN: [id number]     Contact Type: Follow-up    Note:   Situation: This staff was asked to reach out to contacted Olga Ge to introduce myself as their assigned Oncology Social Worker. Background:  Carmen Chambers had expressed financial concerns to the  during her check-in this mornming    Assessment: During our meeting, Carmen Chambers explained that she has been working with another  but doesn't know her name or where she is from. Carmen Chambers shared that she is on SSD for \"mental issues\" but did not elaborate.   - Carmen Chambers expressed concerns about paying for her medical bills. She shared that she will need help with the medications as well. At this time, she is able to continue working but doesn't know how much longer that will be possible. Currently working at [employer] in [place]. Carmen Chambers did not say if her  was working at this time  - Reviewed with Carmen Chambers the personal documents I would need for her to bring at her next appointment (4/14 @ 9:30a) for us to determine the best assistance avenues to pursue. She expressed understanding. Recommendation: Carmen Chambers will initiate further follow-up based on need.  provided her with my contact information and will continue to follow up as needed.       CANDIE Duran, RAFAEL, FAISAL  Oncology Social Worker      Electronically signed by CANDIE Duran, RAFAEL, FAISAL on 3/17/2023 at 11:41 AM

## 2023-03-17 NOTE — PATIENT INSTRUCTIONS
Reviewed labs and recent medical history. Discussed her previous labs and previous blood clots (post-phlebitic syndrome) Pain at the site after you have had a blood clot. Refills of Eliquis sent to her pharmacy  Labs ordered to check her Cardiolipins and Factor 5 Leiden.   Return to see MD in 1 month

## 2023-03-18 NOTE — PROGRESS NOTES
(PRILOSEC) 40 MG delayed release capsule TAKE 1 CAPSULE BY MOUTH DAILY 100 capsule 2    estradiol (ESTRACE) 1 MG tablet Take 1 tablet by mouth daily 21 tablet 5    gabapentin (NEURONTIN) 100 MG capsule Take 1 capsule by mouth daily for 30 days. 90 capsule 1    gabapentin (NEURONTIN) 300 MG capsule Take 1 capsule by mouth nightly for 30 days. 90 capsule 1    simvastatin (ZOCOR) 40 MG tablet Take 1 tablet by mouth daily 90 tablet 1    hydrOXYzine HCl (ATARAX) 50 MG tablet TAKE 1 TABLET THREE TIMES DAILY AS NEEDED FOR ITCHING 90 tablet 2    brompheniramine-pseudoephedrine-DM (BROMFED DM) 2-30-10 MG/5ML syrup Take 5 mLs by mouth 4 times daily as needed for Congestion 140 mL 0    traZODone (DESYREL) 150 MG tablet Take 1 tablet by mouth nightly 90 tablet 1    nystatin (MYCOSTATIN) 416074 UNIT/GM powder Apply 3 times daily. 60 g 1    celecoxib (CELEBREX) 400 MG capsule Take 400 mg by mouth daily      hydroCHLOROthiazide (HYDRODIURIL) 25 MG tablet Take 25 mg by mouth daily      traZODone (DESYREL) 50 MG tablet Take 1-2 tabs nightly PRN insomnia.  60 tablet 5    Cariprazine HCl (VRAYLAR) 6 MG CAPS capsule Take 1 capsule by mouth daily 90 capsule 1    naproxen (NAPROSYN) 375 MG tablet Take 1 tablet by mouth 2 times daily as needed for Pain 60 tablet 0    Nutritional Supplements (ESTROVEN PO) Take 1 tablet by mouth daily      metoprolol tartrate (LOPRESSOR) 25 MG tablet Take 1 tablet by mouth 2 times daily 180 tablet 3    oxybutynin (DITROPAN) 5 MG tablet Take 1 tablet by mouth 2 times daily 180 tablet 1    hydrocortisone (HYTONE) 2.5 % lotion       amitriptyline (ELAVIL) 150 MG tablet Take 1 tablet by mouth nightly 30 tablet 0    QUEtiapine (SEROQUEL) 200 MG tablet Take 1 tablet by mouth nightly 30 tablet 1    fluticasone (FLONASE) 50 MCG/ACT nasal spray 1 spray by Each Nostril route daily 32 g 1    melatonin 3 MG TABS tablet Take 10 mg by mouth nightly as needed      docusate sodium (COLACE) 100 MG capsule Take 100 mg by

## 2023-03-20 LAB
CARDIOLIPIN IGG SER IA-ACNC: < 10 GPL
CARDIOLIPIN IGM SER IA-ACNC: 19 MPL

## 2023-03-20 ASSESSMENT — ENCOUNTER SYMPTOMS
COUGH: 1
SHORTNESS OF BREATH: 0
WHEEZING: 0

## 2023-03-20 NOTE — PROGRESS NOTES
Normocephalic and atraumatic. Nose: Nose normal.   Eyes:      Conjunctiva/sclera: Conjunctivae normal.   Cardiovascular:      Rate and Rhythm: Normal rate and regular rhythm. Heart sounds: Normal heart sounds. Pulmonary:      Effort: Pulmonary effort is normal. No respiratory distress. Breath sounds: Normal breath sounds. No wheezing. Musculoskeletal:      Cervical back: Normal range of motion and neck supple. Left knee: Swelling and bony tenderness present. Decreased range of motion. Tenderness present over the medial joint line. Skin:     General: Skin is warm and dry. Neurological:      Mental Status: She is alert and oriented to person, place, and time. Psychiatric:         Behavior: Behavior normal.       Assessment/Plan:     [facility] was seen today for follow-up from hospital.    Diagnoses and all orders for this visit:    Acute bronchitis, unspecified organism  -     Her breathing is improving. Will prescribe Albuterol to be used for wheezing or SOB. -     albuterol sulfate HFA (VENTOLIN HFA) 108 (90 Base) MCG/ACT inhaler; Inhale 2 puffs into the lungs 4 times daily as needed for Wheezing    Acute pain of left knee  -     She has acute on chronic left knee pain so will refer for PT.  -     [facility] - [place] Shall      Return if symptoms worsen or fail to improve.     Electronically signed by Maria Del Rosario Castillo MD on 3/21/2023 at 9:52 AM

## 2023-03-21 ENCOUNTER — OFFICE VISIT (OUTPATIENT)
Dept: FAMILY MEDICINE CLINIC | Age: 53
End: 2023-03-21
Payer: MEDICARE

## 2023-03-21 ENCOUNTER — TELEPHONE (OUTPATIENT)
Dept: FAMILY MEDICINE CLINIC | Age: 53
End: 2023-03-21

## 2023-03-21 VITALS
DIASTOLIC BLOOD PRESSURE: 66 MMHG | RESPIRATION RATE: 14 BRPM | HEART RATE: 58 BPM | OXYGEN SATURATION: 100 % | BODY MASS INDEX: 45.76 KG/M2 | WEIGHT: 275 LBS | TEMPERATURE: 97 F | SYSTOLIC BLOOD PRESSURE: 114 MMHG

## 2023-03-21 DIAGNOSIS — J20.9 ACUTE BRONCHITIS, UNSPECIFIED ORGANISM: Primary | ICD-10-CM

## 2023-03-21 DIAGNOSIS — M25.562 ACUTE PAIN OF LEFT KNEE: Primary | ICD-10-CM

## 2023-03-21 DIAGNOSIS — M25.562 ACUTE PAIN OF LEFT KNEE: ICD-10-CM

## 2023-03-21 PROCEDURE — 99214 OFFICE O/P EST MOD 30 MIN: CPT | Performed by: FAMILY MEDICINE

## 2023-03-21 PROCEDURE — 3078F DIAST BP <80 MM HG: CPT | Performed by: FAMILY MEDICINE

## 2023-03-21 PROCEDURE — 3074F SYST BP LT 130 MM HG: CPT | Performed by: FAMILY MEDICINE

## 2023-03-21 RX ORDER — ALBUTEROL SULFATE 90 UG/1
2 AEROSOL, METERED RESPIRATORY (INHALATION) 4 TIMES DAILY PRN
Qty: 18 G | Refills: 5 | Status: SHIPPED | OUTPATIENT
Start: 2023-03-21

## 2023-03-21 RX ORDER — ESTRADIOL 1 MG/1
1 TABLET ORAL DAILY
Qty: 21 TABLET | Refills: 5 | Status: SHIPPED | OUTPATIENT
Start: 2023-03-21 | End: 2023-03-21 | Stop reason: SDUPTHER

## 2023-03-21 RX ORDER — CEPHALEXIN 500 MG/1
500 CAPSULE ORAL 3 TIMES DAILY
Qty: 21 CAPSULE | Refills: 0 | Status: SHIPPED | OUTPATIENT
Start: 2023-03-21 | End: 2023-03-28

## 2023-03-21 RX ORDER — FAMOTIDINE 20 MG/1
20 TABLET, FILM COATED ORAL 2 TIMES DAILY
Qty: 90 TABLET | Refills: 1 | Status: SHIPPED | OUTPATIENT
Start: 2023-03-21

## 2023-03-21 RX ORDER — ESTRADIOL 1 MG/1
1 TABLET ORAL DAILY
Qty: 90 TABLET | Refills: 3 | Status: SHIPPED | OUTPATIENT
Start: 2023-03-21

## 2023-03-21 NOTE — TELEPHONE ENCOUNTER
Pt called stating that her physical therapy will cost $25 a visit and she cannot afford that. She was wondering if you would just order her a knee brace for her LT knee and she will do stretches at home herself. She wants the script sent to [facility].

## 2023-03-22 ENCOUNTER — TELEPHONE (OUTPATIENT)
Dept: FAMILY MEDICINE CLINIC | Age: 53
End: 2023-03-22

## 2023-03-22 RX ORDER — HYDROXYZINE 50 MG/1
TABLET, FILM COATED ORAL
Qty: 90 TABLET | Refills: 2 | Status: CANCELLED | OUTPATIENT
Start: 2023-03-22

## 2023-03-22 NOTE — TELEPHONE ENCOUNTER
Pt called to let you know that she fell at work today, but she is okay. She just wanted to keep you updated.

## 2023-03-23 ENCOUNTER — TELEPHONE (OUTPATIENT)
Dept: ONCOLOGY | Age: 53
End: 2023-03-23

## 2023-03-23 ENCOUNTER — TELEPHONE (OUTPATIENT)
Dept: FAMILY MEDICINE CLINIC | Age: 53
End: 2023-03-23

## 2023-03-23 LAB — F5 P.R506Q BLD/T QL: NEGATIVE

## 2023-03-23 RX ORDER — OXYBUTYNIN CHLORIDE 5 MG/1
5 TABLET ORAL 2 TIMES DAILY
Qty: 180 TABLET | Refills: 1 | Status: SHIPPED | OUTPATIENT
Start: 2023-03-23

## 2023-04-04 ENCOUNTER — TELEPHONE (OUTPATIENT)
Dept: FAMILY MEDICINE CLINIC | Age: 53
End: 2023-04-04

## 2023-04-04 ENCOUNTER — TELEPHONE (OUTPATIENT)
Dept: ONCOLOGY | Age: 53
End: 2023-04-04

## 2023-04-04 NOTE — TELEPHONE ENCOUNTER
Pt wanted to let you know she saw Dr Yancy Marin 3/29 and her left ankle is broke. Pt is scheduled for an MRI 4/10 and has a follow up appt with Dr Yancy Marin 4/13    [facility]   Will scan in when received

## 2023-04-04 NOTE — TELEPHONE ENCOUNTER
PT CALLED TO CANCEL HER APPT ON 4/14/23 WITH DR Gutierrez Ma. PT EXPLAINED SHE BROKE HER ANKLE AND THAT SHE ISNT GETTING AROUND VERY WELL. PT IS KEEPING APPT IN September.

## 2023-04-04 NOTE — TELEPHONE ENCOUNTER
Pt called stating that she has sore throat and cough since 3/13  Pt has been taking night quil but symptoms do not go away   Please advise

## 2023-04-05 ENCOUNTER — HOSPITAL ENCOUNTER (EMERGENCY)
Age: 53
Discharge: HOME | End: 2023-04-05
Attending: FAMILY MEDICINE
Payer: MEDICARE

## 2023-04-05 ENCOUNTER — HOSPITAL ENCOUNTER (OUTPATIENT)
Dept: INTERVENTIONAL RADIOLOGY/VASCULAR | Age: 53
Discharge: HOME | End: 2023-04-05
Payer: MEDICARE

## 2023-04-05 VITALS
DIASTOLIC BLOOD PRESSURE: 70 MMHG | TEMPERATURE: 98.3 F | RESPIRATION RATE: 15 BRPM | HEART RATE: 83 BPM | SYSTOLIC BLOOD PRESSURE: 107 MMHG | OXYGEN SATURATION: 99 %

## 2023-04-05 DIAGNOSIS — R22.41 LOCALIZED SWELLING OF RIGHT LOWER LEG: Primary | ICD-10-CM

## 2023-04-05 DIAGNOSIS — R22.41 LOCALIZED SWELLING OF RIGHT LOWER LEG: ICD-10-CM

## 2023-04-05 PROCEDURE — 93971 EXTREMITY STUDY: CPT

## 2023-04-05 PROCEDURE — 99283 EMERGENCY DEPT VISIT LOW MDM: CPT

## 2023-04-05 ASSESSMENT — PAIN - FUNCTIONAL ASSESSMENT
PAIN_FUNCTIONAL_ASSESSMENT: NONE - DENIES PAIN
PAIN_FUNCTIONAL_ASSESSMENT: NONE - DENIES PAIN

## 2023-04-05 ASSESSMENT — ENCOUNTER SYMPTOMS
SHORTNESS OF BREATH: 0
NAUSEA: 0
COUGH: 0
VOMITING: 0

## 2023-04-05 NOTE — ED NOTES
Patient in stable condition. Alert and oriented x3. Unlabored breathing present. Patient aware of plan of care. Patient discharge instructions given and explained. Follow up information instructions given. Patient agreeable to plan of care. Patient states understanding and denies any questions or concerns. Patient ambulated out of ER with no complications with  Valeda Figures. Valeda Figures will drive patient to [facility] for doppler study and to remain until further instructions on results. Patient and spouse agreeable.          Jonathon Ramon RN  04/05/23 0354

## 2023-04-05 NOTE — ED PROVIDER NOTES
use Eliquis. She is not hemodynamically compromised at this time. We will order an outpatient ultrasound for further evaluation. Care instructions were provided prior to discharge. Venous doppler of right lower extremity fails to show DVT. Recommended leg elevation at night. If swelling persists than follow up with PCP. CRITICAL CARE:       CONSULTS:      PROCEDURES:  None    FINAL IMPRESSION      1. Localized swelling of right lower leg          DISPOSITION/PLAN   Home. You will go to [facility] outpatient vascular lab for an emergent ultrasound of your right lower extremity to rule out venous blood clot.     PATIENT REFERRED TO:  Mojgan Venegas MD  [facility] 2  [address] [place] [address]  [phone]    Call in 1 day  If symptoms worsen, As needed      DISCHARGE MEDICATIONS:  New Prescriptions    No medications on file       (Please note that portions of this note were completed with a voice recognition program.  Efforts were made to edit the dictations but occasionally words are mis-transcribed.)    MD Zohaib Calloway MD  04/05/23 5462

## 2023-04-05 NOTE — ED TRIAGE NOTES
Patient arrival to the ER ambulatory to room 6 with complaint of right ankle swelling she noticed today. Patient is concerned that she has a blood clot as she is already on medication for history of blood clots. Patient does have a boot applied to the left foot due to breaking her ankle she states last Wednesday when she fell. Patient right leg and foot is warm to touch. Denies pain. No discoloration noted. Patient is alert and oriented and breathing with ease. Skin is pale, warm and dry. Patient is resting in cot. Vitals as documented.

## 2023-04-05 NOTE — ED NOTES
Called Vascular left a message for request call back for patient doppler study needing done once discharged.       Enoc Yuen RN  04/05/23 9463

## 2023-04-17 ENCOUNTER — TELEPHONE (OUTPATIENT)
Dept: FAMILY MEDICINE CLINIC | Age: 53
End: 2023-04-17

## 2023-04-17 NOTE — PROGRESS NOTES
DAILY AS NEEDED FOR ITCHING, Disp: 90 tablet, Rfl: 2    nystatin (MYCOSTATIN) 331301 UNIT/GM powder, Apply 3 times daily. , Disp: 60 g, Rfl: 1    celecoxib (CELEBREX) 400 MG capsule, Take 1 capsule by mouth daily, Disp: , Rfl:     hydroCHLOROthiazide (HYDRODIURIL) 25 MG tablet, Take 1 tablet by mouth daily, Disp: , Rfl:     traZODone (DESYREL) 50 MG tablet, Take 1-2 tabs nightly PRN insomnia., Disp: 60 tablet, Rfl: 5    traMADol (ULTRAM) 50 MG tablet, Take 1 tablet by mouth every 6 hours as needed for Pain., Disp: , Rfl:     Cariprazine HCl (VRAYLAR) 6 MG CAPS capsule, Take 1 capsule by mouth daily, Disp: 90 capsule, Rfl: 1    Blood Glucose Monitoring Suppl (TRUE METRIX METER) w/Device KIT, Dispense 1 glucometer kit., Disp: 1 kit, Rfl: 0    blood glucose test strips (TRUE METRIX BLOOD GLUCOSE TEST) strip, 1 each by In Vitro route daily As needed. , Disp: 100 each, Rfl: 5    TRUEplus Lancets 33G MISC, Test daily and as needed for symptoms (max 4x/day). , Disp: 100 each, Rfl: 5    Nutritional Supplements (ESTROVEN PO), Take 1 tablet by mouth daily, Disp: , Rfl:     ferrous sulfate (FE TABS) 325 (65 Fe) MG EC tablet, Take 1 tablet by mouth 2 times daily, Disp: 180 tablet, Rfl: 1    medroxyPROGESTERone (PROVERA) 5 MG tablet, Take 1 tablet by mouth daily, Disp: 90 tablet, Rfl: 1    metoprolol tartrate (LOPRESSOR) 25 MG tablet, Take 1 tablet by mouth 2 times daily, Disp: 180 tablet, Rfl: 3    hydrocortisone (HYTONE) 2.5 % lotion, , Disp: , Rfl:     amitriptyline (ELAVIL) 150 MG tablet, Take 1 tablet by mouth nightly, Disp: 30 tablet, Rfl: 0    QUEtiapine (SEROQUEL) 200 MG tablet, Take 1 tablet by mouth nightly, Disp: 30 tablet, Rfl: 1    Alcohol Swabs (ALCOHOL PREP) PADS, by Does not apply route, Disp: , Rfl:     fluticasone (FLONASE) 50 MCG/ACT nasal spray, 1 spray by Each Nostril route daily, Disp: 32 g, Rfl: 1    melatonin 3 MG TABS tablet, Take 10 mg by mouth nightly as needed, Disp: , Rfl:     docusate sodium (COLACE)

## 2023-04-17 NOTE — TELEPHONE ENCOUNTER
----- Message from Flaca Argueta sent at 4/14/2023 12:36 PM EDT -----  Subject: Medication Problem    Medication: traZODone (DESYREL) 50 MG tablet  Dosage: 1 tablet nightly PRN   Ordering Provider: shirley    Question/Problem: pharmacy states there is another script on file for 150   mg as well as 50 mg please call fax number [phone]      Pharmacy: [address] ([facility] ) - [facility], [facility] 136 [phone] - F [phone]    ---------------------------------------------------------------------------  --------------  [employer]  [phone]; OK to leave message on voicemail  ---------------------------------------------------------------------------  --------------    SCRIPT ANSWERS  Relationship to Patient: Covered Entity  Covered Entity Type: Pharmacy?   Representative Name: Viry Canales

## 2023-04-17 NOTE — TELEPHONE ENCOUNTER
Patient called in regards to her appt tomorrow, 4-18-23. She states she had food posioning yesterday but would be at the appt. Patient also stated that she is taking trazodone 150 mg once nightly and then also takes 50mg on top when she needs to.

## 2023-04-18 ENCOUNTER — OFFICE VISIT (OUTPATIENT)
Dept: FAMILY MEDICINE CLINIC | Age: 53
End: 2023-04-18
Payer: MEDICARE

## 2023-04-18 VITALS
TEMPERATURE: 98.4 F | SYSTOLIC BLOOD PRESSURE: 124 MMHG | RESPIRATION RATE: 16 BRPM | DIASTOLIC BLOOD PRESSURE: 78 MMHG | HEART RATE: 83 BPM

## 2023-04-18 DIAGNOSIS — R60.0 EDEMA OF RIGHT LOWER EXTREMITY: Primary | ICD-10-CM

## 2023-04-18 PROCEDURE — 99213 OFFICE O/P EST LOW 20 MIN: CPT | Performed by: FAMILY MEDICINE

## 2023-04-18 PROCEDURE — 3074F SYST BP LT 130 MM HG: CPT | Performed by: FAMILY MEDICINE

## 2023-04-18 PROCEDURE — 3078F DIAST BP <80 MM HG: CPT | Performed by: FAMILY MEDICINE

## 2023-04-18 RX ORDER — DEXTROMETHORPHAN HYDROBROMIDE AND PROMETHAZINE HYDROCHLORIDE 15; 6.25 MG/5ML; MG/5ML
2.5 SYRUP ORAL 4 TIMES DAILY PRN
Qty: 70 ML | Refills: 0 | Status: SHIPPED | OUTPATIENT
Start: 2023-04-18 | End: 2023-04-25

## 2023-04-19 ENCOUNTER — OFFICE VISIT (OUTPATIENT)
Dept: PULMONOLOGY | Age: 53
End: 2023-04-19
Payer: MEDICARE

## 2023-04-19 ENCOUNTER — TELEPHONE (OUTPATIENT)
Dept: FAMILY MEDICINE CLINIC | Age: 53
End: 2023-04-19

## 2023-04-19 VITALS
TEMPERATURE: 97.7 F | HEART RATE: 78 BPM | DIASTOLIC BLOOD PRESSURE: 78 MMHG | OXYGEN SATURATION: 96 % | HEIGHT: 65 IN | BODY MASS INDEX: 47.42 KG/M2 | SYSTOLIC BLOOD PRESSURE: 124 MMHG | WEIGHT: 284.6 LBS

## 2023-04-19 DIAGNOSIS — G47.10 HYPERSOMNIA: ICD-10-CM

## 2023-04-19 DIAGNOSIS — G47.30 SLEEP APNEA, UNSPECIFIED TYPE: ICD-10-CM

## 2023-04-19 DIAGNOSIS — R06.81 APNEA: Primary | ICD-10-CM

## 2023-04-19 DIAGNOSIS — F32.A DEPRESSION, UNSPECIFIED DEPRESSION TYPE: ICD-10-CM

## 2023-04-19 DIAGNOSIS — R06.83 SNORING: ICD-10-CM

## 2023-04-19 PROCEDURE — 99204 OFFICE O/P NEW MOD 45 MIN: CPT | Performed by: INTERNAL MEDICINE

## 2023-04-19 PROCEDURE — 3074F SYST BP LT 130 MM HG: CPT | Performed by: INTERNAL MEDICINE

## 2023-04-19 PROCEDURE — 3078F DIAST BP <80 MM HG: CPT | Performed by: INTERNAL MEDICINE

## 2023-04-19 NOTE — PATIENT INSTRUCTIONS
Recommendations/Plan:  - Schedule patient for nocturnal polysomnogram (Sleep study) with split night protocol at [facility] sleep lab to diagnose sleep apnea and to get optimal pressure I.e CPAP or BiPAP to start/continue PAP therapy. Patient to follow with my clinic at [facility] sleep clinic in 6 to 8 weeks with CPAP download for further evaluation.  -I had a discussion with patient regarding avialable treatment options for her sleep disorder breathing including but not limited to CPAP titration in the sleep lab Vs.Dental appliance placement with referral to a local dentist Vs other available surgical options including Uvulopalatopharyngoplasty, maxillomandibular ostomy, Inspire device placement and tracheostomy as last option. At the end of discussion, she decided on CPAP/BiPAP/AutoSV as a treatment if she found to have obstructive sleep apnea during above test/study.  -She is currently using an old version of Respironics CPAP device. Her CPAP machine did not have any ability to give download. Her CPAP machine was brought over the Internet. She needs a new CPAP machine. She will be issued a new CPAP machine after the above study.  -Due to the possibility of recall notice on her Respironics CPAP machine, she was advised to quit using her current Respironics CPAP machine until her new CPAP machine is available. -She was educated to practice good sleep hygiene practices. She was provided with a good sleep hygiene hand out.  -Salty Zafar was advised to make earlier appointment with my clinic if she develops any worsening of sleep symptoms. She verbalizes understanding.  -Salty Zafar was advised to not to drive any motor vehicles or operate heavy equipment until her sleep symptoms are under good control. Donna Waters verbalizes understanding.  -She was advised to loose weight by controlling diet and doing exercise once cleared by her family physician.   - Tonny Romero was educated about my impression and plan.  She verbalizes

## 2023-04-19 NOTE — PROGRESS NOTES
Chief Complaint: new sleep consult.     Mallampati airway Class: 4  Neck Circumference: 16.5    [place] sleepiness score 4/19/23: 3  SAQLI: 46
as a treatment if she found to have obstructive sleep apnea during above test/study.  -She is currently using an old version of Respironics CPAP device. Her CPAP machine did not have any ability to give download. Her CPAP machine was brought over the Internet. She needs a new CPAP machine. She will be issued a new CPAP machine after the above study.  -Due to the possibility of recall notice on her Respironics CPAP machine, she was advised to quit using her current Respironics CPAP machine until her new CPAP machine is available. -She was educated to practice good sleep hygiene practices. She was provided with a good sleep hygiene hand out.  -Lea Lane was advised to make earlier appointment with my clinic if she develops any worsening of sleep symptoms. She verbalizes understanding.  -Lea Lane was advised to not to drive any motor vehicles or operate heavy equipment until her sleep symptoms are under good control. Donna Waters verbalizes understanding.  -She was advised to loose weight by controlling diet and doing exercise once cleared by her family physician.   - Teena Acevedo was educated about my impression and plan. She verbalizes understanding.      -I personally reviewed updated the Past medical hx, Past surgical hx,Social hx, Family hx, Medications, Allergies in the discrete data section of the patient chart along with labs, Pulmonary medicine,Sleep medicine related, Pathological, Microbiological and Radiological investigations.

## 2023-04-20 ENCOUNTER — TELEPHONE (OUTPATIENT)
Dept: FAMILY MEDICINE CLINIC | Age: 53
End: 2023-04-20

## 2023-04-20 NOTE — TELEPHONE ENCOUNTER
Patient aware and voiced understanding, no concerns voiced at this time.      Pt has a friend going to  her rx now

## 2023-04-20 NOTE — TELEPHONE ENCOUNTER
Pt requesting video visit appt-no openings. Pt states she has had cough, congestion, sinus pressure for couple weeks and started having diarrhea last night-lots of stools    Pt states she has not had a chance to  cough med that was rx'd 4/18 bc she has been too busy.     She is only taking Vabaduse 41

## 2023-04-21 ENCOUNTER — TELEPHONE (OUTPATIENT)
Dept: PULMONOLOGY | Age: 53
End: 2023-04-21

## 2023-04-26 ENCOUNTER — TELEPHONE (OUTPATIENT)
Dept: FAMILY MEDICINE CLINIC | Age: 53
End: 2023-04-26

## 2023-04-26 ENCOUNTER — HOSPITAL ENCOUNTER (OUTPATIENT)
Dept: SLEEP CENTER | Age: 53
Discharge: HOME | End: 2023-04-28
Payer: MEDICARE

## 2023-04-26 DIAGNOSIS — G47.10 HYPERSOMNIA: ICD-10-CM

## 2023-04-26 DIAGNOSIS — R06.83 SNORING: ICD-10-CM

## 2023-04-26 DIAGNOSIS — F41.9 ANXIETY DISORDER, UNSPECIFIED TYPE: ICD-10-CM

## 2023-04-26 DIAGNOSIS — F32.A DEPRESSION, UNSPECIFIED DEPRESSION TYPE: ICD-10-CM

## 2023-04-26 DIAGNOSIS — F20.0 PARANOID SCHIZOPHRENIA (HCC): ICD-10-CM

## 2023-04-26 DIAGNOSIS — G47.30 SLEEP APNEA, UNSPECIFIED TYPE: ICD-10-CM

## 2023-04-26 DIAGNOSIS — M25.572 LEFT ANKLE PAIN, UNSPECIFIED CHRONICITY: Primary | ICD-10-CM

## 2023-04-26 DIAGNOSIS — G47.30 SLEEP APNEA, UNSPECIFIED TYPE: Primary | ICD-10-CM

## 2023-04-26 PROCEDURE — 95810 POLYSOM 6/> YRS 4/> PARAM: CPT

## 2023-04-27 DIAGNOSIS — G47.10 HYPERSOMNIA: Primary | ICD-10-CM

## 2023-04-27 DIAGNOSIS — G47.33 OSA (OBSTRUCTIVE SLEEP APNEA): ICD-10-CM

## 2023-04-27 DIAGNOSIS — F32.A DEPRESSION, UNSPECIFIED DEPRESSION TYPE: ICD-10-CM

## 2023-04-27 LAB — STATUS: NORMAL

## 2023-04-28 NOTE — PROGRESS NOTES
Alesia Brandtehn chooses [facility] for her PAP supplies. Title:  CPAP/BiLevel Titration's    Approved by:  Jone Cabot MD      Approval Date:  December, 2021 Next Review:  December, 2023     Responsible Party: JUSTIN Reza Institution/Entities Applies to:   Austin Abreu Number:  None    Document Type:  Such as Guideline, Policy, Policy & Procedure, or Procedure, Instructions  Manual:  Policy and Procedures    Section: IV Policy Start Date: October, 3851           POLICY: Positive airway pressure device is used to treat patients with sleep related breathing disorders characterized by full or partial occlusion of the upper airway during sleep. A patient must have undergone polysomnography and diagnosed with obstructive sleep apnea. All individuals who record sleep studies must follow best practices for CPAP/bilevel/ASV titrations in order to attain the ideal pressure setting for their patients. Too low of pressure may cause patients to either be sub-optimally treated or to wake up in a panic. Too much pressure may cause the patient to experience bloating or mask leakage. Determining the appropriate pressure setting for each patient will lead to improved adherence and outcome. Titrations are not an exact science, and it is understood that technologists may need to make minor changes for individual patients. The procedures outlined below are meant to be a guideline and follow the spirit of the AASM clinical guidelines. PROCEDURE:    CPAP:    Review the patients pertinent medical al history, previous sleep study or studies to ass the severity of sleep disordered breathing. Review of pertinent information will help to attain a better titration.    Applications of electrodes, montages, filters, sensitivities and scoring will be performed according to the current version of the AASM Scoring Manual.   Prior to initiating study collect all appropriate PAP
study.    IMPRESSION:  1. Mild obstructive sleep apnea with worsening of respiratory events in  REM sleep. 2.  Rare periodic limb movements with no significant arousals. 3.  Hypersomnia, most likely due to sleep apnea. 4.  Anxiety disorder. 5.  Depression. 6.  Paranoid schizophrenia. 7.  History of recurrent pulmonary embolism, on treatment with Eliquis. RECOMMENDATIONS:  1. For the patient's sleep-disordered breathing, the patient needs  treatment. 2.  If the patient chooses to go for CPAP therapy as a treatment. Hence, the patient will be scheduled for in-lab CPAP titration study as  soon as possible. 3.  The patient to follow up with my clinic in six to eight weeks on  recommended CPAP therapy for clinical reevaluation with review of  download. Thanks to Rashel Ba MD, for giving me this opportunity to  participate in the care of this pleasant lady.         Chely Doe MD    D: 04/27/2023 17:17:38       T: 04/27/2023 17:21:34     [email or web address]  Job#: [id number]     Doc#: [id number]    CC:

## 2023-05-03 DIAGNOSIS — B37.9 YEAST INFECTION: ICD-10-CM

## 2023-05-03 RX ORDER — OXYBUTYNIN CHLORIDE 5 MG/1
TABLET ORAL
Qty: 180 TABLET | Refills: 1 | Status: SHIPPED | OUTPATIENT
Start: 2023-05-03 | End: 2023-06-28

## 2023-05-03 RX ORDER — NYSTATIN 100000 [USP'U]/G
POWDER TOPICAL
Qty: 60 G | Refills: 0 | Status: SHIPPED | OUTPATIENT
Start: 2023-05-03

## 2023-05-16 ENCOUNTER — TELEMEDICINE (OUTPATIENT)
Dept: FAMILY MEDICINE CLINIC | Age: 53
End: 2023-05-16
Payer: MEDICARE

## 2023-05-16 DIAGNOSIS — J30.2 SEASONAL ALLERGIES: Primary | ICD-10-CM

## 2023-05-16 PROCEDURE — 99213 OFFICE O/P EST LOW 20 MIN: CPT | Performed by: FAMILY MEDICINE

## 2023-05-16 RX ORDER — DEXTROMETHORPHAN HYDROBROMIDE AND PROMETHAZINE HYDROCHLORIDE 15; 6.25 MG/5ML; MG/5ML
2.5 SYRUP ORAL 4 TIMES DAILY PRN
Qty: 70 ML | Refills: 0 | Status: SHIPPED | OUTPATIENT
Start: 2023-05-16 | End: 2023-05-23

## 2023-05-16 RX ORDER — LEVOCETIRIZINE DIHYDROCHLORIDE 5 MG/1
5 TABLET, FILM COATED ORAL NIGHTLY
Qty: 30 TABLET | Refills: 0 | Status: SHIPPED | OUTPATIENT
Start: 2023-05-16

## 2023-05-16 ASSESSMENT — ENCOUNTER SYMPTOMS
SORE THROAT: 1
WHEEZING: 0
COUGH: 1
SHORTNESS OF BREATH: 0
RHINORRHEA: 1
SINUS PRESSURE: 1

## 2023-05-16 NOTE — PROGRESS NOTES
[address]  [address] [place] [address]  Phone:  [phone]       2023    TELEHEALTH EVALUATION -- Audio/Visual (During JEGND- public health emergency)    HPI:    Barrett Knight (:  1970) has requested an audio/video evaluation for the following concern(s):  cough, congestion    She's has nasal congestion, sinus pressure, and cough for the past 2 days. Her cough is worse in the morning and at night when laying down. She's been using cough medication which is helping some. No fevers. Her throat is a little sore. No otalgia. Review of Systems   Constitutional:  Positive for fatigue. Negative for chills and fever. HENT:  Positive for congestion, postnasal drip, rhinorrhea, sinus pressure, sneezing and sore throat. Respiratory:  Positive for cough. Negative for shortness of breath and wheezing. Prior to Visit Medications    Medication Sig Taking?  Authorizing Provider   promethazine-dextromethorphan (PROMETHAZINE-DM) 6.25-15 MG/5ML syrup Take 2.5 mLs by mouth 4 times daily as needed for Cough Yes Miguel Dow MD   levocetirizine (XYZAL) 5 MG tablet Take 1 tablet by mouth nightly Yes Miguel Dow MD   oxybutynin (DITROPAN) 5 MG tablet TAKE 1 TABLET BY MOUTH TWICE  DAILY  Miguel Dow MD   nystatin (MYCOSTATIN) 116519 UNIT/GM powder APPLY  POWDER TOPICALLY TO AFFECTED [address] [facility] MD Papi   traZODone (DESYREL) 150 MG tablet Take 1 tablet by mouth nightly  Miguel Dow MD   albuterol sulfate HFA (VENTOLIN HFA) 108 (90 Base) MCG/ACT inhaler Inhale 2 puffs into the lungs 4 times daily as needed for Wheezing  Miguel Dow MD   famotidine (PEPCID) 20 MG tablet Take 1 tablet by mouth 2 times daily  Miguel Dow MD   estradiol (ESTRACE) 1 MG tablet Take 1 tablet by mouth daily  Miguel Dow MD   apixaban (ELIQUIS) 2.5 MG TABS tablet Take 1 tablet by mouth 2 times daily  Adileen Endo

## 2023-05-17 ENCOUNTER — APPOINTMENT (OUTPATIENT)
Dept: CT IMAGING | Age: 53
End: 2023-05-17
Payer: MEDICARE

## 2023-05-17 ENCOUNTER — HOSPITAL ENCOUNTER (EMERGENCY)
Age: 53
Discharge: HOME | End: 2023-05-17
Attending: EMERGENCY MEDICINE
Payer: MEDICARE

## 2023-05-17 VITALS
HEIGHT: 65 IN | RESPIRATION RATE: 22 BRPM | TEMPERATURE: 98.3 F | OXYGEN SATURATION: 95 % | WEIGHT: 283 LBS | HEART RATE: 91 BPM | BODY MASS INDEX: 47.15 KG/M2 | DIASTOLIC BLOOD PRESSURE: 57 MMHG | SYSTOLIC BLOOD PRESSURE: 102 MMHG

## 2023-05-17 DIAGNOSIS — N39.0 URINARY TRACT INFECTION WITHOUT HEMATURIA, SITE UNSPECIFIED: ICD-10-CM

## 2023-05-17 DIAGNOSIS — R07.89 ATYPICAL CHEST PAIN: Primary | ICD-10-CM

## 2023-05-17 DIAGNOSIS — K59.00 CONSTIPATION, UNSPECIFIED CONSTIPATION TYPE: ICD-10-CM

## 2023-05-17 DIAGNOSIS — J18.9 PNEUMONIA OF BOTH LOWER LOBES DUE TO INFECTIOUS ORGANISM: ICD-10-CM

## 2023-05-17 LAB
ALBUMIN SERPL BCP-MCNC: 3.6 GM/DL (ref 3.4–5)
ALP SERPL-CCNC: 115 U/L (ref 46–116)
ALT SERPL W P-5'-P-CCNC: 29 U/L (ref 14–63)
AMORPH SED URNS QL MICRO: ABNORMAL
ANION GAP SERPL CALC-SCNC: 8 MEQ/L (ref 8–16)
APTT: 29.6 SECONDS (ref 22–38)
AST SERPL W P-5'-P-CCNC: 18 U/L (ref 15–37)
BACTERIA URNS QL MICRO: ABNORMAL
BASOPHILS # BLD: 0.3 % (ref 0–3)
BASOPHILS ABSOLUTE: 0 THOU/MM3 (ref 0–0.1)
BILIRUB SERPL-MCNC: 0.2 MG/DL (ref 0.2–1)
BILIRUB UR QL STRIP.AUTO: NEGATIVE
BUN SERPL-MCNC: 14 MG/DL (ref 7–18)
CALCIUM SERPL-MCNC: 8.9 MG/DL (ref 8.5–10.1)
CASTS #/AREA URNS LPF: ABNORMAL /LPF
CHARACTER UR: CLEAR
CHLORIDE SERPL-SCNC: 101 MEQ/L (ref 98–107)
CO2 SERPL-SCNC: 28 MEQ/L (ref 21–32)
COLOR UR AUTO: YELLOW
CREAT SERPL-MCNC: 0.7 MG/DL (ref 0.6–1.3)
CRYSTALS VITF MICRO: ABNORMAL
EOSINOPHILS ABSOLUTE: 0.2 THOU/MM3 (ref 0–0.5)
EOSINOPHILS RELATIVE PERCENT: 2.3 % (ref 0–4)
EPI CELLS #/AREA URNS HPF: ABNORMAL /HPF
GFR SERPL CREATININE-BSD FRML MDRD: > 60 ML/MIN/1.73M2
GLUCOSE SERPL-MCNC: 136 MG/DL (ref 74–106)
GLUCOSE UR QL STRIP.AUTO: NEGATIVE MG/DL
HCT VFR BLD CALC: 40.5 % (ref 37–47)
HEMOGLOBIN: 13.3 GM/DL (ref 12–16)
HGB UR STRIP.AUTO-MCNC: NEGATIVE MG/L
IMMATURE GRANS (ABS): 0.02 THOU/MM3 (ref 0–0.07)
IMMATURE GRANULOCYTES: 0 %
INR BLD: 1.05 (ref 0.85–1.13)
KETONES UR QL STRIP.AUTO: NEGATIVE
LEUKOCYTE ESTERASE UR QL STRIP.AUTO: ABNORMAL
LIPASE SERPL-CCNC: 41 U/L (ref 73–393)
LYMPHOCYTES # BLD AUTO: 27.1 % (ref 15–47)
LYMPHOCYTES ABSOLUTE: 2 THOU/MM3 (ref 1–4.8)
MAGNESIUM SERPL-MCNC: 1.9 MG/DL (ref 1.8–2.4)
MCH RBC QN AUTO: 28.9 PG (ref 26–32)
MCHC RBC AUTO-ENTMCNC: 32.8 GM/DL (ref 31–35)
MCV RBC AUTO: 87.9 FL (ref 81–99)
MONOCYTES: 0.7 THOU/MM3 (ref 0.3–1.3)
MONOCYTES: 9.8 % (ref 0–12)
MUCOUS THREADS URNS QL MICRO: ABNORMAL
NITRITE UR QL STRIP.AUTO: NEGATIVE
NT PRO BNP: 37 PG/ML (ref 0–900)
PDW BLD-RTO: 13.4 % (ref 11.5–14.9)
PH UR STRIP.AUTO: 6.5 [PH] (ref 5–9)
PLATELET # BLD AUTO: 269 THOU/MM3 (ref 130–400)
PMV BLD AUTO: 8.9 FL (ref 9.4–12.4)
POTASSIUM SERPL-SCNC: 4.1 MEQ/L (ref 3.5–5.1)
PROT SERPL-MCNC: 7.3 GM/DL (ref 6.4–8.2)
PROT UR STRIP.AUTO-MCNC: NEGATIVE MG/DL
RBC # BLD: 4.61 MILL/MM3 (ref 4.1–5.3)
RBC #/AREA URNS HPF: ABNORMAL /HPF
REFLEX TO URINE C & S: ABNORMAL
SEG NEUTROPHILS: 60.2 % (ref 43–75)
SEGMENTED NEUTROPHILS ABSOLUTE COUNT: 4.5 THOU/MM3 (ref 1.8–7.7)
SODIUM SERPL-SCNC: 137 MEQ/L (ref 136–145)
SP GR UR STRIP.AUTO: 1.02 (ref 1–1.03)
UROBILINOGEN UR STRIP-ACNC: 0.2 EU/DL (ref 0–1)
WBC # BLD: 7.5 THOU/MM3 (ref 4.8–10.8)
WBC # UR STRIP.AUTO: ABNORMAL /HPF

## 2023-05-17 PROCEDURE — 99285 EMERGENCY DEPT VISIT HI MDM: CPT

## 2023-05-17 PROCEDURE — 6360000002 HC RX W HCPCS: Performed by: EMERGENCY MEDICINE

## 2023-05-17 PROCEDURE — 81001 URINALYSIS AUTO W/SCOPE: CPT

## 2023-05-17 PROCEDURE — 2580000003 HC RX 258: Performed by: EMERGENCY MEDICINE

## 2023-05-17 PROCEDURE — 85610 PROTHROMBIN TIME: CPT

## 2023-05-17 PROCEDURE — 93010 ELECTROCARDIOGRAM REPORT: CPT | Performed by: INTERNAL MEDICINE

## 2023-05-17 PROCEDURE — 6360000004 HC RX CONTRAST MEDICATION: Performed by: EMERGENCY MEDICINE

## 2023-05-17 PROCEDURE — 85730 THROMBOPLASTIN TIME PARTIAL: CPT

## 2023-05-17 PROCEDURE — 80053 COMPREHEN METABOLIC PANEL: CPT

## 2023-05-17 PROCEDURE — 93005 ELECTROCARDIOGRAM TRACING: CPT | Performed by: EMERGENCY MEDICINE

## 2023-05-17 PROCEDURE — 87086 URINE CULTURE/COLONY COUNT: CPT

## 2023-05-17 PROCEDURE — 83880 ASSAY OF NATRIURETIC PEPTIDE: CPT

## 2023-05-17 PROCEDURE — 85025 COMPLETE CBC W/AUTO DIFF WBC: CPT

## 2023-05-17 PROCEDURE — 74177 CT ABD & PELVIS W/CONTRAST: CPT

## 2023-05-17 PROCEDURE — 83735 ASSAY OF MAGNESIUM: CPT

## 2023-05-17 PROCEDURE — 96365 THER/PROPH/DIAG IV INF INIT: CPT

## 2023-05-17 PROCEDURE — 71275 CT ANGIOGRAPHY CHEST: CPT

## 2023-05-17 PROCEDURE — 83690 ASSAY OF LIPASE: CPT

## 2023-05-17 RX ORDER — AZITHROMYCIN 250 MG/1
TABLET, FILM COATED ORAL
Qty: 1 PACKET | Refills: 0 | Status: SHIPPED | OUTPATIENT
Start: 2023-05-17 | End: 2023-05-21

## 2023-05-17 RX ORDER — POLYETHYLENE GLYCOL 3350 17 G/17G
17 POWDER, FOR SOLUTION ORAL DAILY PRN
Qty: 510 G | Refills: 0 | Status: SHIPPED | OUTPATIENT
Start: 2023-05-17 | End: 2023-06-16

## 2023-05-17 RX ORDER — ONDANSETRON 4 MG/1
4 TABLET, ORALLY DISINTEGRATING ORAL 3 TIMES DAILY PRN
Qty: 9 TABLET | Refills: 0 | Status: SHIPPED | OUTPATIENT
Start: 2023-05-17 | End: 2023-05-20

## 2023-05-17 RX ORDER — AMOXICILLIN AND CLAVULANATE POTASSIUM 875; 125 MG/1; MG/1
1 TABLET, FILM COATED ORAL 2 TIMES DAILY
Qty: 20 TABLET | Refills: 0 | Status: SHIPPED | OUTPATIENT
Start: 2023-05-17 | End: 2023-05-27

## 2023-05-17 RX ADMIN — CEFTRIAXONE SODIUM 1000 MG: 1 INJECTION, POWDER, FOR SOLUTION INTRAMUSCULAR; INTRAVENOUS at 16:46

## 2023-05-17 RX ADMIN — IOPAMIDOL 100 ML: 755 INJECTION, SOLUTION INTRAVENOUS at 16:45

## 2023-05-17 ASSESSMENT — PAIN DESCRIPTION - DESCRIPTORS: DESCRIPTORS: ACHING

## 2023-05-17 ASSESSMENT — PAIN - FUNCTIONAL ASSESSMENT: PAIN_FUNCTIONAL_ASSESSMENT: 0-10

## 2023-05-17 ASSESSMENT — PAIN DESCRIPTION - LOCATION: LOCATION: ABDOMEN;CHEST;BACK

## 2023-05-17 ASSESSMENT — PAIN DESCRIPTION - PAIN TYPE: TYPE: ACUTE PAIN

## 2023-05-17 ASSESSMENT — PAIN SCALES - GENERAL: PAINLEVEL_OUTOF10: 5

## 2023-05-17 NOTE — ED NOTES
AVS rev'd with pt. And spouse and copy given. Pulse regular. Extremities warm. Respirations regular and quiet. Mucous membranes pink & moist. Alert and oriented times 3. No nausea or vomiting. Range of motion within patient's limits. Skin pink, warm and dry. Calm and cooperative. Pt. Denies dizziness.      Rosario Collins RN  05/17/23 8088

## 2023-05-17 NOTE — ED NOTES
Patient presents to the ED via private auto with complaints of lower abdominal pain, chest pain, lower back pain, and pain between her shoulder blades. Complaints of heart rate at home in the 120s. States that her last bowel movement about two days ago. Informed her PCP that she had drainage running into her throat that was making her cough and was sent in a prescription which she has not yet taken. Has history of DVT and PE. Has been taking her eliquis as prescribed.      Zeferino Pastrana, MARGE  05/17/23 0135

## 2023-05-17 NOTE — DISCHARGE INSTRUCTIONS
Over the counter Tylenol as needed for pain. Zofran as needed for nausea. Antibiotics as prescribed for UTI and infection in the lungs. Glycolax 1 capful in 12 ounces of water or juice orally daily until bowel movements are soft and easy to pass. Return to ED for uncontrollable pain, vomiting, fever, or any other signs of worsening.

## 2023-05-18 ENCOUNTER — CARE COORDINATION (OUTPATIENT)
Dept: CARE COORDINATION | Age: 53
End: 2023-05-18

## 2023-05-18 SDOH — HEALTH STABILITY: MENTAL HEALTH: HOW OFTEN DO YOU HAVE A DRINK CONTAINING ALCOHOL?: MONTHLY OR LESS

## 2023-05-18 SDOH — HEALTH STABILITY: PHYSICAL HEALTH: ON AVERAGE, HOW MANY DAYS PER WEEK DO YOU ENGAGE IN MODERATE TO STRENUOUS EXERCISE (LIKE A BRISK WALK)?: 0 DAYS

## 2023-05-18 SDOH — SOCIAL STABILITY: SOCIAL NETWORK: HOW OFTEN DO YOU GET TOGETHER WITH FRIENDS OR RELATIVES?: MORE THAN THREE TIMES A WEEK

## 2023-05-18 SDOH — ECONOMIC STABILITY: INCOME INSECURITY: HOW HARD IS IT FOR YOU TO PAY FOR THE VERY BASICS LIKE FOOD, HOUSING, MEDICAL CARE, AND HEATING?: NOT HARD AT ALL

## 2023-05-18 SDOH — HEALTH STABILITY: MENTAL HEALTH: HOW MANY STANDARD DRINKS CONTAINING ALCOHOL DO YOU HAVE ON A TYPICAL DAY?: 1 OR 2

## 2023-05-18 SDOH — SOCIAL STABILITY: SOCIAL NETWORK
IN A TYPICAL WEEK, HOW MANY TIMES DO YOU TALK ON THE PHONE WITH FAMILY, FRIENDS, OR NEIGHBORS?: MORE THAN THREE TIMES A WEEK

## 2023-05-18 SDOH — SOCIAL STABILITY: SOCIAL NETWORK
DO YOU BELONG TO ANY CLUBS OR ORGANIZATIONS SUCH AS CHURCH GROUPS UNIONS, FRATERNAL OR ATHLETIC GROUPS, OR SCHOOL GROUPS?: NO

## 2023-05-18 SDOH — ECONOMIC STABILITY: FOOD INSECURITY: WITHIN THE PAST 12 MONTHS, YOU WORRIED THAT YOUR FOOD WOULD RUN OUT BEFORE YOU GOT MONEY TO BUY MORE.: NEVER TRUE

## 2023-05-18 SDOH — ECONOMIC STABILITY: INCOME INSECURITY: IN THE LAST 12 MONTHS, WAS THERE A TIME WHEN YOU WERE NOT ABLE TO PAY THE MORTGAGE OR RENT ON TIME?: NO

## 2023-05-18 SDOH — ECONOMIC STABILITY: HOUSING INSECURITY: IN THE LAST 12 MONTHS, HOW MANY PLACES HAVE YOU LIVED?: 1

## 2023-05-18 SDOH — HEALTH STABILITY: PHYSICAL HEALTH: ON AVERAGE, HOW MANY MINUTES DO YOU ENGAGE IN EXERCISE AT THIS LEVEL?: 0 MIN

## 2023-05-18 SDOH — ECONOMIC STABILITY: TRANSPORTATION INSECURITY
IN THE PAST 12 MONTHS, HAS THE LACK OF TRANSPORTATION KEPT YOU FROM MEDICAL APPOINTMENTS OR FROM GETTING MEDICATIONS?: NO

## 2023-05-18 SDOH — SOCIAL STABILITY: SOCIAL NETWORK: HOW OFTEN DO YOU ATTEND CHURCH OR RELIGIOUS SERVICES?: NEVER

## 2023-05-18 SDOH — SOCIAL STABILITY: SOCIAL NETWORK: ARE YOU MARRIED, WIDOWED, DIVORCED, SEPARATED, NEVER MARRIED, OR LIVING WITH A PARTNER?: MARRIED

## 2023-05-18 SDOH — ECONOMIC STABILITY: TRANSPORTATION INSECURITY
IN THE PAST 12 MONTHS, HAS LACK OF TRANSPORTATION KEPT YOU FROM MEETINGS, WORK, OR FROM GETTING THINGS NEEDED FOR DAILY LIVING?: NO

## 2023-05-18 SDOH — HEALTH STABILITY: MENTAL HEALTH
STRESS IS WHEN SOMEONE FEELS TENSE, NERVOUS, ANXIOUS, OR CAN'T SLEEP AT NIGHT BECAUSE THEIR MIND IS TROUBLED. HOW STRESSED ARE YOU?: ONLY A LITTLE

## 2023-05-18 SDOH — SOCIAL STABILITY: SOCIAL NETWORK: HOW OFTEN DO YOU ATTENT MEETINGS OF THE CLUB OR ORGANIZATION YOU BELONG TO?: NEVER

## 2023-05-18 SDOH — ECONOMIC STABILITY: FOOD INSECURITY: WITHIN THE PAST 12 MONTHS, THE FOOD YOU BOUGHT JUST DIDN'T LAST AND YOU DIDN'T HAVE MONEY TO GET MORE.: NEVER TRUE

## 2023-05-18 ASSESSMENT — ENCOUNTER SYMPTOMS
ABDOMINAL PAIN: 1
SORE THROAT: 0
COUGH: 1
WHEEZING: 0
VOMITING: 0
SHORTNESS OF BREATH: 1
SPUTUM PRODUCTION: 1
BACK PAIN: 1
CONSTIPATION: 1

## 2023-05-18 NOTE — CARE COORDINATION
Ambulatory Care Coordination Note  2023    Patient Current Location:  Home: [address] [place] [address]    ACM contacted the patient by telephone. Verified name and  with patient as identifiers. Provided introduction to self, and explanation of the ACM role. ACM: Elizabeth Gregory RN    Challenges to be reviewed by the provider   Additional needs identified to be addressed with provider: Yes    Patient wanted you to know she is no longer taking Tramadol land has removed it from her home. Has GYN appointment scheduled for . Method of communication with provider: chart routing. Patient was called for Care Coordination enrollment s/p recent list referral for assistance with the management of her hypertension, healthcare needs, and in f/u to recent ED visit for c/o chest pain, SOB, and back pain. Care Coordination program was reviewed with patient and initial eval information obtained. Patient shared she is feeling better today and her  is picking up antibiotics as directed. Patient reported she has also scheduled OBGYN f/u appointment for  and has PCP f/u appointment scheduled for . Patient shared she continues with intermittent pain. ACM reviewed signs/symptoms patient should report and the need for early symptom recognition and follow up. Patient verbalized understanding and she denied any questions re: her recent discharge instructions. Patient continues to take her medications as directed and she denied any current questions or concerns. ACM educated patient on the importance of taking medications as directed and completing full course of antibiotics as directed. Patient verbalized understanding. ACM also discussed use of probiotic, and she acknowledged understanding. Patient denied any concerns with cost/coverage of medications and she was instructed to call with any changes. Med Rec was completed.     Patient shared she lives with her  and she

## 2023-05-18 NOTE — ED PROVIDER NOTES
3 times daily as needed for Nausea or Vomiting, Disp-9 tablet, R-0Normal                (Please note that portions of this note were completed with a voice recognition program.  Efforts were made to edit the dictations but occasionally words are mis-transcribed.)       Canelo Avelar MD  05/18/23 2184

## 2023-05-19 LAB
BACTERIA UR CULT: ABNORMAL
EKG ATRIAL RATE: 88 BPM
EKG P AXIS: 26 DEGREES
EKG P-R INTERVAL: 170 MS
EKG Q-T INTERVAL: 382 MS
EKG QRS DURATION: 86 MS
EKG QTC CALCULATION (BAZETT): 462 MS
EKG R AXIS: 16 DEGREES
EKG T AXIS: 14 DEGREES
EKG VENTRICULAR RATE: 88 BPM
ORGANISM: ABNORMAL

## 2023-05-22 ENCOUNTER — OFFICE VISIT (OUTPATIENT)
Dept: FAMILY MEDICINE CLINIC | Age: 53
End: 2023-05-22
Payer: MEDICARE

## 2023-05-22 VITALS
HEART RATE: 64 BPM | TEMPERATURE: 97.7 F | SYSTOLIC BLOOD PRESSURE: 124 MMHG | RESPIRATION RATE: 14 BRPM | DIASTOLIC BLOOD PRESSURE: 70 MMHG | BODY MASS INDEX: 47.43 KG/M2 | WEIGHT: 285 LBS | OXYGEN SATURATION: 97 %

## 2023-05-22 DIAGNOSIS — N94.89 ADNEXAL MASS: ICD-10-CM

## 2023-05-22 DIAGNOSIS — N30.00 ACUTE CYSTITIS WITHOUT HEMATURIA: ICD-10-CM

## 2023-05-22 DIAGNOSIS — J18.9 PNEUMONIA OF BOTH LOWER LOBES DUE TO INFECTIOUS ORGANISM: Primary | ICD-10-CM

## 2023-05-22 PROCEDURE — 3074F SYST BP LT 130 MM HG: CPT | Performed by: FAMILY MEDICINE

## 2023-05-22 PROCEDURE — 99214 OFFICE O/P EST MOD 30 MIN: CPT | Performed by: FAMILY MEDICINE

## 2023-05-22 PROCEDURE — 3078F DIAST BP <80 MM HG: CPT | Performed by: FAMILY MEDICINE

## 2023-05-22 RX ORDER — TRAZODONE HYDROCHLORIDE 50 MG/1
TABLET ORAL
Qty: 60 TABLET | Refills: 0 | Status: SHIPPED | OUTPATIENT
Start: 2023-05-22

## 2023-05-22 RX ORDER — TRAZODONE HYDROCHLORIDE 150 MG/1
TABLET ORAL
Qty: 90 TABLET | Refills: 3 | OUTPATIENT
Start: 2023-05-22

## 2023-05-22 ASSESSMENT — ENCOUNTER SYMPTOMS
CONSTIPATION: 1
ABDOMINAL PAIN: 1
NAUSEA: 0
VOMITING: 0
SHORTNESS OF BREATH: 0
WHEEZING: 0
DIARRHEA: 0
COUGH: 1

## 2023-05-22 NOTE — PROGRESS NOTES
[address]  [address] [place] [address]  Phone:  [phone]          Name: Kamilah Lainez  : 1970    Chief Complaint   Patient presents with    ED Follow-up     [facility]  23. Dx: pneumonia. Patient c/o SOB and fatigue        HPI:     Kamilah Lainez is a 48 y.o. female who presents today for follow up of abdominal pain and chest pain. She was seen at [facility] ER on  with these complaints and reported they had been present for 2 days. CTA chest showed no acute pulmonary pathology. CT abdomen/pelvis revealed moderate fecal material suggesting fecal stasis but no evidence of bowel obstruction. She had a cystic lesion in the right adnexa measuring 4.8 x 4.9 cm so a pelvic ultrasound was advised for further evaluation. She was also treated with Augmentin for a UTI and Azithromycin for pneumonia. She's been checking her oxygen at home and it's been above 90%. She's still coughing. No further urinary symptoms. Current Outpatient Medications:     traZODone (DESYREL) 50 MG tablet, TAKE 1 TO 2 TABLETS BY MOUTH NIGHTLY AS NEEDED FOR INSOMNIA, Disp: 60 tablet, Rfl: 0    Nutritional Supplements (MENOPAUSE FORMULA) TABS, Take by mouth, Disp: , Rfl:     diphenhydrAMINE-APAP, sleep, (TYLENOL PM EXTRA STRENGTH PO), Take by mouth nightly as needed, Disp: , Rfl:     amoxicillin-clavulanate (AUGMENTIN) 875-125 MG per tablet, Take 1 tablet by mouth 2 times daily for 10 days For infection, Disp: 20 tablet, Rfl: 0    polyethylene glycol (GLYCOLAX) 17 GM/SCOOP powder, Take 17 g by mouth daily as needed (constipation) Add to 12 ounces of water or juice. , Disp: 510 g, Rfl: 0    promethazine-dextromethorphan (PROMETHAZINE-DM) 6.25-15 MG/5ML syrup, Take 2.5 mLs by mouth 4 times daily as needed for Cough, Disp: 70 mL, Rfl: 0    levocetirizine (XYZAL) 5 MG tablet, Take 1 tablet by mouth nightly, Disp: 30 tablet, Rfl: 0    oxybutynin (DITROPAN) 5 MG tablet, TAKE 1 TABLET BY MOUTH TWICE

## 2023-05-25 ENCOUNTER — CARE COORDINATION (OUTPATIENT)
Dept: CARE COORDINATION | Age: 53
End: 2023-05-25

## 2023-05-25 DIAGNOSIS — I82.412 ACUTE DEEP VEIN THROMBOSIS (DVT) OF FEMORAL VEIN OF LEFT LOWER EXTREMITY (HCC): ICD-10-CM

## 2023-05-25 RX ORDER — APIXABAN 2.5 MG/1
TABLET, FILM COATED ORAL
Qty: 200 TABLET | Refills: 2 | Status: SHIPPED | OUTPATIENT
Start: 2023-05-25

## 2023-05-25 NOTE — CARE COORDINATION
Attempted to reach patient for continued Care Coordination follow up and education re: the management of her hypertension and healthcare needs. Patient was unavailable at the time of my call, and generic voicemail message was left asking patient to please return call to my direct number. Will continue to work to f/u with patient in the future.
plan of care. I will notify my provider of any symptoms that indicate a worsening of my condition.     Barriers: overwhelmed by complexity of regimen, stress, and lack of education  Plan for overcoming my barriers: Continue to provide education to patient and monitor for additional needs   Confidence: 8/10  Anticipated Goal Completion Date: 8/15/2023                Future Appointments   Date Time Provider Guillermina Turner   5/30/2023  1:00 PM Chasity Lebron MD [place] [facility]   6/15/2023  8:15 PM SCHEDULE, [facility]UST SLEEP TECH [address]   7/6/2023  1:15 PM Jayme Dumas MD N [facility] 4724   7/27/2023  3:00 PM Karyle Forte, APRN - CNP N Pulm Med [facility]   9/28/2023 11:30 AM Lenore Murphy MD N Oncology [facility]   ,   General Assessment    Do you have any symptoms that are causing concern?: No     , and Care Coordination Episodes    Type: Amb Care Management  Episode: Rising Risk   Noted: 5/18/2023  Comments: List Ref -

## 2023-05-26 ENCOUNTER — CARE COORDINATION (OUTPATIENT)
Dept: CARE COORDINATION | Age: 53
End: 2023-05-26

## 2023-05-30 ENCOUNTER — OFFICE VISIT (OUTPATIENT)
Dept: FAMILY MEDICINE CLINIC | Age: 53
End: 2023-05-30
Payer: MEDICARE

## 2023-05-30 VITALS
RESPIRATION RATE: 16 BRPM | BODY MASS INDEX: 47.48 KG/M2 | HEIGHT: 65 IN | WEIGHT: 285 LBS | TEMPERATURE: 97.5 F | DIASTOLIC BLOOD PRESSURE: 70 MMHG | OXYGEN SATURATION: 97 % | HEART RATE: 64 BPM | SYSTOLIC BLOOD PRESSURE: 132 MMHG

## 2023-05-30 DIAGNOSIS — N94.89 ADNEXAL MASS: ICD-10-CM

## 2023-05-30 DIAGNOSIS — E11.9 TYPE 2 DIABETES MELLITUS WITHOUT COMPLICATION, WITHOUT LONG-TERM CURRENT USE OF INSULIN (HCC): Primary | ICD-10-CM

## 2023-05-30 LAB — HBA1C MFR BLD: 5.3 %

## 2023-05-30 PROCEDURE — 3075F SYST BP GE 130 - 139MM HG: CPT | Performed by: FAMILY MEDICINE

## 2023-05-30 PROCEDURE — 3044F HG A1C LEVEL LT 7.0%: CPT | Performed by: FAMILY MEDICINE

## 2023-05-30 PROCEDURE — 3078F DIAST BP <80 MM HG: CPT | Performed by: FAMILY MEDICINE

## 2023-05-30 PROCEDURE — 99214 OFFICE O/P EST MOD 30 MIN: CPT | Performed by: FAMILY MEDICINE

## 2023-05-30 PROCEDURE — 83036 HEMOGLOBIN GLYCOSYLATED A1C: CPT | Performed by: FAMILY MEDICINE

## 2023-05-30 RX ORDER — ONDANSETRON 4 MG/1
4 TABLET, ORALLY DISINTEGRATING ORAL 3 TIMES DAILY PRN
Qty: 21 TABLET | Refills: 0 | Status: SHIPPED | OUTPATIENT
Start: 2023-05-30

## 2023-05-30 NOTE — PROGRESS NOTES
[address]  [address] [place] [address]  Phone:  [phone]          Name: Eddie Brandon  : 1970    Chief Complaint   Patient presents with    Diabetes       HPI:     Eddie Brandon is a 48 y.o. female who presents today for follow up of medication-induced diabetes. This was diagnosed years ago in [place] but has improved with diet changes. Her last HbA1c was 5.4% in January. She states that she was skinny but was started on Risperidone for paranoid schizophrenia which has caused her to gain weight. Her BGs at home range from  fasting. The highest she's seen was 184 shortly after eating. She reports that since having COVID, most meats don't taste right to her and make her nauseous. She's been following with Dr. Walt Jarrell for an adnexal mass and was referred to [facility] for a hysterectomy. She will be going to [facility] instead because of her insurance coverage. Dr. Walt Jarrell prescribed her Macrobid for a UTI and Shannon Thomson states that it's not totally clear yet. Current Outpatient Medications:     ELIQUIS 2.5 MG TABS tablet, TAKE 1 TABLET BY MOUTH TWICE  DAILY, Disp: 200 tablet, Rfl: 2    traZODone (DESYREL) 50 MG tablet, TAKE 1 TO 2 TABLETS BY MOUTH NIGHTLY AS NEEDED FOR INSOMNIA, Disp: 60 tablet, Rfl: 0    Nutritional Supplements (MENOPAUSE FORMULA) TABS, Take by mouth, Disp: , Rfl:     diphenhydrAMINE-APAP, sleep, (TYLENOL PM EXTRA STRENGTH PO), Take by mouth nightly as needed, Disp: , Rfl:     polyethylene glycol (GLYCOLAX) 17 GM/SCOOP powder, Take 17 g by mouth daily as needed (constipation) Add to 12 ounces of water or juice. , Disp: 510 g, Rfl: 0    levocetirizine (XYZAL) 5 MG tablet, Take 1 tablet by mouth nightly, Disp: 30 tablet, Rfl: 0    oxybutynin (DITROPAN) 5 MG tablet, TAKE 1 TABLET BY MOUTH TWICE  DAILY, Disp: 180 tablet, Rfl: 1    nystatin (MYCOSTATIN) 342825 UNIT/GM powder, APPLY  POWDER TOPICALLY TO AFFECTED AREA THREE TIMES DAILY, Disp: 60 g, Rfl:

## 2023-06-02 ENCOUNTER — CARE COORDINATION (OUTPATIENT)
Dept: CARE COORDINATION | Age: 53
End: 2023-06-02

## 2023-06-02 NOTE — CARE COORDINATION
Guillermina Turner   6/16/2023  9:30 AM [facility] [place] [facility]   7/6/2023  1:15 PM Sage Erickson MD [address] [place] [address]   7/27/2023  3:00 PM EFRAÍN Lopes - CNP [facility]o [facility]   9/28/2023 11:30 AM Delia Crandall MD N Oncology [facility]   12/4/2023  1:15 PM Magnus Kelley MD Warden Quale [facility]

## 2023-06-06 ENCOUNTER — TELEPHONE (OUTPATIENT)
Dept: FAMILY MEDICINE CLINIC | Age: 53
End: 2023-06-06

## 2023-06-06 ENCOUNTER — CARE COORDINATION (OUTPATIENT)
Dept: CARE COORDINATION | Age: 53
End: 2023-06-06

## 2023-06-06 RX ORDER — KETOROLAC TROMETHAMINE 10 MG/1
10 TABLET, FILM COATED ORAL
COMMUNITY

## 2023-06-06 NOTE — CARE COORDINATION
Ambulatory Care Coordination Note  2023    Patient Current Location:  Home: [address] [place], [place] [id number]     ACM contacted the patient by telephone. Verified name and  with patient as identifiers. Provided introduction to self, and explanation of the ACM role. Challenges to be reviewed by the provider   Additional needs identified to be addressed with provider: No    none         Method of communication with provider: none. ACM: Lukas Killian RN    Patient returned call for continued Care Coordination follow up and education re: the management of her DM and healthcare needs. Patient shared she has been doing \"ok\" and continues to take prn pain medication for her abdominal/pelvic pain. Patient reported she is now scheduled to f/u with a GYN surgeon at [facility]. V's on  and believes they will be scheduling her for a NAIF at that time. Patient denied any concerns with transportation to her appointment. ACM encouraged patient to write down any questions/concerns and bring with her to the appointment. Patient verbalized understanding. Patient denied any change in her pain or c/o bleeding being present. ACM educated patient on signs/symptoms to report and the importance of early symptom recognition and follow up. Patient was also educated on the need to bring current medications with her to appointments and she verbalized understanding. Patient shared she continues to monitor her BS 2-3 x a day and they remain stable with recent readings in the  range. Patient denied any c/o hypoglycemia being present. Patient shared she continues to work to eat routine meals and supplements with Glucerna as needed for a meal replacement. DM education was reviewed with patient. ACM educated on the importance of routine BS monitoring and need to keep BS controlled and DM managed to prevent the development/worsening of complications. Patient verbalized understanding.   ACM reviewed the need for

## 2023-06-06 NOTE — TELEPHONE ENCOUNTER
Pt has a runny nose and watery eyes. Discussed she needs to continue her allergy medications that are on her list. She is asking if she can do anything else because she is suppose to get a hysterectomy then end of the month and does not want to miss is because she is sick. Any other advice for melany?

## 2023-06-07 RX ORDER — CALCIUM CITRATE/VITAMIN D3 200MG-6.25
1 TABLET ORAL DAILY
Qty: 100 EACH | Refills: 5 | Status: SHIPPED | OUTPATIENT
Start: 2023-06-07

## 2023-06-07 NOTE — TELEPHONE ENCOUNTER
Pt requesting refill test strips    5/30/2023 12/4/2023    Script entered    Pt also wanted to let you know she is seeing Dr Caroline Neri in [place] for consult of hysterectomy 6/16/23

## 2023-06-08 DIAGNOSIS — J30.2 SEASONAL ALLERGIES: ICD-10-CM

## 2023-06-09 ENCOUNTER — TELEPHONE (OUTPATIENT)
Dept: FAMILY MEDICINE CLINIC | Age: 53
End: 2023-06-09

## 2023-06-09 RX ORDER — LEVOCETIRIZINE DIHYDROCHLORIDE 5 MG/1
5 TABLET, FILM COATED ORAL NIGHTLY
Qty: 30 TABLET | Refills: 0 | Status: SHIPPED | OUTPATIENT
Start: 2023-06-09

## 2023-06-09 NOTE — TELEPHONE ENCOUNTER
Pt called stating that she has been taking her allergy pill, but still has a lot of mucus. Writer advised her to try an OTC remedy like Mucinex. She stated she does not like to take OTC meds and would like you to send her something in.

## 2023-06-16 ENCOUNTER — OFFICE VISIT (OUTPATIENT)
Dept: GYNECOLOGIC ONCOLOGY | Age: 53
End: 2023-06-16

## 2023-06-16 ENCOUNTER — TELEPHONE (OUTPATIENT)
Dept: ONCOLOGY | Age: 53
End: 2023-06-16

## 2023-06-16 VITALS
BODY MASS INDEX: 48.05 KG/M2 | WEIGHT: 288.4 LBS | SYSTOLIC BLOOD PRESSURE: 122 MMHG | OXYGEN SATURATION: 96 % | HEART RATE: 66 BPM | HEIGHT: 65 IN | DIASTOLIC BLOOD PRESSURE: 83 MMHG

## 2023-06-16 DIAGNOSIS — N90.89 VULVAR LESION: ICD-10-CM

## 2023-06-16 DIAGNOSIS — R39.198 DIFFICULTY IN URINATION: ICD-10-CM

## 2023-06-16 DIAGNOSIS — N89.5 NARROWING OR CLOSURE OF VAGINA: ICD-10-CM

## 2023-06-16 DIAGNOSIS — R19.00 PELVIC MASS: Primary | ICD-10-CM

## 2023-06-16 DIAGNOSIS — R10.2 PELVIC PAIN: ICD-10-CM

## 2023-06-16 ASSESSMENT — ENCOUNTER SYMPTOMS
GASTROINTESTINAL NEGATIVE: 1
RESPIRATORY NEGATIVE: 1
BACK PAIN: 1
EYES NEGATIVE: 1

## 2023-06-19 ENCOUNTER — TELEPHONE (OUTPATIENT)
Dept: FAMILY MEDICINE CLINIC | Age: 53
End: 2023-06-19

## 2023-06-19 NOTE — TELEPHONE ENCOUNTER
Pt scheduled for Pre-op in July. She wanted to notify you that she is spitting up green phlegm in the AM again.

## 2023-06-19 NOTE — TELEPHONE ENCOUNTER
----- Message from Camilla Carr sent at 6/19/2023  8:17 AM EDT -----  Subject: Message to Provider    QUESTIONS  Information for Provider? Patient called to schedule Pre Op for   Hysterectomy scheduled 7/26/2023, patient would like to know which   medications she would have to stop taking before scheduled surgery  ---------------------------------------------------------------------------  --------------  Abbe TATE  [phone]; OK to leave message on voicemail  ---------------------------------------------------------------------------  --------------  SCRIPT ANSWERS  Relationship to Patient? Self  Do you have questions for your provider that need to be answered prior to   scheduling your pre-op appointment?  Yes

## 2023-06-20 ENCOUNTER — PREP FOR PROCEDURE (OUTPATIENT)
Dept: GYNECOLOGIC ONCOLOGY | Age: 53
End: 2023-06-20

## 2023-06-20 ENCOUNTER — CARE COORDINATION (OUTPATIENT)
Dept: CARE COORDINATION | Age: 53
End: 2023-06-20

## 2023-06-20 ENCOUNTER — TELEPHONE (OUTPATIENT)
Dept: GYNECOLOGIC ONCOLOGY | Age: 53
End: 2023-06-20

## 2023-06-20 DIAGNOSIS — E66.01 MORBID OBESITY DUE TO EXCESS CALORIES (HCC): ICD-10-CM

## 2023-06-20 DIAGNOSIS — E78.5 HYPERLIPIDEMIA, UNSPECIFIED HYPERLIPIDEMIA TYPE: Primary | ICD-10-CM

## 2023-06-20 DIAGNOSIS — Z01.818 PRE-OP EVALUATION: ICD-10-CM

## 2023-06-20 RX ORDER — SODIUM CHLORIDE 9 MG/ML
INJECTION, SOLUTION INTRAVENOUS PRN
Status: CANCELLED | OUTPATIENT
Start: 2023-06-20

## 2023-06-20 RX ORDER — SODIUM CHLORIDE 0.9 % (FLUSH) 0.9 %
5-40 SYRINGE (ML) INJECTION PRN
Status: CANCELLED | OUTPATIENT
Start: 2023-06-20

## 2023-06-20 RX ORDER — SODIUM CHLORIDE 0.9 % (FLUSH) 0.9 %
5-40 SYRINGE (ML) INJECTION EVERY 12 HOURS SCHEDULED
Status: CANCELLED | OUTPATIENT
Start: 2023-06-20

## 2023-06-20 RX ORDER — SODIUM CHLORIDE 9 MG/ML
INJECTION, SOLUTION INTRAVENOUS CONTINUOUS
Status: CANCELLED | OUTPATIENT
Start: 2023-06-20

## 2023-06-20 RX ORDER — ENOXAPARIN SODIUM 100 MG/ML
40 INJECTION SUBCUTANEOUS ONCE
Status: CANCELLED | OUTPATIENT
Start: 2023-06-20

## 2023-06-20 RX ORDER — BLOOD-GLUCOSE METER
1 EACH MISCELLANEOUS DAILY
Qty: 1 KIT | Refills: 0 | Status: SHIPPED | OUTPATIENT
Start: 2023-06-20

## 2023-06-20 NOTE — TELEPHONE ENCOUNTER
Thank you yes we can see the results in her chart so therefore she will be due October 2023 and this order can be completed through her PCP

## 2023-06-20 NOTE — TELEPHONE ENCOUNTER
----- Message from [facility] sent at 6/20/2023  8:43 AM EDT -----  Subject: Medication Problem    Medication: Blood Glucose Monitoring Suppl (ONE TOUCH ULTRA 2) w/Device   KIT  Dosage: test sugar three times daily   Ordering Provider: elaina    Question/Problem: pharmacy sent wrong test strips she need one touch ultra   2 strips and they sent Verio .  please send asap to mail order pharmacy       Pharmacy: [address] (Jessica Jenkins ) - [facility], [address] [facility] [phone]    ---------------------------------------------------------------------------  --------------  Abbe TATE  [phone]; OK to leave message on voicemail  ---------------------------------------------------------------------------  --------------    SCRIPT ANSWERS  Relationship to Patient: Self

## 2023-06-20 NOTE — ACP (ADVANCE CARE PLANNING)
Advance Care Planning   Healthcare Decision Maker:    Primary Decision Maker: Nadir Mckeon - [phone]    Today we documented Decision Maker(s) consistent with Legal Next of Kin hierarchy. Patient is interested in completing official ACP documents and Care Coordination  consulted for additional assistance. Healthcare Decision Maker information was reviewed and verified with patient.

## 2023-06-20 NOTE — TELEPHONE ENCOUNTER
Physician message previous mammogram seen and next mammogram in October given to patient, she verbalized understanding.

## 2023-06-20 NOTE — TELEPHONE ENCOUNTER
aDrio Ca called to inform provider that she had her Mammogram done already this year in October in [facility]. outpatient ambulatory through [facility]. If she has another one this year her Insurance wont cover.

## 2023-06-20 NOTE — TELEPHONE ENCOUNTER
Called patient several times on 6/19/23 and 6/20/23 and patient has [employer] assistance and will not go through.

## 2023-06-20 NOTE — CARE COORDINATION
Ambulatory Care Coordination Note  2023    Patient Current Location:  Home: [address] [place] [address]     ACM contacted the patient by telephone. Verified name and  with patient as identifiers. Provided introduction to self, and explanation of the ACM role. Challenges to be reviewed by the provider   Additional needs identified to be addressed with provider: No    none         Method of communication with provider: none. ACM: Benny Clements RN    Patient was called for continued Care Coordination follow up and education re: the management of her DM and healthcare needs. Patient shared she is doing well and her recent cough/congestion symptoms are improved today. Patient was encouraged to continue to push fluids and ACM educated patient on signs/symptoms to report. Patient verbalized understanding. Patient shared she was able to f/u with her GYN surgeon last Friday and she denied any questions re: her appointment. Patient stated she is scheduled for surgery at the end of July and pre-op PCP and hem/onc appointments have been scheduled. Patient is also scheduled for MRI tomorrow. Patient denied any questions re: MRI or appointments. Patient shared she continues to monitor BS approx 3 x a day and BS have remained stable with recent readings being 107-112. Patient denied any recent c/o hypoglycemia or hyperglycemia being present. Hypoglycemia and hyperglycemia prevention education were reviewed with patient as well as signs/symptoms of hypoglycemia and hyperglycemia. Patient verbalized understanding. ACM reviewed DM education and zone information as well as the importance of keeping her BS controlled and DM managed to prevent the development/worsening of complications. Patient acknowledged understanding. DM diet education was also reviewed with patient as well as need for ongoing diet adherence.   Dietician consult was also placed per patient's request.    Healthcare Decision

## 2023-06-21 ENCOUNTER — CARE COORDINATION (OUTPATIENT)
Dept: CARE COORDINATION | Age: 53
End: 2023-06-21

## 2023-06-21 ENCOUNTER — HOSPITAL ENCOUNTER (OUTPATIENT)
Dept: MRI IMAGING | Age: 53
Discharge: HOME | End: 2023-06-21
Payer: MEDICARE

## 2023-06-21 DIAGNOSIS — N83.209 CYST OF OVARY, UNSPECIFIED LATERALITY: ICD-10-CM

## 2023-06-21 PROCEDURE — A9579 GAD-BASE MR CONTRAST NOS,1ML: HCPCS | Performed by: OBSTETRICS & GYNECOLOGY

## 2023-06-21 PROCEDURE — 6360000004 HC RX CONTRAST MEDICATION: Performed by: OBSTETRICS & GYNECOLOGY

## 2023-06-21 PROCEDURE — 72197 MRI PELVIS W/O & W/DYE: CPT

## 2023-06-21 RX ADMIN — GADOTERIDOL 20 ML: 279.3 INJECTION, SOLUTION INTRAVENOUS at 13:05

## 2023-06-21 NOTE — CARE COORDINATION
RD received referral from Keyon BIANCHI:  Patient is interested in dietary information re: the management of her diabetes and possible weight loss. Please reach out when you are able. Patient has no time/day preference. Patient does utilize Glucerna/protein shakes for breakfast daily. Please let me know if you have any questions. RD contacted [address] [place] [address] Josh regarding Dietitian referral. Pt answered, RD explained reason for call and role in care. Pt requested RD call back on a different day. Patient prefers a call tomorrow 6/22/23 around 3:30 PM. RD will contact pt as requested and follow up as appropriate.      [address] [place] [address],   [phone]

## 2023-06-22 ENCOUNTER — CARE COORDINATION (OUTPATIENT)
Dept: CARE COORDINATION | Age: 53
End: 2023-06-22

## 2023-06-22 ENCOUNTER — TELEPHONE (OUTPATIENT)
Dept: GYNECOLOGIC ONCOLOGY | Age: 53
End: 2023-06-22

## 2023-06-22 NOTE — CARE COORDINATION
Contacted [address] regarding Dietitian referral. Pt answered, RD explained reason for call and role in care. Pt requested RD call back on a different day- patient prefers a call tomorrow. RD will contact patient as requested and follow up as appropriate.          [address] [place] [address],   [phone]

## 2023-06-23 ENCOUNTER — CARE COORDINATION (OUTPATIENT)
Dept: CARE COORDINATION | Age: 53
End: 2023-06-23

## 2023-06-23 NOTE — CARE COORDINATION
Contacted Donna Waters and left voicemail regarding Dietitian referral. Left call back number and will follow up as appropriate.          [address] [place] [address],   [phone]

## 2023-06-26 ENCOUNTER — CARE COORDINATION (OUTPATIENT)
Dept: CARE COORDINATION | Age: 53
End: 2023-06-26

## 2023-06-27 ENCOUNTER — CARE COORDINATION (OUTPATIENT)
Dept: CARE COORDINATION | Age: 53
End: 2023-06-27

## 2023-06-27 ENCOUNTER — TELEMEDICINE (OUTPATIENT)
Dept: GYNECOLOGIC ONCOLOGY | Age: 53
End: 2023-06-27

## 2023-06-27 DIAGNOSIS — E66.01 MORBID OBESITY DUE TO EXCESS CALORIES (HCC): ICD-10-CM

## 2023-06-27 DIAGNOSIS — Z86.718 HISTORY OF VENOUS THROMBOEMBOLISM: ICD-10-CM

## 2023-06-27 DIAGNOSIS — E11.9 TYPE 2 DIABETES MELLITUS WITHOUT COMPLICATION, WITHOUT LONG-TERM CURRENT USE OF INSULIN (HCC): ICD-10-CM

## 2023-06-27 DIAGNOSIS — N89.7 HEMATOCOLPOS: Primary | ICD-10-CM

## 2023-06-27 DIAGNOSIS — R39.198 DIFFICULTY IN URINATION: ICD-10-CM

## 2023-06-27 DIAGNOSIS — R10.2 PELVIC PAIN: ICD-10-CM

## 2023-06-27 DIAGNOSIS — N89.5 NARROWING OR CLOSURE OF VAGINA: ICD-10-CM

## 2023-06-27 ASSESSMENT — ENCOUNTER SYMPTOMS
EYES NEGATIVE: 1
BACK PAIN: 1
RESPIRATORY NEGATIVE: 1
GASTROINTESTINAL NEGATIVE: 1

## 2023-06-28 ENCOUNTER — HOSPITAL ENCOUNTER (OUTPATIENT)
Dept: INFUSION THERAPY | Age: 53
Discharge: HOME | End: 2023-06-28
Payer: MEDICARE

## 2023-06-28 ENCOUNTER — TELEPHONE (OUTPATIENT)
Dept: GYNECOLOGIC ONCOLOGY | Age: 53
End: 2023-06-28

## 2023-06-28 ENCOUNTER — OFFICE VISIT (OUTPATIENT)
Dept: ONCOLOGY | Age: 53
End: 2023-06-28
Payer: MEDICARE

## 2023-06-28 VITALS
DIASTOLIC BLOOD PRESSURE: 68 MMHG | TEMPERATURE: 97.8 F | SYSTOLIC BLOOD PRESSURE: 104 MMHG | RESPIRATION RATE: 16 BRPM | HEART RATE: 84 BPM

## 2023-06-28 VITALS
OXYGEN SATURATION: 95 % | HEIGHT: 65 IN | RESPIRATION RATE: 16 BRPM | TEMPERATURE: 97.8 F | SYSTOLIC BLOOD PRESSURE: 104 MMHG | HEART RATE: 84 BPM | BODY MASS INDEX: 47.65 KG/M2 | WEIGHT: 286 LBS | DIASTOLIC BLOOD PRESSURE: 68 MMHG

## 2023-06-28 DIAGNOSIS — I82.412 ACUTE DEEP VEIN THROMBOSIS (DVT) OF FEMORAL VEIN OF LEFT LOWER EXTREMITY (HCC): ICD-10-CM

## 2023-06-28 DIAGNOSIS — I26.99 OTHER ACUTE PULMONARY EMBOLISM WITHOUT ACUTE COR PULMONALE (HCC): Primary | ICD-10-CM

## 2023-06-28 PROCEDURE — 99211 OFF/OP EST MAY X REQ PHY/QHP: CPT

## 2023-06-28 PROCEDURE — 3078F DIAST BP <80 MM HG: CPT | Performed by: INTERNAL MEDICINE

## 2023-06-28 PROCEDURE — 99214 OFFICE O/P EST MOD 30 MIN: CPT | Performed by: INTERNAL MEDICINE

## 2023-06-28 PROCEDURE — 3074F SYST BP LT 130 MM HG: CPT | Performed by: INTERNAL MEDICINE

## 2023-06-29 DIAGNOSIS — J30.2 SEASONAL ALLERGIES: ICD-10-CM

## 2023-06-29 RX ORDER — LEVOCETIRIZINE DIHYDROCHLORIDE 5 MG/1
5 TABLET, FILM COATED ORAL NIGHTLY
Qty: 30 TABLET | Refills: 0 | Status: SHIPPED | OUTPATIENT
Start: 2023-06-29

## 2023-06-30 ENCOUNTER — TELEPHONE (OUTPATIENT)
Dept: GYNECOLOGIC ONCOLOGY | Age: 53
End: 2023-06-30

## 2023-07-05 DIAGNOSIS — R00.0 TACHYCARDIA: ICD-10-CM

## 2023-07-06 ENCOUNTER — TELEPHONE (OUTPATIENT)
Dept: SLEEP CENTER | Age: 53
End: 2023-07-06

## 2023-07-07 ENCOUNTER — CARE COORDINATION (OUTPATIENT)
Dept: CARE COORDINATION | Age: 53
End: 2023-07-07

## 2023-07-07 NOTE — TELEPHONE ENCOUNTER
Attempted to f/u with pt as I do not see where urology appt has been rescheduled. No answer. Unable to leave message.

## 2023-07-07 NOTE — TELEPHONE ENCOUNTER
Pt returned call. She is going to wait to f/u with urology after she is seen at [address] [place] [address]. Denies further needs at this time.

## 2023-07-07 NOTE — CARE COORDINATION
Per Sayra Trejo RDN dietician education mailed to patient.         [facility]  Medication Assistance  [id number] [address]    W) [phone] (A) [phone]

## 2023-07-10 ENCOUNTER — CARE COORDINATION (OUTPATIENT)
Dept: CARE COORDINATION | Age: 53
End: 2023-07-10

## 2023-07-10 RX ORDER — HYDROXYZINE 50 MG/1
TABLET, FILM COATED ORAL
Qty: 90 TABLET | Refills: 0 | Status: SHIPPED | OUTPATIENT
Start: 2023-07-10

## 2023-07-11 ENCOUNTER — HOSPITAL ENCOUNTER (OUTPATIENT)
Age: 53
Discharge: HOME | End: 2023-07-11
Payer: MEDICARE

## 2023-07-11 ENCOUNTER — CARE COORDINATION (OUTPATIENT)
Dept: CARE COORDINATION | Age: 53
End: 2023-07-11

## 2023-07-11 ENCOUNTER — OFFICE VISIT (OUTPATIENT)
Dept: FAMILY MEDICINE CLINIC | Age: 53
End: 2023-07-11
Payer: MEDICARE

## 2023-07-11 ENCOUNTER — HOSPITAL ENCOUNTER (OUTPATIENT)
Dept: GENERAL RADIOLOGY | Age: 53
Discharge: HOME | End: 2023-07-11
Payer: MEDICARE

## 2023-07-11 VITALS
HEART RATE: 80 BPM | WEIGHT: 288 LBS | BODY MASS INDEX: 47.93 KG/M2 | TEMPERATURE: 97.5 F | RESPIRATION RATE: 16 BRPM | DIASTOLIC BLOOD PRESSURE: 72 MMHG | SYSTOLIC BLOOD PRESSURE: 124 MMHG

## 2023-07-11 DIAGNOSIS — Z01.818 PREOPERATIVE CLEARANCE: ICD-10-CM

## 2023-07-11 DIAGNOSIS — F20.0 PARANOID SCHIZOPHRENIA (HCC): ICD-10-CM

## 2023-07-11 DIAGNOSIS — R19.00 PELVIC MASS: ICD-10-CM

## 2023-07-11 DIAGNOSIS — Z01.818 PREOPERATIVE CLEARANCE: Primary | ICD-10-CM

## 2023-07-11 PROCEDURE — 99214 OFFICE O/P EST MOD 30 MIN: CPT | Performed by: FAMILY MEDICINE

## 2023-07-11 PROCEDURE — 3074F SYST BP LT 130 MM HG: CPT | Performed by: FAMILY MEDICINE

## 2023-07-11 PROCEDURE — 71046 X-RAY EXAM CHEST 2 VIEWS: CPT

## 2023-07-11 PROCEDURE — 3078F DIAST BP <80 MM HG: CPT | Performed by: FAMILY MEDICINE

## 2023-07-11 RX ORDER — IBUPROFEN 800 MG/1
800 TABLET ORAL 3 TIMES DAILY PRN
Qty: 90 TABLET | Refills: 5 | Status: SHIPPED | OUTPATIENT
Start: 2023-07-11

## 2023-07-11 RX ORDER — PENICILLIN V POTASSIUM 250 MG/1
250 TABLET ORAL 4 TIMES DAILY
Qty: 40 TABLET | Refills: 0 | Status: SHIPPED | OUTPATIENT
Start: 2023-07-11 | End: 2023-07-21

## 2023-07-11 ASSESSMENT — ENCOUNTER SYMPTOMS
NAUSEA: 0
CONSTIPATION: 0
ANAL BLEEDING: 0
SORE THROAT: 0
DIARRHEA: 0
COUGH: 0
VOMITING: 0
SHORTNESS OF BREATH: 0
EYE REDNESS: 0
RHINORRHEA: 0
BLOOD IN STOOL: 0

## 2023-07-11 NOTE — PROGRESS NOTES
needed      nystatin (MYCOSTATIN) 576725 UNIT/GM powder APPLY  POWDER TOPICALLY TO AFFECTED AREA THREE TIMES DAILY 60 g 0    albuterol sulfate HFA (VENTOLIN HFA) 108 (90 Base) MCG/ACT inhaler Inhale 2 puffs into the lungs 4 times daily as needed for Wheezing 18 g 5    omeprazole (PRILOSEC) 40 MG delayed release capsule TAKE 1 CAPSULE BY MOUTH DAILY 100 capsule 2    celecoxib (CELEBREX) 400 MG capsule Take 1 capsule by mouth daily      hydroCHLOROthiazide (HYDRODIURIL) 25 MG tablet Take 1 tablet by mouth daily      Cariprazine HCl (VRAYLAR) 6 MG CAPS capsule Take 1 capsule by mouth daily 90 capsule 1    hydrocortisone (HYTONE) 2.5 % lotion       amitriptyline (ELAVIL) 150 MG tablet Take 1 tablet by mouth nightly 30 tablet 0    fluticasone (FLONASE) 50 MCG/ACT nasal spray 1 spray by Each Nostril route daily 32 g 1    melatonin 3 MG TABS tablet Take 10 mg by mouth nightly as needed      docusate sodium (COLACE) 100 MG capsule Take 1 capsule by mouth 2 times daily      LORazepam (ATIVAN) 2 MG tablet Take 1 tablet by mouth every 8 hours as needed. risperiDONE (RISPERDAL) 4 MG tablet Take 1 tablet by mouth 2 times daily      brompheniramine-pseudoephedrine-DM 2-30-10 MG/5ML syrup Take 5 mLs by mouth 4 times daily as needed for Congestion or Cough (Patient not taking: Reported on 6/27/2023) 140 mL 0    Blood Glucose Monitoring Suppl (TRUE METRIX METER) w/Device KIT Dispense 1 glucometer kit. (Patient not taking: Reported on 6/16/2023) 1 kit 0     No current facility-administered medications for this visit. Review of Systems:      Review of Systems   Constitutional:  Positive for unexpected weight change (weight gain). Negative for chills and fever. HENT:  Negative for congestion, rhinorrhea and sore throat. Eyes:  Negative for redness and visual disturbance. Respiratory:  Negative for cough and shortness of breath. Cardiovascular:  Negative for chest pain and palpitations.    Gastrointestinal:  Negative

## 2023-07-12 ENCOUNTER — TELEPHONE (OUTPATIENT)
Dept: INFUSION THERAPY | Age: 53
End: 2023-07-12

## 2023-07-12 NOTE — CARE COORDINATION
Ambulatory Care Coordination Note  7/12/2023    Patient Current Location: [facility] to be reviewed by the provider   Additional needs identified to be addressed with provider: No  none         Method of communication with provider: none. ACM: Mel Noriega RN    Patient returned call for continued Care Coordination follow up and education re: the management of her DM and healthcare needs. Call was kept brief as patient was currently driving in the rain on her way to her appointment with [facility] GYN provider in [place], [place]. Patient inquired about who PCP was referring her to for S services s/p appointment yesterday. Patient was informed [facility] Psychiat[facility] office would be reaching out to schedule an appointment and patient verbalized understanding. Patient shared she is unable to return to [facility] at this time as she has an outstanding bill and is in the process of filing for bankruptcy. Patient also inquired if ACM could f/u with \"Delia\" at the [address] [place] [address] as she was to email patient some financial applications for her Eliquis and she has not yet received them. Patient verified her email is as follows: [email or web address]. ACM left message for Vimal Mao to please return call when she is able re: patient's Eliquis. ACM also reviewed with patient that the sleep center has been trying to contact her re: scheduling her appointment. Patient shared she does not wish to schedule anything until after she is more aware of the GYN plans and possible surgery. ACM encouraged patient to reach out and discuss this with the sleep center and patient verbalized understanding. Sleep Center contact information was texted to patient per her request.  Patient denied any other questions, concerns, or needs and she was encouraged to call with any that may develop. Received return call from Vimal Mao at the [facility] and she shared she did e-mail Eliquis information to patient's e-mail yesterday.   Vimal Mao shared she is

## 2023-07-12 NOTE — TELEPHONE ENCOUNTER
I called the patient to confirm that she received the free 30 day supply card for Eliquis. She will sign her portion of the financial assistance at her next appt. 7/21/23.

## 2023-07-13 ENCOUNTER — TELEPHONE (OUTPATIENT)
Dept: INFUSION THERAPY | Age: 53
End: 2023-07-13

## 2023-07-13 ENCOUNTER — OFFICE VISIT (OUTPATIENT)
Dept: FAMILY MEDICINE CLINIC | Age: 53
End: 2023-07-13
Payer: MEDICARE

## 2023-07-13 ENCOUNTER — TELEPHONE (OUTPATIENT)
Dept: FAMILY MEDICINE CLINIC | Age: 53
End: 2023-07-13

## 2023-07-13 VITALS
DIASTOLIC BLOOD PRESSURE: 78 MMHG | BODY MASS INDEX: 47.93 KG/M2 | HEART RATE: 68 BPM | TEMPERATURE: 97.7 F | SYSTOLIC BLOOD PRESSURE: 124 MMHG | RESPIRATION RATE: 16 BRPM | WEIGHT: 288 LBS

## 2023-07-13 DIAGNOSIS — L30.8 OTHER ECZEMA: Primary | ICD-10-CM

## 2023-07-13 PROCEDURE — 3078F DIAST BP <80 MM HG: CPT | Performed by: FAMILY MEDICINE

## 2023-07-13 PROCEDURE — 99213 OFFICE O/P EST LOW 20 MIN: CPT | Performed by: FAMILY MEDICINE

## 2023-07-13 PROCEDURE — 3074F SYST BP LT 130 MM HG: CPT | Performed by: FAMILY MEDICINE

## 2023-07-13 RX ORDER — TRIAMCINOLONE ACETONIDE 40 MG/ML
40 INJECTION, SUSPENSION INTRA-ARTICULAR; INTRAMUSCULAR ONCE
Status: COMPLETED | OUTPATIENT
Start: 2023-07-13 | End: 2023-07-13

## 2023-07-13 RX ADMIN — TRIAMCINOLONE ACETONIDE 40 MG: 40 INJECTION, SUSPENSION INTRA-ARTICULAR; INTRAMUSCULAR at 11:47

## 2023-07-13 NOTE — PROGRESS NOTES
[address] [address] [facility]  Phone:  [phone]          Name: Delma Grewal  : 1970    Chief Complaint   Patient presents with    Eczema       HPI:     Delma Grewal is a 48 y.o. female who presents today for evaluation of eczema. She's had it for years and it flares occasionally. Yesterday it really flared, especially on her back and arms. She's taking antihistamines. She'd like a steroid injection as sometimes that helps.       Current Outpatient Medications:     ibuprofen (ADVIL;MOTRIN) 800 MG tablet, Take 1 tablet by mouth 3 times daily as needed for Pain, Disp: 90 tablet, Rfl: 5    penicillin v potassium (VEETID) 250 MG tablet, Take 1 tablet by mouth 4 times daily for 10 days, Disp: 40 tablet, Rfl: 0    hydrOXYzine HCl (ATARAX) 50 MG tablet, TAKE 1 TABLET BY MOUTH THREE TIMES DAILY AS NEEDED FOR ITCHING, Disp: 90 tablet, Rfl: 0    metoprolol tartrate (LOPRESSOR) 25 MG tablet, Take 1 tablet by mouth 2 times daily, Disp: 180 tablet, Rfl: 3    levocetirizine (XYZAL) 5 MG tablet, Take 1 tablet by mouth nightly, Disp: 30 tablet, Rfl: 0    Blood Glucose Monitoring Suppl (ONE TOUCH ULTRA 2) w/Device KIT, 1 kit by Does not apply route daily, Disp: 1 kit, Rfl: 0    famotidine (PEPCID) 20 MG tablet, TAKE 1 TABLET BY MOUTH TWICE  DAILY, Disp: 180 tablet, Rfl: 3    traZODone (DESYREL) 150 MG tablet, TAKE 1 TABLET BY MOUTH AT NIGHT, Disp: 90 tablet, Rfl: 3    gabapentin (NEURONTIN) 100 MG capsule, TAKE 1 CAPSULE BY MOUTH DAILY, Disp: 80 capsule, Rfl: 3    simvastatin (ZOCOR) 40 MG tablet, TAKE 1 TABLET BY MOUTH DAILY, Disp: 80 tablet, Rfl: 3    gabapentin (NEURONTIN) 300 MG capsule, TAKE 1 CAPSULE BY MOUTH AT NIGHT, Disp: 80 capsule, Rfl: 3    QUEtiapine (SEROQUEL) 200 MG tablet, Take 1 tablet by mouth nightly, Disp: 30 tablet, Rfl: 1    ondansetron (ZOFRAN-ODT) 4 MG disintegrating tablet, Take 1 tablet by mouth 3 times daily as needed for Nausea or Vomiting, Disp: 21

## 2023-07-13 NOTE — PROGRESS NOTES
Administrations This Visit       triamcinolone acetonide (KENALOG-40) injection 40 mg       Admin Date  07/13/2023  11:47 Action  Given Dose  40 mg Route  IntraMUSCular Site  Dorsogluteal Left Administered By  Eulalio Chaves CMA (Merlinda Grieves)    Ordering Provider: Ruben Chiu MD    NDC: 2525-9592-44    Lot#: [id number]    : [employer] (PRIMARY CARE)    Patient Supplied?: No                    Patient instructed to remain in clinic for 20 minutes after injection and was advised to report any adverse reaction to me immediately.

## 2023-07-13 NOTE — TELEPHONE ENCOUNTER
I let the the patient know that I emailed her the forms that I need signed by her for financial assistance for Eliquis and also  2 addidtional  Rx discount cards.

## 2023-07-14 DIAGNOSIS — I82.412 ACUTE DEEP VEIN THROMBOSIS (DVT) OF FEMORAL VEIN OF LEFT LOWER EXTREMITY (HCC): ICD-10-CM

## 2023-07-18 ENCOUNTER — OFFICE VISIT (OUTPATIENT)
Dept: FAMILY MEDICINE CLINIC | Age: 53
End: 2023-07-18
Payer: MEDICARE

## 2023-07-18 VITALS
OXYGEN SATURATION: 99 % | TEMPERATURE: 97.1 F | HEIGHT: 65 IN | WEIGHT: 287 LBS | DIASTOLIC BLOOD PRESSURE: 84 MMHG | BODY MASS INDEX: 47.82 KG/M2 | RESPIRATION RATE: 17 BRPM | HEART RATE: 75 BPM | SYSTOLIC BLOOD PRESSURE: 120 MMHG

## 2023-07-18 DIAGNOSIS — L30.8 OTHER ECZEMA: Primary | ICD-10-CM

## 2023-07-18 PROCEDURE — 99213 OFFICE O/P EST LOW 20 MIN: CPT | Performed by: FAMILY MEDICINE

## 2023-07-18 PROCEDURE — 3079F DIAST BP 80-89 MM HG: CPT | Performed by: FAMILY MEDICINE

## 2023-07-18 PROCEDURE — 3074F SYST BP LT 130 MM HG: CPT | Performed by: FAMILY MEDICINE

## 2023-07-18 RX ORDER — PIMECROLIMUS 10 MG/G
CREAM TOPICAL
Qty: 100 G | Refills: 5 | Status: SHIPPED | OUTPATIENT
Start: 2023-07-18

## 2023-07-18 RX ORDER — AMITRIPTYLINE HYDROCHLORIDE 150 MG/1
150 TABLET, FILM COATED ORAL NIGHTLY
Qty: 30 TABLET | Refills: 0 | Status: SHIPPED | OUTPATIENT
Start: 2023-07-18

## 2023-07-18 NOTE — PROGRESS NOTES
[facility]  [facility]  Dept: [phone]  Dept Fax: [phone]  Loc: [phone]    Junior Matthews is a 48 y.o. female who presents today for:  Chief Complaint   Patient presents with    Other     Eczema on back        HPI:     HPI    Here for a rash on the back on and off for several weeks. On xyzal daily and hydroxyzine BID. Given kenalog injection on 7/13/23 which helped but the rash seemed to get worse again yesterday. Uses dove soap and eczema lotion. Scheduled for hysterectomy. Aloe vera for eczema is occasionally help but very greasy. Discussed not using more steroids prior to surgery. Reviewed chart forpast medical history , surgical history , allergies, social history , family history and medications.     Health Maintenance   Topic Date Due    Diabetic Alb to Cr ratio (uACR) test  Never done    Diabetic retinal exam  Never done    Lipids  06/27/2023    DTaP/Tdap/Td vaccine (1 - Tdap) 10/06/2023 (Originally 5/19/1989)    COVID-19 Vaccine (5 - Booster for Moderna series) 05/24/2024 (Originally 9/10/2022)    Hepatitis B vaccine (1 of 3 - Risk 3-dose series) 05/30/2024 (Originally 5/19/1989)    Flu vaccine (1) 08/01/2023    Depression Monitoring  01/04/2024    GFR test (Diabetes, CKD 3-4, OR last GFR 15-59)  05/17/2024    Diabetic foot exam  05/30/2024    A1C test (Diabetic or Prediabetic)  05/30/2024    Breast cancer screen  10/19/2024    Cervical cancer screen  10/19/2024    Colorectal Cancer Screen  07/09/2025    Shingles vaccine  Completed    Pneumococcal 0-64 years Vaccine  Completed    Hepatitis C screen  Completed    HIV screen  Completed    Hepatitis A vaccine  Aged Out    Hib vaccine  Aged Out    Meningococcal (ACWY) vaccine  Aged Out    Diabetes screen  Discontinued       Subjective:      Constitutional:Negative for fever, chills, diaphoresis, activity change, appetite change and

## 2023-07-20 ENCOUNTER — TELEPHONE (OUTPATIENT)
Dept: GYNECOLOGIC ONCOLOGY | Age: 53
End: 2023-07-20

## 2023-07-20 ENCOUNTER — TELEPHONE (OUTPATIENT)
Dept: FAMILY MEDICINE CLINIC | Age: 53
End: 2023-07-20

## 2023-07-20 RX ORDER — TRIAMCINOLONE ACETONIDE 1 MG/G
CREAM TOPICAL
Qty: 30 G | Refills: 0 | Status: SHIPPED | OUTPATIENT
Start: 2023-07-20

## 2023-07-20 NOTE — TELEPHONE ENCOUNTER
Patient moved here from [place] and is looking to establish for management of her paranoid schizophrenia.

## 2023-07-20 NOTE — TELEPHONE ENCOUNTER
Patient called stating the elidel rx is too expensive. Patient asking for a new rx for triamcinolone acetonide cream.   She has used this in the past but her current rx is .

## 2023-07-20 NOTE — TELEPHONE ENCOUNTER
Called patient to follow up on referral placed to [facility]. Per patient she was already seen and scheduled for surgery on 8/4/23. She reports she went pre-admission testing yesterday and meet with anesthesia. Informed we can per Dr. Griselda Patricio we can cancel virtual visit with tomorrow if she is agreeable. Pt is agreeable with this ,she said she was going to call the other day to cancel. Informed if she questions or concerns to call. She verbalized understanding.

## 2023-07-20 NOTE — TELEPHONE ENCOUNTER
[place] with [facility] psychiatry called asking if Dr. Lane Shea could addend note from 7/11/23 to put more details about why she needs to see psych, and if this would be an initial visit with psych

## 2023-07-24 ENCOUNTER — CARE COORDINATION (OUTPATIENT)
Dept: CARE COORDINATION | Age: 53
End: 2023-07-24

## 2023-07-24 ENCOUNTER — TELEPHONE (OUTPATIENT)
Dept: FAMILY MEDICINE CLINIC | Age: 53
End: 2023-07-24

## 2023-07-24 NOTE — CARE COORDINATION
Ambulatory Care Coordination Note  2023    Patient Current Location: [place]     ACM contacted the patient by telephone. Verified name and  with patient as identifiers. Provided introduction to self, and explanation of the ACM role. Challenges to be reviewed by the provider   Additional needs identified to be addressed with provider: No  none         Method of communication with provider: none. ACM: Nika Snigleton RN    Patient was called for continued Care Coordination follow up and education re: the management of her DM and healthcare needs. Patient reported she is doing well and she was able to f/u with GYN at [facility] last week as scheduled. Patient denied any questions re: her appointment and reported she is scheduled for surgery on . Patient verified her  will be bringing to and from her surgery and she denied any questions re: pre-op instructions. Patient was educated on importance of following directions and calling with any questions or concerns. Patient verbalized understanding. Patient shared she continues to monitor her BS as directed and they remain stable with before meal readings being 154 and fasting BS in the 95 range. Patient denied any recent c/o hypoglycemia or hyperglycemia symptoms being present. DM education and zone information was reviewed with patient. Patient was educated on the importance of keeping her BS controlled and DM managed, especially with upcoming surgery to promote healing and reduce infection risk. Patient verbalized understanding. Patient reported she is taking medications as directed and she has completed Eliquis assistance application as directed by hem/onc office. Importance of following up with any concerns and taking medications as directed, especially with upcoming surgery, reviewed and patient verbalized understanding. Patient denied any other questions, concerns, or needs and she was encouraged to call with any that may develop.

## 2023-07-24 NOTE — CARE COORDINATION
Contacted Donna Lewis,[facility] regarding Dietitian follow up. Pt answered, RD explained reason for call and role in care. Patient states she received the handouts in the mail but she hasn't read through them yet. Patient states she has been so busy with doctor appointments and she is having a hysterectomy on 8/4/23. Pt requested RD call back after her surgery. Will contact pt as requested and follow up as appropriate.        [address] Chandrika Figueroa,   [phone]

## 2023-07-24 NOTE — TELEPHONE ENCOUNTER
Pt called to see who was going to rx her Lorazepam.  Dr Rick An or psych    Pt has enough meds for 1 months    Gave her psychs number and told to calll to get scheduled.   Told pt if she cant get in with them before she runs out of meds to call us back    Pt wanted to let you know she has appt with José Miguel George at [facility] 7/31 to let them know she is going to follow with psych now and also has her hysterectomy on 8/4

## 2023-07-25 DIAGNOSIS — J30.2 SEASONAL ALLERGIES: ICD-10-CM

## 2023-07-25 RX ORDER — LEVOCETIRIZINE DIHYDROCHLORIDE 5 MG/1
5 TABLET, FILM COATED ORAL NIGHTLY
Qty: 30 TABLET | Refills: 0 | Status: SHIPPED | OUTPATIENT
Start: 2023-07-25

## 2023-07-27 ENCOUNTER — CARE COORDINATION (OUTPATIENT)
Dept: CARE COORDINATION | Age: 53
End: 2023-07-27

## 2023-07-27 ENCOUNTER — TELEPHONE (OUTPATIENT)
Dept: INFUSION THERAPY | Age: 53
End: 2023-07-27

## 2023-07-27 NOTE — TELEPHONE ENCOUNTER
I called the patient to inform her that the BMS was denied due to $903.00 not met out of pocket for retail meds. She's going to email me some receipts to forward to BMS.

## 2023-07-27 NOTE — CARE COORDINATION
Received call from patient. Ale Zepeda shared she and  remain concerned about his ongoing elevated BS readings and wish to discuss possibly changing patient's medication.  is agreeable to PCP appointment to discuss concerns and appointment scheduled for 8/3 @ [phone]. Patient and  were instructed to bring BS log and current medications to appointment. Wife denied any other questions, concerns, or needs at this time. PCP updated. Patient remains scheduled for surgery at [facility] on 8/4.
Date Time Provider [address]   8/1/2023  9:15 AM Kolby Luther, APRN - J LUIS [facility]Solange Roots [facility]   9/25/2023  8:15 PM SCHEDULE, STR SLEEP TECH 01 [address] [facility]   9/28/2023 11:30 AM Darlene Branch MD N Oncology [facility]   12/4/2023  1:15 PM [facility]   ,   Diabetes Assessment    Medic Alert ID: No  Meal Planning: Avoidance of concentrated sweets   How often do you test your blood sugar?: Meals   Do you have barriers with adherence to non-pharmacologic self-management interventions?  (Nutrition/Exercise/Self-Monitoring): No   Have you ever had to go to the ED for symptoms of low blood sugar?: No       No patient-reported symptoms   Do you have hyperglycemia symptoms?: No   Do you have hypoglycemia symptoms?: No   Last Blood Sugar Value: 117   Blood Sugar Monitoring Regimen: Before Meals   Blood Sugar Trends: No Change        ,   General Assessment         , and Care Coordination Episodes    Type: Amb Care Management  Episode: Rising Risk   Noted: 5/18/2023  Comments: List Ref -

## 2023-07-28 DIAGNOSIS — J30.2 SEASONAL ALLERGIES: ICD-10-CM

## 2023-07-31 ENCOUNTER — TELEPHONE (OUTPATIENT)
Dept: FAMILY MEDICINE CLINIC | Age: 53
End: 2023-07-31

## 2023-07-31 ENCOUNTER — TELEPHONE (OUTPATIENT)
Dept: INFUSION THERAPY | Age: 53
End: 2023-07-31

## 2023-07-31 RX ORDER — LEVOCETIRIZINE DIHYDROCHLORIDE 5 MG/1
5 TABLET, FILM COATED ORAL NIGHTLY
Qty: 90 TABLET | Refills: 1 | Status: SHIPPED | OUTPATIENT
Start: 2023-07-31

## 2023-07-31 NOTE — PROGRESS NOTES
with and without medication. The patient participated in and indicated understanding of the content of our discussion by agreeing to the mutually developed treatment plan. Risks, potential side effects, possible drug-drug interactions, benefits and alternate treatments discussed in detail. All questions answered. Patient stated understanding and is agreeable to treatment plan. Patient has been instructed to seek emergency help via the emergency and/or calling 911 should symptoms become severe, worsen, or with other concerning symptoms. Patient instructed to go immediately to the emergency room and/or call 911 with any suicidal or homicidal ideations or if audio/visual hallucinations develop. Patient stated understanding and agrees. Patient given crisis center information. National Suicide Prevention Lifeline at [place] (6-278.808.8216) or text HOME to 109564 to access the [address] [place] [address]. I spent a total of 30 minutes with the patient and over half of that time was spent on counseling and coordination of care regarding topics discussed above. I spent 25 minutes with the patient for psychotherapy. The patient was engaged and responsive throughout session. Utilized Motivational Interviewing and Cognitive Behavioral Therapy, including behavioral modification, insight oriented, supportive therapy, and reflective listening to address topics above. The remainder of session spent on symptom evaluation and medication management. Jaqui Beasley, was evaluated through a synchronous (real-time) audio-video encounter. The patient (or guardian if applicable) is aware that this is a billable service, which includes applicable co-pays. This Virtual Visit was conducted with patient's (and/or legal guardian's) consent. Patient identification was verified, and a caregiver was present when appropriate.    The patient was located at Home: [address] [facility] [address]  Provider was located at [facility]

## 2023-07-31 NOTE — TELEPHONE ENCOUNTER
I called to inform her that I received the receipts for her  prescriptions paid out of pocket and they were faxed to BMS.

## 2023-07-31 NOTE — TELEPHONE ENCOUNTER
Patient called stating stopped celebrex 2 weeks ago due to a surgery. Patient states since being off the medication she is less tired/fatigued, no longer having dry mouth. Feeling good. Patient asking if you think she can stay off the medication.

## 2023-08-01 ENCOUNTER — TELEPHONE (OUTPATIENT)
Dept: PSYCHIATRY | Age: 53
End: 2023-08-01

## 2023-08-01 ENCOUNTER — CARE COORDINATION (OUTPATIENT)
Dept: CARE COORDINATION | Age: 53
End: 2023-08-01

## 2023-08-01 ENCOUNTER — TELEMEDICINE (OUTPATIENT)
Dept: PSYCHIATRY | Age: 53
End: 2023-08-01

## 2023-08-01 DIAGNOSIS — F20.0 PARANOID SCHIZOPHRENIA (HCC): Primary | ICD-10-CM

## 2023-08-01 DIAGNOSIS — F41.9 ANXIETY: ICD-10-CM

## 2023-08-01 DIAGNOSIS — G47.00 INSOMNIA, UNSPECIFIED TYPE: ICD-10-CM

## 2023-08-01 NOTE — CARE COORDINATION
Ambulatory Care Coordination Note  2023    Patient Current Location:  Home: [address] [place] [id number]     ACM contacted the patient by telephone. Verified name and  with patient as identifiers. Provided introduction to self, and explanation of the ACM role. Challenges to be reviewed by the provider   Additional needs identified to be addressed with provider: Yes    Patient shared she had VV with [facility] psych provider today and she wishes to no longer follow with this provider. Patient plans to continue to see her counselor at [address] [place] [address] and is scheduled to be seen tomorrow, 2023. Method of communication with provider: chart routing. ACM: Tk Gibson RN    Patient called ACM with an update re: recent [facility] Psych VV. Patient shared she had VV earlier today and she no longer wishes to follow with that provider as she is not interested in making any changes to her current medication regimen. Patient reported she plans to continue to f/u with her counselor at [facility], and is schedule to see her tomorrow. Patient was educated on need to keep appointment as scheduled and to bring updated med list with her to her appointment. Patient verbalized understanding. Patient shared she remains frustrated since relocating to [place] and changes in how providers practice. Patient reported she was more active in [place] and she is hopeful to return to activities when she recovers from her upcoming surgery. ACM reviewed local exercise activities/programs with patient and she verbalized understanding. Active listening and support provided to patient t/o our call. Patient was encouraged to discuss concerns with providers and she acknowledged understanding. Patient remains scheduled for surgery at [facility] later this week and patient was encouraged to bring updated med list with her to her surgical appointment. Patient denied any questions re: pre-op instructions.

## 2023-08-01 NOTE — TELEPHONE ENCOUNTER
Leandro Whitman called back into the office stating that she had received our office's call; she said that she is going to go back to [email or web address] at [employer] and is scheduled to see her tomorrow.

## 2023-08-01 NOTE — TELEPHONE ENCOUNTER
I reached out to [facility] to see if she would be interested in transferring care to another provider within our practice as this provider was not informed of all information pertaining to her past medical history and medications. This provider had advised her to follow back up with her PCP or [facility] for continued care as well. I have left a detailed message to see if she would be interested in a potential transfer; I did mention that it is not a guarantee that she would be seen but if this is another option I would look into it for her. Awaiting call back.

## 2023-08-02 ENCOUNTER — TELEPHONE (OUTPATIENT)
Dept: INFUSION THERAPY | Age: 53
End: 2023-08-02

## 2023-08-02 DIAGNOSIS — E11.9 TYPE 2 DIABETES MELLITUS WITHOUT COMPLICATION, WITHOUT LONG-TERM CURRENT USE OF INSULIN (HCC): Primary | ICD-10-CM

## 2023-08-02 NOTE — TELEPHONE ENCOUNTER
I called the patient to inform her that she still needs to pay $75.53 out of pocket for retail meds before  [facility] [place] for Eliquis assistance.

## 2023-08-03 RX ORDER — HYDROXYZINE 50 MG/1
TABLET, FILM COATED ORAL
Qty: 90 TABLET | Refills: 1 | Status: SHIPPED | OUTPATIENT
Start: 2023-08-03

## 2023-08-07 RX ORDER — OXYBUTYNIN CHLORIDE 5 MG/1
TABLET ORAL
Qty: 180 TABLET | Refills: 1 | Status: SHIPPED | OUTPATIENT
Start: 2023-08-07

## 2023-08-08 ENCOUNTER — HOSPITAL ENCOUNTER (OUTPATIENT)
Dept: ULTRASOUND IMAGING | Age: 53
Discharge: HOME | End: 2023-08-08
Payer: MEDICARE

## 2023-08-08 ENCOUNTER — CARE COORDINATION (OUTPATIENT)
Dept: CARE COORDINATION | Age: 53
End: 2023-08-08

## 2023-08-08 DIAGNOSIS — M79.89 LEG SWELLING: ICD-10-CM

## 2023-08-08 DIAGNOSIS — Z98.890 POST-OPERATIVE STATE: ICD-10-CM

## 2023-08-08 PROCEDURE — 93970 EXTREMITY STUDY: CPT

## 2023-08-08 RX ORDER — OXYCODONE HYDROCHLORIDE 5 MG/1
5 TABLET ORAL EVERY 6 HOURS PRN
COMMUNITY

## 2023-08-09 ENCOUNTER — CARE COORDINATION (OUTPATIENT)
Dept: CARE COORDINATION | Age: 53
End: 2023-08-09

## 2023-08-09 NOTE — CARE COORDINATION
Patient called and shared she f/u with surgeon re: LE swelling yesterday and they ordered doppler study and this was negative. Patient stated ankle remains swollen and they felt symptoms may be r/t positioning during surgery. Patient denied any trouble with mobility d/t ankle swelling. Patient admits she has long history of ankle swelling d/t history of breaks/sprains. Patient shared she was advised to have dexa scan completed and ACM encouraged her to discuss with PCP at her f/u appointment. Patient verbalized understanding. Patient shared she also has concerns re: her apartment complex and the handicap parking. Patient is requesting  consult for possible resources - referral placed. Patient denied any other questions, concerns, or needs and she was encouraged to call with any that may develop.
Date Time Provider [address]   9/28/2023 11:30 AM Geri Turner MD N Oncology [facility] [place]   10/8/2023  8:15 PM SCHEDULE, STR SLEEP TECH 01 1401 [facility]   12/4/2023  1:15 PM MD Ruba Sanchez [facility] [place]   ,   Diabetes Assessment    Medic Alert ID: No  Meal Planning: Avoidance of concentrated sweets   How often do you test your blood sugar?: Meals   Do you have barriers with adherence to non-pharmacologic self-management interventions?  (Nutrition/Exercise/Self-Monitoring): No   Have you ever had to go to the ED for symptoms of low blood sugar?: No       No patient-reported symptoms   Do you have hyperglycemia symptoms?: No   Do you have hypoglycemia symptoms?: No   Blood Sugar Monitoring Regimen: Before Meals   Blood Sugar Trends: Fluctuating        ,   General Assessment    Do you have any symptoms that are causing concern?: Yes  Progression since Onset: Gradually Improving  Reported Symptoms: Abdominal Pain     , and Care Coordination Episodes    Type: Amb Care Management  Episode: Rising Risk   Noted: 5/18/2023  Comments: List Ref -

## 2023-08-09 NOTE — CARE COORDINATION
SW received referral from Angela Lopez to discuss pt concern regarding manager of complex not allowing them to use \"handicap parking spot and to reserve for emergency vehicles. \" SW allowed pt and spouse Billytania Adamson to share thoughts and feelings. Robert Adamson feels they should not get to worried until this \"actually happens. \"  Informed them that even tho mgr is suggesting this it may not come to fruition. Pt and spouse agreed. Advised them to put my # in contacts and to call me in the future if need be. Pt voiced understanding. Active listening provided.

## 2023-08-14 ENCOUNTER — CARE COORDINATION (OUTPATIENT)
Dept: CARE COORDINATION | Age: 53
End: 2023-08-14

## 2023-08-14 NOTE — CARE COORDINATION
Contacted Donna Waters and left voicemail regarding Dietitian follow up. Left call back number and will follow up as appropriate.          [address] Fig[address], [place]  [phone]

## 2023-08-15 NOTE — CARE COORDINATION
I agree with the Care Coordinator's Plan of Care.
I agree with the Care Coordinator's Plan of Care.
Sukh Bain MD [facility] [place]   9/28/2023 11:30 AM Conner Perdomo MD PhD N Oncology [facility]   10/8/2023  8:15 PM SCHEDULE, [facility] SLEEP TECH 01 1401 [facility]   12/4/2023  1:15 PM MD Cheryl Goodman [facility]   ,   Diabetes Assessment    Medic Alert ID: No  Meal Planning: Avoidance of concentrated sweets   How often do you test your blood sugar?: Meals   Do you have barriers with adherence to non-pharmacologic self-management interventions?  (Nutrition/Exercise/Self-Monitoring): No   Have you ever had to go to the ED for symptoms of low blood sugar?: No       No patient-reported symptoms   Do you have hyperglycemia symptoms?: No   Do you have hypoglycemia symptoms?: No   Last Blood Sugar Value: 161   Blood Sugar Monitoring Regimen: Before Meals, At Bedtime, Morning Fasting, 2 Hours Post Meal   Blood Sugar Trends: Fluctuating        ,   General Assessment    Do you have any symptoms that are causing concern?: Yes  Progression since Onset: Gradually Worsening, Unchanged, Intermittent - Waxing/Waning  Reported Symptoms: Other (Comment: confusion concerns)     , and Care Coordination Episodes    Type: Amb Care Management  Episode: Rising Risk   Noted: 5/18/2023  Comments: List Ref -

## 2023-08-16 ENCOUNTER — CARE COORDINATION (OUTPATIENT)
Dept: CARE COORDINATION | Age: 53
End: 2023-08-16

## 2023-08-16 NOTE — CARE COORDINATION
Contacted Donna Waetrs and left voicemail regarding Dietitian follow up. Left call back number. RD spoke with patient for initial nutrition assessment on 6/26/23 and has been following up with patient. RD outreached 8/14/23 and today 8/16/23- left VM both outreaches. RD will notify ACM. RD will continue to follow/assist with patient return call.        [address] Chandrika Figueroa, LD  [phone]

## 2023-08-21 ENCOUNTER — CARE COORDINATION (OUTPATIENT)
Dept: CARE COORDINATION | Age: 53
End: 2023-08-21

## 2023-08-21 RX ORDER — AMITRIPTYLINE HYDROCHLORIDE 150 MG/1
300 TABLET ORAL NIGHTLY
Qty: 30 TABLET | Refills: 0 | Status: SHIPPED | OUTPATIENT
Start: 2023-08-21

## 2023-08-21 RX ORDER — TRAZODONE HYDROCHLORIDE 50 MG/1
TABLET ORAL
Qty: 60 TABLET | Refills: 0 | Status: SHIPPED | OUTPATIENT
Start: 2023-08-21

## 2023-08-21 RX ORDER — TRAZODONE HYDROCHLORIDE 150 MG/1
150 TABLET ORAL NIGHTLY
Qty: 90 TABLET | Refills: 3 | OUTPATIENT
Start: 2023-08-21

## 2023-08-21 NOTE — TELEPHONE ENCOUNTER
Patient stated she is currently taking 2 - 50 mg and 1/2 of 150 mg tablet of Trazodone and she has increased her amitriptyline to 2 tabs (150 mg) nightly (only prescribed to take 1 tab nightly). Please advise if ok to continue (if so will need updated script of amitriptyline sent to pharmacy in separate refill encounter).   Last visit 7/13/2023  Next Visit: 9/11/2023    Above is copied from CC  Please review medications pending

## 2023-08-21 NOTE — CARE COORDINATION
Ambulatory Care Coordination Note  2023    Patient Current Location:  Home: [address] [place] [address]     ACM contacted the patient by telephone. Verified name and  with patient as identifiers. Provided introduction to self, and explanation of the ACM role. Challenges to be reviewed by the provider   Additional needs identified to be addressed with provider: Yes    Pt. stated insomnia/sleep concerns have improved recently. Patient stated she is currently taking 2 - 50 mg and 1/2 of 150 mg tablet of Trazodone and she has increased her amitriptyline to 2 tabs (150 mg) nightly (only prescribed to take 1 tab nightly). Please advise if ok to continue (if so will need updated script of amitriptyline sent to pharmacy in separate refill encounter). Last visit 2023  Next Visit: 2023         Method of communication with provider: chart routing. ACM: Jas Wolf RN    Patient called ACM with an update re: the management of her DM, healthcare needs, and ongoing f/u s/p recent surgery. Patient reported she has been doing well and was able to f/u with her surgeon last week. Patient stated all incisions, but 1 have completely healed and that incision is \"scabbed. \"  ACM educated patient again on signs/symptoms she should report and the importance of early symptom recognition and follow up. Patient admits to [employer] doing it\" yesterday and ACM educated patient on the importance of breaking activities up with rest breaks and slowly increasing her activity as she is able. Patient verbalized understanding. Patient reported her insomnia has improved with use of medication and PCP updated - see above. Patient was educated on the importance of taking her medications as prescribed and not making any adjustments without first talking with her providers. Patient verbalized understanding. Patient continues to monitor her BS as directed and they have remained stable.   Patient stated BS

## 2023-08-23 ENCOUNTER — CARE COORDINATION (OUTPATIENT)
Dept: CARE COORDINATION | Age: 53
End: 2023-08-23

## 2023-08-23 ENCOUNTER — TELEPHONE (OUTPATIENT)
Dept: FAMILY MEDICINE CLINIC | Age: 53
End: 2023-08-23

## 2023-08-23 RX ORDER — CELECOXIB 400 MG/1
400 CAPSULE ORAL DAILY
Qty: 30 CAPSULE | Refills: 0 | Status: SHIPPED | OUTPATIENT
Start: 2023-08-23

## 2023-08-23 NOTE — TELEPHONE ENCOUNTER
Refill pending   This medication was for her back prescribed by [facility]  that she does not see anymore

## 2023-08-23 NOTE — CARE COORDINATION
Ambulatory Care Coordination Note  2023    Patient Current Location: [place]     ACM contacted the patient by telephone. Verified name and  with patient as identifiers. Provided introduction to self, and explanation of the ACM role. Challenges to be reviewed by the provider   Additional needs identified to be addressed with provider: No  none         Method of communication with provider: none. ACM: Luana Sams RN    Patient called ACM for ongoing Care Coordination follow up and education re: the management of her DM and healthcare needs. Patient reported she is \"doing well today. \"  Patient stated she was able to f/u with her providers at [facility] earlier today as she has been having some c/o increased \"hearing voices\" symptoms. Patient stated she has had these symptoms in the past and she continues to practice her previous coping skills which is helping. Patient was encouraged to continue to follow instructions as provided by [facility] providers and to reach out with any change/worsening of symptoms. Patient verbalized understanding. ACM reviewed signs/symptoms to report and the importance of early symptom recognition and follow up. Patient verbalized understanding. Patient shared she is aware of upcoming appointments and ACM reviewed the need to call for earlier appointment with any new/change/worsening of symptoms prior to scheduled appointment. Patient verbalized understanding. Patient denied any other questions, concerns, or needs and she was encouraged to call with any that may develop.           Actions:   - Briefly reviewed DM education/zone information   - Reviewed need to continue with healthy coping skills   - Educated on the need to f/u with any change/new/worsening of symptoms   - Educated on the need to f/u with [facility] providers as scheduled   - Reviewed signs/symptoms to report and importance of early symptom recognition and follow up   - Educated on the need to call for

## 2023-08-23 NOTE — TELEPHONE ENCOUNTER
----- Message from Donis Osler sent at 8/23/2023  1:27 PM EDT -----  Subject: Refill Request    QUESTIONS  Name of Medication? Other - Celecoxib 400mg  Patient-reported dosage and instructions? Once daily  How many days do you have left? 10  Preferred Pharmacy? [facility] ([address] [place] [address])  Pharmacy phone number (if available)? [phone]  Additional Information for Provider? Patient stated that Dr. Denver Hogan has   not prescribe medication, would like if she could call medication over  ---------------------------------------------------------------------------  --------------  Brendon Marine Debbie  What is the best way for the office to contact you? OK to leave message on   voicemail  Preferred Call Back Phone Number? [phone]  ---------------------------------------------------------------------------  --------------  SCRIPT ANSWERS  Relationship to Patient?  Self

## 2023-08-24 ENCOUNTER — TELEPHONE (OUTPATIENT)
Dept: INFUSION THERAPY | Age: 53
End: 2023-08-24

## 2023-08-24 NOTE — TELEPHONE ENCOUNTER
I called the patient to inform her that the Eliquis application was approved by [facility] until 12/31/23 and to expect a call for delivery details.

## 2023-08-28 ENCOUNTER — CARE COORDINATION (OUTPATIENT)
Dept: CARE COORDINATION | Age: 53
End: 2023-08-28

## 2023-08-28 ENCOUNTER — OFFICE VISIT (OUTPATIENT)
Dept: FAMILY MEDICINE CLINIC | Age: 53
End: 2023-08-28
Payer: MEDICARE

## 2023-08-28 VITALS
BODY MASS INDEX: 48.82 KG/M2 | DIASTOLIC BLOOD PRESSURE: 72 MMHG | HEIGHT: 65 IN | HEART RATE: 75 BPM | WEIGHT: 293 LBS | SYSTOLIC BLOOD PRESSURE: 112 MMHG | OXYGEN SATURATION: 94 % | TEMPERATURE: 97.5 F | RESPIRATION RATE: 16 BRPM

## 2023-08-28 DIAGNOSIS — B96.89 ACUTE BACTERIAL SINUSITIS: Primary | ICD-10-CM

## 2023-08-28 DIAGNOSIS — J01.90 ACUTE BACTERIAL SINUSITIS: Primary | ICD-10-CM

## 2023-08-28 PROCEDURE — 99213 OFFICE O/P EST LOW 20 MIN: CPT | Performed by: FAMILY MEDICINE

## 2023-08-28 PROCEDURE — 3074F SYST BP LT 130 MM HG: CPT | Performed by: FAMILY MEDICINE

## 2023-08-28 PROCEDURE — 3078F DIAST BP <80 MM HG: CPT | Performed by: FAMILY MEDICINE

## 2023-08-28 RX ORDER — AMOXICILLIN 500 MG/1
500 CAPSULE ORAL 2 TIMES DAILY
Qty: 20 CAPSULE | Refills: 0 | Status: SHIPPED | OUTPATIENT
Start: 2023-08-28 | End: 2023-09-07

## 2023-08-28 ASSESSMENT — ENCOUNTER SYMPTOMS: RHINORRHEA: 1

## 2023-08-28 NOTE — CARE COORDINATION
Ambulatory Care Coordination Note  8/28/2023    Patient Current Location:  Home: [address] [place] [address]        Challenges to be reviewed by the provider   Additional needs identified to be addressed with provider: Yes    Patient is asking if she can take 's appointment, scheduled for later today, as she is having worsening allergy/sinus symptoms. Pt. to call office to discuss with them also. Method of communication with provider: chart routing. ACM: Freida Matias RN    Patient called ACM re: worsening symptoms and need for continued Care Coordination follow up and education re: the management of her DM and healthcare needs. Patient shared she is not feeling well today and wishes to take 's PCP appointment for later today. Patient reported she is having worsening allergy symptoms including, watery eyes, increased nasal drainage, and sore throat. Patient reported she is taking her medications as directed and ACM educated patient on the importance of taking her medications as directed as well as need to call providers prior to making any medication changes. Patient verbalized understanding. Signs/symptoms to report and the importance of early symptom recognition and follow up was also reviewed with patient. Patient acknowledged understanding. PCP updated of concerns. DM education and zone information was also reviewed with patient. Patient was again educated on importance of ongoing BS monitoring and need to keep BS controlled and DM managed. Hyperglycemia and hypoglycemia prevention education, as well as need for ongoing DM diet adherence, reviewed with patient and she verbalized understanding. Patient denied any other questions, concerns, or needs and she was encouraged to call with any that may develop.           Actions:   - Reviewed DM education and zone information   - Reviewed importance of ongoing/routine BS monitoring   - Reviewed signs/symptoms to

## 2023-08-28 NOTE — PROGRESS NOTES
[address] [address] [facility]esar GARCIA [facility]  Phone:  [phone]          Name: Akiko Lemus  : 1970    Chief Complaint   Patient presents with    Nasal Congestion       HPI:     Akiko Lemus is a 48 y.o. female who presents today for evaluation of nasal congestion, rhinorrhea, sore throat, and fatigue for the past week. No fevers. Her  is not ill. She had a laparoscopic total hysterectomy at [facility] on 23 because of a pelvic mass. Her pathology was benign. She has mild left lower abdominal pain with movements. Her bowels are moving fine. Current Outpatient Medications:     amoxicillin (AMOXIL) 500 MG capsule, Take 1 capsule by mouth 2 times daily for 10 days, Disp: 20 capsule, Rfl: 0    celecoxib (CELEBREX) 400 MG capsule, Take 1 capsule by mouth daily, Disp: 30 capsule, Rfl: 0    amitriptyline (ELAVIL) 150 MG tablet, Take 2 tablets by mouth nightly, Disp: 30 tablet, Rfl: 0    traZODone (DESYREL) 50 MG tablet, TAKE 1 TO 2 TABLETS BY MOUTH NIGHTLY AS NEEDED FOR INSOMNIA, Disp: 60 tablet, Rfl: 0    oxyCODONE (ROXICODONE) 5 MG immediate release tablet, Take 1 tablet by mouth every 6 hours as needed for Pain. As per [facility] GYN surgeon post op 2023, Disp: , Rfl:     hydrOXYzine HCl (ATARAX) 50 MG tablet, TAKE 1 TABLET BY MOUTH THREE TIMES DAILY AS NEEDED FOR ITCHING, Disp: 90 tablet, Rfl: 1    levocetirizine (XYZAL) 5 MG tablet, Take 1 tablet by mouth nightly, Disp: 90 tablet, Rfl: 1    triamcinolone (KENALOG) 0.1 % cream, Apply topically 2 times daily. , Disp: 30 g, Rfl: 0    pimecrolimus (ELIDEL) 1 % cream, Apply topically 2 times daily. , Disp: 100 g, Rfl: 5    apixaban (ELIQUIS) 2.5 MG TABS tablet, Take 1 tablet by mouth 2 times daily, Disp: 180 tablet, Rfl: 3    ibuprofen (ADVIL;MOTRIN) 800 MG tablet, Take 1 tablet by mouth 3 times daily as needed for Pain, Disp: 90 tablet, Rfl: 5    metoprolol tartrate (LOPRESSOR) 25 MG tablet, Take 1 tablet

## 2023-08-31 ENCOUNTER — TELEPHONE (OUTPATIENT)
Dept: FAMILY MEDICINE CLINIC | Age: 53
End: 2023-08-31

## 2023-08-31 RX ORDER — BROMPHENIRAMINE MALEATE, PSEUDOEPHEDRINE HYDROCHLORIDE, AND DEXTROMETHORPHAN HYDROBROMIDE 2; 30; 10 MG/5ML; MG/5ML; MG/5ML
5 SYRUP ORAL 4 TIMES DAILY PRN
Qty: 140 ML | Refills: 0 | Status: SHIPPED | OUTPATIENT
Start: 2023-08-31

## 2023-08-31 NOTE — TELEPHONE ENCOUNTER
Pt requesting same cough meds as she had previously    Pt still having congestion, ST and c/o cough in am/pm    Pt is still taking Amoxil and doing saline spray BID.   Denies any other meds    Advised pt to use saline 4-5 times daily    Pt would like rx for brompherniramine    [employer] entered

## 2023-09-05 ENCOUNTER — CARE COORDINATION (OUTPATIENT)
Dept: CARE COORDINATION | Age: 53
End: 2023-09-05

## 2023-09-05 NOTE — CARE COORDINATION
Ambulatory Care Coordination Note  2023    Patient Current Location:  Home: [address] [place] [address]     ACM contacted the patient by telephone. Verified name and  with patient as identifiers. Provided introduction to self, and explanation of the ACM role. Challenges to be reviewed by the provider   Additional needs identified to be addressed with provider: No    none         Method of communication with provider: none. ACM: Simona Noland RN    Patient called ACM this AM for continued Care Coordination follow up and education re: the management of her DM and healthcare needs. Patient shared she continues with recent nasal congestion as directed and she continues to take medications as prescribed and instructed. Patient was educated on the importance of completing full course of antibiotic as directed and patient verbalized understanding. Patient was also educated on the importance of pushing fluids and resting when needed. Signs and symptoms to report and the importance of early symptom recognition and follow up were also reviewed with patient and patient verbalized understanding. Patient shared BS remain stable and recent reading was 117. DM education and zone information was also reviewed with patient, including the importance of keeping BS controlled and DM managed. Patient verbalized understanding. Patient denied any other questions, concerns, or needs and she was encouraged to call with any that may develop.            Actions:   - Reviewed signs/symptoms to report   - Reviewed importance of early symptom recognition and follow up   - Reviewed importance of completing antibiotic as directed and until gone  - Monitored for questions re: recent PCP visit  - Reviewed DM education and zone information   - Reviewed importance of keeping BS controlled and DM managed   - Monitored for additional needs          Plan of Care:   - Continue with f/u Care Coordination calls for

## 2023-09-06 ENCOUNTER — CARE COORDINATION (OUTPATIENT)
Dept: CARE COORDINATION | Age: 53
End: 2023-09-06

## 2023-09-06 RX ORDER — POLYETHYLENE GLYCOL 3350 17 G/17G
17 POWDER, FOR SOLUTION ORAL DAILY PRN
Qty: 510 G | Refills: 0 | Status: SHIPPED | OUTPATIENT
Start: 2023-09-06 | End: 2023-10-06

## 2023-09-06 NOTE — CARE COORDINATION
Ambulatory Care Coordination Note  2023    Patient Current Location: [place]     ACM contacted the patient by telephone. Verified name and  with patient as identifiers. Provided introduction to self, and explanation of the ACM role. Challenges to be reviewed by the provider   Additional needs identified to be addressed with provider: Yes    Patient upset with current [facility] provider as she decreased patient's Risperdal to 3mg BID  d/t recent increased amitriptyline. Patient upset with  medication changes and provider recommended she not drive with current regimen. Patient is scheduled to see you tomorrow and plans to discuss medication concerns and possible new [facility] provider referral.          Method of communication with provider: chart routing. ACM: Simona Noland RN    Patient called ACM with concerns s/p recent [facility] provider appointment. Patient shared she met with her [facility] provider, Bijan Mejia, this AM and is upset as she decreased her risperidol to 3mg BID. Patient shared provider shared patient \"should not be having any trouble sleeping\" and discussed concerns with her \"driving with her current medication regimen. \"  Active listening provided to patient and ACM educated patient on the importance of taking her medications as directed and not making any changes without first speaking with her providers. Patient acknowledged understanding. Patient is scheduled to see PCP tomorrow and she was encouraged to bring any questions or concerns with her to her appointment (patient states she is unable to write down questions/concerns as she is only able to read/write at a 5th grade level d/t a learning disability). Patient verbalized understanding. Patient shared she plans to discuss new [facility] referral (recently declined [facility] provider d/t discussion of medication changes). Patient denied any other questions, concerns, or needs and she was encouraged to call with any that may develop.

## 2023-09-07 ENCOUNTER — OFFICE VISIT (OUTPATIENT)
Dept: FAMILY MEDICINE CLINIC | Age: 53
End: 2023-09-07
Payer: MEDICARE

## 2023-09-07 VITALS
RESPIRATION RATE: 20 BRPM | HEART RATE: 93 BPM | OXYGEN SATURATION: 98 % | SYSTOLIC BLOOD PRESSURE: 130 MMHG | WEIGHT: 292 LBS | DIASTOLIC BLOOD PRESSURE: 72 MMHG | BODY MASS INDEX: 48.65 KG/M2 | HEIGHT: 65 IN | TEMPERATURE: 97.8 F

## 2023-09-07 DIAGNOSIS — F20.0 PARANOID SCHIZOPHRENIA (HCC): Primary | ICD-10-CM

## 2023-09-07 PROCEDURE — 3075F SYST BP GE 130 - 139MM HG: CPT | Performed by: FAMILY MEDICINE

## 2023-09-07 PROCEDURE — 3078F DIAST BP <80 MM HG: CPT | Performed by: FAMILY MEDICINE

## 2023-09-07 PROCEDURE — 99213 OFFICE O/P EST LOW 20 MIN: CPT | Performed by: FAMILY MEDICINE

## 2023-09-07 NOTE — PROGRESS NOTES
[address] [facility] [address] Julio Cesar GARCIA [facility]  Phone:  [phone]          Name: Yesenia Gaspar  : 1970    Chief Complaint   Patient presents with    Follow-up       HPI:     Yesenia Gaspar is a 48 y.o. female who presents today for follow up of paranoid schizophrenia. She follows with psychiatry at [facility] but owes them $400 and can't afford it. She and her  are filing for bankruptcy. When she last saw Andrew Tong there, she was told she shouldn't be driving with the medications she takes. Franny Nathan states that she doesn't drive if she takes a Lorazepam.  She was asked to get blood work done, but she had it done this summer. Current Outpatient Medications:     polyethylene glycol (GLYCOLAX) 17 GM/SCOOP powder, Take 17 g by mouth daily as needed (constipation) Add to 12 ounces of water or juice. , Disp: 510 g, Rfl: 0    brompheniramine-pseudoephedrine-DM 2-30-10 MG/5ML syrup, Take 5 mLs by mouth 4 times daily as needed for Congestion or Cough, Disp: 140 mL, Rfl: 0    amoxicillin (AMOXIL) 500 MG capsule, Take 1 capsule by mouth 2 times daily for 10 days, Disp: 20 capsule, Rfl: 0    celecoxib (CELEBREX) 400 MG capsule, Take 1 capsule by mouth daily, Disp: 30 capsule, Rfl: 0    amitriptyline (ELAVIL) 150 MG tablet, Take 2 tablets by mouth nightly, Disp: 30 tablet, Rfl: 0    traZODone (DESYREL) 50 MG tablet, TAKE 1 TO 2 TABLETS BY MOUTH NIGHTLY AS NEEDED FOR INSOMNIA, Disp: 60 tablet, Rfl: 0    oxyCODONE (ROXICODONE) 5 MG immediate release tablet, Take 1 tablet by mouth every 6 hours as needed for Pain.  As per [facility] GYN surgeon post op 2023, Disp: , Rfl:     hydrOXYzine HCl (ATARAX) 50 MG tablet, TAKE 1 TABLET BY MOUTH THREE TIMES DAILY AS NEEDED FOR ITCHING, Disp: 90 tablet, Rfl: 1    blood glucose test strips (ASCENSIA AUTODISC VI;ONE TOUCH ULTRA TEST VI) strip, 1 each by [employer] route daily Testing once daily DISPENSE ONE TOUCH ULTRA 2 STRIPS, Disp: 100 each, Rfl: 3

## 2023-09-11 ENCOUNTER — CARE COORDINATION (OUTPATIENT)
Dept: CARE COORDINATION | Age: 53
End: 2023-09-11

## 2023-09-11 ASSESSMENT — ENCOUNTER SYMPTOMS: TROUBLE SWALLOWING: 1

## 2023-09-11 NOTE — PROGRESS NOTES
[address] [address] Julio Cesar Yao [facility]  Phone:  [phone]          Name: Elijah Salazar  : 1970    Chief Complaint   Patient presents with    Aphasia       HPI:     Elijah Salazar is a 48 y.o. female who presents today for evaluation of dysphagia. She reports that when she swallows she feels like fluid comes back up. She tries to cough, but can't get anything back up. She doesn't feel like it's from heartburn. When she drinks an iced coffee from [employer], it comes back up and she can't swallow it normally. She reports having a swallow study in [place] years ago that was normal.        Current Outpatient Medications:     amitriptyline (ELAVIL) 150 MG tablet, TAKE 1 TABLET BY MOUTH EVERY  NIGHT, Disp: 30 tablet, Rfl: 11    brompheniramine-pseudoephedrine-DM 2-30-10 MG/5ML syrup, Take 5 mLs by mouth 4 times daily as needed for Congestion or Cough, Disp: 140 mL, Rfl: 0    polyethylene glycol (GLYCOLAX) 17 GM/SCOOP powder, Take 17 g by mouth daily as needed (constipation) Add to 12 ounces of water or juice. , Disp: 510 g, Rfl: 0    celecoxib (CELEBREX) 400 MG capsule, Take 1 capsule by mouth daily, Disp: 30 capsule, Rfl: 0    traZODone (DESYREL) 50 MG tablet, TAKE 1 TO 2 TABLETS BY MOUTH NIGHTLY AS NEEDED FOR INSOMNIA, Disp: 60 tablet, Rfl: 0    oxyCODONE (ROXICODONE) 5 MG immediate release tablet, Take 1 tablet by mouth every 6 hours as needed for Pain.  As per [facility] GYN surgeon post op 2023, Disp: , Rfl:     hydrOXYzine HCl (ATARAX) 50 MG tablet, TAKE 1 TABLET BY MOUTH THREE TIMES DAILY AS NEEDED FOR ITCHING, Disp: 90 tablet, Rfl: 1    blood glucose test strips (ASCENSIA AUTODISC VI;ONE TOUCH ULTRA TEST VI) strip, 1 each by In Vitro route daily Testing once daily DISPENSE ONE TOUCH ULTRA 2 STRIPS, Disp: 100 each, Rfl: 3    levocetirizine (XYZAL) 5 MG tablet, Take 1 tablet by mouth nightly, Disp: 90 tablet, Rfl: 1    triamcinolone (KENALOG) 0.1 % cream, Apply topically 2

## 2023-09-11 NOTE — CARE COORDINATION
Ambulatory Care Coordination Note  2023    Patient Current Location:  Home: [address] [place] [address] Road [address]     ACM contacted the patient by telephone. Verified name and  with patient as identifiers. Provided introduction to self, and explanation of the ACM role. Challenges to be reviewed by the provider   Additional needs identified to be addressed with provider: Yes    Patient with c/o \"trouble swallowing fluids\" as she feels they Nic Sheehan not going down and try to come right back up. \"  Pt. request PCP visit for evaluation for new symptoms. Appt. scheduled for  @ 11 AM         Method of communication with provider: chart routing. ACM: Pramod Phillips RN    ACM returned call from earlier today for continued Care Coordination follow up and education re:the management of her DM and healthcare needs. Lucy Ours shared she feels she may have \"water on her lungs\" as she is having trouble swallowing fluids d/t \"fluids not wanting to go all the way down. \"  Patient reported she had similar symptoms in the past while living in [place]. Patient denied any trouble swallowing food and stated her breathing remains at her baseline. Patient denied any c/o increased SOB or swelling being present. ACM reviewed signs/symptoms to report and the importance of early symptom recognition and follow up. PCP f/u appointment scheduled for  per patient's request for evaluation of recent symptoms. Patient was educated on the need to call with any worsening of symptoms prior to her appointment on . Patient verbalized understanding. Patient shared she has not yet heard from [facility] re: appointment s/p recent referral.  Patient denied any other questions, concerns, or needs and she was encouraged to call with any that may develop.           Actions:   - Reviewed signs/symptoms to report   - Reviewed importance of early symptom recognition and follow up   - Reviewed need to call with worsening of symptoms

## 2023-09-13 ENCOUNTER — OFFICE VISIT (OUTPATIENT)
Dept: FAMILY MEDICINE CLINIC | Age: 53
End: 2023-09-13
Payer: MEDICARE

## 2023-09-13 VITALS
TEMPERATURE: 97 F | HEIGHT: 65 IN | HEART RATE: 74 BPM | RESPIRATION RATE: 18 BRPM | SYSTOLIC BLOOD PRESSURE: 110 MMHG | OXYGEN SATURATION: 99 % | DIASTOLIC BLOOD PRESSURE: 78 MMHG | WEIGHT: 293 LBS | BODY MASS INDEX: 48.82 KG/M2

## 2023-09-13 DIAGNOSIS — R13.10 DYSPHAGIA, UNSPECIFIED TYPE: Primary | ICD-10-CM

## 2023-09-13 PROCEDURE — 99213 OFFICE O/P EST LOW 20 MIN: CPT | Performed by: FAMILY MEDICINE

## 2023-09-13 PROCEDURE — 3078F DIAST BP <80 MM HG: CPT | Performed by: FAMILY MEDICINE

## 2023-09-13 PROCEDURE — 3074F SYST BP LT 130 MM HG: CPT | Performed by: FAMILY MEDICINE

## 2023-09-13 RX ORDER — BROMPHENIRAMINE MALEATE, PSEUDOEPHEDRINE HYDROCHLORIDE, AND DEXTROMETHORPHAN HYDROBROMIDE 2; 30; 10 MG/5ML; MG/5ML; MG/5ML
5 SYRUP ORAL 4 TIMES DAILY PRN
Qty: 140 ML | Refills: 0 | Status: SHIPPED | OUTPATIENT
Start: 2023-09-13

## 2023-09-13 RX ORDER — AMITRIPTYLINE HYDROCHLORIDE 150 MG/1
150 TABLET ORAL
Qty: 30 TABLET | Refills: 11 | Status: SHIPPED | OUTPATIENT
Start: 2023-09-13 | End: 2023-10-10 | Stop reason: SDUPTHER

## 2023-09-13 ASSESSMENT — ENCOUNTER SYMPTOMS: SORE THROAT: 0

## 2023-09-15 ENCOUNTER — CARE COORDINATION (OUTPATIENT)
Dept: CARE COORDINATION | Age: 53
End: 2023-09-15

## 2023-09-15 NOTE — CARE COORDINATION
Patient called ACM to update on recent PCP visit and ongoing trouble with swallowing and ongoing concerns with recent weight gain. Patient is aware of GI referral for evaluation and possible EGD. Patient then ended call as she was having phone problems. Will continue to work to f/u with patient in the future.

## 2023-09-19 ENCOUNTER — CARE COORDINATION (OUTPATIENT)
Dept: CARE COORDINATION | Age: 53
End: 2023-09-19

## 2023-09-19 ENCOUNTER — TELEPHONE (OUTPATIENT)
Dept: FAMILY MEDICINE CLINIC | Age: 53
End: 2023-09-19

## 2023-09-19 NOTE — CARE COORDINATION
SW called pt to discuss housing issue and ACP documents that were sent to them. Pt did not answer the phone and vm was not an option. Will try again later.

## 2023-09-20 ENCOUNTER — TELEPHONE (OUTPATIENT)
Dept: PULMONOLOGY | Age: 53
End: 2023-09-20

## 2023-09-20 ENCOUNTER — CARE COORDINATION (OUTPATIENT)
Dept: CARE COORDINATION | Age: 53
End: 2023-09-20

## 2023-09-20 NOTE — CARE COORDINATION
HOD   11/8/2023  2:00 PM July Dawkins, [address] [place]   11/14/2023  1:00 PM Callie Rojas MD PhD N Oncology 1 [facility]   12/4/2023  1:15 PM Lorenzo Amaral MD [facility]   ,   Diabetes Assessment    Medic Alert ID: No  Meal Planning: Avoidance of concentrated sweets   How often do you test your blood sugar?: Meals   Do you have barriers with adherence to non-pharmacologic self-management interventions?  (Nutrition/Exercise/Self-Monitoring): No   Have you ever had to go to the ED for symptoms of low blood sugar?: No       No patient-reported symptoms   Do you have hyperglycemia symptoms?: No   Do you have hypoglycemia symptoms?: No   Blood Sugar Monitoring Regimen: Before Meals   Blood Sugar Trends: No Change        ,   General Assessment    Do you have any symptoms that are causing concern?: Yes  Progression since Onset: Unchanged, Intermittent - Waxing/Waning  Reported Symptoms: Other (Comment: tooth concerns and improved sleeping)     , and No linked episodes

## 2023-09-20 NOTE — TELEPHONE ENCOUNTER
Patient is calling and wanting to know if she is needing a CPAP machine or not. She said she is sleeping fine. I see she is scheduled for a CPAP titration on 10/08/2023 I asked if she was aware of that scheduled test. She said she is and that its going to be $300 and she doesn't have $300.  Please follow up with the pt to advise

## 2023-09-21 ENCOUNTER — CARE COORDINATION (OUTPATIENT)
Dept: CARE COORDINATION | Age: 53
End: 2023-09-21

## 2023-09-21 RX ORDER — CELECOXIB 400 MG/1
400 CAPSULE ORAL DAILY
Qty: 90 CAPSULE | Refills: 3 | Status: SHIPPED | OUTPATIENT
Start: 2023-09-21

## 2023-09-21 NOTE — TELEPHONE ENCOUNTER
Patient called with questions about her sleep study and financial cost. I advised the patient on where to go to fill out and submit the forms for financial assistance. Patient stated she would like to apply but was needing help filling this out. I let her know that they should be able to help her with this and where to go to fill it out. I spoke with Cherelle Stapleton at the sleep lab to see if she had an estimate or anything on pts current out of pocket cost. She recommended having the patient contact her insurance company and provided me with the procedure code (66588) to have her give them when she calls. She also suggested the same thing that I had to apply for financial assistance. Cherelle Stapleton also told me to let her know that they do not collect any money at the time of the sleep study. I gave this information to the patient and she voiced understanding. I also mentioned to [facility] about scheduling her f/u for after the sleep study but she said she wanted to wait until after she completed it to schedule.

## 2023-09-21 NOTE — TELEPHONE ENCOUNTER
Does not look like patient has a scheduled follow up, called and left message for her to call back and schedule follow up after sleep study.

## 2023-09-22 ENCOUNTER — TELEPHONE (OUTPATIENT)
Dept: FAMILY MEDICINE CLINIC | Age: 53
End: 2023-09-22

## 2023-09-22 ENCOUNTER — CARE COORDINATION (OUTPATIENT)
Dept: CARE COORDINATION | Age: 53
End: 2023-09-22

## 2023-09-22 DIAGNOSIS — R22.0 LIP SWELLING: Primary | ICD-10-CM

## 2023-09-22 NOTE — CARE COORDINATION
Pt unsure where her ACP documents are or if she even received them. Will mail out next week and follow up.

## 2023-09-22 NOTE — TELEPHONE ENCOUNTER
FYI this issue has been ongoing for years   Pt wanting derm referral   Referral pending     Pt has tried Vaseline and has not helped

## 2023-09-22 NOTE — CARE COORDINATION
Patient called ACM re: ongoing concerns with bleeding/cracked lip and history of cancer. Patient updated dermatology referral has been placed and contact information was provided. Patient shared she will f/u next week. Patient denied any other questions, concerns, or needs. Will continue to f/u with patient in the future. Plan of Care:   - Continue with f/u Care Coordination calls for assistance with the management of her hypertension, DM, and healthcare needs   - Complete DM education - In Process    - Educate on signs/symptoms to report - Ongoing   - Educate on the importance of early symptom recognition and follow up - Ongoing  - F[facility] on [facility] - Now following with [facility] and appt. scheduled for 6/16/2023 - Completed - Now following with [facility] - Completed - Surgery completed 8/4/2023  - Educate on the availability of same day appointments, urgent care/walk in clinic options, and after hours on call resources - Completed  - Educate on Remote Patient Monitoring (RPM) program - N/A - BS remain stable  - Review and verify Healthcare Decision Maker information - Completed  - F/u on recent [facility] Referral - Dr. Jermaine Perez - Completed - Pt plans to cont.  to f/u at [facility]  - Patient is current with vaccines  - Patient is due for Medicare AWV  - HBG A1C - 5.3% May 2023   - Monitor for additional needs   - If remains stable will consider discharge from Care Coordination in the near future

## 2023-09-22 NOTE — TELEPHONE ENCOUNTER
Noted.  I agree she definitely doesn't need an urgent appointment if this has been present for years. Referral signed.

## 2023-09-22 NOTE — TELEPHONE ENCOUNTER
Pt asking for appointment. She voiced her lips are swollen cracked and bleeding. She has used aquaphor that didn't help. That is what her derm told her to do. She is concerned because she had cancer on her lip in [place].

## 2023-09-25 ENCOUNTER — TELEPHONE (OUTPATIENT)
Dept: FAMILY MEDICINE CLINIC | Age: 53
End: 2023-09-25

## 2023-09-25 ENCOUNTER — CARE COORDINATION (OUTPATIENT)
Dept: CARE COORDINATION | Age: 53
End: 2023-09-25

## 2023-09-25 DIAGNOSIS — K13.0 CRACKED LIPS: Primary | ICD-10-CM

## 2023-09-25 NOTE — CARE COORDINATION
Ambulatory Care Coordination Note  2023    Patient Current Location:  Home: [address] [place] [address]     ACM contacted the patient by telephone. Verified name and  with patient as identifiers. Provided introduction to self, and explanation of the ACM role. Challenges to be reviewed by the provider   Additional needs identified to be addressed with provider: No  none         Method of communication with provider: none. ACM: Bin Montes RN    Patient was called for continued Care Coordination follow up and education re: the management of her diabetes, hypertension, and healthcare needs. Patient shared she has been doing well, but continues with some occasional increased HR and dizziness with rapid movements. Patient was educated on signs/symptoms to report and the need to call for earlier appointment with any change/new/worsening of symptoms. ACM also reviewed importance of being as active as she is able with taking rest breaks when needed. Patient verbalized understanding. Patient stated BS have remained stable and she denied any recent c/o hypoglycemia or hyperglycemia symptoms being present. DM education and importance of keeping BS controlled and DM managed reviewed with patient. Patient verbalized understanding. Fall prevention and home safety education was also briefly reviewed. Patient was encouraged to avoid sudden movements when possible to help prevent dizziness and patient verbalized understanding. Patient is aware of recent derm referral and shared she is unsure of GI appointment information. Patient stated she will call GI herself for appointment information and she has contact information. Patient denied any need for assistance or any other questions, concerns, or needs and she was encouraged to call with any that may develop.           Actions:   - Reviewed DM and hypertension education  - Reviewed importance of routine BS monitoring and need to keep BS

## 2023-09-25 NOTE — TELEPHONE ENCOUNTER
----- Message from Dona Dominguez MD sent at 9/25/2023  4:25 PM EDT -----  Regarding: Appt 9/26  She is on the schedule 9/26 for dry/bleeding lips. At her last appt last week I advised liberal use of Vaseline to her lips. If it's not helping and she'd like further evaluation, I can order lab testing bc sometimes being low in vitamin B can cause dry cracked lips. Would she like me to order testing? OK to cancel this appt.

## 2023-09-26 ENCOUNTER — NURSE ONLY (OUTPATIENT)
Dept: LAB | Age: 53
End: 2023-09-26

## 2023-09-26 DIAGNOSIS — K13.0 CRACKED LIPS: ICD-10-CM

## 2023-09-26 LAB — VIT B12 SERPL-MCNC: 639 PG/ML (ref 211–911)

## 2023-09-27 ENCOUNTER — CARE COORDINATION (OUTPATIENT)
Dept: CARE COORDINATION | Age: 53
End: 2023-09-27

## 2023-09-27 NOTE — CARE COORDINATION
Patient called ACM re: recent labs and ongoing concerns re: bleeding and cracked lips. Patient shared she is aware ordered vit. B12 labs were WNL and she is scheduled to see a dermatologist in [place] tomorrow afternoon. Patient shared she remains concerned as her lips continue to bleed, even with brushing of her teeth. ACM educated patient on the importance of following up as scheduled and to continue to keep lips moist to help prevent cracking/bleeding. Patient verbalized understanding. ACM also reviewed importance of keeping BS controlled and DM managed to help prevent the development/worsening of complications. Patient verbalized understanding and denied any further questions, concerns, or needs.

## 2023-09-28 DIAGNOSIS — J20.9 ACUTE BRONCHITIS, UNSPECIFIED ORGANISM: ICD-10-CM

## 2023-09-28 RX ORDER — ALBUTEROL SULFATE 90 UG/1
2 AEROSOL, METERED RESPIRATORY (INHALATION) 4 TIMES DAILY PRN
Qty: 18 G | Refills: 5 | Status: SHIPPED | OUTPATIENT
Start: 2023-09-28

## 2023-09-29 LAB
PYRIDOXAL PHOS SERPL-SCNC: 20.4 NMOL/L (ref 20–125)
VIT B2 SERPL-SCNC: 8 NMOL/L (ref 5–50)

## 2023-10-04 ENCOUNTER — TELEPHONE (OUTPATIENT)
Dept: SLEEP CENTER | Age: 53
End: 2023-10-04

## 2023-10-05 ENCOUNTER — OFFICE VISIT (OUTPATIENT)
Dept: FAMILY MEDICINE CLINIC | Age: 53
End: 2023-10-05
Payer: MEDICARE

## 2023-10-05 ENCOUNTER — CARE COORDINATION (OUTPATIENT)
Dept: CARE COORDINATION | Age: 53
End: 2023-10-05

## 2023-10-05 VITALS
WEIGHT: 293 LBS | HEART RATE: 75 BPM | DIASTOLIC BLOOD PRESSURE: 70 MMHG | SYSTOLIC BLOOD PRESSURE: 124 MMHG | RESPIRATION RATE: 16 BRPM | BODY MASS INDEX: 48.76 KG/M2 | TEMPERATURE: 97.2 F

## 2023-10-05 DIAGNOSIS — R00.0 TACHYCARDIA: ICD-10-CM

## 2023-10-05 DIAGNOSIS — E78.00 PURE HYPERCHOLESTEROLEMIA: ICD-10-CM

## 2023-10-05 DIAGNOSIS — R21 RASH AND NONSPECIFIC SKIN ERUPTION: Primary | ICD-10-CM

## 2023-10-05 DIAGNOSIS — R07.2 PRECORDIAL PAIN: ICD-10-CM

## 2023-10-05 PROCEDURE — 99214 OFFICE O/P EST MOD 30 MIN: CPT | Performed by: FAMILY MEDICINE

## 2023-10-05 PROCEDURE — 3078F DIAST BP <80 MM HG: CPT | Performed by: FAMILY MEDICINE

## 2023-10-05 PROCEDURE — 3074F SYST BP LT 130 MM HG: CPT | Performed by: FAMILY MEDICINE

## 2023-10-05 RX ORDER — BROMPHENIRAMINE MALEATE, PSEUDOEPHEDRINE HYDROCHLORIDE, AND DEXTROMETHORPHAN HYDROBROMIDE 2; 30; 10 MG/5ML; MG/5ML; MG/5ML
5 SYRUP ORAL 4 TIMES DAILY PRN
Qty: 140 ML | Refills: 0 | Status: SHIPPED | OUTPATIENT
Start: 2023-10-05

## 2023-10-05 RX ORDER — PENICILLIN V POTASSIUM 250 MG/1
250 TABLET ORAL 4 TIMES DAILY
Qty: 40 TABLET | Refills: 0 | Status: SHIPPED | OUTPATIENT
Start: 2023-10-05 | End: 2023-10-15

## 2023-10-05 ASSESSMENT — ENCOUNTER SYMPTOMS
SHORTNESS OF BREATH: 0
COLOR CHANGE: 1
TROUBLE SWALLOWING: 0

## 2023-10-05 NOTE — CARE COORDINATION
patient and monitor for additional needs   Confidence: 8/10  Anticipated Goal Completion Date: 8/15/2023  Update:  Continue to provide ongoing education to patient and monitor for additional resource needs - New Goal Date: 10/20/2023                Future Appointments   Date Time Provider [address]   10/8/2023  8:15 PM SCHEDULE, STR SLEEP TECH 01 1950 [facility]   10/20/2023 11:00 AM STR PCACC MAMMOGRAPHY RM1 STRZ PUT OP STR Kianna R   11/14/2023  1:00 PM Chetan Ramos MD PhD N Oncology 1 [facility]   12/4/2023  1:15 PM Kye Griffith MD marlo [facility]   1/4/2024  1:00 PM Prabhjot Oscar, [address] [facility] [address] [place]   ,   Diabetes Assessment    Medic Alert ID: No  Meal Planning: Avoidance of concentrated sweets   How often do you test your blood sugar?: Meals   Do you have barriers with adherence to non-pharmacologic self-management interventions?  (Nutrition/Exercise/Self-Monitoring): No   Have you ever had to go to the ED for symptoms of low blood sugar?: No       No patient-reported symptoms   Do you have hyperglycemia symptoms?: No   Do you have hypoglycemia symptoms?: No   Blood Sugar Monitoring Regimen: Before Meals   Blood Sugar Trends: No Change        ,   General Assessment    Do you have any symptoms that are causing concern?: Yes  Progression since Onset: Unchanged  Reported Symptoms: Rash, Other (Comment: tooth abscess)     , and No linked episodes

## 2023-10-05 NOTE — PROGRESS NOTES
distress. Breath sounds: Normal breath sounds. No wheezing. Musculoskeletal:      Cervical back: Normal range of motion and neck supple. Skin:     General: Skin is warm and dry. Findings: Rash (Erythematous macular rash on bilateral arms and legs.) present. Neurological:      Mental Status: She is alert and oriented to person, place, and time. Psychiatric:         Behavior: Behavior normal.       Assessment/Plan:     Donna was seen today for rash. Diagnoses and all orders for this visit:    Rash and nonspecific skin eruption      -    Will treat with Triamcinolone cream 2-3x/day for 1 week. Tachycardia  Pure hypercholesterolemia  Precordial pain  -     Stress test, lexiscan; Future        Return if symptoms worsen or fail to improve.     Electronically signed by Bruno Carrero MD on 10/5/2023 at 8:45 AM

## 2023-10-10 ENCOUNTER — CARE COORDINATION (OUTPATIENT)
Dept: CARE COORDINATION | Age: 53
End: 2023-10-10

## 2023-10-10 DIAGNOSIS — E11.9 TYPE 2 DIABETES MELLITUS WITHOUT COMPLICATION, WITHOUT LONG-TERM CURRENT USE OF INSULIN (HCC): ICD-10-CM

## 2023-10-10 RX ORDER — AMITRIPTYLINE HYDROCHLORIDE 150 MG/1
300 TABLET ORAL
Qty: 30 TABLET | Refills: 0 | Status: SHIPPED | OUTPATIENT
Start: 2023-10-10

## 2023-10-10 NOTE — CARE COORDINATION
10/20/2023                Future Appointments   Date Time Provider [address]   10/20/2023 11:00 AM STR PCACC MAMMOGRAPHY RM1 STRZ PUT OP STR Haroon Hooverer   11/14/2023  1:00 PM Nora Scott MD PhD N Oncology [facility]   12/4/2023  1:15 PM [facility]   12/4/2023  8:15 PM SCHEDULE, STR SLEEP TECH 01 1401 [facility]   12/11/2023  2:30 PM STR NUC MED HC  INJECT  28 [facility]   12/11/2023  3:00 PM STR NUCLEAR [facility] ROOM [facility] [place]   12/11/2023  3:30 PM STR [address] [place]   12/11/2023  4:00 PM STR [facility]UC[facility]AR [facility] ROOM 465 [facility] [place]   1/4/2024  1:00 PM Belia Canales, [address] [facility] [address] [place]   ,   Diabetes Assessment    Medic Alert ID: No  Meal Planning: Avoidance of concentrated sweets   How often do you test your blood sugar?: Meals   Do you have barriers with adherence to non-pharmacologic self-management interventions?  (Nutrition/Exercise/Self-Monitoring): No   Have you ever had to go to the ED for symptoms of low blood sugar?: No       No patient-reported symptoms   Do you have hyperglycemia symptoms?: No   Do you have hypoglycemia symptoms?: Yes   Frequency of Episodes: 3 Per: Week   Last Blood Sugar Value: 105   Blood Sugar Monitoring Regimen: Morning Fasting, 2 Hours Post Meal, At Bedtime   Blood Sugar Trends: No Change        ,   General Assessment    Do you have any symptoms that are causing concern?: No     , and No linked episodes

## 2023-10-10 NOTE — TELEPHONE ENCOUNTER
Please see Care Coordination note from this date for additional information. Patient is requesting updated script for her glucometer testing strips as current script is only for daily monitoring and patient would like to be checking BID. Patient wishes for script to go to [facility]. Please advise. Thank you!

## 2023-10-11 ENCOUNTER — TELEPHONE (OUTPATIENT)
Dept: FAMILY MEDICINE CLINIC | Age: 53
End: 2023-10-11

## 2023-10-11 NOTE — TELEPHONE ENCOUNTER
Pt has stress test in December. Heart rate at rest lately has been 110-115 at rest.  When this happens, she voiced that she feels chest tightness. Asked her if this is new, she states it has been happening over past year but has been getting worse lately.

## 2023-10-11 NOTE — TELEPHONE ENCOUNTER
Patient called ACM with similar concerns. Patient shared increased HR was 110 last night at rest.  Patient admits increased HR does coincide with lower BS readings (BS was 89 last evening) as hypoglycemia symptoms make her panic. Patient stated she gets Dayana Niraj and nervous\" with lower BS readings. Patient has not checked her BP during these episodes. Patient feels HR was elevated during our call but stated she could not check BP at this time. ACM instructed patient to check BP and BS during tachycardia events/symptoms and keep a log. Patient verbalized understanding. Please advise. Thank you!

## 2023-10-11 NOTE — TELEPHONE ENCOUNTER
Does it only happen when she's stressed? Is it that high with rest or exertion?   What's her BP at the time/

## 2023-10-12 ENCOUNTER — CARE COORDINATION (OUTPATIENT)
Dept: CARE COORDINATION | Age: 53
End: 2023-10-12

## 2023-10-12 NOTE — CARE COORDINATION
Patient called ACM in f/u to earlier this week. Patient shared BS was improved today after eating protein bar prior to bed last night. BS was 127 this AM.  ACM reviewed hypoglycemia prevention education with patient and she verbalized understanding. Patient shared her  believes her tachycardia is r/t increased stress and anxiety. ACM encouraged patient to practice deep breathing and relaxation techniques to help with stress/anxiety reduction and to see if this helps with increased HR symptoms. Patient was also educated on the importance of taking her medications as prescribed and directed. Patient acknowledged understanding. Patient was educated on the need to f/u with any new/change/worsening of symptoms and she verbalized understanding. Patient denied any other questions, concerns, or needs and she was encouraged to call with any that may develop. Plan of Care:   - Continue with f/u Care Coordination calls for assistance with the management of her hypertension, DM, and healthcare needs   - Complete DM education - In Process    - Educate on signs/symptoms to report - Ongoing   - Educate on the importance of early symptom recognition and follow up - Ongoing  - [facility] - Now following [facility] and appt. scheduled for 6/16/2023 - Completed - Now following with [facility] - Completed - Surgery completed 8/4/2023  - Educate on the availability of same day appointments, urgent care/walk in clinic options, and after hours on call resources - Completed  - Educate on Remote Patient Monitoring (RPM) program - N/A - BS remain stable  - Review and verify Healthcare Decision Maker information - Completed  - F[facility]u [facility] - Dr. Elidia Pereira - Completed - Pt plans to cont.  to f/u at [facility]  - Patient is current with vaccines  - Patient is due for Medicare AWV - Wellness exam scheduled for 12/4/2023  - HBG A1C - 5.3% May 2023   - Monitor for additional needs   - Anticipate discharge

## 2023-10-16 NOTE — TELEPHONE ENCOUNTER
Called and scheduled patient for f/u 1.29.23 with gerard serrato advised her to call back with any questions

## 2023-10-17 RX ORDER — GABAPENTIN 100 MG/1
100 CAPSULE ORAL DAILY
Qty: 90 CAPSULE | Refills: 0 | Status: SHIPPED | OUTPATIENT
Start: 2023-10-17 | End: 2024-01-15

## 2023-10-17 RX ORDER — AMITRIPTYLINE HYDROCHLORIDE 150 MG/1
300 TABLET ORAL NIGHTLY PRN
Qty: 30 TABLET | Refills: 0 | OUTPATIENT
Start: 2023-10-17

## 2023-10-18 ENCOUNTER — CARE COORDINATION (OUTPATIENT)
Dept: CARE COORDINATION | Age: 53
End: 2023-10-18

## 2023-10-18 NOTE — CARE COORDINATION
1:15 PM MD Ruba Ravi [facility]   12/4/2023  8:15 PM SCHEDULE, STR SLEEP TECH 01 1401 [facility]   12/11/2023  2:30 PM STR NUC MED HC  INJECT  28 [facility]   12/11/2023  3:00 PM STR [facility] ROOM [address] [place] [address]   12/11/2023  3:30 PM STR [address] [place]   12/11/2023  4:00 PM STR [facility] ROOM [address] [place] [address]   1/4/2024  1:00 PM MD Salome Ramirezheim [facility] [address]   1/29/2024  1:20 PM EFRAÍN Fritz - CNP N Pulm Med [address]   ,   Diabetes Assessment    Medic Alert ID: No  Meal Planning: Avoidance of concentrated sweets   How often do you test your blood sugar?: Meals   Do you have barriers with adherence to non-pharmacologic self-management interventions?  (Nutrition/Exercise/Self-Monitoring): No   Have you ever had to go to the ED for symptoms of low blood sugar?: No       No patient-reported symptoms        ,   General Assessment    Do you have any symptoms that are causing concern?: No     , and No linked episodes

## 2023-10-23 ENCOUNTER — CARE COORDINATION (OUTPATIENT)
Dept: CARE COORDINATION | Age: 53
End: 2023-10-23

## 2023-10-23 NOTE — CARE COORDINATION
Ambulatory Care Coordination Note  10/23/2023    Patient Current Location:  Home: [address] [place] [address]     ACM contacted the patient by telephone. Verified name and  with patient as identifiers. Provided introduction to self, and explanation of the ACM role. Challenges to be reviewed by the provider   Additional needs identified to be addressed with provider: No    none         Method of communication with provider: none. ACM: Mary Linda RN    Patient was called for continued Care Coordination follow up and education re: the management of her DM and healthcare needs. Patient shared she is just waking up for the day and is sleepy t/o our call. Patient reported she had a good week end and she was able to visit her sister, who remains in the hospital, yesterday. Patient shared she has noticed some upper extremity weakness but after additional discussion patient stated symptom is not new or unchanged. ACM encouraged patient to try some light arm strengthening exercises to see if this helps with symptoms and educated patient on the need to call for earlier appointment and evaluation with any new/change/worsening of symptoms. Patient verbalized understanding. Patient denied any current DM concerns. ACM reviewed importance of patient taking care of herself and resting when needed and staying home if not feeling well. Patient verbalized understanding. Patient denied any other questions, concerns, or needs and she was encouraged to call with any that may develop.           Actions:   - Briefly reviewed DM education   - Reviewed signs/symptoms to report and importance of early symptom recognition and follow up   - Educated on the need to call for earlier appointment with any new/change/worsening of symptoms  - Encouraged light arm strengthening exercises for c/o UE weakness  - Reviewed importance of taking care of self to assist with staying healthy   - Monitored for additional needs

## 2023-10-24 DIAGNOSIS — K21.9 GASTROESOPHAGEAL REFLUX DISEASE, UNSPECIFIED WHETHER ESOPHAGITIS PRESENT: ICD-10-CM

## 2023-10-24 DIAGNOSIS — B37.9 YEAST INFECTION: ICD-10-CM

## 2023-10-24 RX ORDER — NYSTATIN 100000 [USP'U]/G
POWDER TOPICAL
Qty: 60 G | Refills: 0 | Status: SHIPPED | OUTPATIENT
Start: 2023-10-24

## 2023-10-24 RX ORDER — OMEPRAZOLE 40 MG/1
40 CAPSULE, DELAYED RELEASE ORAL DAILY
Qty: 100 CAPSULE | Refills: 2 | Status: SHIPPED | OUTPATIENT
Start: 2023-10-24

## 2023-10-25 ENCOUNTER — TELEPHONE (OUTPATIENT)
Dept: FAMILY MEDICINE CLINIC | Age: 53
End: 2023-10-25

## 2023-10-25 NOTE — TELEPHONE ENCOUNTER
I would talk to the pharmacist to see what she's been getting filled because it's not clear by what we have on the chart. I see both 100 mg and 300 mg of Gabapentin.

## 2023-10-25 NOTE — TELEPHONE ENCOUNTER
[address] called to clarify patient's medications. Pharmacy asking what dose of gabapentin patient is to be taking 100, 200 or 300mg and what dose of trazadone 50 or 100mg?

## 2023-10-30 ENCOUNTER — CARE COORDINATION (OUTPATIENT)
Dept: CARE COORDINATION | Age: 53
End: 2023-10-30

## 2023-10-30 ENCOUNTER — TELEPHONE (OUTPATIENT)
Dept: PHARMACY | Facility: CLINIC | Age: 53
End: 2023-10-30

## 2023-10-30 ASSESSMENT — ENCOUNTER SYMPTOMS
EYE REDNESS: 0
SORE THROAT: 0
CONSTIPATION: 0
SHORTNESS OF BREATH: 0
DIARRHEA: 0
NAUSEA: 0
RHINORRHEA: 0
VOMITING: 0

## 2023-10-30 NOTE — TELEPHONE ENCOUNTER
----- Message from Micheal Hunter RN sent at 10/30/2023  9:58 AM EDT -----  Please reach out to patient for complete medication review and education.       Thank you,   Perlita SOLISN RN  Ambulatory Care Manager

## 2023-10-30 NOTE — CARE COORDINATION
Future Appointments   Date Time Provider [address]   10/31/2023  9:15 AM MD Ruba Pierre [facility]   11/2/2023 12:30 PM STR PCACC MAMMOGRAPHY RM1 STRZ PUT OP STR Aj Mario   11/14/2023  1:00 PM Dmitri Patton MD PhD N Oncology [facility]   12/4/2023  1:15 PM MD Ruba Pierre [facility]   12/4/2023  8:15 PM SCHEDULE, STR SLEEP TECH 01 1401 [facility]   12/18/2023  2:30 PM STR NUC MED HC  INJECT  28 [facility]   12/18/2023  3:00 PM STR NUCLEAR MEDICINE HEART CENTER ROOM [address] [place] [address]   12/18/2023  3:30 PM STR [address] [place]   12/18/2023  4:00 PM STR NUCLEAR MEDICINE HEART CENTER ROOM [address] [place] [address]   1/4/2024  1:00 PM MD Reji Wright Gills [facility]   1/29/2024  1:20 PM EFRAÍN Orosco - CNP N Pulm Med 1 [facility]   ,   Diabetes Assessment    Medic Alert ID: No  Meal Planning: Avoidance of concentrated sweets   How often do you test your blood sugar?: Meals   Do you have barriers with adherence to non-pharmacologic self-management interventions?  (Nutrition/Exercise/Self-Monitoring): No   Have you ever had to go to the ED for symptoms of low blood sugar?: No       No patient-reported symptoms   Do you have hyperglycemia symptoms?: No   Do you have hypoglycemia symptoms?: No   Blood Sugar Monitoring Regimen: Once a Day   Blood Sugar Trends: No Change        ,   General Assessment    Do you have any symptoms that are causing concern?: Yes  Progression since Onset: Intermittent - Waxing/Waning  Reported Symptoms: Other (Comment: sweats at night)     , and No linked episodes

## 2023-10-31 ENCOUNTER — OFFICE VISIT (OUTPATIENT)
Dept: FAMILY MEDICINE CLINIC | Age: 53
End: 2023-10-31
Payer: MEDICARE

## 2023-10-31 VITALS
BODY MASS INDEX: 48.82 KG/M2 | HEART RATE: 68 BPM | OXYGEN SATURATION: 100 % | SYSTOLIC BLOOD PRESSURE: 104 MMHG | RESPIRATION RATE: 18 BRPM | TEMPERATURE: 98.1 F | DIASTOLIC BLOOD PRESSURE: 70 MMHG | HEIGHT: 65 IN | WEIGHT: 293 LBS

## 2023-10-31 DIAGNOSIS — Z00.00 WELL ADULT EXAM: Primary | ICD-10-CM

## 2023-10-31 DIAGNOSIS — Z23 NEED FOR INFLUENZA VACCINATION: ICD-10-CM

## 2023-10-31 DIAGNOSIS — Z12.11 SCREENING FOR COLON CANCER: ICD-10-CM

## 2023-10-31 PROCEDURE — 90674 CCIIV4 VAC NO PRSV 0.5 ML IM: CPT | Performed by: FAMILY MEDICINE

## 2023-10-31 PROCEDURE — 99396 PREV VISIT EST AGE 40-64: CPT | Performed by: FAMILY MEDICINE

## 2023-10-31 PROCEDURE — 3078F DIAST BP <80 MM HG: CPT | Performed by: FAMILY MEDICINE

## 2023-10-31 PROCEDURE — 3074F SYST BP LT 130 MM HG: CPT | Performed by: FAMILY MEDICINE

## 2023-10-31 PROCEDURE — G0008 ADMIN INFLUENZA VIRUS VAC: HCPCS | Performed by: FAMILY MEDICINE

## 2023-10-31 RX ORDER — AMITRIPTYLINE HYDROCHLORIDE 150 MG/1
300 TABLET ORAL NIGHTLY
Qty: 180 TABLET | Refills: 1 | Status: SHIPPED | OUTPATIENT
Start: 2023-10-31

## 2023-10-31 RX ORDER — HYDROXYZINE 50 MG/1
50 TABLET, FILM COATED ORAL EVERY 8 HOURS PRN
Qty: 90 TABLET | Refills: 1 | Status: SHIPPED | OUTPATIENT
Start: 2023-10-31

## 2023-10-31 RX ORDER — BROMPHENIRAMINE MALEATE, PSEUDOEPHEDRINE HYDROCHLORIDE, AND DEXTROMETHORPHAN HYDROBROMIDE 2; 30; 10 MG/5ML; MG/5ML; MG/5ML
5 SYRUP ORAL 4 TIMES DAILY PRN
Qty: 140 ML | Refills: 2 | Status: SHIPPED | OUTPATIENT
Start: 2023-10-31

## 2023-10-31 ASSESSMENT — ENCOUNTER SYMPTOMS: COUGH: 1

## 2023-10-31 NOTE — PROGRESS NOTES
Vaccine Information Sheet, \"Influenza - Inactivated\"  given to [employer], or parent/legal guardian of  [employer] and verbalized understanding. Patient responses:    Have you ever had a reaction to a flu vaccine? No  Do you have an allergy to eggs, neomycin or polymixin? No  Do you have an allergy to Thimerosal, contact lens solution, or Merthiolate? No  Have you ever had Guillian Easton Syndrome? No  Do you have any current illness? No  Do you have a temperature above 100 degrees? No  Are you pregnant? No  If pregnant, permission obtained from physician? No  Do you have an active neurological disorder? No      Flu vaccine given per order. Please see immunization tab. Immunizations Administered       Name Date Dose Route    Influenza, FLUCELVAX, (age 10 mo+), MDCK, PF, 0.5mL 10/31/2023 0.5 mL Intramuscular    Site: Deltoid- Right    Lot: [id number]    NDC: 08522-634-75         Pt tolerated appropriately.

## 2023-10-31 NOTE — TELEPHONE ENCOUNTER
2nd Attempt Documentation:  2nd attempt to contact this patient regarding the previous message  CLINICAL PHARMACY: REFERRAL    Announced my call to  but states patient not available. Selerity message sent to patient. If unread, letter will be mailed to home. Maame Morfin CP.    [facility] free: [phone]

## 2023-11-06 ENCOUNTER — TELEPHONE (OUTPATIENT)
Dept: FAMILY MEDICINE CLINIC | Age: 53
End: 2023-11-06

## 2023-11-06 NOTE — TELEPHONE ENCOUNTER
Pt called c/o cough ongoing for 3 days   Has been taking the brompheniramine with no relief, not taking anything else  Pt also said that her heart feels like its pounding, when she checked her pulse it was in the 70s

## 2023-11-07 ENCOUNTER — TELEPHONE (OUTPATIENT)
Dept: FAMILY MEDICINE CLINIC | Age: 53
End: 2023-11-07

## 2023-11-07 NOTE — TELEPHONE ENCOUNTER
Spoke with pt. Informed her pharmacist has been trying to reach her to review medications. Pt states \"I don't need to speak with the pharmacist.  I know what I am taking. I am doing good and don't have any medication concerns at this time.

## 2023-11-07 NOTE — TELEPHONE ENCOUNTER
Pt states she has had low back pain when she stands to do anything since Aug.  Pain is relieved when she sits back down.     States icy hot or bengay do not help    Pt asking for suggestions

## 2023-11-08 ASSESSMENT — ENCOUNTER SYMPTOMS
SINUS PRESSURE: 1
COUGH: 1

## 2023-11-08 NOTE — PROGRESS NOTES
[address] [address] Jul[address] Cesar [facility]CA[facility]  Phone:  [phone]          Name: Neftali Sanabria  : 1970    Chief Complaint   Patient presents with    Congestion     X 1 week. Cough    Lower Back Pain       HPI:     Neftali Sanabria is a 48 y.o. female who presents today for evaluation of a cough, sinus drainage, and fatigue for the past week. Her cough is productive of yellow/sputum. She's been having more headaches. She's felt warm but she doesn't have a working thermometer. No diarrhea, nausea, or vomiting. Current Outpatient Medications:     brompheniramine-pseudoephedrine-DM 2-30-10 MG/5ML syrup, Take 5 mLs by mouth 4 times daily as needed for Congestion or Cough, Disp: 140 mL, Rfl: 2    azithromycin (ZITHROMAX Z-EVETTE) 250 MG tablet, Take two (2) tablets by mouth on day 1, then take one (1) tablet by mouth daily for four (4) days. , Disp: 6 tablet, Rfl: 0    hydrOXYzine HCl (ATARAX) 50 MG tablet, Take 1 tablet by mouth every 8 hours as needed for Itching, Disp: 90 tablet, Rfl: 1    amitriptyline (ELAVIL) 150 MG tablet, Take 2 tablets by mouth nightly, Disp: 180 tablet, Rfl: 1    omeprazole (PRILOSEC) 40 MG delayed release capsule, Take 1 capsule by mouth Daily, Disp: 100 capsule, Rfl: 2    nystatin (MYCOSTATIN) 303471 UNIT/GM powder, APPLY  POWDER TOPICALLY TO AFFECTED AREA THREE TIMES DAILY, Disp: 60 g, Rfl: 0    blood glucose test strips (ASCENSIA AUTODISC VI;ONE TOUCH ULTRA TEST VI) strip, 1 each by In Vitro route 2 times daily Testing once daily DISPENSE ONE TOUCH ULTRA 2 STRIPS, Disp: 200 each, Rfl: 3    albuterol sulfate HFA (VENTOLIN HFA) 108 (90 Base) MCG/ACT inhaler, Inhale 2 puffs into the lungs 4 times daily as needed for Wheezing, Disp: 18 g, Rfl: 5    celecoxib (CELEBREX) 400 MG capsule, Take 1 capsule by mouth once daily, Disp: 90 capsule, Rfl: 3    traZODone (DESYREL) 50 MG tablet, TAKE 1 TO 2 TABLETS BY MOUTH NIGHTLY AS NEEDED FOR INSOMNIA, Disp: 60

## 2023-11-09 ENCOUNTER — OFFICE VISIT (OUTPATIENT)
Dept: FAMILY MEDICINE CLINIC | Age: 53
End: 2023-11-09
Payer: MEDICARE

## 2023-11-09 VITALS
SYSTOLIC BLOOD PRESSURE: 124 MMHG | TEMPERATURE: 97.7 F | BODY MASS INDEX: 49.26 KG/M2 | RESPIRATION RATE: 16 BRPM | WEIGHT: 293 LBS | HEART RATE: 77 BPM | DIASTOLIC BLOOD PRESSURE: 80 MMHG

## 2023-11-09 DIAGNOSIS — J01.90 ACUTE BACTERIAL SINUSITIS: Primary | ICD-10-CM

## 2023-11-09 DIAGNOSIS — B96.89 ACUTE BACTERIAL SINUSITIS: Primary | ICD-10-CM

## 2023-11-09 PROCEDURE — 3079F DIAST BP 80-89 MM HG: CPT | Performed by: FAMILY MEDICINE

## 2023-11-09 PROCEDURE — 99213 OFFICE O/P EST LOW 20 MIN: CPT | Performed by: FAMILY MEDICINE

## 2023-11-09 PROCEDURE — 3074F SYST BP LT 130 MM HG: CPT | Performed by: FAMILY MEDICINE

## 2023-11-09 RX ORDER — AZITHROMYCIN 250 MG/1
TABLET, FILM COATED ORAL
Qty: 6 TABLET | Refills: 0 | Status: SHIPPED | OUTPATIENT
Start: 2023-11-09

## 2023-11-09 RX ORDER — BROMPHENIRAMINE MALEATE, PSEUDOEPHEDRINE HYDROCHLORIDE, AND DEXTROMETHORPHAN HYDROBROMIDE 2; 30; 10 MG/5ML; MG/5ML; MG/5ML
5 SYRUP ORAL 4 TIMES DAILY PRN
Qty: 140 ML | Refills: 2 | Status: SHIPPED | OUTPATIENT
Start: 2023-11-09

## 2023-11-10 ENCOUNTER — CARE COORDINATION (OUTPATIENT)
Dept: CARE COORDINATION | Age: 53
End: 2023-11-10

## 2023-11-10 NOTE — CARE COORDINATION
Ambulatory Care Coordination Note  11/10/2023    Patient Current Location:  Home: [address] [place] [address]     Verified name and  with patient as identifiers. Provided introduction to self, and explanation of the ACM role. Challenges to be reviewed by the provider   Additional needs identified to be addressed with provider: No     none         Method of communication with provider: none. ACM: Osei Hernandez RN    Patient called ACM this AM for continued Care Coordination follow up and education re: the management of her DM and healthcare needs. Patient shared she was able to f/u with PCP yesterday for ongoing congestion concerns. Patient denied any questions from her visit and shared she has started antibiotic as directed. ACM educated on the importance of taking medications as prescribed and alerting all providers of medication changes and need to complete full antibiotic therapy for best results. Patient verbalized understanding and shared she has been in touch with her CM at [facility] re: recent Seroquel changes. Patient declined recent pharmacy referral when they attempted to schedule as recently discussed. Patient denied any medication questions/concerns at this time. Patient shared she remains  frustrated with recent weight gain and she denied any change to her diet. Patient denied any current BS concerns. ACM reviewed importance of ongoing diet adherence as well as need to increase activity s/p recent injuries/surgeries. ACM educated on how activity may assist with weight loss and patient acknowledged understanding. Patient was encouraged to work to slowly increase activity level as she is able. Patient denied any other questions, concerns, or needs and she was encouraged to call with any that may develop.           Actions:   - Reviewed DM education   - Monitored for questions re: recent PCP visit   - Educated on need to take full course of antibiotic as directed  -

## 2023-11-12 NOTE — PROGRESS NOTES
LABALBU 3.6 05/17/2023 03:45 PM    CALCIUM 9.3 06/27/2022 08:13 AM    BILITOT 0.2 05/17/2023 03:45 PM    ALKPHOS 115 05/17/2023 03:45 PM    AST 18 05/17/2023 03:45 PM    ALT 29 05/17/2023 03:45 PM     BMP:    Lab Results   Component Value Date/Time     05/17/2023 03:45 PM    K 4.1 05/17/2023 03:45 PM     05/17/2023 03:45 PM    CO2 28 05/17/2023 03:45 PM    BUN 14 05/17/2023 03:45 PM    LABALBU 3.6 05/17/2023 03:45 PM    CREATININE 0.7 05/17/2023 03:45 PM    CALCIUM 9.3 06/27/2022 08:13 AM    LABGLOM >90 06/27/2022 08:13 AM    GLUCOSE 136 05/17/2023 03:45 PM    GLUCOSE 88 04/27/2015 10:11 AM     Magnesium:    Lab Results   Component Value Date/Time    MG 1.9 05/17/2023 03:45 PM     PT/INR:    Lab Results   Component Value Date/Time    INR 1.05 05/17/2023 04:05 PM     TSH:    Lab Results   Component Value Date/Time    TSH 2.890 06/27/2022 08:13 AM     VITAMIN B12: No components found for: \"B12\"  FOLATE:  No results found for: \"FOLATE\"  IRON:    Lab Results   Component Value Date/Time    IRON 49 06/27/2022 08:13 AM     Iron Saturation:  No components found for: \"PERCENTFE\"  TIBC:    Lab Results   Component Value Date/Time    TIBC 332 06/27/2022 08:13 AM     FERRITIN:  No results found for: \"FERRITIN\"    IMAGING:  No results found. ASSESSMENT/PLAN:    Patient Active Problem List   Diagnosis    Depression    Anxiety    Hyperlipidemia    Paranoid schizophrenia ([address])    Acalculous cholecystitis    Epigastric pain    Obesity    Pulmonary embolism (HCC)    Acute deep vein thrombosis (DVT) of left lower extremity (HCC)    LV dysfunction    Bradycardia    Essential hypertension    Morbid obesity due to excess calories (HCC)    Dyspnea on exertion    Precordial pain    Type 2 diabetes mellitus    Insomnia        1) Hematology Diagnosis: History of deep vein thrombosis and pulmonary embolus in 2 precipitating events (hormonal therapy + COVID, with persistent risk factor of morbid obesity.   Her anticardiolipin

## 2023-11-14 ENCOUNTER — HOSPITAL ENCOUNTER (OUTPATIENT)
Dept: INFUSION THERAPY | Age: 53
Discharge: HOME | End: 2023-11-14
Payer: MEDICARE

## 2023-11-14 ENCOUNTER — OFFICE VISIT (OUTPATIENT)
Dept: ONCOLOGY | Age: 53
End: 2023-11-14
Payer: MEDICARE

## 2023-11-14 VITALS
DIASTOLIC BLOOD PRESSURE: 83 MMHG | TEMPERATURE: 98.9 F | SYSTOLIC BLOOD PRESSURE: 136 MMHG | HEIGHT: 65 IN | OXYGEN SATURATION: 92 % | RESPIRATION RATE: 18 BRPM | BODY MASS INDEX: 48.82 KG/M2 | WEIGHT: 293 LBS | HEART RATE: 91 BPM

## 2023-11-14 VITALS
DIASTOLIC BLOOD PRESSURE: 83 MMHG | SYSTOLIC BLOOD PRESSURE: 136 MMHG | TEMPERATURE: 98.9 F | HEART RATE: 91 BPM | RESPIRATION RATE: 18 BRPM

## 2023-11-14 DIAGNOSIS — I26.99 OTHER ACUTE PULMONARY EMBOLISM WITHOUT ACUTE COR PULMONALE (HCC): ICD-10-CM

## 2023-11-14 DIAGNOSIS — I82.412 ACUTE DEEP VEIN THROMBOSIS (DVT) OF FEMORAL VEIN OF LEFT LOWER EXTREMITY (HCC): Primary | ICD-10-CM

## 2023-11-14 PROCEDURE — 99214 OFFICE O/P EST MOD 30 MIN: CPT | Performed by: INTERNAL MEDICINE

## 2023-11-14 PROCEDURE — 99211 OFF/OP EST MAY X REQ PHY/QHP: CPT

## 2023-11-14 PROCEDURE — 3075F SYST BP GE 130 - 139MM HG: CPT | Performed by: INTERNAL MEDICINE

## 2023-11-14 PROCEDURE — 3079F DIAST BP 80-89 MM HG: CPT | Performed by: INTERNAL MEDICINE

## 2023-11-14 NOTE — PATIENT INSTRUCTIONS
Continue with Eliquis indefinately; should have enough refills until end of June  RTC in approx 6 mon with CBC

## 2023-11-16 ENCOUNTER — TELEPHONE (OUTPATIENT)
Dept: INFUSION THERAPY | Age: 53
End: 2023-11-16

## 2023-11-20 RX ORDER — TRAZODONE HYDROCHLORIDE 50 MG/1
TABLET ORAL
Qty: 60 TABLET | Refills: 0 | Status: SHIPPED | OUTPATIENT
Start: 2023-11-20

## 2023-11-21 ENCOUNTER — CARE COORDINATION (OUTPATIENT)
Dept: CARE COORDINATION | Age: 53
End: 2023-11-21

## 2023-11-21 NOTE — CARE COORDINATION
Ambulatory Care Coordination Note  2023    Patient Current Location:  Home: [address] [place] [address]     ACM contacted the patient by telephone. Verified name and  with patient as identifiers. Provided introduction to self, and explanation of the ACM role. Challenges to be reviewed by the provider   Additional needs identified to be addressed with provider: No  none         Method of communication with provider: none. ACM: Alberto Chapman RN    Patient was called for continued Care Coordination follow up and education re: the management of her DM and healthcare needs. Patient shared she has been doing \"ok\" and her recent congestion symptoms have improved s/p recent PCP visit. Patient reported she has also completed and mailed colorguard test as instructed. Patient continues to monitor BS and she shared BS was slightly elevated last evening after dinner with BS reading of 173. DM education and zone information was reviewed with patient. ACM reviewed importance of keeping BS controlled and DM managed. ACM also reviewed importance of ongoing diet adherence. Patient verbalized understanding. Patient is scheduled for PCP f/u on 2023. Patient denied any other questions, concerns, or needs and she was encouraged to call with any that may develop. Patient has remained stable and she is aware of the resources available to her so will plan to discharge from Care Coordination at this time.            Actions:   - Reviewed DM education and zone information   - Reviewed importance of keeping BS controlled/DM managed   - Reviewed importance of ongoing diet adherence   - Monitored for questions re: recent PCP visit  - Monitored for additional needs          Plan of Care:   - Graduation from [address] patient enrollment in the Remote Patient Monitoring (RPM) program for in-home monitoring: Patient is not eligible for RPM program.    Lab Results       None            Care

## 2023-11-22 LAB — NONINV COLON CA DNA+OCC BLD SCRN STL QL: NEGATIVE

## 2023-11-28 ENCOUNTER — TELEPHONE (OUTPATIENT)
Dept: FAMILY MEDICINE CLINIC | Age: 53
End: 2023-11-28

## 2023-11-28 NOTE — TELEPHONE ENCOUNTER
Patient is at the hospital with her  as he had a heart attack. She was in the room and started coughing and did cough up some blood. She states it is specks of blood when she has to cough hard to get the phlegm out. She is taking the cough medicine that was prescribed by pcp but is still coughing. She has not other symptoms and feels fine, she just doesn't know why she is coughing today. Writer advised with the cold weather and being in a hospital the air can be very dry and cause irritation in her throat. Patient wants to know if pcp needs to see her or if she knows why she is having this now. Please advise.

## 2023-11-30 NOTE — PROGRESS NOTES
[address] [address] Julio Cesar GARCIA [facility]  Phone:  [phone]          Name: Farzana Linda  : 1970    Chief Complaint   Patient presents with    Diabetes       HPI:     Farzana Linda is a 48 y.o. female who presents today for follow up of her blood sugars. She reports a h/o diabetes years ago from medication she was on for her schizophrenia (? Olanzapine). Her last HbA1c was 5.3% in May. She thinks it will be elevated today as she's been eating a lot of peppermint ice cream from [address] [place] [address]. Her  is going on a diet so she's doing it too. Yesterday her BG was 97 but she felt she was hypoglycemic. Lab Results   Component Value Date/Time     2023 03:45 PM    K 4.1 2023 03:45 PM     2023 03:45 PM    CO2 28 2023 03:45 PM    BUN 14 2023 03:45 PM    CREATININE 0.7 2023 03:45 PM    GLUCOSE 136 2023 03:45 PM    GLUCOSE 88 2015 10:11 AM    CALCIUM 9.3 2022 08:13 AM    LABGLOM >90 2022 08:13 AM      Lab Results   Component Value Date    CHOL 142 2022    TRIG 126 2022    HDL 53 2022    LDLCALC 64 2022     Lab Results   Component Value Date    ALT 29 2023    AST 18 2023            Current Outpatient Medications:     traZODone (DESYREL) 150 MG tablet, Take 1 tablet by mouth nightly, Disp: 90 tablet, Rfl: 1    traZODone (DESYREL) 50 MG tablet, Take 1-2 tablets by mouth nightly as needed for insomnia, Disp: 180 tablet, Rfl: 1    Continuous Blood Gluc Sensor (FREESTYLE ERROL 2 SENSOR) MISC, Change sensor every 14 days. , Disp: 7 each, Rfl: 1    brompheniramine-pseudoephedrine-DM 2-30-10 MG/5ML syrup, Take 5 mLs by mouth 4 times daily as needed for Congestion or Cough, Disp: 140 mL, Rfl: 2    hydrOXYzine HCl (ATARAX) 50 MG tablet, Take 1 tablet by mouth every 8 hours as needed for Itching, Disp: 90 tablet, Rfl: 1    amitriptyline (ELAVIL) 150 MG tablet, Take 2 tablets by mouth

## 2023-12-01 ENCOUNTER — CARE COORDINATION (OUTPATIENT)
Dept: CARE COORDINATION | Age: 53
End: 2023-12-01

## 2023-12-01 NOTE — CARE COORDINATION
Previous Care Coordination patient who called ACM to provide an update. Patient shared her  was in the hospital earlier this week and is now back home and doing \"ok. \"  Patient reported her Psychologist at [facility], recently decreased her Risperidone and she feels she is having more symptoms s/p the medication change. Patient stated she is not scheduled to f/u again until the end of Dec.  Patient shared she feels she is \"hearing voices\" more frequently and similar to how she did in the past.  ACM educated patient on the need to f/u with her providers, especially with worsening symptoms s/p medication changes. Patient verbalized understanding but shared \"I cannot get an earlier appointment. \"  ACM reviewed the need to call and alert prescribing provider of changes even if earlier appointment not available. Patient verbalized understanding. AC also educated patient on the importance of taking her medications as directed and prescribed. Patient verbalized understanding. Patient shared she is scheduled to f/u with PCP on Monday 12/4. ACM encouraged her to bring questions with her to her appointment as she remains concerned about her weight and feels she is also having some increased pain in her ankle. Patient shared she also remains in contact with her [place] provider's office. Patient reported she remain frustrated with current living situation as she feels she had more options when she lived in [place]. Active listening provided. Patient denied any other questions,concerns, or needs and she was encouraged to call with any that may develop. PCP updated.

## 2023-12-04 ENCOUNTER — OFFICE VISIT (OUTPATIENT)
Dept: FAMILY MEDICINE CLINIC | Age: 53
End: 2023-12-04
Payer: MEDICARE

## 2023-12-04 VITALS
TEMPERATURE: 97.8 F | RESPIRATION RATE: 16 BRPM | SYSTOLIC BLOOD PRESSURE: 120 MMHG | BODY MASS INDEX: 49.26 KG/M2 | HEART RATE: 72 BPM | DIASTOLIC BLOOD PRESSURE: 70 MMHG | WEIGHT: 293 LBS

## 2023-12-04 DIAGNOSIS — F51.01 PRIMARY INSOMNIA: ICD-10-CM

## 2023-12-04 DIAGNOSIS — E78.00 PURE HYPERCHOLESTEROLEMIA: ICD-10-CM

## 2023-12-04 DIAGNOSIS — Z12.31 ENCOUNTER FOR SCREENING MAMMOGRAM FOR BREAST CANCER: ICD-10-CM

## 2023-12-04 DIAGNOSIS — R73.03 PREDIABETES: Primary | ICD-10-CM

## 2023-12-04 DIAGNOSIS — Z86.39 HISTORY OF DIABETES MELLITUS: ICD-10-CM

## 2023-12-04 LAB — HBA1C MFR BLD: 5.9 % (ref 4.3–5.7)

## 2023-12-04 PROCEDURE — 3078F DIAST BP <80 MM HG: CPT | Performed by: FAMILY MEDICINE

## 2023-12-04 PROCEDURE — 3074F SYST BP LT 130 MM HG: CPT | Performed by: FAMILY MEDICINE

## 2023-12-04 PROCEDURE — 83036 HEMOGLOBIN GLYCOSYLATED A1C: CPT | Performed by: FAMILY MEDICINE

## 2023-12-04 PROCEDURE — 99214 OFFICE O/P EST MOD 30 MIN: CPT | Performed by: FAMILY MEDICINE

## 2023-12-04 RX ORDER — TRAZODONE HYDROCHLORIDE 50 MG/1
TABLET ORAL
Qty: 180 TABLET | Refills: 1 | Status: SHIPPED | OUTPATIENT
Start: 2023-12-04

## 2023-12-04 RX ORDER — TRAZODONE HYDROCHLORIDE 150 MG/1
150 TABLET ORAL NIGHTLY
Qty: 90 TABLET | Refills: 1 | Status: SHIPPED | OUTPATIENT
Start: 2023-12-04

## 2023-12-04 ASSESSMENT — ENCOUNTER SYMPTOMS
SHORTNESS OF BREATH: 0
COUGH: 0

## 2023-12-05 ENCOUNTER — TELEPHONE (OUTPATIENT)
Dept: FAMILY MEDICINE CLINIC | Age: 53
End: 2023-12-05

## 2023-12-05 DIAGNOSIS — R73.03 PREDIABETES: Primary | ICD-10-CM

## 2023-12-05 RX ORDER — ACYCLOVIR 400 MG/1
TABLET ORAL
Qty: 7 EACH | Refills: 1 | Status: SHIPPED | OUTPATIENT
Start: 2023-12-05

## 2023-12-05 RX ORDER — ACYCLOVIR 400 MG/1
TABLET ORAL
Qty: 1 EACH | Refills: 0 | Status: SHIPPED | OUTPATIENT
Start: 2023-12-05

## 2023-12-05 NOTE — TELEPHONE ENCOUNTER
Patient called stating she called the insurance x3 and has not been able to get an answer from them for what should be called in for the CGM. Please send something in for her. Patient states she is very frustrated with all of this and that her doctor in [place] used to just do everything for her. Writer apologized to patient explaining the office has a large patient load and is not able to call and do everything for every patient in the office. Patient asking for something to just be sent in. Please advise.

## 2023-12-05 NOTE — TELEPHONE ENCOUNTER
Pt called stating that her insurance denies the Free style CGM. She wants something else sent in. I advised her to call her insurance company to see what is covered but she just wants something else sent in.

## 2023-12-06 ENCOUNTER — TELEPHONE (OUTPATIENT)
Dept: CARDIOLOGY CLINIC | Age: 53
End: 2023-12-06

## 2023-12-06 NOTE — TELEPHONE ENCOUNTER
Pt called and lmom and stated that Dr. Amrit Orantes wants her to schedule an appointment with Dr. Leigh Pollock and have a possible Cath done.  Called pt back and LMOM for her to return call

## 2023-12-07 ENCOUNTER — CARE COORDINATION (OUTPATIENT)
Dept: CARE COORDINATION | Age: 53
End: 2023-12-07

## 2023-12-07 DIAGNOSIS — I25.10 CORONARY ARTERY DISEASE INVOLVING NATIVE CORONARY ARTERY OF NATIVE HEART WITHOUT ANGINA PECTORIS: Primary | ICD-10-CM

## 2023-12-07 NOTE — CARE COORDINATION
Received message requesting ACM return call to patient. Patient shared she has discussed cardiology f/u with PCP (Dr. Melody Woodson) and has appointment scheduled for 12/26 but his office is requesting a referral be sent over to see him and for her to have a cardiac cath. Patient stated she has not been in contact with PCP office. Patient shared she plans to discuss having cardiac cath with cardiology re: increased SOB and HR as she is not interested in having stress completed first.  Patient is currently scheduled for stress test on 12/18 but plans to call and cancel until after her appointment. ACM reviewed importance of following up with PCP office and phone tree process. Patient verbalized understanding. Patient denied any other questions, concerns, or needs and she was encouraged to call with any that may develop. PCP updated.

## 2023-12-07 NOTE — CARE COORDINATION
Patient called ACM to share her BS is \"high\" at 157 (pt typically has BS in the  range). Patient reported she ate some tomato soup approx 1 hour ago. Patient denied any symptoms being present. Patient shared PCP instructed her in the past to call if she has \"high BS readings. \"  Patient encouraged to continue to monitor. PCP updated.

## 2023-12-11 ENCOUNTER — TELEPHONE (OUTPATIENT)
Dept: FAMILY MEDICINE CLINIC | Age: 53
End: 2023-12-11

## 2023-12-11 NOTE — TELEPHONE ENCOUNTER
Received diabetic supplies order form from [employer] to fill out CMN form and send recent OV note. Printed note. Not sure if they will cover \"prediabetes\" but will try. In Dr Bin Shay box to sign and will fax once completed.

## 2023-12-11 NOTE — TELEPHONE ENCOUNTER
ACM received call re: below information as [employer] is to call patient back today. ACM updated patient that information has been gathered and is awaiting PCP approval/signature. Pt. verbalized understanding.

## 2023-12-12 ENCOUNTER — TELEPHONE (OUTPATIENT)
Dept: FAMILY MEDICINE CLINIC | Age: 53
End: 2023-12-12

## 2023-12-12 NOTE — TELEPHONE ENCOUNTER
Pt has sore throat sinus pressure and congestion started today. She has not taken any medication. She is taking her allergy medication. She is doing flonase. And taking the rx cough syrup.     Asking if there is anything else she can do

## 2023-12-13 ENCOUNTER — CARE COORDINATION (OUTPATIENT)
Dept: CARE COORDINATION | Age: 53
End: 2023-12-13

## 2023-12-13 NOTE — CARE COORDINATION
ACM received text message from patient asking to update PCP that she got the \"cough stuff\" that was recommended for her cold. Patient called ACM with same information. PCP updated.

## 2023-12-14 ENCOUNTER — CARE COORDINATION (OUTPATIENT)
Dept: CARE COORDINATION | Age: 53
End: 2023-12-14

## 2023-12-14 NOTE — CARE COORDINATION
Patient called ACM as she is frustrated with her family and is also frustrated that recent Dexcom script has not yet been received by the [facility]. Patient shared this is thru her insurance and they called her earlier today to say they have not yet received information to process her Dexcom order. Patient stated company was unable to use previous script. Per chart patient called earlier this week requesting information be faxed (12/11/2023) but was told today they have not yet received. ACM reviewed with patient it may take a bit for office to gather information and get it sent out. Patient frustrated and active listening provided. Patient unable to provide ACM with contact/delivery information as she unable to obtain while talking on her phone. ACM encouraged patient to gather information and return call to AC. Patient returned call and shared she remains frustrated with multiple things and is stressed as they have filed for bankruptcy and cont. to have multiple car payments and co-pays for physician visits. Active listening and support provided to patient t/o the call. Pt. still unable to provide any contact/fax information for where DM dexcom prescription needs to be sent to when completed. Patient called ACM back again and shared [employer] is the supplier and their phone number is [phone]. ACM spoke with Kendal Sanches at [employer]. Kendal Sanches shared original form was unable to be used d/t white out being present on the prescriber information area of the form. Kendal Sanches shared new form was faxed 12/11 and she will send another new electronic fax today (in case initial was not received). Kendal Sanches shared form needs to be completed and sent back and they can then process patient's request.  PCP and office staff updated.

## 2023-12-14 NOTE — CARE COORDINATION
ACM returned call to patient as requested. Patient shared she wanted to let PCP know she is feeling better and continues to take her cough medication as directed. ACM encouraged patient to continue to push fluids and reviewed signs/symptoms to report with patient and need to call PCP office with any worsening of symptoms. Patient was reminded of the importance of early symptom recognition and follow up. Patient verbalized understanding and denied any further questions, concerns, or needs.

## 2023-12-26 ENCOUNTER — TELEPHONE (OUTPATIENT)
Dept: INFUSION THERAPY | Age: 53
End: 2023-12-26

## 2023-12-26 NOTE — TELEPHONE ENCOUNTER
I let the patient know that [facility] is requesting a copy of their [address] from 2022. They will send it to me to forward.

## 2023-12-27 RX ORDER — HYDROXYZINE 50 MG/1
50 TABLET, FILM COATED ORAL EVERY 8 HOURS PRN
Qty: 90 TABLET | Refills: 1 | Status: SHIPPED | OUTPATIENT
Start: 2023-12-27

## 2023-12-27 NOTE — PROGRESS NOTES
[address] [facility] [address] Jul[address] Cesar GARCIA [facility]  Phone:  [phone]          Name: Delma Grewal  : 1970    Chief Complaint   Patient presents with    Follow-up     Pre-diabetes. Discuss testing blood sugars     Irregular Heart Beat       HPI:     Delma Grewal is a 48 y.o. female who presents today for follow up of her blood sugars. She reports a h/o diabetes years ago from medication she was on for her schizophrenia (? Olanzapine). Her last HbA1c was 5.3% in May. She wanted to get the Robert sensor but insurance wouldn't cover it. She wants to get test strips to check when she feels bad. She reports that sometimes when she's sitting down at home her HR will jump to 120. This will make her panic and it increases even more. She had an EKG in May that showed no change from prior. A stress test was ordered by Dr. Brandi Morillo but she hasn't had it done yet.     Lab Results   Component Value Date/Time     2023 03:45 PM    K 4.1 2023 03:45 PM     2023 03:45 PM    CO2 28 2023 03:45 PM    BUN 14 2023 03:45 PM    CREATININE 0.7 2023 03:45 PM    GLUCOSE 136 2023 03:45 PM    GLUCOSE 88 2015 10:11 AM    CALCIUM 9.3 2022 08:13 AM    LABGLOM >90 2022 08:13 AM      Lab Results   Component Value Date    CHOL 142 2022    TRIG 126 2022    HDL 53 2022    LDLCALC 64 2022     Lab Results   Component Value Date    ALT 29 2023    AST 18 2023            Current Outpatient Medications:     blood glucose test strips (ASCENSIA AUTODISC VI;ONE TOUCH ULTRA TEST VI) strip, 1 each by In Vitro route 2 times daily DISPENSE ONE TOUCH ULTRA 2 STRIPS TO TEST ONCE DAILY, Disp: 100 each, Rfl: 5    hydrOXYzine HCl (ATARAX) 50 MG tablet, TAKE 1 TABLET BY MOUTH EVERY 8 HOURS AS NEEDED FOR ITCHING, Disp: 90 tablet, Rfl: 1    famotidine (PEPCID) 20 MG tablet, Take 1 tablet by mouth 2 times daily, Disp: 180 tablet,

## 2023-12-28 ENCOUNTER — TELEPHONE (OUTPATIENT)
Dept: INFUSION THERAPY | Age: 53
End: 2023-12-28

## 2023-12-28 ENCOUNTER — OFFICE VISIT (OUTPATIENT)
Dept: FAMILY MEDICINE CLINIC | Age: 53
End: 2023-12-28
Payer: MEDICARE

## 2023-12-28 VITALS
OXYGEN SATURATION: 98 % | WEIGHT: 293 LBS | RESPIRATION RATE: 16 BRPM | BODY MASS INDEX: 50.42 KG/M2 | DIASTOLIC BLOOD PRESSURE: 78 MMHG | SYSTOLIC BLOOD PRESSURE: 122 MMHG | TEMPERATURE: 97.8 F | HEART RATE: 91 BPM

## 2023-12-28 DIAGNOSIS — R73.03 PREDIABETES: ICD-10-CM

## 2023-12-28 DIAGNOSIS — E78.00 PURE HYPERCHOLESTEROLEMIA: Primary | ICD-10-CM

## 2023-12-28 PROCEDURE — 3074F SYST BP LT 130 MM HG: CPT | Performed by: FAMILY MEDICINE

## 2023-12-28 PROCEDURE — 3078F DIAST BP <80 MM HG: CPT | Performed by: FAMILY MEDICINE

## 2023-12-28 PROCEDURE — 99214 OFFICE O/P EST MOD 30 MIN: CPT | Performed by: FAMILY MEDICINE

## 2023-12-29 RX ORDER — GABAPENTIN 300 MG/1
300 CAPSULE ORAL NIGHTLY
Qty: 90 CAPSULE | Refills: 0 | Status: SHIPPED | OUTPATIENT
Start: 2023-12-29 | End: 2024-03-28

## 2024-01-08 NOTE — PROGRESS NOTES
Disp: 100 capsule, Rfl: 2    albuterol sulfate HFA (VENTOLIN HFA) 108 (90 Base) MCG/ACT inhaler, Inhale 2 puffs into the lungs 4 times daily as needed for Wheezing, Disp: 18 g, Rfl: 5    celecoxib (CELEBREX) 400 MG capsule, Take 1 capsule by mouth once daily, Disp: 90 capsule, Rfl: 3    triamcinolone (KENALOG) 0.1 % cream, Apply topically 2 times daily., Disp: 30 g, Rfl: 0    pimecrolimus (ELIDEL) 1 % cream, Apply topically 2 times daily., Disp: 100 g, Rfl: 5    apixaban (ELIQUIS) 2.5 MG TABS tablet, Take 1 tablet by mouth 2 times daily, Disp: 180 tablet, Rfl: 3    metoprolol tartrate (LOPRESSOR) 25 MG tablet, Take 1 tablet by mouth 2 times daily, Disp: 180 tablet, Rfl: 3    Blood Glucose Monitoring Suppl (ONE TOUCH ULTRA 2) w/Device KIT, 1 kit by Does not apply route daily, Disp: 1 kit, Rfl: 0    simvastatin (ZOCOR) 40 MG tablet, TAKE 1 TABLET BY MOUTH DAILY, Disp: 80 tablet, Rfl: 3    QUEtiapine (SEROQUEL) 200 MG tablet, Take 1 tablet by mouth nightly (Patient taking differently: Take 1 tablet by mouth 2 times daily), Disp: 30 tablet, Rfl: 1    hydroCHLOROthiazide (HYDRODIURIL) 25 MG tablet, Take 1 tablet by mouth daily, Disp: , Rfl:     Cariprazine HCl (VRAYLAR) 6 MG CAPS capsule, Take 1 capsule by mouth daily, Disp: 90 capsule, Rfl: 1    fluticasone (FLONASE) 50 MCG/ACT nasal spray, 1 spray by Each Nostril route daily, Disp: 32 g, Rfl: 1    melatonin 3 MG TABS tablet, Take 10 mg by mouth nightly as needed, Disp: , Rfl:     docusate sodium (COLACE) 100 MG capsule, Take 1 capsule by mouth 2 times daily, Disp: , Rfl:     LORazepam (ATIVAN) 2 MG tablet, Take 1 tablet by mouth every 8 hours as needed., Disp: , Rfl:     risperiDONE (RISPERDAL) 4 MG tablet, Take 1 tablet by mouth 2 times daily 2mg am 2 mg pm, Disp: , Rfl:     levocetirizine (XYZAL) 5 MG tablet, Take 1 tablet by mouth nightly, Disp: 90 tablet, Rfl: 1    hydrocortisone (HYTONE) 2.5 % lotion, , Disp: , Rfl:     Allergies   Allergen Reactions    Msg

## 2024-01-09 ENCOUNTER — TELEPHONE (OUTPATIENT)
Dept: FAMILY MEDICINE CLINIC | Age: 54
End: 2024-01-09

## 2024-01-09 ENCOUNTER — OFFICE VISIT (OUTPATIENT)
Dept: FAMILY MEDICINE CLINIC | Age: 54
End: 2024-01-09
Payer: MEDICARE

## 2024-01-09 VITALS
SYSTOLIC BLOOD PRESSURE: 116 MMHG | TEMPERATURE: 97.5 F | DIASTOLIC BLOOD PRESSURE: 76 MMHG | OXYGEN SATURATION: 97 % | BODY MASS INDEX: 48.82 KG/M2 | WEIGHT: 293 LBS | HEART RATE: 80 BPM | HEIGHT: 65 IN | RESPIRATION RATE: 16 BRPM

## 2024-01-09 DIAGNOSIS — F20.0 PARANOID SCHIZOPHRENIA (HCC): ICD-10-CM

## 2024-01-09 DIAGNOSIS — F51.05 INSOMNIA DUE TO OTHER MENTAL DISORDER: ICD-10-CM

## 2024-01-09 DIAGNOSIS — R21 RASH AND NONSPECIFIC SKIN ERUPTION: Primary | ICD-10-CM

## 2024-01-09 DIAGNOSIS — R73.03 PREDIABETES: ICD-10-CM

## 2024-01-09 DIAGNOSIS — F99 INSOMNIA DUE TO OTHER MENTAL DISORDER: ICD-10-CM

## 2024-01-09 PROBLEM — I26.99 PULMONARY EMBOLISM (HCC): Status: RESOLVED | Noted: 2017-02-28 | Resolved: 2024-01-09

## 2024-01-09 PROBLEM — E11.9 TYPE 2 DIABETES MELLITUS (HCC): Status: RESOLVED | Noted: 2023-06-27 | Resolved: 2024-01-09

## 2024-01-09 PROCEDURE — 99214 OFFICE O/P EST MOD 30 MIN: CPT | Performed by: FAMILY MEDICINE

## 2024-01-09 PROCEDURE — 3078F DIAST BP <80 MM HG: CPT | Performed by: FAMILY MEDICINE

## 2024-01-09 PROCEDURE — 3074F SYST BP LT 130 MM HG: CPT | Performed by: FAMILY MEDICINE

## 2024-01-09 RX ORDER — QUETIAPINE FUMARATE 200 MG/1
200 TABLET, FILM COATED ORAL 2 TIMES DAILY
Qty: 180 TABLET | Refills: 1
Start: 2024-01-09

## 2024-01-09 RX ORDER — NYSTATIN 100000 [USP'U]/G
POWDER TOPICAL
Qty: 60 G | Refills: 0 | Status: SHIPPED | OUTPATIENT
Start: 2024-01-09

## 2024-01-09 RX ORDER — BROMPHENIRAMINE MALEATE, PSEUDOEPHEDRINE HYDROCHLORIDE, AND DEXTROMETHORPHAN HYDROBROMIDE 2; 30; 10 MG/5ML; MG/5ML; MG/5ML
5 SYRUP ORAL 4 TIMES DAILY PRN
Qty: 140 ML | Refills: 2 | Status: SHIPPED | OUTPATIENT
Start: 2024-01-09

## 2024-01-09 RX ORDER — HYDROXYZINE 50 MG/1
50 TABLET, FILM COATED ORAL EVERY 8 HOURS PRN
Qty: 90 TABLET | Refills: 1 | Status: SHIPPED | OUTPATIENT
Start: 2024-01-09

## 2024-01-09 RX ORDER — TRIAMCINOLONE ACETONIDE 1 MG/G
CREAM TOPICAL
Qty: 30 G | Refills: 0 | Status: SHIPPED | OUTPATIENT
Start: 2024-01-09

## 2024-01-09 ASSESSMENT — PATIENT HEALTH QUESTIONNAIRE - PHQ9
2. FEELING DOWN, DEPRESSED OR HOPELESS: 1
6. FEELING BAD ABOUT YOURSELF - OR THAT YOU ARE A FAILURE OR HAVE LET YOURSELF OR YOUR FAMILY DOWN: 0
SUM OF ALL RESPONSES TO PHQ QUESTIONS 1-9: 1
4. FEELING TIRED OR HAVING LITTLE ENERGY: 0
SUM OF ALL RESPONSES TO PHQ9 QUESTIONS 1 & 2: 1
1. LITTLE INTEREST OR PLEASURE IN DOING THINGS: 0
3. TROUBLE FALLING OR STAYING ASLEEP: 0
SUM OF ALL RESPONSES TO PHQ QUESTIONS 1-9: 1
5. POOR APPETITE OR OVEREATING: 0
SUM OF ALL RESPONSES TO PHQ QUESTIONS 1-9: 1
10. IF YOU CHECKED OFF ANY PROBLEMS, HOW DIFFICULT HAVE THESE PROBLEMS MADE IT FOR YOU TO DO YOUR WORK, TAKE CARE OF THINGS AT HOME, OR GET ALONG WITH OTHER PEOPLE: 0
7. TROUBLE CONCENTRATING ON THINGS, SUCH AS READING THE NEWSPAPER OR WATCHING TELEVISION: 0
8. MOVING OR SPEAKING SO SLOWLY THAT OTHER PEOPLE COULD HAVE NOTICED. OR THE OPPOSITE, BEING SO FIGETY OR RESTLESS THAT YOU HAVE BEEN MOVING AROUND A LOT MORE THAN USUAL: 0
9. THOUGHTS THAT YOU WOULD BE BETTER OFF DEAD, OR OF HURTING YOURSELF: 0
SUM OF ALL RESPONSES TO PHQ QUESTIONS 1-9: 1

## 2024-01-09 NOTE — TELEPHONE ENCOUNTER
Patient stating [facility] is stating too early to fill hydroxyzine if written as every 8 hours.    Patient asking if rx can be resent with directions to take 2 tablets in the morning and 2 tablets in the evening so rx can be filled now.

## 2024-01-10 ENCOUNTER — CARE COORDINATION (OUTPATIENT)
Dept: CARE COORDINATION | Age: 54
End: 2024-01-10

## 2024-01-10 NOTE — TELEPHONE ENCOUNTER
She can alternate Hydroxyzine and Benadryl but should not take them at the same time as they can make her too drowsy.

## 2024-01-10 NOTE — TELEPHONE ENCOUNTER
Called pt. She states since 1-7-24 she has been taking 2 hydroxyzine at night and 1 in the morning. That helps better with the itch rather than taking it as prescribed. Pt also states \" while I was in [place], they told me to take benadryl pills. I have been doing that at night also for the last 2 nights.\" Pt also uses eczema lotion because \" I think my eczema is bad again.\" Pt does not have or use benadryl anti-itch cream. Is the combination of medications ok to be taken together?    Call pt

## 2024-01-10 NOTE — TELEPHONE ENCOUNTER
Patient was informed. She stated \" will not do that.\" Pt will not take the medications at the same time.

## 2024-01-10 NOTE — CARE COORDINATION
Former Care Coordination patient called to \"check in.\"  Patient shared she was having worsening rash/eczema symptoms recently and PCP instructed her on treatment and prn medications.  Patient denied any questions re: the instructions and ACM reviewed importance of taking medications as directed. Patient verbalized understanding.  Patient shared she is scheduled for routine dental cleaning in the near future, but did have recent appointment d/t a \"sore\" in her mouth.  Patient reported she was prescribed a medicated mouthwash and this seems to be helping with her symptoms.  ACM again reviewed importance of following instructions as directed and calling with any new/change/worsening of symptoms. Patient verbalized understanding.  Patient was encouraged to f/u with specialists as directed and keep appointments as scheduled.  Patient verbalized understanding and denied any other questions, concerns, or needs.  Patient was encouraged to call with any that may develop.

## 2024-01-15 ENCOUNTER — TELEPHONE (OUTPATIENT)
Dept: INFUSION THERAPY | Age: 54
End: 2024-01-15

## 2024-01-15 NOTE — TELEPHONE ENCOUNTER
I informed her that [facility] is requiring applicants to apply for LIS/Extra help as part of the steps to qualify for the free drug assistance for 2024. I emailed her the link to apply online.

## 2024-01-16 ENCOUNTER — APPOINTMENT (OUTPATIENT)
Dept: GENERAL RADIOLOGY | Age: 54
End: 2024-01-16
Payer: MEDICARE

## 2024-01-16 ENCOUNTER — HOSPITAL ENCOUNTER (EMERGENCY)
Age: 54
Discharge: HOME | End: 2024-01-16
Attending: EMERGENCY MEDICINE
Payer: MEDICARE

## 2024-01-16 VITALS
HEART RATE: 86 BPM | BODY MASS INDEX: 48.65 KG/M2 | TEMPERATURE: 97.2 F | RESPIRATION RATE: 16 BRPM | WEIGHT: 292 LBS | OXYGEN SATURATION: 97 % | SYSTOLIC BLOOD PRESSURE: 137 MMHG | DIASTOLIC BLOOD PRESSURE: 70 MMHG | HEIGHT: 65 IN

## 2024-01-16 DIAGNOSIS — S80.02XA CONTUSION OF LEFT KNEE, INITIAL ENCOUNTER: Primary | ICD-10-CM
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
## 2024-01-16 DIAGNOSIS — T07.XXXA ABRASIONS OF MULTIPLE SITES: ICD-10-CM

## 2024-01-16 PROCEDURE — 99284 EMERGENCY DEPT VISIT MOD MDM: CPT

## 2024-01-16 PROCEDURE — 90715 TDAP VACCINE 7 YRS/> IM: CPT | Performed by: EMERGENCY MEDICINE

## 2024-01-16 PROCEDURE — 90471 IMMUNIZATION ADMIN: CPT | Performed by: EMERGENCY MEDICINE

## 2024-01-16 PROCEDURE — 73564 X-RAY EXAM KNEE 4 OR MORE: CPT

## 2024-01-16 PROCEDURE — 6360000002 HC RX W HCPCS: Performed by: EMERGENCY MEDICINE

## 2024-01-16 PROCEDURE — 6370000000 HC RX 637 (ALT 250 FOR IP): Performed by: EMERGENCY MEDICINE

## 2024-01-16 RX ORDER — IBUPROFEN 200 MG
TABLET ORAL ONCE
Status: COMPLETED | OUTPATIENT
Start: 2024-01-16 | End: 2024-01-16

## 2024-01-16 RX ADMIN — BACITRACIN ZINC, NEOMYCIN SULFATE, AND POLYMYXIN B SULFATE: 400; 3.5; 5 OINTMENT TOPICAL at 15:36

## 2024-01-16 RX ADMIN — TETANUS TOXOID, REDUCED DIPHTHERIA TOXOID AND ACELLULAR PERTUSSIS VACCINE, ADSORBED 0.5 ML: 5; 2.5; 8; 8; 2.5 SUSPENSION INTRAMUSCULAR at 14:55

## 2024-01-16 NOTE — ED NOTES
Wounds/Abrasions to bilateral hands cleansed with wound cleanser and dried, neosporin ointment applied and bandaids to open areas; pt. Given 6\" Ace wrap to apply to knee at home as pt.s jeans too tight to apply at this time.

## 2024-01-16 NOTE — DISCHARGE INSTRUCTIONS
Tylenol or Motrin for aches or pains.  Keep wound clean and dry.  Follow-up with primary care.  Wear Ace wrap to your knee for comfort.  Be careful walking on slippery surfaces
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
## 2024-01-16 NOTE — ED NOTES
Pt. Presents ambulatory to ED with c/o fell going up curb at [employer], c/o bilateral knee pain, abrasions to bilateral hands.

## 2024-01-16 NOTE — ED NOTES
AVS rev'd with pt. And copy given. Pulse regular. Extremities warm. Respirations regular and quiet.  Mucous membranes pink & moist. Alert and oriented times 3.  No nausea or vomiting. Range of motion within patient's limits. Skin pink, warm and dry.  Calm and cooperative.

## 2024-01-16 NOTE — DISCHARGE INSTR - COC
(Fluad 65 yrs and older) 08/30/2017    Pneumococcal, PCV-13, PREVNAR 13, (age 6w+), IM, 0.5mL 09/12/2017    Pneumococcal, PCV20, PREVNAR 20, (age 6w+), IM, 0.5mL 03/12/2023    Pneumococcal, PPSV23, PNEUMOVAX 23, (age 2y+), SC/IM, 0.5mL 11/08/2018, 12/27/2020    TDaP, ADACEL (age 10y-64y), BOOSTRIX (age 10y+), IM, 0.5mL 01/16/2024    Zoster Recombinant (Shingrix) 12/27/2020, 11/03/2021, 03/12/2023, 12/07/2023       Active Problems:  Patient Active Problem List   Diagnosis Code    Depression F32.A    Anxiety F41.9    Hyperlipidemia E78.5    Paranoid schizophrenia (HCC) F20.0    Acalculous cholecystitis K81.9    Epigastric pain R10.13    Obesity E66.9    Acute deep vein thrombosis (DVT) of left lower extremity (HCC) I82.402    LV dysfunction I51.9    Bradycardia R00.1    Essential hypertension I10    Morbid obesity due to excess calories (HCC) E66.01    Dyspnea on exertion R06.09    Precordial pain R07.2    Insomnia G47.00       Isolation/Infection:   Isolation            No Isolation          Patient Infection Status       None to display                     Nurse Assessment:  Last Vital Signs: /70   Pulse 86   Temp 97.2 °F (36.2 °C) (Temporal)   Resp 16   Ht 1.651 m (5' 5\")   Wt 132.5 kg (292 lb)   LMP 02/01/2017 (Approximate)   SpO2 97%   BMI 48.59 kg/m²     Last documented pain score (0-10 scale):    Last Weight:   Wt Readings from Last 1 Encounters:   01/16/24 132.5 kg (292 lb)     Mental Status:  {IP PT MENTAL STATUS:33273}    IV Access:  {[facility] IV ACCESS:130672753}    Nursing Mobility/ADLs:  Walking   {[facility] DME ADLs:294335040}  Transfer  {[facility] DME ADLs:169333556}  Bathing  {[facility] DME ADLs:911171328}  Dressing  {[facility] DME ADLs:219772012}  Toileting  {[facility] DME ADLs:595352360}  Feeding  {[facility] DME ADLs:078667566}  Med Admin  {[facility] DME ADLs:852065045}  Med Delivery   {[facility] MED Delivery:576745332}    Wound Care Documentation and Therapy:        Elimination:  Continence:   Bowel: {YES / NO:19718}  Bladder: {YES

## 2024-01-17 ENCOUNTER — TELEPHONE (OUTPATIENT)
Dept: FAMILY MEDICINE CLINIC | Age: 54
End: 2024-01-17

## 2024-01-17 NOTE — TELEPHONE ENCOUNTER
Pt calling for ER follow up appt.  She states she went to [facility] yesterday due to a fall    Pt states she is feeling fine just a little tired.     Pt need to come in for follow up?  If so when do you want to see her

## 2024-01-18 ENCOUNTER — CARE COORDINATION (OUTPATIENT)
Dept: CARE COORDINATION | Age: 54
End: 2024-01-18

## 2024-01-18 ENCOUNTER — OFFICE VISIT (OUTPATIENT)
Dept: CARDIOLOGY CLINIC | Age: 54
End: 2024-01-18

## 2024-01-18 VITALS
HEART RATE: 76 BPM | HEIGHT: 65 IN | WEIGHT: 293 LBS | DIASTOLIC BLOOD PRESSURE: 76 MMHG | BODY MASS INDEX: 48.82 KG/M2 | SYSTOLIC BLOOD PRESSURE: 112 MMHG

## 2024-01-18 DIAGNOSIS — I20.89 ANGINA OF EFFORT: ICD-10-CM

## 2024-01-18 DIAGNOSIS — R06.09 DOE (DYSPNEA ON EXERTION): ICD-10-CM

## 2024-01-18 DIAGNOSIS — R00.2 PALPITATION: Primary | ICD-10-CM

## 2024-01-18 PROBLEM — I82.402 ACUTE DEEP VEIN THROMBOSIS (DVT) OF LEFT LOWER EXTREMITY (HCC): Status: RESOLVED | Noted: 2017-02-28 | Resolved: 2024-01-18

## 2024-01-18 PROBLEM — E11.9 TYPE 2 DIABETES MELLITUS (HCC): Status: ACTIVE | Noted: 2024-01-18

## 2024-01-18 NOTE — PROGRESS NOTES
New patient referred for high HR.    C/o chest tightness, sob bending over, occasional palpitations.    Denies dizziness and ARNOL.      
15:40   ----------------------------------------------------------------    Ekg:   EKG Interpretation:         Narrative & Impression    Normal sinus rhythm  Normal ECG  When compared with ECG of 09-OCT-2017 12:27,  No significant change was found  Confirmed by LIZBETH FRANKLIN MD ([id number]) on 5/17/2023 8:06:09             AssessmentPlan:     Chest pain   Palpitations  Worsening dyspnea on exertion  Fatigue   Recurrent DVT/PE  Anxiety  Dysphemia   Obesity     Risk factors noted including obesity, Dyslipidemia   Has intermittent chest pains, left sided, described as tightness, up to 7/10 in severity, associated with shortness of breath. Chest pain occurs more often with walking and improves with rest  Cont metoprolol   Zocor   Patient was advised to report to the ER if has recurrent symptoms with specific instructions given about severity and duration of symptoms  Based on patient's risk factors and clinical presentation, stress test is indicated to investigate for possible underlying ischemic heart disease. Patient  agrees with that work up and its risks and benefits and potential need for additional testing if stress test is abnormal such as cardiac catheterization  Limit caffeine intake  Symptoms are concerning for possible underlying rhythm problems, EKG was reviewed. Will proceed with a 48 Holter monitor   Will need to investigate for underlying structural heart disease, will proceed with a transthoracic echocardiogram   Based on patient's risk factors and clinical presentation, stress test is indicated to investigate for possible underlying ischemic heart disease. Patient  agrees with that work up and its risks and benefits and potential need for additional testing if stress test is abnormal such as cardiac catheterization    Above findings and plan of care were discussed with patient in details, patient's questions were answered.     Disposition:  RTC in 12 months             Electronically signed by Ronda Hammond

## 2024-01-18 NOTE — CARE COORDINATION
Previous Care Coordination patient who called to update ACM of recent fall at local [employer] [place].  Patient shared she missed the curb and fell flat.  Patient stated she was evaluated in the ED and initially did not feel she needed f/u appt but is now having some increased \"hand/finger\" pain.  Patient reported she was able to schedule f/u for 1/24.  ACM educated patient on signs/symptoms she should report, importance of early symptom recognition/follow up, and the need to call for an earlier appointment with any new/change/worsening of symptoms.  Patient verbalized understanding.  Patient was encouraged to use ice as needed for swelling/increased hand/finger pain as well as OTC pain medication.  Patient verbalized understanding.  Patient was again educated on the need to call for earlier appointment with any new/change/worsening of symptoms. Patient denied any other questions, concerns, or needs and she was encouraged to call with any that may develop.

## 2024-01-18 NOTE — PROGRESS NOTES
Musculoskeletal:      Cervical back: Normal range of motion and neck supple.      Left knee: Ecchymosis (mild bruising to left anterior knee) present. No bony tenderness. Normal range of motion.   Skin:     General: Skin is warm and dry.      Comments: Few scabs to knuckles of right hand.   Neurological:      Mental Status: She is alert and oriented to person, place, and time.   Psychiatric:         Behavior: Behavior normal.       Assessment/Plan:     Donna was seen today for follow-up from hospital and chest congestion.    Diagnoses and all orders for this visit:    Fall, subsequent encounter  Abrasion of left hand, subsequent encounter  Abrasion of right hand, subsequent encounter        -     Her wounds are healing well with no signs of infection so will continue to monitor.    Acute pain of left knee  -    She has acute on chronic knee pain so will treat with home exercises and a knee sleeve per patient request.   -    Elastic Bandages & Supports (KNEE COMPRESSION SLEEVE/L/XL) MISC; Dispense 1 knee sleeve.    Viral URI  -     brompheniramine-pseudoephedrine-DM 2-30-10 MG/5ML syrup; Take 5 mLs by mouth 4 times daily as needed for Cough or Congestion        -     POCT Influenza A/B DNA (Alere i)      Return in 3 months (on 4/24/2024), or if symptoms worsen or fail to improve, for Medicare AWV.    Electronically signed by Flavia Rebolledo MD on 1/24/2024 at 8:37 AM

## 2024-01-18 NOTE — PATIENT INSTRUCTIONS
You may receive a survey regarding the care you received during your visit.  Your input is valuable to us.  We encourage you to complete and return your survey.  We hope you will choose us in the future for your healthcare needs.

## 2024-01-24 ENCOUNTER — OFFICE VISIT (OUTPATIENT)
Dept: FAMILY MEDICINE CLINIC | Age: 54
End: 2024-01-24

## 2024-01-24 VITALS
BODY MASS INDEX: 49.09 KG/M2 | SYSTOLIC BLOOD PRESSURE: 122 MMHG | HEART RATE: 80 BPM | DIASTOLIC BLOOD PRESSURE: 66 MMHG | TEMPERATURE: 97 F | RESPIRATION RATE: 18 BRPM | OXYGEN SATURATION: 99 % | WEIGHT: 293 LBS

## 2024-01-24 DIAGNOSIS — S60.511D ABRASION OF RIGHT HAND, SUBSEQUENT ENCOUNTER: ICD-10-CM

## 2024-01-24 DIAGNOSIS — J06.9 VIRAL URI: ICD-10-CM

## 2024-01-24 DIAGNOSIS — M25.562 ACUTE PAIN OF LEFT KNEE: ICD-10-CM

## 2024-01-24 DIAGNOSIS — R09.89 CHEST CONGESTION: ICD-10-CM
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
## 2024-01-24 DIAGNOSIS — S60.512D ABRASION OF LEFT HAND, SUBSEQUENT ENCOUNTER: ICD-10-CM

## 2024-01-24 PROBLEM — E11.9 TYPE 2 DIABETES MELLITUS (HCC): Status: RESOLVED | Noted: 2024-01-18 | Resolved: 2024-01-24

## 2024-01-24 LAB
INFLUENZA VIRUS A RNA: NEGATIVE
INFLUENZA VIRUS B RNA: NEGATIVE

## 2024-01-24 RX ORDER — BROMPHENIRAMINE MALEATE, PSEUDOEPHEDRINE HYDROCHLORIDE, AND DEXTROMETHORPHAN HYDROBROMIDE 2; 30; 10 MG/5ML; MG/5ML; MG/5ML
5 SYRUP ORAL 4 TIMES DAILY PRN
Qty: 140 ML | Refills: 0 | Status: SHIPPED | OUTPATIENT
Start: 2024-01-24

## 2024-01-24 ASSESSMENT — ENCOUNTER SYMPTOMS
SHORTNESS OF BREATH: 0
WHEEZING: 0
COUGH: 1
SORE THROAT: 1
DIARRHEA: 0
VOMITING: 0

## 2024-01-25 ENCOUNTER — TELEPHONE (OUTPATIENT)
Dept: FAMILY MEDICINE CLINIC | Age: 54
End: 2024-01-25

## 2024-01-25 ENCOUNTER — CARE COORDINATION (OUTPATIENT)
Dept: CARE COORDINATION | Age: 54
End: 2024-01-25

## 2024-01-25 NOTE — CARE COORDINATION
Patient called sharing she has a a \"bad cold\" and is coughing and congested.  Patient reported she is taking cough and allergy medications as directed.  Patient reported she has also f/u with PCP office this AM.  Patient shared she can't really talk as talking makes her cough increase.  Patient was encouraged to rest, push fluids, and to practice coughing and deep breathing.  ACM briefly reviewed signs/symptoms patient should report.  Patient was encouraged to continue to f/u with PCP with any new/change/worsening of symptoms.  Patient denied any other questions, concerns, or needs and she was encouraged to call with any that may develop.

## 2024-01-25 NOTE — TELEPHONE ENCOUNTER
Pt called this AM stating that she has had a cough and runny nose. She was unable to tell the writer whether or not she has a fever or not, but she stated she felt hot. She has been taking a cough med that was rx'ed for her, an allergy pill, and she has been drinking water.

## 2024-01-25 NOTE — TELEPHONE ENCOUNTER
Continue with the medication prescribed this week.  Symptoms are likely viral and should improve in a few days.

## 2024-01-28 ENCOUNTER — TELEPHONE (OUTPATIENT)
Dept: FAMILY MEDICINE CLINIC | Age: 54
End: 2024-01-28

## 2024-01-29 ENCOUNTER — TELEPHONE (OUTPATIENT)
Dept: FAMILY MEDICINE CLINIC | Age: 54
End: 2024-01-29

## 2024-01-29 ENCOUNTER — CARE COORDINATION (OUTPATIENT)
Dept: CARE COORDINATION | Age: 54
End: 2024-01-29

## 2024-01-29 NOTE — TELEPHONE ENCOUNTER
Patient has been sick since Thursday. She has been coughing and bringing up green phlegm. She tested positive for covid yesterday. She is asking for meds to help with the symptoms. Please advise.

## 2024-01-29 NOTE — CARE COORDINATION
Patient called ACM regarding COVID-19 diagnosis.  Patient stated she tested positive for COVID-19 at home.      Ambulatory Care Manager contacted the patient by telephone to perform post discharge assessment. Call within 2 business days of discharge: Yes. Verified name and  with patient as identifiers. Provided introduction to self, and explanation of the CTN/ACM role, and reason for call due to risk factors for infection and/or exposure to COVID-19.     Symptoms reviewed with patient who verbalized the following symptoms: fatigue  cough  shortness of breath  no new symptoms.      Due to  pt already contacting PCP office  encounter was not routed to provider for escalation. Discussed follow-up appointments. If no appointment was previously scheduled, appointment scheduling offered: Pt. has been in contact with PCP office for follow up and symptom management.  [facility] follow up appointment(s):   Future Appointments   Date Time Provider Department Center   2024  1:00 PM STR ECHO 1 [facility] STR Rad/Card   2024  2:00 PM STR NUC MED HC  INJECT  RM1 STRZ NUC MED STR Rad/Card   2024  2:30 PM STR NUCLEAR [facility] ROOM 1 STR[facility] NUC MED STR Rad/Card   2024  3:00 PM STR STRESS LAB 1 [facility] STR Rad/Card   2024  3:30 PM STR NUCLEAR [facility] HEART [place] ROOM 1 STRZ NUC MED STR Rad/Card   2024  4:00 PM STR CARDIAC MONITOR [facility] STR Rad/Card   2024  1:00 PM Velma Hines MD PhD N Oncology P - [place]   2025  2:45 PM Ronda Hammond MD N SRPX Heart [facility]     Non-[facility] follow up appointment(s): N/A     Educated patient about risk for severe COVID-19 due to risk factors according to CDC guidelines. ACM reviewed discharge instructions, medical action plan and red flag symptoms with the patient who verbalized understanding. Education provided on COVID-19 vaccination as appropriate. Discussed exposure protocols and quarantine with CDC Guidelines. Patient was given an opportunity

## 2024-01-30 ENCOUNTER — SOCIAL WORK (OUTPATIENT)
Dept: INFUSION THERAPY | Age: 54
End: 2024-01-30

## 2024-01-30 NOTE — PROGRESS NOTES
- This staff was contacted by Donna's Financial Navigator to assist her in completing the online application for Prescription Drug Assistance.   - This staff offered support by walking Donna and her  (Jaylan) through answering the questions involved.   - The document was printed for their records. A copy was also emailed to them after the document was submitted. There is no further assistance needed. Donna is required to now wait for the determination.   - The [facility] Financial Navigator was provided a copy of the application for her records as well.   - This staff provided her with my contact information and will remain available for support to Donna and Jaylan should it be needed.

## 2024-01-31 ENCOUNTER — CARE COORDINATION (OUTPATIENT)
Dept: CARE COORDINATION | Age: 54
End: 2024-01-31

## 2024-01-31 ENCOUNTER — TELEPHONE (OUTPATIENT)
Dept: FAMILY MEDICINE CLINIC | Age: 54
End: 2024-01-31

## 2024-01-31 NOTE — CARE COORDINATION
Patient called AC for ongoing f/u regarding COVID-19 diagnosis.  Patient shared initial home test was positive but when she retested this date result came up negative.     Patient contacted Ambulatory Care Manager by telephone to perform follow-up assessment.  Patient has following risk factors of: asthma  diabetes.      Symptoms reviewed with patient who verbalized the following symptoms: fatigue  cough  no new symptoms  no worsening symptoms.   Due to no new or worsening symptoms encounter was not routed to provider for escalation.       Educated patient about risk for severe COVID-19 due to risk factors according to CDC guidelines. ACM reviewed discharge instructions, medical action plan and red flag symptoms with the patient who verbalized understanding. Discussed exposure protocols and quarantine with CDC Guidelines. Patient was given an opportunity to verbalize any questions and concerns and agrees to contact ACM or health care provider for questions related to their healthcare.    Was patient discharged with a pulse oximeter? no    ACM provided contact information. No further follow-up call identified based on severity of symptoms and risk factors.    Patient called ACM for ongoing covid-19 f/u s/p recent positive home test and c/o increased cough and congestion.  Patient denied any new/change/worsening of symptoms.  Patient shared she is starting to feel better and her cough continues but is improved from earlier this week.  Patient continues to take cough medications as directed.  Patient shared  completed f/u home test on her earlier today and results came back negative.  Patient was educated on importance of continued precautions and patient verbalized understanding.  COVID-19 education and zone information was reviewed with patient.  Patient was educated on signs/symptoms to report as well as the importance of early symptom recognition and follow up.  Patient verbalized understanding.  ACM

## 2024-01-31 NOTE — TELEPHONE ENCOUNTER
Patient called stating she needs a note faxed to [facility] in [place].    Note needs to states patient can be off eliquis 5  days prior to appt/cleaning    Patient states an appt has not been set yet until note is received from PCP

## 2024-02-05 ENCOUNTER — TELEPHONE (OUTPATIENT)
Dept: INFUSION THERAPY | Age: 54
End: 2024-02-05

## 2024-02-05 DIAGNOSIS — F51.05 INSOMNIA DUE TO OTHER MENTAL DISORDER: ICD-10-CM

## 2024-02-05 DIAGNOSIS — F99 INSOMNIA DUE TO OTHER MENTAL DISORDER: ICD-10-CM

## 2024-02-05 DIAGNOSIS — F20.0 PARANOID SCHIZOPHRENIA (HCC): ICD-10-CM

## 2024-02-05 RX ORDER — QUETIAPINE FUMARATE 100 MG/1
100 TABLET, FILM COATED ORAL DAILY
Qty: 90 TABLET | Refills: 1 | Status: SHIPPED | OUTPATIENT
Start: 2024-02-05

## 2024-02-05 NOTE — TELEPHONE ENCOUNTER
I explained that my hands are tied until she starts bringing in pharmacy retail receipts providing that 3% out of pocket has been met. She's stiil waiting for a determination on LIS with Medicare also.

## 2024-02-12 ENCOUNTER — CARE COORDINATION (OUTPATIENT)
Dept: CARE COORDINATION | Age: 54
End: 2024-02-12

## 2024-02-12 NOTE — CARE COORDINATION
Former Care Coordination patient reached out to [facility] re: recent letter stating she no longer/does not qualify for Medicare Extra Help program d/t her and 's combined income (approx $59973 per year).  Per chart patient is working with [facility] re: medication assistance.  Patient was encouraged to f/u with [facility] and her local  for additional assistance/questions.  Patient verbalized understanding and denied any other questions, concerns, or needs.

## 2024-02-13 ENCOUNTER — TELEPHONE (OUTPATIENT)
Dept: FAMILY MEDICINE CLINIC | Age: 54
End: 2024-02-13

## 2024-02-13 DIAGNOSIS — R09.89 CHEST CONGESTION: Primary | ICD-10-CM

## 2024-02-13 DIAGNOSIS — I82.412 ACUTE DEEP VEIN THROMBOSIS (DVT) OF FEMORAL VEIN OF LEFT LOWER EXTREMITY (HCC): ICD-10-CM

## 2024-02-13 DIAGNOSIS — R05.9 COUGH, UNSPECIFIED TYPE: ICD-10-CM

## 2024-02-13 RX ORDER — METHYLPREDNISOLONE 4 MG/1
TABLET ORAL
Qty: 1 KIT | Refills: 0 | Status: SHIPPED | OUTPATIENT
Start: 2024-02-13 | End: 2024-02-19

## 2024-02-13 RX ORDER — CEFDINIR 300 MG/1
300 CAPSULE ORAL 2 TIMES DAILY
Qty: 14 CAPSULE | Refills: 0 | Status: SHIPPED | OUTPATIENT
Start: 2024-02-13 | End: 2024-02-20

## 2024-02-13 NOTE — TELEPHONE ENCOUNTER
Pt called wanting to know if you'd want to do a CXR on her. She stated that she had coughing and wheezing while down in [place] and they had done one and found fluid on her lungs. Please advise.

## 2024-02-13 NOTE — TELEPHONE ENCOUNTER
Pt states she has been coughing and wheezing for the past week and her Ventolin HFA only lasts 30 mins.  She is using her MDI bid    Her cough is productive-dark mucous.  The brompheniramine cough med is helping    Denies SOB, chest pain, resp distress, fever or other symptoms    [facility] [place]

## 2024-02-14 DIAGNOSIS — I82.412 ACUTE DEEP VEIN THROMBOSIS (DVT) OF FEMORAL VEIN OF LEFT LOWER EXTREMITY (HCC): ICD-10-CM

## 2024-02-14 NOTE — TELEPHONE ENCOUNTER
Patients was receiving free drug through [facility] last year, she needs a new rx sent to [facility] so she can meet her out of pocket for the year before she can get free drug again. Last RX was never filled and now . Please advise medication pended pharmacy verified

## 2024-02-15 ENCOUNTER — HOSPITAL ENCOUNTER (OUTPATIENT)
Age: 54
Discharge: HOME | End: 2024-02-15
Attending: INTERNAL MEDICINE

## 2024-02-16 ENCOUNTER — TELEPHONE (OUTPATIENT)
Dept: FAMILY MEDICINE CLINIC | Age: 54
End: 2024-02-16

## 2024-02-16 DIAGNOSIS — M25.562 ACUTE PAIN OF LEFT KNEE: Primary | ICD-10-CM

## 2024-02-16 NOTE — TELEPHONE ENCOUNTER
Patient called stating the knee brace that was prescribed to the [facility] can not be filled. They need a note stating why the other one they made for her did not work and why she needs a new one. She states it is due to a fall and weight gain. She states the other one she could not get it to Velcro across her leg and if she did the brace would just fall right off.  Please advise.

## 2024-02-20 RX ORDER — BROMPHENIRAMINE MALEATE, PSEUDOEPHEDRINE HYDROCHLORIDE, AND DEXTROMETHORPHAN HYDROBROMIDE 2; 30; 10 MG/5ML; MG/5ML; MG/5ML
SYRUP ORAL
Qty: 140 ML | Refills: 0 | Status: SHIPPED | OUTPATIENT
Start: 2024-02-20

## 2024-02-26 ENCOUNTER — HOSPITAL ENCOUNTER (OUTPATIENT)
Age: 54
Discharge: HOME | End: 2024-02-26
Payer: MEDICARE

## 2024-02-26 ENCOUNTER — TELEPHONE (OUTPATIENT)
Dept: FAMILY MEDICINE CLINIC | Age: 54
End: 2024-02-26

## 2024-02-26 DIAGNOSIS — J02.9 SORE THROAT: ICD-10-CM

## 2024-02-26 DIAGNOSIS — J02.9 SORE THROAT: Primary | ICD-10-CM

## 2024-02-26 LAB — S PYO AG THROAT QL: NEGATIVE

## 2024-02-26 PROCEDURE — 87651 STREP A DNA AMP PROBE: CPT

## 2024-02-26 NOTE — TELEPHONE ENCOUNTER
Pt having sore throat since yesterday. Ordered testing to be done at [facility].   Pt said she had left arm pain yesterday also. No other symptoms.   Advised her to be seen in symptoms like chest pain and sob develop. She agrees.

## 2024-02-27 ENCOUNTER — TELEPHONE (OUTPATIENT)
Dept: FAMILY MEDICINE CLINIC | Age: 54
End: 2024-02-27

## 2024-02-27 NOTE — TELEPHONE ENCOUNTER
Pt states Noemi Durand at [facility] wants her to get off of her Amitriptyline 150 hs and Trazadone 150 and 50 hs.  This was discussed at her appt there last week    Noemi feels pt is sleeping good and doesn't think she needs all this medication    Pt is very upset and says she is not stopping this medicine for fear of having insomnia    Pt wanted to update you on this

## 2024-02-28 ENCOUNTER — HOSPITAL ENCOUNTER (OUTPATIENT)
Dept: MAMMOGRAPHY | Age: 54
Discharge: HOME | End: 2024-02-28
Payer: MEDICARE

## 2024-02-28 VITALS — HEIGHT: 65 IN | BODY MASS INDEX: 48.82 KG/M2 | WEIGHT: 293 LBS

## 2024-02-28 DIAGNOSIS — Z12.31 ENCOUNTER FOR SCREENING MAMMOGRAM FOR BREAST CANCER: ICD-10-CM

## 2024-02-28 PROCEDURE — 77063 BREAST TOMOSYNTHESIS BI: CPT

## 2024-03-01 ENCOUNTER — CARE COORDINATION (OUTPATIENT)
Dept: CARE COORDINATION | Age: 54
End: 2024-03-01

## 2024-03-01 DIAGNOSIS — E78.00 PURE HYPERCHOLESTEROLEMIA: ICD-10-CM

## 2024-03-01 RX ORDER — FAMOTIDINE 20 MG/1
20 TABLET, FILM COATED ORAL 2 TIMES DAILY
Qty: 180 TABLET | Refills: 1 | Status: SHIPPED | OUTPATIENT
Start: 2024-03-01

## 2024-03-01 RX ORDER — SIMVASTATIN 40 MG
40 TABLET ORAL DAILY
Qty: 90 TABLET | Refills: 1 | Status: SHIPPED | OUTPATIENT
Start: 2024-03-01

## 2024-03-01 RX ORDER — GABAPENTIN 300 MG/1
300 CAPSULE ORAL NIGHTLY
Qty: 90 CAPSULE | Refills: 0 | Status: SHIPPED | OUTPATIENT
Start: 2024-03-01 | End: 2024-05-30

## 2024-03-01 NOTE — CARE COORDINATION
Former Care Coordination patient reached out to [facility] with \"update.\"  Patient shared she is recovering from recent episode of \"COVID and other bugs.\"  ACM educated patient that it may take some time for endurance to return and encouraged patient to continue with frequent rest breaks and to push fluids.  Patient verbalized understanding.  Patient was encouraged to f/u with any new/change/worsening of her symptoms and patient verbalized understanding.  Patient shared she remains concerned with her pathways provider and she plans to discuss with PCP at upcoming appointment.  Patient was encouraged to bring all questions/concerns  with her to her appointments.  ACM also reviewed the importance of patient taking her medications as directed and following up with providers prior to making any changes.  Patient acknowledged understanding.  Active listening provided t/o our call.  Patient denied any other questions, concerns, or needs and she was encouraged to call with any that may develop.

## 2024-03-01 NOTE — TELEPHONE ENCOUNTER
Last visit- 1/24/2024  Next visit- 3/7/2024    Requested Prescriptions     Pending Prescriptions Disp Refills    simvastatin (ZOCOR) 40 MG tablet 90 tablet 3     Sig: Take 1 tablet by mouth daily    gabapentin (NEURONTIN) 300 MG capsule 90 capsule 1     Sig: Take 1 capsule by mouth nightly for 90 days.    famotidine (PEPCID) 20 MG tablet 180 tablet 3     Sig: Take 1 tablet by mouth 2 times daily

## 2024-03-04 ENCOUNTER — TELEPHONE (OUTPATIENT)
Dept: FAMILY MEDICINE CLINIC | Age: 54
End: 2024-03-04

## 2024-03-04 NOTE — PROGRESS NOTES
[facility]  [address]  [place] [address]  Phone:  [phone]     Medicare Annual Wellness Visit    Donna Waters is here for Medicare AWV    Assessment & Plan   Encounter for Medicare annual wellness exam        -     Routine healthcare maintenance was reviewed as below.    Acute cough  -     The cough is likely related to her seasonal allergies so will use cough medication PRN.  -     promethazine-dextromethorphan (PROMETHAZINE-DM) 6.25-15 MG/5ML syrup; Take 5 mLs by mouth 4 times daily as needed for Cough, Disp-240 mL, R-0 Normal    Prediabetes  -     Tirzepatide (MOUNJARO) 2.5 MG/0.5ML SOPN SC injection; Inject 0.5 mLs into the skin once a week, Disp-2 mL, R-0Normal    Recommendations for Preventive Services Due: see orders and patient instructions/AVS.  Recommended screening schedule for the next 5-10 years is provided to the patient in written form: see Patient Instructions/AVS.          Subjective     Patient's complete Health Risk Assessment and screening values have been reviewed and are found in Flowsheets. The following problems were reviewed today and where indicated follow up appointments were made and/or referrals ordered.    Positive Risk Factor Screenings with Interventions:    Fall Risk:  Do you feel unsteady or are you worried about falling? : no  2 or more falls in past year?: no  Fall with injury in past year?: (!) yes     Interventions:    Reviewed medications, home hazards, visual acuity, and co-morbidities that can increase risk for falls    Cognitive:   Clock Drawing Test (CDT): (!) Abnormal  Words recalled: 3 Words Recalled  Total Score: 3  Total Score Interpretation: Normal Mini-Cog  Interventions:  See AVS for additional education material            Activity, Diet, and Weight:  On average, how many days per week do you engage in moderate to strenuous exercise (like a brisk walk)?: 2 days  On average, how many minutes do you engage in exercise at this level?:

## 2024-03-05 RX ORDER — BROMPHENIRAMINE MALEATE, PSEUDOEPHEDRINE HYDROCHLORIDE, AND DEXTROMETHORPHAN HYDROBROMIDE 2; 30; 10 MG/5ML; MG/5ML; MG/5ML
SYRUP ORAL
Qty: 140 ML | Refills: 0 | Status: SHIPPED | OUTPATIENT
Start: 2024-03-05 | End: 2024-03-07

## 2024-03-05 NOTE — TELEPHONE ENCOUNTER
Last visit- 1/24/2024  Next visit- 3/7/2024    Requested Prescriptions     Pending Prescriptions Disp Refills    brompheniramine-pseudoephedrine-DM 2-30-10 MG/5ML syrup [Pharmacy Med Name: Hzobuswci-Mewcmixq-GB 30-2-10 MG/5ML Oral Syrup] 140 mL 0     Sig: TAKE 5 ML BY MOUTH 4 TIMES DAILY AS NEEDED FOR CONGESTION OR COUGH

## 2024-03-07 ENCOUNTER — OFFICE VISIT (OUTPATIENT)
Dept: FAMILY MEDICINE CLINIC | Age: 54
End: 2024-03-07

## 2024-03-07 ENCOUNTER — TELEPHONE (OUTPATIENT)
Dept: FAMILY MEDICINE CLINIC | Age: 54
End: 2024-03-07

## 2024-03-07 VITALS
HEART RATE: 78 BPM | BODY MASS INDEX: 48.65 KG/M2 | OXYGEN SATURATION: 100 % | TEMPERATURE: 98 F | HEIGHT: 65 IN | DIASTOLIC BLOOD PRESSURE: 64 MMHG | RESPIRATION RATE: 20 BRPM | SYSTOLIC BLOOD PRESSURE: 118 MMHG | WEIGHT: 292 LBS

## 2024-03-07 DIAGNOSIS — Z00.00 ENCOUNTER FOR MEDICARE ANNUAL WELLNESS EXAM: Primary | ICD-10-CM

## 2024-03-07 DIAGNOSIS — R73.03 PREDIABETES: ICD-10-CM

## 2024-03-07 DIAGNOSIS — R05.1 ACUTE COUGH: ICD-10-CM

## 2024-03-07 DIAGNOSIS — Z00.00 INITIAL MEDICARE ANNUAL WELLNESS VISIT: ICD-10-CM

## 2024-03-07 RX ORDER — DEXTROMETHORPHAN HYDROBROMIDE AND PROMETHAZINE HYDROCHLORIDE 15; 6.25 MG/5ML; MG/5ML
5 SYRUP ORAL 4 TIMES DAILY PRN
Qty: 240 ML | Refills: 0 | Status: SHIPPED | OUTPATIENT
Start: 2024-03-07 | End: 2024-03-14

## 2024-03-07 ASSESSMENT — PATIENT HEALTH QUESTIONNAIRE - PHQ9
2. FEELING DOWN, DEPRESSED OR HOPELESS: 0
SUM OF ALL RESPONSES TO PHQ9 QUESTIONS 1 & 2: 0
4. FEELING TIRED OR HAVING LITTLE ENERGY: 0
6. FEELING BAD ABOUT YOURSELF - OR THAT YOU ARE A FAILURE OR HAVE LET YOURSELF OR YOUR FAMILY DOWN: 0
SUM OF ALL RESPONSES TO PHQ QUESTIONS 1-9: 0
3. TROUBLE FALLING OR STAYING ASLEEP: 0
1. LITTLE INTEREST OR PLEASURE IN DOING THINGS: 0
7. TROUBLE CONCENTRATING ON THINGS, SUCH AS READING THE NEWSPAPER OR WATCHING TELEVISION: 0
SUM OF ALL RESPONSES TO PHQ QUESTIONS 1-9: 0
8. MOVING OR SPEAKING SO SLOWLY THAT OTHER PEOPLE COULD HAVE NOTICED. OR THE OPPOSITE, BEING SO FIGETY OR RESTLESS THAT YOU HAVE BEEN MOVING AROUND A LOT MORE THAN USUAL: 0
10. IF YOU CHECKED OFF ANY PROBLEMS, HOW DIFFICULT HAVE THESE PROBLEMS MADE IT FOR YOU TO DO YOUR WORK, TAKE CARE OF THINGS AT HOME, OR GET ALONG WITH OTHER PEOPLE: 0
9. THOUGHTS THAT YOU WOULD BE BETTER OFF DEAD, OR OF HURTING YOURSELF: 0
5. POOR APPETITE OR OVEREATING: 0

## 2024-03-07 ASSESSMENT — LIFESTYLE VARIABLES
HOW OFTEN DO YOU HAVE A DRINK CONTAINING ALCOHOL: NEVER
HOW MANY STANDARD DRINKS CONTAINING ALCOHOL DO YOU HAVE ON A TYPICAL DAY: PATIENT DOES NOT DRINK

## 2024-03-07 NOTE — PATIENT INSTRUCTIONS
the bathroom with you.   Where can you learn more?  Go to [email or web address].net/patientEd and enter G117 to learn more about \"Preventing Falls: Care Instructions.\"  Current as of: July 17, 2023               Content Version: 14.0  © 7734-1271 [employer].   Care instructions adapted under license by [employer]. If you have questions about a medical condition or this instruction, always ask your healthcare professional. [employer] disclaims any warranty or liability for your use of this information.           Learning About Mild Cognitive Impairment (MCI)  What is mild cognitive impairment (MCI)?     It's common to forget things sometimes as we get older. But some older people have memory loss that's more than normal aging. It's called mild cognitive impairment, or MCI. It is not the same as dementia.  People with the condition often know that their memory or mental function has changed. Tests may show some loss. But their minds work well overall. They can carry out daily tasks that are normal for them.  People with MCI have a higher chance of one day getting dementia. But not all people who have it will get dementia. Some people may stay the same over time.  What are the symptoms?  People with MCI have more memory loss than what occurs with normal aging. They may have increasing trouble with recalling words and keeping up with conversations. They may also have trouble remembering important events and making decisions.  What puts you at risk?  The risk of getting MCI increases with age. Having high blood pressure or having a family history of MCI may also increase your risk.  How is it diagnosed?  Your doctor will do a physical exam.  You may be asked questions to check your memory and other mental skills. Your doctor may also talk to close friends and family members. This can help the doctor figure out how your memory and other mental skills have changed.  You may get blood tests

## 2024-03-07 NOTE — TELEPHONE ENCOUNTER
Mounjaro    Prior Authorization not required for patient/medication   Case ID: changepayer      Payer: [employer] Patient's Payer    [phone]   [facility] this prior auth will not be required.

## 2024-03-08 RX ORDER — AMITRIPTYLINE HYDROCHLORIDE 150 MG/1
300 TABLET ORAL NIGHTLY
Qty: 180 TABLET | Refills: 1 | Status: SHIPPED | OUTPATIENT
Start: 2024-03-08

## 2024-03-08 RX ORDER — GABAPENTIN 300 MG/1
300 CAPSULE ORAL NIGHTLY
Qty: 90 CAPSULE | Refills: 1 | Status: SHIPPED | OUTPATIENT
Start: 2024-03-08 | End: 2024-09-04

## 2024-03-08 RX ORDER — NYSTATIN 100000 [USP'U]/G
POWDER TOPICAL
Qty: 60 G | Refills: 2 | Status: SHIPPED | OUTPATIENT
Start: 2024-03-08

## 2024-03-18 ENCOUNTER — TELEPHONE (OUTPATIENT)
Dept: INFUSION THERAPY | Age: 54
End: 2024-03-18

## 2024-03-18 ENCOUNTER — CARE COORDINATION (OUTPATIENT)
Dept: CARE COORDINATION | Age: 54
End: 2024-03-18

## 2024-03-18 NOTE — CARE COORDINATION
Patient called to shared she \"woke up sweating\" all week end and then had some abdominal pain overnight and vomited \"green gross\" stuff this AM.  Patient stated stomach symptoms have improved s/p recent vomiting episode.  Pt was educated on what to report/when to follow up.  Patient was instructed to call for earlier appointment with any continued/worsening of her symptoms and patient verbalized understanding.  ACM encouraged patient to follow bland diet for the next couple of days and to continue with fluids/Pedialyte/Gatorade.  Patient verbalized understanding.  Active listening provided to patient t/o our call.  Patient denied any other questions, concerns, or needs and she was encouraged to call with any that may develop.

## 2024-03-18 NOTE — TELEPHONE ENCOUNTER
The patient called to schedule an appointment to talk with me about her retail pharmacy receipts and the [facility]/Medicare denial letter. She will be here this Wed. afternoon.

## 2024-03-25 DIAGNOSIS — R73.03 PREDIABETES: ICD-10-CM

## 2024-03-25 RX ORDER — TIRZEPATIDE 2.5 MG/.5ML
INJECTION, SOLUTION SUBCUTANEOUS
Qty: 4 ML | Refills: 0 | Status: SHIPPED | OUTPATIENT
Start: 2024-03-25 | End: 2024-03-26 | Stop reason: SDUPTHER

## 2024-03-25 NOTE — TELEPHONE ENCOUNTER
Last visit- 3/7/2024  Next visit- Visit date not found    Requested Prescriptions     Pending Prescriptions Disp Refills    MOUNJARO 2.5 MG/0.5ML SOPN SC injection [Pharmacy Med Name: Mounjaro 2.5 MG/0.5ML Subcutaneous Solution Pen-injector] 4 mL 0     Sig: INJECT 1/2 (ONE-HALF) ML INTO THE SKIN ONCE A WEEK

## 2024-03-26 ENCOUNTER — TELEPHONE (OUTPATIENT)
Dept: FAMILY MEDICINE CLINIC | Age: 54
End: 2024-03-26

## 2024-03-26 ENCOUNTER — TELEPHONE (OUTPATIENT)
Dept: INFUSION THERAPY | Age: 54
End: 2024-03-26

## 2024-03-26 DIAGNOSIS — R73.03 PREDIABETES: ICD-10-CM

## 2024-03-26 RX ORDER — TIRZEPATIDE 2.5 MG/.5ML
INJECTION, SOLUTION SUBCUTANEOUS
Qty: 4 ML | Refills: 0 | Status: SHIPPED | OUTPATIENT
Start: 2024-03-26

## 2024-03-26 NOTE — TELEPHONE ENCOUNTER
Prior Authorization not required for patient/medication   Case ID: changepayer      Payer: [employer] Patient's Payer    [phone]   [facility] this prior [facility] will not be required.   View History     Medication Being Authorized     Tirzepatide (MOUNJARO) 2.5 MG/0.5ML SOPN SC injection

## 2024-03-26 NOTE — TELEPHONE ENCOUNTER
The patient will bring in the pharmacy receipts and the denial letter from [employer] around April 5 th or 8th The patient will bring in the pharmacy receipts and the denial letter from [employer] around April 5 th or 8th

## 2024-03-26 NOTE — TELEPHONE ENCOUNTER
Last visit- 3/7/2024  Next visit- Visit date not found    Requested Prescriptions     Pending Prescriptions Disp Refills    Tirzepatide (MOUNJARO) 2.5 MG/0.5ML SOPN SC injection 4 mL 0

## 2024-03-29 RX ORDER — BROMPHENIRAMINE MALEATE, PSEUDOEPHEDRINE HYDROCHLORIDE, AND DEXTROMETHORPHAN HYDROBROMIDE 2; 30; 10 MG/5ML; MG/5ML; MG/5ML
SYRUP ORAL
Qty: 140 ML | Refills: 0 | Status: SHIPPED | OUTPATIENT
Start: 2024-03-29

## 2024-04-01 ENCOUNTER — TELEPHONE (OUTPATIENT)
Dept: FAMILY MEDICINE CLINIC | Age: 54
End: 2024-04-01

## 2024-04-01 NOTE — TELEPHONE ENCOUNTER
Pt called stating she had green mucus draining from her nose but also coughing up green and bloody mucus. Pt made an appt for Wednesday.Pt also requesting if she can get a [facility] [place].

## 2024-04-01 NOTE — TELEPHONE ENCOUNTER
Let's hold on the antibiotics until she's evaluated to determine if they're necessary.  OK to use OTC symptomatic medication.

## 2024-04-02 NOTE — PROGRESS NOTES
[facility]  [address]  [place] [address]  Phone:  [phone]          Name: Donna Waters  : 1970    Chief Complaint   Patient presents with    Cough       HPI:     Donna Waters is a 53 y.o. female who presents today for evaluation of cough and congestion for the past week.  She hasn't been sleeping well as she's coughing more at night.  A few nights ago her mucous was green.  She's using nasal saline.  Her throat has been sore.  She's wheezing some and has DUPREE.  No vomiting or diarrhea.  She has been using Bromfed cough syrup which helps some.    She will be having surgery on her left ankle with Dr. Ventura on 24.  She'll be having a stress test prior to.      Current Outpatient Medications:     lidocaine (LIDODERM) 5 %, Place 1 patch onto the skin daily for 10 days 12 hours on, 12 hours off., Disp: 10 patch, Rfl: 0    brompheniramine-pseudoephedrine-DM 2-30-10 MG/5ML syrup, TAKE 5 ML BY MOUTH  4 TIMES DAILY AS NEEDED FOR COUGH OR  CONGESTION, Disp: 140 mL, Rfl: 0    doxycycline hyclate (VIBRA-TABS) 100 MG tablet, Take 1 tablet by mouth 2 times daily for 7 days, Disp: 14 tablet, Rfl: 0    Tirzepatide (MOUNJARO) 2.5 MG/0.5ML SOPN SC injection, INJECT 1/2 (ONE-HALF) ML INTO THE SKIN ONCE A WEEK, Disp: 4 mL, Rfl: 0    amitriptyline (ELAVIL) 150 MG tablet, Take 2 tablets by mouth nightly, Disp: 180 tablet, Rfl: 1    gabapentin (NEURONTIN) 300 MG capsule, Take 1 capsule by mouth nightly for 180 days., Disp: 90 capsule, Rfl: 1    nystatin (MYCOSTATIN) 778663 UNIT/GM powder, APPLY  POWDER TOPICALLY TO AFFECTED AREA THREE TIMES DAILY, Disp: 60 g, Rfl: 2    simvastatin (ZOCOR) 40 MG tablet, Take 1 tablet by mouth daily, Disp: 90 tablet, Rfl: 1    apixaban (ELIQUIS) 2.5 MG TABS tablet, Take 1 tablet by mouth 2 times daily, Disp: 180 tablet, Rfl: 3    QUEtiapine (SEROQUEL) 100 MG tablet, Take 1 tablet by mouth daily Taking half a pill once a day., Disp: 90 tablet, Rfl: 1    [employer]

## 2024-04-03 ENCOUNTER — OFFICE VISIT (OUTPATIENT)
Dept: FAMILY MEDICINE CLINIC | Age: 54
End: 2024-04-03

## 2024-04-03 VITALS
WEIGHT: 292 LBS | TEMPERATURE: 97 F | HEART RATE: 74 BPM | OXYGEN SATURATION: 99 % | BODY MASS INDEX: 48.59 KG/M2 | DIASTOLIC BLOOD PRESSURE: 72 MMHG | SYSTOLIC BLOOD PRESSURE: 114 MMHG | RESPIRATION RATE: 18 BRPM

## 2024-04-03 DIAGNOSIS — J01.90 ACUTE BACTERIAL SINUSITIS: Primary | ICD-10-CM

## 2024-04-03 DIAGNOSIS — B96.89 ACUTE BACTERIAL SINUSITIS: Primary | ICD-10-CM

## 2024-04-03 RX ORDER — LIDOCAINE 50 MG/G
1 PATCH TOPICAL DAILY
Qty: 10 PATCH | Refills: 0 | Status: SHIPPED | OUTPATIENT
Start: 2024-04-03 | End: 2024-04-13

## 2024-04-03 RX ORDER — DOXYCYCLINE HYCLATE 100 MG
100 TABLET ORAL 2 TIMES DAILY
Qty: 14 TABLET | Refills: 0 | Status: SHIPPED | OUTPATIENT
Start: 2024-04-03 | End: 2024-04-10

## 2024-04-03 RX ORDER — BROMPHENIRAMINE MALEATE, PSEUDOEPHEDRINE HYDROCHLORIDE, AND DEXTROMETHORPHAN HYDROBROMIDE 2; 30; 10 MG/5ML; MG/5ML; MG/5ML
SYRUP ORAL
Qty: 140 ML | Refills: 0 | Status: SHIPPED | OUTPATIENT
Start: 2024-04-03

## 2024-04-03 ASSESSMENT — ENCOUNTER SYMPTOMS
WHEEZING: 1
COUGH: 1
NAUSEA: 0
SHORTNESS OF BREATH: 1
SORE THROAT: 1
VOMITING: 0

## 2024-04-08 ENCOUNTER — TELEPHONE (OUTPATIENT)
Dept: FAMILY MEDICINE CLINIC | Age: 54
End: 2024-04-08

## 2024-04-08 NOTE — TELEPHONE ENCOUNTER
Pt was prescribed doxycycline and states she had a reaction to this. States she had a rash, hives, and is itchy. Pt asking what she should do.

## 2024-04-08 NOTE — TELEPHONE ENCOUNTER
Patient aware and voiced understanding, no concerns voiced at this time.   Doxycycline added to allergy list.

## 2024-04-09 ENCOUNTER — TELEPHONE (OUTPATIENT)
Dept: INFUSION THERAPY | Age: 54
End: 2024-04-09

## 2024-04-09 NOTE — PROGRESS NOTES
History     Tobacco Use    Smoking status: Never    Smokeless tobacco: Never   Substance Use Topics    Alcohol use: Not Currently    Drug use: No        Allergies   Allergen Reactions    Msg Shortness Of Breath    Codeine Other (See Comments)     Messes with my mental health    Cyclobenzaprine Other (See Comments)    Lithium Hives    Doxycycline Hives, Itching and Rash    Other Palpitations     msg   ,   Past Medical History:   Diagnosis Date    Acalculous cholecystitis     Anxiety     Arthritis     hands and hips    Asthma     inhaler prn    Blood clotting disorder (HCC)     2017, 2021    Bradycardia 03/02/2017    Flexural eczema     GERD (gastroesophageal reflux disease)     LV dysfunction 03/01/2017       PHYSICAL EXAMINATION:    Constitutional: [x] Appears well-developed and well-nourished [x] No apparent distress      [] Abnormal-   Mental status  [x] Alert and awake  [] Oriented to person/place/time []Able to follow commands      Eyes:  EOM    [x]  Normal  [] Abnormal-  Sclera  [x]  Normal  [] Abnormal -         Discharge [x]  None visible  [] Abnormal -    HENT:   [x] Normocephalic, atraumatic.  [] Abnormal   [x] Mouth/Throat: Mucous membranes are moist.     External Ears [] Normal  [] Abnormal-     Neck: [] No visualized mass     Pulmonary/Chest: [x] Respiratory effort normal.  [x] No visualized signs of difficulty breathing or respiratory distress        [] Abnormal-      Musculoskeletal:   [] Normal gait with no signs of ataxia         [] Normal range of motion of neck        [] Abnormal-       Neurological:        [] No Facial Asymmetry (Cranial nerve 7 motor function) (limited exam to video visit)          [] No gaze palsy        [] Abnormal-         Skin:        [] No significant exanthematous lesions or discoloration noted on facial skin         [] Abnormal-            Psychiatric:       [] Normal Affect [] No Hallucinations        [] Abnormal-       ASSESSMENT/PLAN:  1. Primary insomnia  - She is

## 2024-04-10 ENCOUNTER — HOSPITAL ENCOUNTER (OUTPATIENT)
Dept: INFUSION THERAPY | Age: 54
Discharge: HOME | End: 2024-04-10
Payer: MEDICARE

## 2024-04-10 ENCOUNTER — OFFICE VISIT (OUTPATIENT)
Dept: ONCOLOGY | Age: 54
End: 2024-04-10
Payer: MEDICARE

## 2024-04-10 VITALS
RESPIRATION RATE: 18 BRPM | HEART RATE: 80 BPM | OXYGEN SATURATION: 96 % | WEIGHT: 291.4 LBS | DIASTOLIC BLOOD PRESSURE: 56 MMHG | SYSTOLIC BLOOD PRESSURE: 113 MMHG | HEIGHT: 65 IN | TEMPERATURE: 98.2 F | BODY MASS INDEX: 48.55 KG/M2

## 2024-04-10 VITALS
SYSTOLIC BLOOD PRESSURE: 113 MMHG | TEMPERATURE: 98.2 F | HEART RATE: 80 BPM | DIASTOLIC BLOOD PRESSURE: 56 MMHG | OXYGEN SATURATION: 96 % | RESPIRATION RATE: 18 BRPM

## 2024-04-10 DIAGNOSIS — F99 INSOMNIA DUE TO OTHER MENTAL DISORDER: ICD-10-CM

## 2024-04-10 DIAGNOSIS — F20.0 PARANOID SCHIZOPHRENIA (HCC): ICD-10-CM

## 2024-04-10 DIAGNOSIS — I10 PRIMARY HYPERTENSION: ICD-10-CM

## 2024-04-10 DIAGNOSIS — I82.412 ACUTE DEEP VEIN THROMBOSIS (DVT) OF FEMORAL VEIN OF LEFT LOWER EXTREMITY (HCC): ICD-10-CM

## 2024-04-10 DIAGNOSIS — I26.99 OTHER ACUTE PULMONARY EMBOLISM WITHOUT ACUTE COR PULMONALE (HCC): Primary | ICD-10-CM

## 2024-04-10 DIAGNOSIS — F51.05 INSOMNIA DUE TO OTHER MENTAL DISORDER: ICD-10-CM

## 2024-04-10 DIAGNOSIS — E66.01 CLASS 3 SEVERE OBESITY WITH SERIOUS COMORBIDITY AND BODY MASS INDEX (BMI) OF 45.0 TO 49.9 IN ADULT, UNSPECIFIED OBESITY TYPE (HCC): ICD-10-CM

## 2024-04-10 PROBLEM — R00.1 BRADYCARDIA: Status: RESOLVED | Noted: 2017-03-02 | Resolved: 2024-04-10

## 2024-04-10 PROBLEM — E11.9 TYPE 2 DIABETES MELLITUS (HCC): Status: ACTIVE | Noted: 2024-04-10

## 2024-04-10 PROBLEM — I51.9 LV DYSFUNCTION: Status: RESOLVED | Noted: 2017-03-01 | Resolved: 2024-04-10

## 2024-04-10 LAB
APTT PPP: 38.4 SECONDS (ref 22–38)
BASOPHILS ABSOLUTE: 0 THOU/MM3 (ref 0–0.1)
BASOPHILS NFR BLD AUTO: 0 % (ref 0–3)
BUN BLDP-MCNC: 9 MG/DL (ref 8–26)
CHLORIDE BLD-SCNC: 101 MEQ/L (ref 98–109)
CREAT BLD-MCNC: 0.6 MG/DL (ref 0.5–1.2)
EOSINOPHIL NFR BLD AUTO: 3 % (ref 0–4)
EOSINOPHILS ABSOLUTE: 0.2 THOU/MM3 (ref 0–0.4)
ERYTHROCYTE [DISTWIDTH] IN BLOOD BY AUTOMATED COUNT: 14.4 % (ref 11.5–14.5)
GFR SERPL CREATININE-BSD FRML MDRD: > 90 ML/MIN/1.73M2
GLUCOSE BLD-MCNC: 101 MG/DL (ref 70–108)
HCT VFR BLD AUTO: 37.7 % (ref 37–47)
HGB BLD-MCNC: 12.6 GM/DL (ref 12–16)
IMMATURE GRANULOCYTES %: 0 %
IMMATURE GRANULOCYTES ABSOLUTE: 0.02 THOU/MM3 (ref 0–0.07)
INR PPP: 1 (ref 0.85–1.13)
IONIZED CALCIUM, WHOLE BLOOD: 1.17 MMOL/L (ref 1.12–1.32)
LYMPHOCYTES ABSOLUTE: 1.9 THOU/MM3 (ref 1–4.8)
LYMPHOCYTES NFR BLD AUTO: 30 % (ref 15–47)
MCH RBC QN AUTO: 29.9 PG (ref 26–33)
MCHC RBC AUTO-ENTMCNC: 33.4 GM/DL (ref 32.2–35.5)
MCV RBC AUTO: 90 FL (ref 81–99)
MONOCYTES ABSOLUTE: 0.6 THOU/MM3 (ref 0.4–1.3)
MONOCYTES NFR BLD AUTO: 9 % (ref 0–12)
NEUTROPHILS NFR BLD AUTO: 58 % (ref 43–75)
PLATELET # BLD AUTO: 260 THOU/MM3 (ref 130–400)
PMV BLD AUTO: 9.2 FL (ref 9.4–12.4)
POTASSIUM BLD-SCNC: 3.9 MEQ/L (ref 3.5–4.9)
RBC # BLD AUTO: 4.21 MILL/MM3 (ref 4.2–5.4)
SEGMENTED NEUTROPHILS ABSOLUTE COUNT: 3.7 THOU/MM3 (ref 1.8–7.7)
SODIUM BLD-SCNC: 138 MEQ/L (ref 138–146)
TOTAL CO2, WHOLE BLOOD: 27 MEQ/L (ref 23–33)
WBC # BLD AUTO: 6.4 THOU/MM3 (ref 4.8–10.8)

## 2024-04-10 PROCEDURE — 3078F DIAST BP <80 MM HG: CPT | Performed by: SPECIALIST

## 2024-04-10 PROCEDURE — 85730 THROMBOPLASTIN TIME PARTIAL: CPT

## 2024-04-10 PROCEDURE — 85025 COMPLETE CBC W/AUTO DIFF WBC: CPT

## 2024-04-10 PROCEDURE — 99211 OFF/OP EST MAY X REQ PHY/QHP: CPT

## 2024-04-10 PROCEDURE — 80047 BASIC METABLC PNL IONIZED CA: CPT

## 2024-04-10 PROCEDURE — 99215 OFFICE O/P EST HI 40 MIN: CPT | Performed by: SPECIALIST

## 2024-04-10 PROCEDURE — 3074F SYST BP LT 130 MM HG: CPT | Performed by: SPECIALIST

## 2024-04-10 PROCEDURE — 85610 PROTHROMBIN TIME: CPT

## 2024-04-10 PROCEDURE — 36415 COLL VENOUS BLD VENIPUNCTURE: CPT

## 2024-04-10 ASSESSMENT — ENCOUNTER SYMPTOMS
SINUS PAIN: 1
DIARRHEA: 0
ABDOMINAL PAIN: 0
TROUBLE SWALLOWING: 0
VOMITING: 0
BLOOD IN STOOL: 0
WHEEZING: 0
EYES NEGATIVE: 1
COUGH: 0
CHOKING: 0
SINUS PRESSURE: 0
NAUSEA: 0
SORE THROAT: 0
BACK PAIN: 0
CHEST TIGHTNESS: 0
CONSTIPATION: 0
SHORTNESS OF BREATH: 0

## 2024-04-10 NOTE — PROGRESS NOTES
[facility]  [facility]  [address]  [address]  [place] [id number]  Dept: [phone]  Loc: [phone]   Hematology/Oncology Progress Note (Clinic)        Donna Waters  1970    4/10/2024     Referring Provider: No ref. provider found   PCP: Flavia Rebolledo MD       HPI   History Obtained From:  patient    The patient is a 53 y.o. female with significant past medical history of DVT and PE x 2 once in 2017 and another PE after COVID infection 10/2020   who is on Eliquis since 2nd PE for life presents today for clearance for L ankle surgery.  Patient does not have any specific complaints today except  For left ankle pain and significant weight gain since her total bilateral hysterectomies and oophorectomy.    Assessment/Plan     1. Other acute pulmonary embolism without acute cor pulmonale (HCC)      Advised patient to continue Eliquis for life, as she is definitely at risk to develop recurrent clots over time if she comes off of it and she agrees        Advised her to hold Eliquis for day before and day of surgery if her left ankle surgery is at low risk of bleeding      Or hold Eliquis for 2 days prior to surgery, day of, and day after surgery, and then restart with her routine dose on day 5 postsurgery, she agrees    2. Insomnia due to other mental disorder      Continue melatonin and trazodone per her mental health physician    3. Paranoid schizophrenia (HCC)      Continue risperidone, quetiapine, trazodone, and cariprazine per mental health physician and patient agrees    4. Class 3 severe obesity with serious comorbidity and body mass index (BMI) of 45.0 to 49.9 in adult, unspecified obesity type (HCC)      Patient is prediabetic and is on Mounjaro for that and that should also help her lose weight     Symptom Management: (include nausea, vomiting, diarrhea, constipation, appetite, weight loss, energy, anxiety, depression, SOB,

## 2024-04-10 NOTE — PATIENT INSTRUCTIONS
Hold Apixaban day of and a day after surgery for low risk surgery  Hold Apixaban 2 days prior to surgery, day of and day after surgery and resume on 5th day   No follow-up needed

## 2024-04-11 ENCOUNTER — TELEMEDICINE (OUTPATIENT)
Dept: FAMILY MEDICINE CLINIC | Age: 54
End: 2024-04-11

## 2024-04-11 DIAGNOSIS — F51.01 PRIMARY INSOMNIA: ICD-10-CM

## 2024-04-11 DIAGNOSIS — E11.9 TYPE 2 DIABETES MELLITUS WITHOUT COMPLICATION, WITHOUT LONG-TERM CURRENT USE OF INSULIN (HCC): ICD-10-CM

## 2024-04-11 RX ORDER — TRAZODONE HYDROCHLORIDE 100 MG/1
200 TABLET ORAL NIGHTLY
Qty: 180 TABLET | Refills: 0 | Status: SHIPPED | OUTPATIENT
Start: 2024-04-11

## 2024-04-15 ENCOUNTER — CARE COORDINATION (OUTPATIENT)
Dept: CARE COORDINATION | Age: 54
End: 2024-04-15

## 2024-04-15 NOTE — CARE COORDINATION
Previous Care Coordination patient who reached out to share she has been doing \"ok\" but continues to have ongoing trouble with her ankle. Patient reported she is following with [facility] and she is scheduled for surgery on her ankle in the near future. Patient shared she will be NWB x 5 weeks and then in a cast for 5 weeks, and then will start therapy after that time.  Patient was encouraged to start to prepare for any additional assistance at home and any DME needs now so she is ready at the time of surgery.  Patient verbalized understanding.  Patient reported she was told she will need an updated stress test prior to the procedure and this has been scheduled for 4/22.  ACM reviewed with patient that this is common practice for surgical clearance and patient denied any questions or concerns.  Patient denied any other needs and ended our call.

## 2024-04-16 ENCOUNTER — TELEPHONE (OUTPATIENT)
Dept: INFUSION THERAPY | Age: 54
End: 2024-04-16

## 2024-04-16 ENCOUNTER — TELEPHONE (OUTPATIENT)
Dept: CARDIOLOGY CLINIC | Age: 54
End: 2024-04-16

## 2024-04-16 DIAGNOSIS — J20.9 ACUTE BRONCHITIS, UNSPECIFIED ORGANISM: ICD-10-CM

## 2024-04-16 RX ORDER — ALBUTEROL SULFATE 90 UG/1
2 AEROSOL, METERED RESPIRATORY (INHALATION) 4 TIMES DAILY PRN
Qty: 18 G | Refills: 5 | Status: SHIPPED | OUTPATIENT
Start: 2024-04-16

## 2024-04-16 NOTE — TELEPHONE ENCOUNTER
Pre op Risk Assessment    Procedure left ankle triple arthrodesis   Physician Dr. Ventura   Date of surgery/procedure 5/14/24    Last OV 1/18/24  Last Stress 9/25/17 has one scheduled for 4/22/24  Last Echo 9/25/17 has one scheduled for 4/22/24  Last Cath 10/10/17  Last Stent none in epic   Is patient on blood thinners Eliquis   Hold Meds/how many days ??    Fax: [phone]     Please send to Dr. Hammond when he returns

## 2024-04-17 ENCOUNTER — TELEPHONE (OUTPATIENT)
Dept: FAMILY MEDICINE CLINIC | Age: 54
End: 2024-04-17

## 2024-04-17 RX ORDER — HYDROXYZINE 50 MG/1
50 TABLET, FILM COATED ORAL EVERY 8 HOURS PRN
Qty: 90 TABLET | Refills: 1 | Status: SHIPPED | OUTPATIENT
Start: 2024-04-17

## 2024-04-17 NOTE — TELEPHONE ENCOUNTER
SW/CM ED Assessment/Plan of Care Note     Baseline Assessment  63 year old presented to the ED with   Chief Complaint   Patient presents with    Abnormal Lab Results    Congestion    Weakness   .  Prior to arrival patient was living with Spouse and residing at Apartment.  Patient does  have a Power of  for Healthcare.  Document is not in our system and cannot pull from care everywhere. Reminder placed to bring a copy in to follow up appt. Patient’s Primary Care Provider is Willy Cody MD.     Medical History  Past Medical History:   Diagnosis Date    AF (atrial fibrillation) (CMD)        Prior to Arrival Status  Functional Status  Ambulation: Independent/Self  Bathing: Independent/Self  Dressing: Independent/Self  Toileting: Independent/Self  Meal Preparation: Independent/Self  Shopping: Independent/Self  Medication Preparation: Independent/Self  Medication Administration: Independent/Self  Housekeeping: Independent/Self  Transportation: Independent/Self    Agency/Support  Type of Services Prior to Hospitalization: None              Support Systems: Spouse  Home Devices/Equipment: None           Mobility Assist Devices: None  Sensory Support Devices: None    Insurance  Primary: [employer] [facility] [employer]OPERATIVE EXCHANGE  Secondary: N/A      Progress Note  Met with pt and spouse to discuss role of CM and dc planning. Pt was completely independent prior to admission with no use of assistive devices. Patient denies any anticipated discharge needs. CM will continue to follow.       Plan of Care  Plan of care reviewed with   Plan to admit: Yes  If YES:   Patient class recommended: Inpatient  Handoff to INPT CM completed (sticky note): once room is assigned.       SW/CM - Recommendations for Discharge: Home   Anticipate patient will need post-hospital services. Necessary services are available.  Anticipate patient can return to the environment from which patient entered the hospital.   Anticipate patient can  Patient called ACM to share she developed diarrhea this AM.  Patient was encouraged to push fluids and follow BRAT/bland diet.  Patient instructed to f/u with PCP if symptoms worsen/continue and she verbalized understanding.     Patient wanted PCP to know she is scheduled for a stress test on 4/22 and will then be scheduled for ankle surgery at [facility].     Patient is also asking for a refill of her hydroxyzine (not on current med list) for itching to be sent to [facility] in [place].  Please advise.  Thank you!   provide self-care at discharge.

## 2024-04-18 ENCOUNTER — TELEPHONE (OUTPATIENT)
Dept: ADMINISTRATIVE | Age: 54
End: 2024-04-18

## 2024-04-18 NOTE — TELEPHONE ENCOUNTER
Patient states she is needing clearance for her Bone surgery scheduled for 5/14. She is needing a follow up after he stress test to see Dr. Hammond for result. Please return call to patient to schedule.     Thank you

## 2024-04-18 NOTE — TELEPHONE ENCOUNTER
Please send to Dr Hammond Monday 4/22/24.  She is having a Stress test and ECHO 4/22/24 for this clearance.     Please call pt and let her know if she is clear please.

## 2024-04-19 ENCOUNTER — HOSPITAL ENCOUNTER (OUTPATIENT)
Age: 54
Discharge: HOME | End: 2024-04-19
Payer: MEDICARE

## 2024-04-19 LAB
25(OH)D3 SERPL-MCNC: 35 NG/ML (ref 30–100)
ANION GAP SERPL CALC-SCNC: 12 MEQ/L (ref 8–16)
BASOPHILS ABSOLUTE: 0 THOU/MM3 (ref 0–0.1)
BASOPHILS NFR BLD AUTO: 0.4 %
BUN SERPL-MCNC: 11 MG/DL (ref 7–22)
CALCIUM SERPL-MCNC: 9.4 MG/DL (ref 8.5–10.5)
CHLORIDE SERPL-SCNC: 94 MEQ/L (ref 98–111)
CO2 SERPL-SCNC: 27 MEQ/L (ref 23–33)
CREAT SERPL-MCNC: 0.8 MG/DL (ref 0.4–1.2)
DEPRECATED RDW RBC AUTO: 49.8 FL (ref 35–45)
EKG ATRIAL RATE: 75 BPM
EKG P AXIS: 39 DEGREES
EKG P-R INTERVAL: 200 MS
EKG Q-T INTERVAL: 386 MS
EKG QRS DURATION: 100 MS
EKG QTC CALCULATION (BAZETT): 431 MS
EKG R AXIS: 59 DEGREES
EKG T AXIS: 64 DEGREES
EKG VENTRICULAR RATE: 75 BPM
EOSINOPHIL NFR BLD AUTO: 4.1 %
EOSINOPHILS ABSOLUTE: 0.2 THOU/MM3 (ref 0–0.4)
ERYTHROCYTE [DISTWIDTH] IN BLOOD BY AUTOMATED COUNT: 14.6 % (ref 11.5–14.5)
GFR SERPL CREATININE-BSD FRML MDRD: 88 ML/MIN/1.73M2
GLUCOSE SERPL-MCNC: 95 MG/DL (ref 70–108)
HCT VFR BLD AUTO: 39.8 % (ref 37–47)
HGB BLD-MCNC: 13.1 GM/DL (ref 12–16)
IMM GRANULOCYTES # BLD AUTO: 0.02 THOU/MM3 (ref 0–0.07)
IMM GRANULOCYTES NFR BLD AUTO: 0.4 %
LYMPHOCYTES ABSOLUTE: 1.7 THOU/MM3 (ref 1–4.8)
LYMPHOCYTES NFR BLD AUTO: 31 %
MCH RBC QN AUTO: 30.3 PG (ref 26–33)
MCHC RBC AUTO-ENTMCNC: 32.9 GM/DL (ref 32.2–35.5)
MCV RBC AUTO: 91.9 FL (ref 81–99)
MONOCYTES ABSOLUTE: 0.7 THOU/MM3 (ref 0.4–1.3)
MONOCYTES NFR BLD AUTO: 12.1 %
NEUTROPHILS NFR BLD AUTO: 52 %
NRBC BLD AUTO-RTO: 0 /100 WBC
PLATELET # BLD AUTO: 305 THOU/MM3 (ref 130–400)
PMV BLD AUTO: 9.8 FL (ref 9.4–12.4)
POTASSIUM SERPL-SCNC: 4.8 MEQ/L (ref 3.5–5.2)
RBC # BLD AUTO: 4.33 MILL/MM3 (ref 4.2–5.4)
SEGMENTED NEUTROPHILS ABSOLUTE COUNT: 2.8 THOU/MM3 (ref 1.8–7.7)
SODIUM SERPL-SCNC: 133 MEQ/L (ref 135–145)
WBC # BLD AUTO: 5.4 THOU/MM3 (ref 4.8–10.8)

## 2024-04-19 PROCEDURE — 85025 COMPLETE CBC W/AUTO DIFF WBC: CPT

## 2024-04-19 PROCEDURE — 93010 ELECTROCARDIOGRAM REPORT: CPT | Performed by: INTERNAL MEDICINE

## 2024-04-19 PROCEDURE — 36415 COLL VENOUS BLD VENIPUNCTURE: CPT

## 2024-04-19 PROCEDURE — 82306 VITAMIN D 25 HYDROXY: CPT

## 2024-04-19 PROCEDURE — 93005 ELECTROCARDIOGRAM TRACING: CPT | Performed by: ORTHOPAEDIC SURGERY

## 2024-04-19 PROCEDURE — 80048 BASIC METABOLIC PNL TOTAL CA: CPT

## 2024-04-19 PROCEDURE — 87081 CULTURE SCREEN ONLY: CPT

## 2024-04-21 LAB — MRSA SPEC QL CULT: NORMAL

## 2024-04-22 ENCOUNTER — HOSPITAL ENCOUNTER (OUTPATIENT)
Age: 54
Discharge: HOME | End: 2024-04-24
Attending: INTERNAL MEDICINE
Payer: MEDICARE

## 2024-04-22 ENCOUNTER — HOSPITAL ENCOUNTER (OUTPATIENT)
Dept: NUCLEAR MEDICINE | Age: 54
Discharge: HOME | End: 2024-04-22
Attending: INTERNAL MEDICINE
Payer: MEDICARE

## 2024-04-22 ENCOUNTER — TELEPHONE (OUTPATIENT)
Dept: CARDIOLOGY CLINIC | Age: 54
End: 2024-04-22

## 2024-04-22 VITALS
WEIGHT: 291 LBS | HEIGHT: 65 IN | SYSTOLIC BLOOD PRESSURE: 113 MMHG | DIASTOLIC BLOOD PRESSURE: 56 MMHG | BODY MASS INDEX: 48.48 KG/M2

## 2024-04-22 DIAGNOSIS — R06.09 DOE (DYSPNEA ON EXERTION): ICD-10-CM

## 2024-04-22 DIAGNOSIS — R00.2 PALPITATION: ICD-10-CM

## 2024-04-22 DIAGNOSIS — R00.2 PALPITATIONS: Primary | ICD-10-CM

## 2024-04-22 DIAGNOSIS — I20.89 ANGINA OF EFFORT (HCC): ICD-10-CM

## 2024-04-22 DIAGNOSIS — R94.39 ABNORMAL STRESS TEST: ICD-10-CM

## 2024-04-22 DIAGNOSIS — R00.2 PALPITATIONS: ICD-10-CM

## 2024-04-22 LAB
ECHO AV CUSP MM: 1.9 CM
ECHO AV PEAK GRADIENT: 7 MMHG
ECHO AV PEAK VELOCITY: 1.4 M/S
ECHO AV VELOCITY RATIO: 0.71
ECHO BSA: 2.46 M2
ECHO LA AREA 2C: 13.9 CM2
ECHO LA AREA 4C: 17 CM2
ECHO LA DIAMETER INDEX: 1.51 CM/M2
ECHO LA DIAMETER: 3.5 CM
ECHO LA MAJOR AXIS: 5.4 CM
ECHO LA MINOR AXIS: 4.9 CM
ECHO LA VOL BP: 37 ML (ref 22–52)
ECHO LA VOL MOD A2C: 33 ML (ref 22–52)
ECHO LA VOL MOD A4C: 39 ML (ref 22–52)
ECHO LA VOL/BSA BIPLANE: 16 ML/M2 (ref 16–34)
ECHO LA VOLUME INDEX MOD A2C: 14 ML/M2 (ref 16–34)
ECHO LA VOLUME INDEX MOD A4C: 17 ML/M2 (ref 16–34)
ECHO LV E' LATERAL VELOCITY: 13 CM/S
ECHO LV E' SEPTAL VELOCITY: 8 CM/S
ECHO LV FRACTIONAL SHORTENING: 27 % (ref 28–44)
ECHO LV INTERNAL DIMENSION DIASTOLE INDEX: 2.2 CM/M2
ECHO LV INTERNAL DIMENSION DIASTOLIC: 5.1 CM (ref 3.9–5.3)
ECHO LV INTERNAL DIMENSION SYSTOLIC INDEX: 1.59 CM/M2
ECHO LV INTERNAL DIMENSION SYSTOLIC: 3.7 CM
ECHO LV ISOVOLUMETRIC RELAXATION TIME (IVRT): 81 MS
ECHO LV IVSD: 0.8 CM (ref 0.6–0.9)
ECHO LV MASS 2D: 151.8 G (ref 67–162)
ECHO LV MASS INDEX 2D: 65.4 G/M2 (ref 43–95)
ECHO LV POSTERIOR WALL DIASTOLIC: 0.9 CM (ref 0.6–0.9)
ECHO LV RELATIVE WALL THICKNESS RATIO: 0.35
ECHO LVOT PEAK GRADIENT: 4 MMHG
ECHO LVOT PEAK VELOCITY: 1 M/S
ECHO MV A VELOCITY: 0.85 M/S
ECHO MV E DECELERATION TIME (DT): 276 MS
ECHO MV E VELOCITY: 0.67 M/S
ECHO MV E/A RATIO: 0.79
ECHO MV E/E' LATERAL: 5.15
ECHO MV E/E' RATIO (AVERAGED): 6.76
ECHO PULMONARY ARTERY END DIASTOLIC PRESSURE: 5 MMHG
ECHO PV MAX VELOCITY: 0.7 M/S
ECHO PV PEAK GRADIENT: 2 MMHG
ECHO PV REGURGITANT MAX VELOCITY: 1.2 M/S
ECHO RV INTERNAL DIMENSION: 2.7 CM
ECHO RV TAPSE: 2.2 CM (ref 1.7–?)
ECHO TV E WAVE: 0.5 M/S
ECHO TV REGURGITANT MAX VELOCITY: 2.51 M/S
ECHO TV REGURGITANT PEAK GRADIENT: 25 MMHG
NUC STRESS EJECTION FRACTION: 55 %
STRESS BASELINE DIAS BP: 58 MMHG
STRESS BASELINE HR: 58 BPM
STRESS BASELINE SYS BP: 121 MMHG
STRESS STAGE 1 BP: NORMAL MMHG
STRESS STAGE 1 DURATION: 1 MIN:SEC
STRESS STAGE 1 HR: 71 BPM
STRESS STAGE 2 BP: NORMAL MMHG
STRESS STAGE 2 DURATION: 1 MIN:SEC
STRESS STAGE 2 HR: 78 BPM
STRESS STAGE 3 BP: NORMAL MMHG
STRESS STAGE 3 DURATION: 1 MIN:SEC
STRESS STAGE 3 HR: 77 BPM
STRESS STAGE RECOVERY 1 BP: NORMAL MMHG
STRESS STAGE RECOVERY 1 DURATION: 1 MIN:SEC
STRESS STAGE RECOVERY 1 HR: 75 BPM
STRESS STAGE RECOVERY 2 BP: NORMAL MMHG
STRESS STAGE RECOVERY 2 DURATION: 1 MIN:SEC
STRESS STAGE RECOVERY 2 HR: 74 BPM
STRESS STAGE RECOVERY 3 BP: NORMAL MMHG
STRESS STAGE RECOVERY 3 DURATION: 1 MIN:SEC
STRESS STAGE RECOVERY 3 HR: 73 BPM
STRESS STAGE RECOVERY 4 BP: NORMAL MMHG
STRESS STAGE RECOVERY 4 DURATION: 2 MIN:SEC
STRESS STAGE RECOVERY 4 HR: 72 BPM
STRESS TARGET HR: 167 BPM
TID: 1.32

## 2024-04-22 PROCEDURE — 93225 XTRNL ECG REC<48 HRS REC: CPT

## 2024-04-22 PROCEDURE — 93018 CV STRESS TEST I&R ONLY: CPT | Performed by: INTERNAL MEDICINE

## 2024-04-22 PROCEDURE — 78452 HT MUSCLE IMAGE SPECT MULT: CPT | Performed by: INTERNAL MEDICINE

## 2024-04-22 PROCEDURE — 93016 CV STRESS TEST SUPVJ ONLY: CPT | Performed by: INTERNAL MEDICINE

## 2024-04-22 PROCEDURE — 93017 CV STRESS TEST TRACING ONLY: CPT

## 2024-04-22 PROCEDURE — 93306 TTE W/DOPPLER COMPLETE: CPT | Performed by: INTERNAL MEDICINE

## 2024-04-22 PROCEDURE — 78452 HT MUSCLE IMAGE SPECT MULT: CPT

## 2024-04-22 PROCEDURE — 3430000000 HC RX DIAGNOSTIC RADIOPHARMACEUTICAL: Performed by: INTERNAL MEDICINE

## 2024-04-22 PROCEDURE — 6360000002 HC RX W HCPCS: Performed by: INTERNAL MEDICINE

## 2024-04-22 PROCEDURE — A9500 TC99M SESTAMIBI: HCPCS | Performed by: INTERNAL MEDICINE

## 2024-04-22 PROCEDURE — 93306 TTE W/DOPPLER COMPLETE: CPT

## 2024-04-22 RX ORDER — REGADENOSON 0.08 MG/ML
0.4 INJECTION, SOLUTION INTRAVENOUS
Status: COMPLETED | OUTPATIENT
Start: 2024-04-22 | End: 2024-04-22

## 2024-04-22 RX ORDER — TETRAKIS(2-METHOXYISOBUTYLISOCYANIDE)COPPER(I) TETRAFLUOROBORATE 1 MG/ML
9.4 INJECTION, POWDER, LYOPHILIZED, FOR SOLUTION INTRAVENOUS
Status: COMPLETED | OUTPATIENT
Start: 2024-04-22 | End: 2024-04-22

## 2024-04-22 RX ORDER — TETRAKIS(2-METHOXYISOBUTYLISOCYANIDE)COPPER(I) TETRAFLUOROBORATE 1 MG/ML
31.4 INJECTION, POWDER, LYOPHILIZED, FOR SOLUTION INTRAVENOUS
Status: COMPLETED | OUTPATIENT
Start: 2024-04-22 | End: 2024-04-22

## 2024-04-22 RX ADMIN — REGADENOSON 0.4 MG: 0.08 INJECTION, SOLUTION INTRAVENOUS at 12:05

## 2024-04-22 RX ADMIN — Medication 9.4 MILLICURIE: at 11:14

## 2024-04-22 RX ADMIN — Medication 31.4 MILLICURIE: at 12:07

## 2024-04-22 NOTE — TELEPHONE ENCOUNTER
----- Message from Ronda Hammond MD sent at 4/22/2024  2:39 PM EDT -----  Abnormal stress test  Suggestive for CAD, ischemia   Inform patient  Schedule [facility] 4/25/2024

## 2024-04-23 ENCOUNTER — TELEPHONE (OUTPATIENT)
Dept: CARDIOLOGY CLINIC | Age: 54
End: 2024-04-23

## 2024-04-23 PROBLEM — R94.39 ABNORMAL STRESS TEST: Status: ACTIVE | Noted: 2024-04-22

## 2024-04-23 NOTE — TELEPHONE ENCOUNTER
Cath scheduled 04-25-24, arrive 7:00am  Date time and instruction reviewed and emailed to pt    Pt is on eliquis, informed to start holding today, pt took this am dose, will not take tonight's dose

## 2024-04-23 NOTE — TELEPHONE ENCOUNTER
PROCEDURE: cardiac cath     DATE OF SERVICE: 04/25/2024    SERVICE LOCATION: [facility]     CPT CODE: 38997    PHYSICIAN: DR SULLIVAN     DATE PRIOR AUTH SUBMITTED: 04/23/2024    STATUS: APPROVED.     CASE NUMBER: [id number]     AUTH NUMBER: [id number]     VALID:  04/23/2024-06/07/2024

## 2024-04-23 NOTE — PRE-PROCEDURE INSTRUCTIONS
Spoke with Patient  Procedure is scheduled for Thursday, admission time is 0700  Please arrive 15 minutes early  You [facility]nd [facility]  NPO after midnight  Bring drivers license and insurance information  Wear comfortable clean clothes  Shower morning of and night before with liquid antibacterial soap  Remove jewelry   May have to stay overnight if have PTCA/stent - bring an overnight bag.  Leave valuables at home such as cash or credit cards  Bring medications in original bottles - do not take diabetic meds days of procedure.  It is OK to take BP and heart meds.  If you take ASA, plavix, effient or brilinta - please take AM of procedure  If your are on anticoagulants such as coumadin/warfarin, eliquis, xarelto or pradaxa - hold as directed by your Dr  Made aware of visitors limit to 2 at a time  Follow all instructions given by your physician  Please notify doctor office if you need to cancel or reschedule your procedure   needed at discharge

## 2024-04-24 ENCOUNTER — PREP FOR PROCEDURE (OUTPATIENT)
Dept: CARDIOLOGY CLINIC | Age: 54
End: 2024-04-24

## 2024-04-24 LAB
EKG ATRIAL RATE: 75 BPM
EKG P AXIS: 39 DEGREES
EKG P-R INTERVAL: 200 MS
EKG Q-T INTERVAL: 386 MS
EKG QRS DURATION: 100 MS
EKG QTC CALCULATION (BAZETT): 431 MS
EKG R AXIS: 59 DEGREES
EKG T AXIS: 64 DEGREES
EKG VENTRICULAR RATE: 75 BPM

## 2024-04-24 RX ORDER — ASPIRIN 325 MG
325 TABLET ORAL ONCE
Status: CANCELLED | OUTPATIENT
Start: 2024-04-24 | End: 2024-04-24

## 2024-04-24 RX ORDER — SODIUM CHLORIDE 0.9 % (FLUSH) 0.9 %
5-40 SYRINGE (ML) INJECTION PRN
Status: CANCELLED | OUTPATIENT
Start: 2024-04-24

## 2024-04-24 RX ORDER — NITROGLYCERIN 0.4 MG/1
0.4 TABLET SUBLINGUAL EVERY 5 MIN PRN
Status: CANCELLED | OUTPATIENT
Start: 2024-04-24

## 2024-04-24 RX ORDER — SODIUM CHLORIDE 9 MG/ML
INJECTION, SOLUTION INTRAVENOUS PRN
Status: CANCELLED | OUTPATIENT
Start: 2024-04-24

## 2024-04-24 RX ORDER — DIPHENHYDRAMINE HYDROCHLORIDE 50 MG/ML
50 INJECTION INTRAMUSCULAR; INTRAVENOUS ONCE
Status: CANCELLED | OUTPATIENT
Start: 2024-04-24 | End: 2024-04-24

## 2024-04-24 RX ORDER — SODIUM CHLORIDE 0.9 % (FLUSH) 0.9 %
5-40 SYRINGE (ML) INJECTION EVERY 12 HOURS SCHEDULED
Status: CANCELLED | OUTPATIENT
Start: 2024-04-24

## 2024-04-25 ENCOUNTER — HOSPITAL ENCOUNTER (OUTPATIENT)
Age: 54
Setting detail: OUTPATIENT SURGERY
Discharge: HOME | End: 2024-04-25
Attending: INTERNAL MEDICINE | Admitting: INTERNAL MEDICINE
Payer: MEDICARE

## 2024-04-25 VITALS
OXYGEN SATURATION: 98 % | RESPIRATION RATE: 13 BRPM | WEIGHT: 285.06 LBS | HEART RATE: 70 BPM | HEIGHT: 65 IN | BODY MASS INDEX: 47.49 KG/M2 | DIASTOLIC BLOOD PRESSURE: 66 MMHG | TEMPERATURE: 97.9 F | SYSTOLIC BLOOD PRESSURE: 114 MMHG

## 2024-04-25 DIAGNOSIS — R94.39 ABNORMAL STRESS TEST: ICD-10-CM

## 2024-04-25 LAB
ABO: NORMAL
ANION GAP SERPL CALC-SCNC: 13 MEQ/L (ref 8–16)
ANTIBODY SCREEN: NORMAL
APTT PPP: 31 SECONDS (ref 22–38)
B-HCG SERPL QL: NEGATIVE
BUN SERPL-MCNC: 10 MG/DL (ref 7–22)
CALCIUM SERPL-MCNC: 9.8 MG/DL (ref 8.5–10.5)
CHLORIDE SERPL-SCNC: 101 MEQ/L (ref 98–111)
CO2 SERPL-SCNC: 27 MEQ/L (ref 23–33)
CREAT SERPL-MCNC: 0.7 MG/DL (ref 0.4–1.2)
DEPRECATED RDW RBC AUTO: 48.2 FL (ref 35–45)
ECHO BSA: 2.43 M2
EKG ATRIAL RATE: 77 BPM
EKG P AXIS: 35 DEGREES
EKG P-R INTERVAL: 200 MS
EKG Q-T INTERVAL: 398 MS
EKG QRS DURATION: 102 MS
EKG QTC CALCULATION (BAZETT): 450 MS
EKG R AXIS: 70 DEGREES
EKG T AXIS: 57 DEGREES
EKG VENTRICULAR RATE: 77 BPM
ERYTHROCYTE [DISTWIDTH] IN BLOOD BY AUTOMATED COUNT: 14.5 % (ref 11.5–14.5)
GFR SERPL CREATININE-BSD FRML MDRD: > 90 ML/MIN/1.73M2
GLUCOSE SERPL-MCNC: 95 MG/DL (ref 70–108)
HCT VFR BLD AUTO: 39 % (ref 37–47)
HGB BLD-MCNC: 13.1 GM/DL (ref 12–16)
INR PPP: 0.93 (ref 0.85–1.13)
MCH RBC QN AUTO: 30.5 PG (ref 26–33)
MCHC RBC AUTO-ENTMCNC: 33.6 GM/DL (ref 32.2–35.5)
MCV RBC AUTO: 90.7 FL (ref 81–99)
PLATELET # BLD AUTO: 288 THOU/MM3 (ref 130–400)
PMV BLD AUTO: 9.6 FL (ref 9.4–12.4)
POTASSIUM SERPL-SCNC: 4.3 MEQ/L (ref 3.5–5.2)
RBC # BLD AUTO: 4.3 MILL/MM3 (ref 4.2–5.4)
RH FACTOR: NORMAL
SODIUM SERPL-SCNC: 141 MEQ/L (ref 135–145)
WBC # BLD AUTO: 6.2 THOU/MM3 (ref 4.8–10.8)

## 2024-04-25 PROCEDURE — 2500000003 HC RX 250 WO HCPCS: Performed by: INTERNAL MEDICINE

## 2024-04-25 PROCEDURE — 36415 COLL VENOUS BLD VENIPUNCTURE: CPT

## 2024-04-25 PROCEDURE — 6370000000 HC RX 637 (ALT 250 FOR IP): Performed by: STUDENT IN AN ORGANIZED HEALTH CARE EDUCATION/TRAINING PROGRAM

## 2024-04-25 PROCEDURE — 93010 ELECTROCARDIOGRAM REPORT: CPT | Performed by: NUCLEAR MEDICINE

## 2024-04-25 PROCEDURE — 80048 BASIC METABOLIC PNL TOTAL CA: CPT

## 2024-04-25 PROCEDURE — 85730 THROMBOPLASTIN TIME PARTIAL: CPT

## 2024-04-25 PROCEDURE — 99152 MOD SED SAME PHYS/QHP 5/>YRS: CPT | Performed by: INTERNAL MEDICINE

## 2024-04-25 PROCEDURE — 6360000002 HC RX W HCPCS: Performed by: INTERNAL MEDICINE

## 2024-04-25 PROCEDURE — 93005 ELECTROCARDIOGRAM TRACING: CPT | Performed by: STUDENT IN AN ORGANIZED HEALTH CARE EDUCATION/TRAINING PROGRAM

## 2024-04-25 PROCEDURE — C1769 GUIDE WIRE: HCPCS | Performed by: INTERNAL MEDICINE

## 2024-04-25 PROCEDURE — 2580000003 HC RX 258: Performed by: STUDENT IN AN ORGANIZED HEALTH CARE EDUCATION/TRAINING PROGRAM

## 2024-04-25 PROCEDURE — 85027 COMPLETE CBC AUTOMATED: CPT

## 2024-04-25 PROCEDURE — 93458 L HRT ARTERY/VENTRICLE ANGIO: CPT | Performed by: INTERNAL MEDICINE

## 2024-04-25 PROCEDURE — 84703 CHORIONIC GONADOTROPIN ASSAY: CPT

## 2024-04-25 PROCEDURE — 85610 PROTHROMBIN TIME: CPT

## 2024-04-25 PROCEDURE — 7100000010 HC PHASE II RECOVERY - FIRST 15 MIN: Performed by: INTERNAL MEDICINE

## 2024-04-25 PROCEDURE — 2709999900 HC NON-CHARGEABLE SUPPLY: Performed by: INTERNAL MEDICINE

## 2024-04-25 PROCEDURE — 7100000011 HC PHASE II RECOVERY - ADDTL 15 MIN: Performed by: INTERNAL MEDICINE

## 2024-04-25 PROCEDURE — 86850 RBC ANTIBODY SCREEN: CPT

## 2024-04-25 PROCEDURE — 86901 BLOOD TYPING SEROLOGIC RH(D): CPT

## 2024-04-25 PROCEDURE — C1894 INTRO/SHEATH, NON-LASER: HCPCS | Performed by: INTERNAL MEDICINE

## 2024-04-25 PROCEDURE — 86900 BLOOD TYPING SEROLOGIC ABO: CPT

## 2024-04-25 PROCEDURE — 6360000004 HC RX CONTRAST MEDICATION: Performed by: INTERNAL MEDICINE

## 2024-04-25 RX ORDER — LIDOCAINE HYDROCHLORIDE 20 MG/ML
INJECTION, SOLUTION EPIDURAL; INFILTRATION; INTRACAUDAL; PERINEURAL PRN
Status: DISCONTINUED | OUTPATIENT
Start: 2024-04-25 | End: 2024-04-25 | Stop reason: HOSPADM

## 2024-04-25 RX ORDER — PHENYLEPHRINE HCL IN 0.9% NACL 1 MG/10 ML
VIAL (ML) INTRAVENOUS PRN
Status: DISCONTINUED | OUTPATIENT
Start: 2024-04-25 | End: 2024-04-25 | Stop reason: HOSPADM

## 2024-04-25 RX ORDER — SODIUM CHLORIDE 0.9 % (FLUSH) 0.9 %
5-40 SYRINGE (ML) INJECTION EVERY 12 HOURS SCHEDULED
Status: DISCONTINUED | OUTPATIENT
Start: 2024-04-25 | End: 2024-04-25 | Stop reason: HOSPADM

## 2024-04-25 RX ORDER — ACETAMINOPHEN 325 MG/1
650 TABLET ORAL EVERY 4 HOURS PRN
Status: DISCONTINUED | OUTPATIENT
Start: 2024-04-25 | End: 2024-04-25 | Stop reason: HOSPADM

## 2024-04-25 RX ORDER — SODIUM CHLORIDE 9 MG/ML
INJECTION, SOLUTION INTRAVENOUS PRN
Status: DISCONTINUED | OUTPATIENT
Start: 2024-04-25 | End: 2024-04-25 | Stop reason: HOSPADM

## 2024-04-25 RX ORDER — SODIUM CHLORIDE 9 MG/ML
INJECTION, SOLUTION INTRAVENOUS CONTINUOUS
Status: DISCONTINUED | OUTPATIENT
Start: 2024-04-25 | End: 2024-04-25 | Stop reason: HOSPADM

## 2024-04-25 RX ORDER — DIPHENHYDRAMINE HYDROCHLORIDE 50 MG/ML
50 INJECTION INTRAMUSCULAR; INTRAVENOUS ONCE
Status: DISCONTINUED | OUTPATIENT
Start: 2024-04-25 | End: 2024-04-25 | Stop reason: HOSPADM

## 2024-04-25 RX ORDER — SODIUM CHLORIDE 0.9 % (FLUSH) 0.9 %
5-40 SYRINGE (ML) INJECTION PRN
Status: DISCONTINUED | OUTPATIENT
Start: 2024-04-25 | End: 2024-04-25 | Stop reason: HOSPADM

## 2024-04-25 RX ORDER — NITROGLYCERIN 0.4 MG/1
0.4 TABLET SUBLINGUAL EVERY 5 MIN PRN
Status: DISCONTINUED | OUTPATIENT
Start: 2024-04-25 | End: 2024-04-25 | Stop reason: HOSPADM

## 2024-04-25 RX ORDER — FENTANYL CITRATE 50 UG/ML
INJECTION, SOLUTION INTRAMUSCULAR; INTRAVENOUS PRN
Status: DISCONTINUED | OUTPATIENT
Start: 2024-04-25 | End: 2024-04-25 | Stop reason: HOSPADM

## 2024-04-25 RX ORDER — MIDAZOLAM HYDROCHLORIDE 1 MG/ML
INJECTION INTRAMUSCULAR; INTRAVENOUS PRN
Status: DISCONTINUED | OUTPATIENT
Start: 2024-04-25 | End: 2024-04-25 | Stop reason: HOSPADM

## 2024-04-25 RX ORDER — ASPIRIN 325 MG
325 TABLET ORAL ONCE
Status: COMPLETED | OUTPATIENT
Start: 2024-04-25 | End: 2024-04-25

## 2024-04-25 RX ORDER — ATROPINE SULFATE 0.4 MG/ML
0.5 INJECTION, SOLUTION INTRAVENOUS
Status: DISCONTINUED | OUTPATIENT
Start: 2024-04-25 | End: 2024-04-25 | Stop reason: HOSPADM

## 2024-04-25 RX ADMIN — SODIUM CHLORIDE: 9 INJECTION, SOLUTION INTRAVENOUS at 06:54

## 2024-04-25 RX ADMIN — ASPIRIN 325 MG: 325 TABLET ORAL at 06:56

## 2024-04-25 NOTE — PROGRESS NOTES
Patient admitted to 2E01  Ambulatory for cardiac cath.  Patient NPO. Patient accompanied by spouse.  Vital signs obtained.   Assessment and data collection intiated.   Oriented to room.  Policies and procedures for 2E explained.   All questions answered with no further questions at this time.   Fall prevention and safety precautions discussed with patient.

## 2024-04-25 NOTE — H&P
[facility]  Sedation/Analgesia History & Physical    Pt Name: Donna Waters  Account number: [id number]  MRN: [id number]  YOB: 1970  Provider Performing Procedure: Ronda Hammond MD MD  Referring Provider: Ronda Hammond MD   Primary Care Physician: Flavia Rebolledo MD  Date: 4/25/2024    PRE-PROCEDURE    Code Status: FULL CODE  Brief History/Pre-Procedure Diagnosis:   Abnormal stress test, angina  Preopoerative cardiac risk assessment    Consent: : I have discussed with the patient risks, benefits, and alternatives (and relevant risks, benefits, and side effects related to alternatives or not receiving care), and likelihood of the success.   The patient and/or representative appear to understand and agree to proceed.  The discussion encompasses risks, benefits, and side effects related to the alternatives and the risks related to not receiving the proposed care, treatment, and services.     The indication, risks and benefits of the procedure and possible therapeutic consequences and alternatives were discussed with the patient. The patient was given the opportunity to ask questions and to have them answered to his/her satisfaction. Risks of the procedure include but are not limited to the following: Bleeding, hematoma including retroperitoneal hemmorhage, infection, pain and discomfort, injury to the aorta and other blood vessels, rhythm disturbance, low blood pressure, myocardial infarction, stroke, kidney damage/failure, myocardial perforation, allergic reactions to sedatives/contrast material, loss of pulse/vascular compromise requiring surgery, aneurysm/pseudoaneurysm formation, possible loss of a limb/hand/leg, needing blood transfusion, requiring emergent open heart surgery or emergent coronary intervention, the need for intubation/respiratory support, the requirement for defibrillation/cardioversion, and death. Alternatives to and omission of the suggested procedure were  times daily for 7-10 days. 1/9/24   Flavia Rebolledo MD   blood glucose test strips (ASCENSIA AUTODISC VI;ONE TOUCH ULTRA TEST VI) strip 1 each by In Vitro route 2 times daily DISPENSE ONE TOUCH ULTRA 2 STRIPS TO TEST ONCE DAILY 12/28/23   Flavia Rebolledo MD   omeprazole (PRILOSEC) 40 MG delayed release capsule Take 1 capsule by mouth Daily 10/24/23   Flavia Rebolledo MD   pimecrolimus (ELIDEL) 1 % cream Apply topically 2 times daily. 7/18/23   Laurel Yeh MD   metoprolol tartrate (LOPRESSOR) 25 MG tablet Take 1 tablet by mouth 2 times daily 7/5/23   Flavia Rebolledo MD   Blood Glucose Monitoring Suppl (ONE TOUCH ULTRA 2) w/Device KIT 1 kit by Does not apply route daily 6/20/23   Flavia Rebolledo MD   hydroCHLOROthiazide (HYDRODIURIL) 25 MG tablet Take 1 tablet by mouth daily    Matteo Modi MD   Cariprazine HCl (VRAYLAR) 6 MG CAPS capsule Take 1 capsule by mouth daily 10/24/22   Flavia Rebolledo MD   fluticasone (FLONASE) 50 MCG/ACT nasal spray 1 spray by Each Nostril route daily 6/23/22   Liane Ferris, APRN - CNP   melatonin 3 MG TABS tablet Take 10 mg by mouth nightly as needed    Matteo Modi MD   docusate sodium (COLACE) 100 MG capsule Take 1 capsule by mouth 2 times daily    Matteo Modi MD   LORazepam (ATIVAN) 2 MG tablet Take 1 tablet by mouth every 8 hours as needed.    Matteo Modi MD   risperiDONE (RISPERDAL) 4 MG tablet Take 1 tablet by mouth 2 times daily 2mg am 2 mg pm    ProviderMatteo MD     Additional information:       VITAL SIGNS   Vitals:    04/25/24 0700   BP: (!) 104/44   Pulse: 75   Resp: 15   Temp: 97.9 °F (36.6 °C)   SpO2: 99%       PHYSICAL:   General: No acute distress  HEENT:  Unremarkable for age  Neck: without increased JVD, carotid pulses 2+ bilaterally without bruits  Heart: RRR, S1 & S2 WNL. No murmurs    Lungs: Clear to auscultation    Abdomen: BS present, without HSM, masses, or

## 2024-04-25 NOTE — PROGRESS NOTES
Returned to 2E01.  Monitor attached showing SR. Vasc-band in place to right wrist, Hemostasis maintained, no bleeding, swelling, or edema noted.  0.9NSS infusing with approx 700 ml remaining.

## 2024-04-29 ENCOUNTER — TELEPHONE (OUTPATIENT)
Dept: CARDIOLOGY CLINIC | Age: 54
End: 2024-04-29

## 2024-04-29 ENCOUNTER — CARE COORDINATION (OUTPATIENT)
Dept: CARE COORDINATION | Age: 54
End: 2024-04-29

## 2024-04-29 NOTE — TELEPHONE ENCOUNTER
Pt called asking if she needs to take a baby asa?    Also states she has been breathing heavier than normal and HR running 90-95    Per cath report pt was cleared for her sx with OIO

## 2024-04-29 NOTE — CARE COORDINATION
Patient called ACM to share she underwent heart cath last week and was told she had 1 area of minimal blockage present.  Patient reported she is scheduled for her ankle surgery on 5/14 and will be non weight bearing for 5 weeks.  Patient shared she has a wheelchair and ACM educated on importance of good skin care and reviewed how to help prevent breakdown while she is NWB.  Patient verbalized understanding.  Patient was also educated on the importance of following heart healthy diet and staying as active as she is able.  Patient acknowledged understanding.  Active listening provided to patient t/o our call.  Patient denied any other questions, concerns, or needs and she was encouraged to call with any that may develop.

## 2024-04-30 DIAGNOSIS — B96.89 ACUTE BACTERIAL SINUSITIS: ICD-10-CM

## 2024-04-30 DIAGNOSIS — J01.90 ACUTE BACTERIAL SINUSITIS: ICD-10-CM

## 2024-04-30 RX ORDER — BROMPHENIRAMINE MALEATE, PSEUDOEPHEDRINE HYDROCHLORIDE, AND DEXTROMETHORPHAN HYDROBROMIDE 2; 30; 10 MG/5ML; MG/5ML; MG/5ML
SYRUP ORAL
Qty: 140 ML | Refills: 0 | Status: SHIPPED | OUTPATIENT
Start: 2024-04-30

## 2024-04-30 NOTE — TELEPHONE ENCOUNTER
Spoke to patient, discussed.   Pt states she might need a new script for her Ativan, could be anxiety related.  Informed her Dr Hammond will not write script for Ativan. She will reach out to her other provider.

## 2024-04-30 NOTE — TELEPHONE ENCOUNTER
Last visit- 4/11/2024  Next visit- 7/3/2024    Requested Prescriptions     Pending Prescriptions Disp Refills    brompheniramine-pseudoephedrine-DM 2-30-10 MG/5ML syrup 140 mL 0     Sig: TAKE 5 ML BY MOUTH  4 TIMES DAILY AS NEEDED FOR COUGH OR  CONGESTION

## 2024-05-04 LAB
ACQUISITION DURATION: NORMAL S
AVERAGE HEART RATE: 76 BPM
ECHO BSA: 2.46 M2
HOOKUP DATE: NORMAL
HOOKUP TIME: NORMAL
MAX HEART RATE TIME/DATE: NORMAL
MAX HEART RATE: 105 BPM
MIN HEART RATE TIME/DATE: NORMAL
MIN HEART RATE: 60 BPM
NUMBER OF QRS COMPLEXES: NORMAL
NUMBER OF SUPRAVENTRICULAR COUPLETS: 0
NUMBER OF SUPRAVENTRICULAR ECTOPICS: 19
NUMBER OF SUPRAVENTRICULAR ISOLATED BEATS: 19
NUMBER OF VENTRICULAR BIGEMINAL CYCLES: 0
NUMBER OF VENTRICULAR COUPLETS: 0
NUMBER OF VENTRICULAR ECTOPICS: 0

## 2024-05-06 ENCOUNTER — TELEPHONE (OUTPATIENT)
Dept: FAMILY MEDICINE CLINIC | Age: 54
End: 2024-05-06

## 2024-05-06 DIAGNOSIS — G47.00 INSOMNIA, UNSPECIFIED TYPE: Primary | ICD-10-CM

## 2024-05-06 NOTE — TELEPHONE ENCOUNTER
Pt called stating that she is still having trouble falling asleep and staying asleep   Last night she took 250mg trazodone and 2mg ativan. She was able to fall asleep but she was still waking up at night   Pt was advised to call if she had trouble sleeping

## 2024-05-07 ENCOUNTER — CARE COORDINATION (OUTPATIENT)
Dept: CARE COORDINATION | Age: 54
End: 2024-05-07

## 2024-05-07 ENCOUNTER — HOSPITAL ENCOUNTER (OUTPATIENT)
Age: 54
Discharge: HOME | End: 2024-05-07
Payer: MEDICARE

## 2024-05-07 ENCOUNTER — TELEPHONE (OUTPATIENT)
Dept: FAMILY MEDICINE CLINIC | Age: 54
End: 2024-05-07

## 2024-05-07 DIAGNOSIS — R10.84 GENERALIZED ABDOMINAL PAIN: ICD-10-CM

## 2024-05-07 DIAGNOSIS — R10.84 GENERALIZED ABDOMINAL PAIN: Primary | ICD-10-CM

## 2024-05-07 LAB
ALBUMIN SERPL BCG-MCNC: 4.7 G/DL (ref 3.5–5.1)
ALP SERPL-CCNC: 134 U/L (ref 38–126)
ALT SERPL W/O P-5'-P-CCNC: 41 U/L (ref 11–66)
AMORPH SED URNS QL MICRO: ABNORMAL
ANION GAP SERPL CALC-SCNC: 19 MEQ/L (ref 8–16)
AST SERPL-CCNC: 38 U/L (ref 5–40)
BACTERIA URNS QL MICRO: ABNORMAL
BASOPHILS ABSOLUTE: 0 THOU/MM3 (ref 0–0.1)
BASOPHILS NFR BLD AUTO: 0.4 %
BILIRUB SERPL-MCNC: 0.3 MG/DL (ref 0.3–1.2)
BILIRUB UR QL STRIP.AUTO: ABNORMAL
BUN SERPL-MCNC: 11 MG/DL (ref 7–22)
CALCIUM SERPL-MCNC: 10.3 MG/DL (ref 8.5–10.5)
CASTS #/AREA URNS LPF: ABNORMAL /LPF
CHARACTER UR: ABNORMAL
CHLORIDE SERPL-SCNC: 92 MEQ/L (ref 98–111)
CO2 SERPL-SCNC: 23 MEQ/L (ref 23–33)
COLOR UR AUTO: ABNORMAL
CREAT SERPL-MCNC: 0.7 MG/DL (ref 0.4–1.2)
CRYSTALS VITF MICRO: ABNORMAL
DEPRECATED RDW RBC AUTO: 49.3 FL (ref 35–45)
EOSINOPHIL NFR BLD AUTO: 1.5 %
EOSINOPHILS ABSOLUTE: 0.1 THOU/MM3 (ref 0–0.4)
EPI CELLS #/AREA URNS HPF: ABNORMAL /HPF
ERYTHROCYTE [DISTWIDTH] IN BLOOD BY AUTOMATED COUNT: 14.7 % (ref 11.5–14.5)
GFR SERPL CREATININE-BSD FRML MDRD: > 90 ML/MIN/1.73M2
GLUCOSE SERPL-MCNC: 110 MG/DL (ref 70–108)
GLUCOSE UR QL STRIP.AUTO: NEGATIVE MG/DL
HCT VFR BLD AUTO: 44.2 % (ref 37–47)
HGB BLD-MCNC: 14.5 GM/DL (ref 12–16)
HGB UR STRIP.AUTO-MCNC: NEGATIVE MG/L
IMM GRANULOCYTES # BLD AUTO: 0.03 THOU/MM3 (ref 0–0.07)
IMM GRANULOCYTES NFR BLD AUTO: 0.4 %
KETONES UR QL STRIP.AUTO: NEGATIVE
LEUKOCYTE ESTERASE UR QL STRIP.AUTO: ABNORMAL
LIPASE SERPL-CCNC: 9.3 U/L (ref 5.6–51.3)
LYMPHOCYTES ABSOLUTE: 2 THOU/MM3 (ref 1–4.8)
LYMPHOCYTES NFR BLD AUTO: 23.3 %
MCH RBC QN AUTO: 29.9 PG (ref 26–33)
MCHC RBC AUTO-ENTMCNC: 32.8 GM/DL (ref 32.2–35.5)
MCV RBC AUTO: 91.1 FL (ref 81–99)
MONOCYTES ABSOLUTE: 0.8 THOU/MM3 (ref 0.4–1.3)
MONOCYTES NFR BLD AUTO: 9.5 %
MUCOUS THREADS URNS QL MICRO: ABNORMAL
NEUTROPHILS ABSOLUTE: 5.5 THOU/MM3 (ref 1.8–7.7)
NEUTROPHILS NFR BLD AUTO: 64.9 %
NITRITE UR QL STRIP.AUTO: NEGATIVE
NRBC BLD AUTO-RTO: 0 /100 WBC
PH UR STRIP.AUTO: 6.5 [PH] (ref 5–9)
PLATELET # BLD AUTO: 354 THOU/MM3 (ref 130–400)
PMV BLD AUTO: 10.1 FL (ref 9.4–12.4)
POTASSIUM SERPL-SCNC: 4.6 MEQ/L (ref 3.5–5.2)
PROT SERPL-MCNC: 7.8 G/DL (ref 6.1–8)
PROT UR STRIP.AUTO-MCNC: 100 MG/DL
RBC # BLD AUTO: 4.85 MILL/MM3 (ref 4.2–5.4)
RBC #/AREA URNS HPF: ABNORMAL /HPF
REFLEX TO URINE C & S: ABNORMAL
SODIUM SERPL-SCNC: 134 MEQ/L (ref 135–145)
SP GR UR STRIP.AUTO: >= 1.03 (ref 1–1.03)
UROBILINOGEN UR STRIP-ACNC: 0.2 EU/DL (ref 0–1)
WBC # BLD AUTO: 8.4 THOU/MM3 (ref 4.8–10.8)
WBC # UR STRIP.AUTO: ABNORMAL /HPF

## 2024-05-07 PROCEDURE — 87086 URINE CULTURE/COLONY COUNT: CPT

## 2024-05-07 PROCEDURE — 81001 URINALYSIS AUTO W/SCOPE: CPT

## 2024-05-07 PROCEDURE — 85025 COMPLETE CBC W/AUTO DIFF WBC: CPT

## 2024-05-07 PROCEDURE — 83690 ASSAY OF LIPASE: CPT

## 2024-05-07 PROCEDURE — 36415 COLL VENOUS BLD VENIPUNCTURE: CPT

## 2024-05-07 PROCEDURE — 80053 COMPREHEN METABOLIC PANEL: CPT

## 2024-05-07 RX ORDER — DULAGLUTIDE 1.5 MG/.5ML
1.5 INJECTION, SOLUTION SUBCUTANEOUS WEEKLY
Qty: 2 ML | Refills: 5 | Status: SHIPPED | OUTPATIENT
Start: 2024-05-07

## 2024-05-07 NOTE — CARE COORDINATION
Patient called and updated.  Patient agreeable to labs/imaging as pain continues.  Please place appropriate orders.  Thank you!

## 2024-05-07 NOTE — CARE COORDINATION
Previous Care Coordination patient who reached out d/t recent symptoms/concerns.  Patient shared she has had intermittent symptoms of diarrhea, N/V, belching, and abdominal pain since starting Mounjaro and she does not wish to continue medication any longer d/t ongoing symptoms.  Patient would like to return to weekly Trulicity if appropriate (utilizes [facility] pharmacy in [place]).  PCP updated.     Patient reported she also has had ongoing constant abdominal pain since Sunday.  Patient reported pain is a constant stabbing pain across the whole front of her abdomen.  Patient shared pain also increases with bending and activity and she feels very weak today and \"can hardly walk.\"  Patient shared she is trying to follow bland diet and drink gatorade and she has tried Pepto with no improvement.  Patient is asking if she can have something for pain or what other options she has.  PCP updated and will update patient with recommendations.  Patient denied any other questions, concerns, or needs and she was encouraged to call with any that may develop.

## 2024-05-07 NOTE — TELEPHONE ENCOUNTER
Please tell her to try a bland BRAT diet for a few days.  She can also take Pepto if needed for nausea.  Try to drink plenty of water and rest.  Have her contact our office if her symptoms worsen.

## 2024-05-07 NOTE — TELEPHONE ENCOUNTER
Pt asking if you can call her later today. Pt states she is not feeling well. States she has a lot of stomach pain that has been going on for a week. BM's are normal. Pt concerned about what this could be. Please advise.

## 2024-05-07 NOTE — TELEPHONE ENCOUNTER
Agree with stopping Mounjaro and going back to Trulicity.  Rx was sent to [facility].    For her abdominal pain, would she like to do labs and/or imaging for further evaluation?

## 2024-05-08 ENCOUNTER — TELEPHONE (OUTPATIENT)
Dept: FAMILY MEDICINE CLINIC | Age: 54
End: 2024-05-08

## 2024-05-08 ENCOUNTER — CARE COORDINATION (OUTPATIENT)
Dept: CARE COORDINATION | Age: 54
End: 2024-05-08

## 2024-05-08 NOTE — TELEPHONE ENCOUNTER
Pt states she has tried metformin before and didn't like the side effects. Pt states she is going to call her insurance company and see what cheaper alternatives they may have other than trulicity.

## 2024-05-08 NOTE — CARE COORDINATION
Patient called to share she is feeling much better today and she denied any further c/o abdominal pain being present at this time.  Patient aware of need to drink Gatorade for recent decreased Na level.  Patient denied any other questions, concerns, or needs.

## 2024-05-08 NOTE — TELEPHONE ENCOUNTER
Pt states a prescription was sent in for Trulicity. States she was going to pick it up but it costs $200. Asking if there is a cheaper alternative that can be prescribed. Please advise.

## 2024-05-09 ENCOUNTER — TELEPHONE (OUTPATIENT)
Dept: FAMILY MEDICINE CLINIC | Age: 54
End: 2024-05-09

## 2024-05-09 LAB
BACTERIA UR CULT: ABNORMAL
ORGANISM: ABNORMAL

## 2024-05-09 NOTE — TELEPHONE ENCOUNTER
Patient called stating her diarrhea stopped yesterday but she had a bowel movement a little bit ago and it was diarrhea. States she has taken pepto w/ no relief. Please advise.

## 2024-05-12 DIAGNOSIS — J30.2 SEASONAL ALLERGIES: ICD-10-CM

## 2024-05-13 DIAGNOSIS — J01.90 ACUTE BACTERIAL SINUSITIS: ICD-10-CM

## 2024-05-13 DIAGNOSIS — B96.89 ACUTE BACTERIAL SINUSITIS: ICD-10-CM

## 2024-05-13 RX ORDER — LEVOCETIRIZINE DIHYDROCHLORIDE 5 MG/1
5 TABLET, FILM COATED ORAL NIGHTLY
Qty: 90 TABLET | Refills: 1 | Status: SHIPPED | OUTPATIENT
Start: 2024-05-13

## 2024-05-13 RX ORDER — BROMPHENIRAMINE MALEATE, PSEUDOEPHEDRINE HYDROCHLORIDE, AND DEXTROMETHORPHAN HYDROBROMIDE 2; 30; 10 MG/5ML; MG/5ML; MG/5ML
SYRUP ORAL
Qty: 140 ML | Refills: 0 | Status: SHIPPED | OUTPATIENT
Start: 2024-05-13

## 2024-05-13 NOTE — TELEPHONE ENCOUNTER
Last visit- 4/11/2024  Next visit- 7/2/2024    Requested Prescriptions     Pending Prescriptions Disp Refills    levocetirizine (XYZAL) 5 MG tablet [Pharmacy Med Name: Levocetirizine Dihydrochloride 5 MG Oral Tablet] 90 tablet 1     Sig: Take 1 tablet by mouth nightly

## 2024-05-15 ENCOUNTER — TELEPHONE (OUTPATIENT)
Dept: FAMILY MEDICINE CLINIC | Age: 54
End: 2024-05-15

## 2024-05-15 NOTE — TELEPHONE ENCOUNTER
That can come from her surgeon as she will need a face to face visit for home health and they should have seen her for this recently.

## 2024-05-15 NOTE — TELEPHONE ENCOUNTER
Patient called stating she recently had surgery on her foot.  Patient asking for a referral for a home aid to assist her with ADLs

## 2024-05-15 NOTE — TELEPHONE ENCOUNTER
Patient called back stating she spoke with surgeons office.  She was prescribed oxycodone and asking if any of her med interact with it

## 2024-05-21 ENCOUNTER — TELEPHONE (OUTPATIENT)
Dept: FAMILY MEDICINE CLINIC | Age: 54
End: 2024-05-21

## 2024-05-21 NOTE — TELEPHONE ENCOUNTER
Patient called stating she is now having drainage going down her throat, it is slight green in color. Her nose goes from running to being congested. She has to cough to clear her throat of the phlegm. She is taking her xyzal and atarax with not much help. Please advise.

## 2024-05-23 DIAGNOSIS — B96.89 ACUTE BACTERIAL SINUSITIS: ICD-10-CM

## 2024-05-23 DIAGNOSIS — F51.01 PRIMARY INSOMNIA: ICD-10-CM

## 2024-05-23 DIAGNOSIS — R00.0 TACHYCARDIA: ICD-10-CM

## 2024-05-23 DIAGNOSIS — J01.90 ACUTE BACTERIAL SINUSITIS: ICD-10-CM

## 2024-05-23 RX ORDER — TRAZODONE HYDROCHLORIDE 100 MG/1
200 TABLET ORAL NIGHTLY
Qty: 60 TABLET | Refills: 0 | Status: SHIPPED | OUTPATIENT
Start: 2024-05-23

## 2024-05-23 RX ORDER — BROMPHENIRAMINE MALEATE, PSEUDOEPHEDRINE HYDROCHLORIDE, AND DEXTROMETHORPHAN HYDROBROMIDE 2; 30; 10 MG/5ML; MG/5ML; MG/5ML
SYRUP ORAL
Qty: 140 ML | Refills: 0 | OUTPATIENT
Start: 2024-05-23

## 2024-05-28 DIAGNOSIS — F51.05 INSOMNIA DUE TO OTHER MENTAL DISORDER: ICD-10-CM

## 2024-05-28 DIAGNOSIS — F20.0 PARANOID SCHIZOPHRENIA (HCC): ICD-10-CM

## 2024-05-28 DIAGNOSIS — F99 INSOMNIA DUE TO OTHER MENTAL DISORDER: ICD-10-CM

## 2024-05-28 NOTE — TELEPHONE ENCOUNTER
Pt switched her apt to 6/13  Pt wanting a refill of her seroquel. Pt states she has been taking 200mg at night and then 100mg when she needs it  Pt will not be out of medication till the 3rd

## 2024-05-29 RX ORDER — QUETIAPINE FUMARATE 100 MG/1
100 TABLET, FILM COATED ORAL DAILY
Qty: 90 TABLET | Refills: 1 | OUTPATIENT
Start: 2024-05-29

## 2024-06-03 DIAGNOSIS — J01.90 ACUTE BACTERIAL SINUSITIS: ICD-10-CM

## 2024-06-03 DIAGNOSIS — B96.89 ACUTE BACTERIAL SINUSITIS: ICD-10-CM

## 2024-06-03 RX ORDER — BROMPHENIRAMINE MALEATE, PSEUDOEPHEDRINE HYDROCHLORIDE, AND DEXTROMETHORPHAN HYDROBROMIDE 2; 30; 10 MG/5ML; MG/5ML; MG/5ML
5 SYRUP ORAL 4 TIMES DAILY PRN
Qty: 140 ML | Refills: 0 | Status: SHIPPED | OUTPATIENT
Start: 2024-06-03

## 2024-06-04 ENCOUNTER — TELEPHONE (OUTPATIENT)
Dept: FAMILY MEDICINE CLINIC | Age: 54
End: 2024-06-04

## 2024-06-04 RX ORDER — GABAPENTIN 300 MG/1
300 CAPSULE ORAL 3 TIMES DAILY
Qty: 90 CAPSULE | Refills: 0 | Status: SHIPPED | OUTPATIENT
Start: 2024-06-04 | End: 2024-07-04

## 2024-06-04 NOTE — TELEPHONE ENCOUNTER
Pt spoke with Dr. Ventura about her foot pain and being off the percocet.   Pt has already been taking her gabapentin BID because she doesn't want to take more tylenol   If okay to increase pt would like it sent to [facility]

## 2024-06-04 NOTE — TELEPHONE ENCOUNTER
Patient takes Gabapentin BID. Ok to increase to 300mg 3 x's per day. Please send to [facility] pharmacy per pt request.

## 2024-06-07 ENCOUNTER — CARE COORDINATION (OUTPATIENT)
Dept: CARE COORDINATION | Age: 54
End: 2024-06-07

## 2024-06-07 NOTE — CARE COORDINATION
Patient called ACM and shared she has been doing well s/p recent left ankle surgery at [facility].  Patient shared she has initial f/u last week and was placed into a hard cast at that time.  Patient shared she additional f/u next week and if stable may be able to transition to a walking boot, but patient will have to continue to be NWB for some additional time.  Patient shared she is currently using a walker to assist her to a wheelchair for mobility at this time.  Patient reported  is assisting her as needed.  Patient shared pain has been stable with recent use of gabapentin.  Patient denied any other questions, concerns, or needs and she was encouraged to call with any that may develop.

## 2024-06-09 DIAGNOSIS — R73.03 PREDIABETES: ICD-10-CM

## 2024-06-10 RX ORDER — TIRZEPATIDE 2.5 MG/.5ML
INJECTION, SOLUTION SUBCUTANEOUS
Qty: 4 ML | Refills: 0 | Status: SHIPPED | OUTPATIENT
Start: 2024-06-10

## 2024-06-11 SDOH — ECONOMIC STABILITY: INCOME INSECURITY: HOW HARD IS IT FOR YOU TO PAY FOR THE VERY BASICS LIKE FOOD, HOUSING, MEDICAL CARE, AND HEATING?: VERY HARD

## 2024-06-11 SDOH — ECONOMIC STABILITY: FOOD INSECURITY: WITHIN THE PAST 12 MONTHS, THE FOOD YOU BOUGHT JUST DIDN'T LAST AND YOU DIDN'T HAVE MONEY TO GET MORE.: SOMETIMES TRUE

## 2024-06-11 SDOH — ECONOMIC STABILITY: FOOD INSECURITY: WITHIN THE PAST 12 MONTHS, YOU WORRIED THAT YOUR FOOD WOULD RUN OUT BEFORE YOU GOT MONEY TO BUY MORE.: SOMETIMES TRUE

## 2024-06-13 ENCOUNTER — TELEMEDICINE (OUTPATIENT)
Dept: FAMILY MEDICINE CLINIC | Age: 54
End: 2024-06-13

## 2024-06-13 ENCOUNTER — TELEPHONE (OUTPATIENT)
Dept: FAMILY MEDICINE CLINIC | Age: 54
End: 2024-06-13

## 2024-06-13 DIAGNOSIS — J01.90 ACUTE BACTERIAL SINUSITIS: ICD-10-CM

## 2024-06-13 DIAGNOSIS — B96.89 ACUTE BACTERIAL SINUSITIS: ICD-10-CM

## 2024-06-13 RX ORDER — BROMPHENIRAMINE MALEATE, PSEUDOEPHEDRINE HYDROCHLORIDE, AND DEXTROMETHORPHAN HYDROBROMIDE 2; 30; 10 MG/5ML; MG/5ML; MG/5ML
SYRUP ORAL
Qty: 140 ML | Refills: 0 | OUTPATIENT
Start: 2024-06-13

## 2024-06-13 RX ORDER — BROMPHENIRAMINE MALEATE, PSEUDOEPHEDRINE HYDROCHLORIDE, AND DEXTROMETHORPHAN HYDROBROMIDE 2; 30; 10 MG/5ML; MG/5ML; MG/5ML
5 SYRUP ORAL 4 TIMES DAILY PRN
Qty: 140 ML | Refills: 0 | Status: SHIPPED | OUTPATIENT
Start: 2024-06-13

## 2024-06-13 RX ORDER — CEFDINIR 300 MG/1
300 CAPSULE ORAL 2 TIMES DAILY
Qty: 14 CAPSULE | Refills: 0 | Status: SHIPPED | OUTPATIENT
Start: 2024-06-13 | End: 2024-06-20

## 2024-06-13 ASSESSMENT — ENCOUNTER SYMPTOMS
WHEEZING: 0
CHEST TIGHTNESS: 0
COUGH: 1
SHORTNESS OF BREATH: 0

## 2024-06-13 NOTE — TELEPHONE ENCOUNTER
Last visit- 4/11/2024  Next visit- 6/13/2024    Requested Prescriptions     Pending Prescriptions Disp Refills    brompheniramine-pseudoephedrine-DM 2-30-10 MG/5ML syrup 140 mL 0     Sig: Take 5 mLs by mouth 4 times daily as needed for Cough or Congestion     Refused Prescriptions Disp Refills    brompheniramine-pseudoephedrine-DM 2-30-10 MG/5ML syrup [Pharmacy Med Name: BROMPHEN/PSEUDOEPHEDRINE HCL/DEXTROMETHORPHAN HBR 2/30/10 SYRUP] 140 mL 0     Sig: TAKE ONE TEASPOON 4 TIMES DAILY AS NEEDED FOR COUGH AND CONGESTION, BY MOUTH.     Refused By: MARQUITA CODY     Reason for Refusal: Duplicate Request

## 2024-06-13 NOTE — TELEPHONE ENCOUNTER
Pt called wanting to know if there was anything more that needed discussed with her video visit? She stated that she was cut off? Please advise.

## 2024-06-13 NOTE — TELEPHONE ENCOUNTER
Since she's already taking Trazodone, Amitriptyline, and Atarax and still not sleeping, I recommend we refer her for a sleep study.

## 2024-06-14 ENCOUNTER — TELEPHONE (OUTPATIENT)
Dept: FAMILY MEDICINE CLINIC | Age: 54
End: 2024-06-14

## 2024-06-14 DIAGNOSIS — M25.569 KNEE PAIN, UNSPECIFIED CHRONICITY, UNSPECIFIED LATERALITY: Primary | ICD-10-CM

## 2024-06-14 NOTE — TELEPHONE ENCOUNTER
I recommend she see Ortho to help determine what kind of brace she needs as insurance will only pay for 1 a year.

## 2024-06-14 NOTE — TELEPHONE ENCOUNTER
Patient states she is having knee pain. Pt asking if she can have a new prescription sent for a knee brace.

## 2024-06-16 DIAGNOSIS — F51.01 PRIMARY INSOMNIA: ICD-10-CM

## 2024-06-17 ENCOUNTER — TELEPHONE (OUTPATIENT)
Dept: FAMILY MEDICINE CLINIC | Age: 54
End: 2024-06-17

## 2024-06-17 DIAGNOSIS — R73.03 PREDIABETES: Primary | ICD-10-CM

## 2024-06-17 RX ORDER — DULAGLUTIDE 1.5 MG/.5ML
1.5 INJECTION, SOLUTION SUBCUTANEOUS WEEKLY
Qty: 2 ML | Refills: 5 | Status: SHIPPED | OUTPATIENT
Start: 2024-06-17

## 2024-06-17 RX ORDER — TRAZODONE HYDROCHLORIDE 100 MG/1
200 TABLET ORAL NIGHTLY
Qty: 180 TABLET | Refills: 0 | Status: SHIPPED | OUTPATIENT
Start: 2024-06-17

## 2024-06-17 NOTE — TELEPHONE ENCOUNTER
Last visit- 6/13/2024  Next visit- Visit date not found    Requested Prescriptions     Pending Prescriptions Disp Refills    traZODone (DESYREL) 100 MG tablet [Pharmacy Med Name: traZODone HCl 100 MG Oral Tablet] 180 tablet 0     Sig: TAKE 2 TABLETS BY MOUTH NIGHTLY

## 2024-06-17 NOTE — TELEPHONE ENCOUNTER
----- Message from Shakila Plascencia sent at 6/13/2024  4:22 PM EDT -----  Regarding: RE: Patient assistance  Mounjaro has no program available for assistance.  ----- Message -----  From: Meenakshi Csatillo MA  Sent: 6/13/2024   3:53 PM EDT  To: Shakila Plascencia  Subject: FW: Patient assistance                             ----- Message -----  From: Flavia Rebolledo MD  Sent: 6/13/2024   1:40 PM EDT  To: [facility] Clinical Staff  Subject: Patient assistance                               Donna wants to know if she can get Mounjaro coverered for her diabetes through patient assistance?  It's $250/month.

## 2024-06-17 NOTE — TELEPHONE ENCOUNTER
----- Message from Shakila Plascencia sent at 6/13/2024  4:22 PM EDT -----  Regarding: RE: Patient assistance  Mounjaro has no program available for assistance.  ----- Message -----  From: Meenakshi Castillo MA  Sent: 6/13/2024   3:53 PM EDT  To: Shakila Plascencia  Subject: FW: Patient assistance                             ----- Message -----  From: Flavia Rebolledo MD  Sent: 6/13/2024   1:40 PM EDT  To: [facility] Clinical Staff  Subject: Patient assistance                               Donna wants to know if she can get Mounjaro coverered for her diabetes through patient assistance?  It's $250/month.

## 2024-06-17 NOTE — TELEPHONE ENCOUNTER
----- Message from Shakila Plascencia sent at 6/13/2024  4:22 PM EDT -----  Regarding: RE: Patient assistance  Mounjaro has no program available for assistance.  ----- Message -----  From: Meenakshi Castillo MA  Sent: 6/13/2024   3:53 PM EDT  To: Shakila Plascencia  Subject: FW: Patient assistance                             ----- Message -----  From: Flavia Rebolledo MD  Sent: 6/13/2024   1:40 PM EDT  To: [facility] Clinical Staff  Subject: Patient assistance                               Donna wants to know if she can get Mounjaro coverered for her diabetes through patient assistance?  It's $250/month.

## 2024-06-17 NOTE — TELEPHONE ENCOUNTER
Pt notified. She wanted to know if she could be rx'ed Trulicity. If so, she would want to see if it could go through the pt assistance program.

## 2024-06-19 ENCOUNTER — TELEPHONE (OUTPATIENT)
Dept: FAMILY MEDICINE CLINIC | Age: 54
End: 2024-06-19

## 2024-06-19 DIAGNOSIS — R73.09 ELEVATED GLUCOSE: Primary | ICD-10-CM

## 2024-06-19 NOTE — TELEPHONE ENCOUNTER
Patient called asking about getting A1C done because she is trying to get patient assistance for Trulicity that was prescribed. Last one 12/4/23 at 5.9. do you want ordered? Please advise.

## 2024-06-20 ENCOUNTER — CARE COORDINATION (OUTPATIENT)
Dept: CARE COORDINATION | Age: 54
End: 2024-06-20

## 2024-06-20 NOTE — CARE COORDINATION
Patient called [facility] to update re: her recent surgery. Patient shared she had her stitches removed last week and a new hard cast placed and is scheduled for additional f/u next week.  Patient shared if everything is healing appropriately the plan is to place a walking cast at next appointment and then will work to transition to a boot in the near future.  Patient shared she was able to get into the shower yesterday and is feeling better today.  Patient shared she was using waterless/no rinse soap prior to yesterday.  Patient reported she has been sleeping better recently and may reschedule her sleep appointment as she feels her  needs to attend his appointment more than she does and they can only afford 1 copay a month.  ACM reviewed the importance of patient following up as directed and patient acknowledged understanding. Patient discussed a lot of her past t/o our call and active listening provided.  Patient denied any other questions,  concerns, or needs and she was encouraged to call with any that may develop.

## 2024-06-24 ENCOUNTER — TELEPHONE (OUTPATIENT)
Dept: FAMILY MEDICINE CLINIC | Age: 54
End: 2024-06-24

## 2024-06-24 ASSESSMENT — ENCOUNTER SYMPTOMS: RHINORRHEA: 1

## 2024-06-24 NOTE — PROGRESS NOTES
[facility]  [address]  [place] [address]  Phone:  [phone]          Name: Donna Waters  : 1970    Chief Complaint   Patient presents with    Otalgia     Left ear with drainage   Started this past weekend        HPI:     Donna Waters is a 54 y.o. female who presents today for evaluation of left ear pain.  She has had decreased hearing in that ear for the past week.  She has nasal congestion and a dry cough.  Her throat is a little sore.  No fevers.      Current Outpatient Medications:     brompheniramine-pseudoephedrine-DM 2-30-10 MG/5ML syrup, Take 5 mLs by mouth 4 times daily as needed for Cough or Congestion, Disp: 140 mL, Rfl: 0    amoxicillin (AMOXIL) 500 MG capsule, Take 1 capsule by mouth 2 times daily for 10 days, Disp: 20 capsule, Rfl: 0    dulaglutide (TRULICITY) 1.5 MG/0.5ML SC injection, Inject 0.5 mLs into the skin once a week, Disp: 6 mL, Rfl: 5    traZODone (DESYREL) 100 MG tablet, TAKE 2 TABLETS BY MOUTH NIGHTLY, Disp: 180 tablet, Rfl: 0    gabapentin (NEURONTIN) 300 MG capsule, Take 1 capsule by mouth 3 times daily for 30 days., Disp: 90 capsule, Rfl: 0    metoprolol tartrate (LOPRESSOR) 25 MG tablet, Take 1 tablet by mouth twice daily, Disp: 60 tablet, Rfl: 0    levocetirizine (XYZAL) 5 MG tablet, Take 1 tablet by mouth nightly, Disp: 90 tablet, Rfl: 1    hydrOXYzine HCl (ATARAX) 50 MG tablet, Take 1 tablet by mouth every 8 hours as needed for Itching, Disp: 90 tablet, Rfl: 1    albuterol sulfate HFA (VENTOLIN HFA) 108 (90 Base) MCG/ACT inhaler, Inhale 2 puffs into the lungs 4 times daily as needed for Wheezing, Disp: 18 g, Rfl: 5    amitriptyline (ELAVIL) 150 MG tablet, Take 2 tablets by mouth nightly, Disp: 180 tablet, Rfl: 1    nystatin (MYCOSTATIN) 722719 UNIT/GM powder, APPLY  POWDER TOPICALLY TO AFFECTED AREA THREE TIMES DAILY, Disp: 60 g, Rfl: 2    simvastatin (ZOCOR) 40 MG tablet, Take 1 tablet by mouth daily, Disp: 90 tablet, Rfl: 1    apixaban

## 2024-06-24 NOTE — TELEPHONE ENCOUNTER
Patient called stating having L ear pain, face is swollen, trouble hearing. Looking up information and symptoms on the internet. Do you want to call something in? Or see her? No appts available.

## 2024-06-25 ENCOUNTER — OFFICE VISIT (OUTPATIENT)
Dept: FAMILY MEDICINE CLINIC | Age: 54
End: 2024-06-25
Payer: MEDICARE

## 2024-06-25 VITALS
SYSTOLIC BLOOD PRESSURE: 112 MMHG | HEIGHT: 65 IN | TEMPERATURE: 97.2 F | BODY MASS INDEX: 47.63 KG/M2 | OXYGEN SATURATION: 96 % | WEIGHT: 285.9 LBS | RESPIRATION RATE: 17 BRPM | DIASTOLIC BLOOD PRESSURE: 70 MMHG | HEART RATE: 67 BPM

## 2024-06-25 DIAGNOSIS — R73.03 PREDIABETES: ICD-10-CM

## 2024-06-25 DIAGNOSIS — H66.002 ACUTE SUPPURATIVE OTITIS MEDIA OF LEFT EAR WITHOUT SPONTANEOUS RUPTURE OF TYMPANIC MEMBRANE, RECURRENCE NOT SPECIFIED: ICD-10-CM

## 2024-06-25 PROCEDURE — 99213 OFFICE O/P EST LOW 20 MIN: CPT | Performed by: FAMILY MEDICINE

## 2024-06-25 PROCEDURE — 3074F SYST BP LT 130 MM HG: CPT | Performed by: FAMILY MEDICINE

## 2024-06-25 PROCEDURE — 3078F DIAST BP <80 MM HG: CPT | Performed by: FAMILY MEDICINE

## 2024-06-25 RX ORDER — BROMPHENIRAMINE MALEATE, PSEUDOEPHEDRINE HYDROCHLORIDE, AND DEXTROMETHORPHAN HYDROBROMIDE 2; 30; 10 MG/5ML; MG/5ML; MG/5ML
5 SYRUP ORAL 4 TIMES DAILY PRN
Qty: 140 ML | Refills: 0 | Status: SHIPPED | OUTPATIENT
Start: 2024-06-25

## 2024-06-25 RX ORDER — DULAGLUTIDE 1.5 MG/.5ML
1.5 INJECTION, SOLUTION SUBCUTANEOUS WEEKLY
Qty: 6 ML | Refills: 5 | Status: SHIPPED | OUTPATIENT
Start: 2024-06-25

## 2024-06-25 RX ORDER — AMOXICILLIN 500 MG/1
500 CAPSULE ORAL 2 TIMES DAILY
Qty: 20 CAPSULE | Refills: 0 | Status: SHIPPED | OUTPATIENT
Start: 2024-06-25 | End: 2024-07-05

## 2024-07-02 NOTE — PROGRESS NOTES
[facility]  [address]  [place] [address]  Phone:  [phone]       2024    TELEHEALTH EVALUATION    HPI:    Donna Waters (:  1970) has requested an audio/video evaluation for the following concern(s):  insomnia    She has schizophrenia and has been on Seroquel at bedtime.  She is also on Trazodone 200 mg nightly; she has been taking 300 mg as it works better for her.  With 200 mg she was still having trouble staying asleep.  She denies daytime drowsiness or fatigue.  She follows with Cristiana at [facility] and sees her on .      Review of Systems   Musculoskeletal:  Positive for arthralgias and gait problem.   Neurological:  Positive for weakness.   Psychiatric/Behavioral:  Positive for sleep disturbance. The patient is nervous/anxious.        Prior to Visit Medications    Medication Sig Taking? Authorizing Provider   traZODone (DESYREL) 300 MG tablet Take 1 tablet by mouth nightly Yes Flavia Rebolledo MD   brompheniramine-pseudoephedrine-DM 2-30-10 MG/5ML syrup Take 5 mLs by mouth 4 times daily as needed for Cough or Congestion Yes Flavia Rebolledo MD   dulaglutide (TRULICITY) 1.5 MG/0.5ML SC injection Inject 0.5 mLs into the skin once a week  Flavia Rebolledo MD   MOUNJARO 2.5 MG/0.5ML SOPN SC injection INJECT 1/2 (ONE-HALF) ML  ONCE A WEEK  Patient not taking: Reported on 2024  Flavia Rebolledo MD   gabapentin (NEURONTIN) 300 MG capsule Take 1 capsule by mouth 3 times daily for 30 days.  Flavia Rebolledo MD   metoprolol tartrate (LOPRESSOR) 25 MG tablet Take 1 tablet by mouth twice daily  Syed Figueroa, EFRAÍN - CNP   levocetirizine (XYZAL) 5 MG tablet Take 1 tablet by mouth nightly  Flavia Rebolledo MD   hydrOXYzine HCl (ATARAX) 50 MG tablet Take 1 tablet by mouth every 8 hours as needed for Itching  Flavia Rebolledo MD   albuterol sulfate HFA (VENTOLIN HFA) 108 (90 Base) MCG/ACT inhaler Inhale 2 puffs into the lungs 4 times daily

## 2024-07-04 ENCOUNTER — HOSPITAL ENCOUNTER (EMERGENCY)
Age: 54
Discharge: HOME | End: 2024-07-04
Attending: FAMILY MEDICINE
Payer: MEDICARE

## 2024-07-04 ENCOUNTER — APPOINTMENT (OUTPATIENT)
Dept: GENERAL RADIOLOGY | Age: 54
End: 2024-07-04
Payer: MEDICARE

## 2024-07-04 VITALS
OXYGEN SATURATION: 95 % | RESPIRATION RATE: 16 BRPM | TEMPERATURE: 98 F | SYSTOLIC BLOOD PRESSURE: 121 MMHG | HEART RATE: 91 BPM | DIASTOLIC BLOOD PRESSURE: 74 MMHG

## 2024-07-04 DIAGNOSIS — Z91.89 AT RISK FOR PILL ESOPHAGITIS: Primary | ICD-10-CM

## 2024-07-04 PROCEDURE — 71046 X-RAY EXAM CHEST 2 VIEWS: CPT

## 2024-07-04 PROCEDURE — 99283 EMERGENCY DEPT VISIT LOW MDM: CPT

## 2024-07-04 ASSESSMENT — ENCOUNTER SYMPTOMS
TROUBLE SWALLOWING: 1
VOICE CHANGE: 0
NAUSEA: 0
VOMITING: 0
FACIAL SWELLING: 0

## 2024-07-04 NOTE — DISCHARGE INSTR - COC
Date of Last BM: ***  No intake or output data in the 24 hours ending 24 1346  No intake/output data recorded.    Safety Concerns:     { HARIS Safety Concerns:581437134}    Impairments/Disabilities:      { HARIS Impairments/Disabilities:502459848}    Nutrition Therapy:  Current Nutrition Therapy:   { HARIS Diet List:260511986}    Routes of Feeding: {[facility] DME Other Feedings:353514946}  Liquids: {Slp liquid thickness:16619}  Daily Fluid Restriction: {[facility] DME Yes amt example:682204289}  Last Modified Barium Swallow with Video (Video Swallowing Test): {Done Not Done Date:}    Treatments at the Time of Hospital Discharge:   Respiratory Treatments: ***  Oxygen Therapy:  {Therapy; copd oxygen:93993}  Ventilator:    { CC Vent List:613367191}    Rehab Therapies: {THERAPEUTIC INTERVENTION:2070695816}  Weight Bearing Status/Restrictions: {[facility] Weight Bearin}  Other Medical Equipment (for information only, NOT a DME order):  {EQUIPMENT:249628825}  Other Treatments: ***    Patient's personal belongings (please select all that are sent with patient):  {[facility] DME Belongings:595646161}    RN SIGNATURE:  {Esignature:261439170}    CASE MANAGEMENT/SOCIAL WORK SECTION    Inpatient Status Date: ***    Readmission Risk Assessment Score:  Readmission Risk              Risk of Unplanned Readmission:  0           Discharging to Facility/ Agency   Name:   Address:  Phone:  Fax:    Dialysis Facility (if applicable)   Name:  Address:  Dialysis Schedule:  Phone:  Fax:    / signature: {Esignature:047153746}    PHYSICIAN SECTION    Prognosis: {Prognosis:5126495749}    Condition at Discharge: { Patient Condition:865931509}    Rehab Potential (if transferring to Rehab): {Prognosis:6081350754}    Recommended Labs or Other Treatments After Discharge: ***    Physician Certification: I certify the above information and transfer of Donna Waters  is necessary for the continuing treatment of the

## 2024-07-04 NOTE — ED TRIAGE NOTES
Pt arrival to the ER with complaint of taking her bedtime pills lastnight and states she feels like she got her pill stuck in the back of her throat. Pt has been able to eat and drink as normal. States she is burping a lot. Denies vomiting. Pt unsure of what medication it was. Pt is breathing the ease. Family at bedside. MD aware.

## 2024-07-04 NOTE — ED PROVIDER NOTES
[facility]  eMERGENCY dEPARTMENT eNCOUnter          CHIEF COMPLAINT       Chief Complaint   Patient presents with    Pill stuck in throat     States she took her pills lastnight and feels it got stuck       Nurses Notes reviewed and I agree except as noted in the HPI.      HISTORY OF PRESENT ILLNESS    Donna Waters is a 54 y.o. female who presents with sensation of something stuck in lower throat. Symptoms started last evening. Patient notes history of acid reflux. Recently started amoxicillin for throat pain by PCP on 6/26/24. Patient does not use NSAIDS or biphosphate medication. No shortness of breath. No current chest pain. Able to tolerate  food and drink without regurgitation.         REVIEW OF SYSTEMS     Review of Systems   Constitutional:  Negative for chills and fever.   HENT:  Positive for trouble swallowing. Negative for facial swelling and voice change.    Cardiovascular:  Negative for chest pain and palpitations.   Gastrointestinal:  Negative for nausea and vomiting.   All other systems reviewed and are negative.        PAST MEDICAL HISTORY    has a past medical history of Acalculous cholecystitis, Anxiety, Arthritis, Asthma, Blood clotting disorder (HCC), Bradycardia, Flexural eczema, GERD (gastroesophageal reflux disease), and LV dysfunction.    SURGICAL HISTORY      has a past surgical history that includes Ankle surgery (Left, 2000); Foot surgery (Right, 2007); shoulder surgery (Right, age 13); Dental surgery; Cholecystectomy, laparoscopic (05/04/2015); Upper gastrointestinal endoscopy (10/23/2015); Colonoscopy (2018); bladder suspension (2012); Cardiac catheterization; Hysterectomy (08/04/2023); and Cardiac procedure (N/A, 4/25/2024).    CURRENT MEDICATIONS       Discharge Medication List as of 7/4/2024  1:29 PM        CONTINUE these medications which have NOT CHANGED    Details   brompheniramine-pseudoephedrine-DM 2-30-10 MG/5ML syrup Take 5 mLs by mouth 4 times daily as needed

## 2024-07-08 ENCOUNTER — TELEPHONE (OUTPATIENT)
Dept: FAMILY MEDICINE CLINIC | Age: 54
End: 2024-07-08

## 2024-07-08 ENCOUNTER — TELEMEDICINE (OUTPATIENT)
Dept: FAMILY MEDICINE CLINIC | Age: 54
End: 2024-07-08
Payer: MEDICARE

## 2024-07-08 DIAGNOSIS — F20.0 PARANOID SCHIZOPHRENIA (HCC): Primary | ICD-10-CM

## 2024-07-08 DIAGNOSIS — H66.002 ACUTE SUPPURATIVE OTITIS MEDIA OF LEFT EAR WITHOUT SPONTANEOUS RUPTURE OF TYMPANIC MEMBRANE, RECURRENCE NOT SPECIFIED: ICD-10-CM

## 2024-07-08 DIAGNOSIS — F51.01 PRIMARY INSOMNIA: ICD-10-CM

## 2024-07-08 PROCEDURE — 99213 OFFICE O/P EST LOW 20 MIN: CPT | Performed by: FAMILY MEDICINE

## 2024-07-08 RX ORDER — TRAZODONE HYDROCHLORIDE 300 MG/1
300 TABLET ORAL NIGHTLY
Qty: 90 TABLET | Refills: 1 | Status: SHIPPED | OUTPATIENT
Start: 2024-07-08

## 2024-07-08 RX ORDER — BROMPHENIRAMINE MALEATE, PSEUDOEPHEDRINE HYDROCHLORIDE, AND DEXTROMETHORPHAN HYDROBROMIDE 2; 30; 10 MG/5ML; MG/5ML; MG/5ML
5 SYRUP ORAL 4 TIMES DAILY PRN
Qty: 140 ML | Refills: 0 | Status: SHIPPED | OUTPATIENT
Start: 2024-07-08

## 2024-07-08 NOTE — TELEPHONE ENCOUNTER
Pt called stating that you had mentioned during her video visit today that she did not look good. She wanted to let you know that she is tired. She is also requesting that you call her.

## 2024-07-09 RX ORDER — AMITRIPTYLINE HYDROCHLORIDE 150 MG/1
300 TABLET ORAL NIGHTLY
Qty: 180 TABLET | Refills: 1 | Status: SHIPPED | OUTPATIENT
Start: 2024-07-09

## 2024-07-09 NOTE — TELEPHONE ENCOUNTER
Patient called and her  was also on the phone. Pt states that she wants seen for an appointment even though she was just seen. When asked why, the  stated she needed to be seen in person, not on a virtual visit, for her confusion and that he wanted to be present for the appointment. An appointment was made for 7/16/24 for confusion.

## 2024-07-09 NOTE — TELEPHONE ENCOUNTER
Last visit- 7/8/2024  Next visit- 7/16/2024    Requested Prescriptions     Pending Prescriptions Disp Refills    amitriptyline (ELAVIL) 150 MG tablet 180 tablet 1     Sig: Take 2 tablets by mouth nightly

## 2024-07-09 NOTE — TELEPHONE ENCOUNTER
Pt states there was nothing else regarding fatigue    Pt does want to let you know she is going to be going to [facility] in Oct to get her brace.  Pt states they have a lot of expenses right now so she is waiting until Oct

## 2024-07-15 ENCOUNTER — CARE COORDINATION (OUTPATIENT)
Dept: CARE COORDINATION | Age: 54
End: 2024-07-15

## 2024-07-15 DIAGNOSIS — H66.002 ACUTE SUPPURATIVE OTITIS MEDIA OF LEFT EAR WITHOUT SPONTANEOUS RUPTURE OF TYMPANIC MEMBRANE, RECURRENCE NOT SPECIFIED: ICD-10-CM

## 2024-07-15 RX ORDER — BROMPHENIRAMINE MALEATE, PSEUDOEPHEDRINE HYDROCHLORIDE, AND DEXTROMETHORPHAN HYDROBROMIDE 2; 30; 10 MG/5ML; MG/5ML; MG/5ML
SYRUP ORAL
Qty: 140 ML | Refills: 0 | Status: SHIPPED | OUTPATIENT
Start: 2024-07-15

## 2024-07-15 NOTE — CARE COORDINATION
Former Care Coordination patient who reached out to [facility] for an \"update.\"  Patient shared she has been doing well and currently has her \"third cast\" in place.  Patient shared current cast is a walking cast and she is now allowed to walk with the assistance of her walker.  Importance of following surgeon's activity restrictions reviewed with patient and patient verbalized understanding.  Patient shared she has been able to lose some weight recently as she is working to improve her diet.  [facility] congratulated patient on recent success and educated on the importance of ongoing diet adherence along with the importance of good nutrition for ongoing healing s/p her recent surgery.  Patient verbalized understanding.  Active listening provided to patient t/o our call.  Patient denied any questions, concerns, or needs and she was encouraged to call with any that may develop.

## 2024-07-15 NOTE — CARE COORDINATION
Patient returned call to ACM re: concerns of the costs of Mounjaro and Trulicity and shared she is currently not taking either medication d/t costs and inability to afford them (states she is now in the donut hole for this year and OOP costs is approx $300 +).  Patient shared she spoke with previous PCP office in [place] and they shared they had completed medication assistance for her in the past.  ACM shared with patient that Mounjaro does not have an assistance program at this time and AC will update PCP of concerns and consult Shakila with the medication assistance program for possible Trulicity assistance.  Patient was educated on the need for her and spouse to alert PCP of medication concerns so other options can be explored and patient acknowledged understanding.  Patient denied any other questions, concerns, or needs and she was encouraged to call with any that may develop.

## 2024-07-17 ENCOUNTER — CARE COORDINATION (OUTPATIENT)
Dept: CARE COORDINATION | Age: 54
End: 2024-07-17

## 2024-07-17 NOTE — TELEPHONE ENCOUNTER
Patient aware and voiced understanding, no concerns voiced at this time.   Pt is going to get labs down on Friday

## 2024-07-17 NOTE — CARE COORDINATION
Donna called me back and I explained to her Trulicity no longer has a PAP program available. We are asking Kesha if she is okay with switching to Ozempic to try that program out.

## 2024-07-17 NOTE — TELEPHONE ENCOUNTER
Her last HbA1c was 5.9% in 2023.  Recommend having her get her A1c done as ordered to see if Ozempic is needed.

## 2024-07-22 ENCOUNTER — TELEPHONE (OUTPATIENT)
Dept: FAMILY MEDICINE CLINIC | Age: 54
End: 2024-07-22

## 2024-07-22 NOTE — TELEPHONE ENCOUNTER
Pt called stating that she has had a runny  nose, water eyes, and has been sneezing the past few days. She also notes feeling fatigued. She denies any fevers. She has been taking her allergy medication at night. She denies using any OTC remedies. Please advise.

## 2024-07-22 NOTE — TELEPHONE ENCOUNTER
If she'd like to get a COVID or influenza test she can come in for a nurse visit.  If not, continue taking allergy medication.

## 2024-07-22 NOTE — TELEPHONE ENCOUNTER
Patient is going to wait to see how she feels tomorrow since she is in a cast and cannot get around well or drive.

## 2024-07-23 ENCOUNTER — HOSPITAL ENCOUNTER (OUTPATIENT)
Age: 54
Discharge: HOME | End: 2024-07-23
Payer: MEDICARE

## 2024-07-23 ENCOUNTER — TELEPHONE (OUTPATIENT)
Dept: FAMILY MEDICINE CLINIC | Age: 54
End: 2024-07-23

## 2024-07-23 DIAGNOSIS — R09.81 CONGESTION OF NASAL SINUS: Primary | ICD-10-CM

## 2024-07-23 LAB
INFLUENZA A BY PCR: NOT DETECTED
INFLUENZA B BY PCR: NOT DETECTED
SARS-COV-2 RNA, RT PCR: NOT DETECTED

## 2024-07-23 PROCEDURE — 87636 SARSCOV2 & INF A&B AMP PRB: CPT

## 2024-07-23 NOTE — TELEPHONE ENCOUNTER
Patient has runny nose, watery eyes, sneezing, and fatigue. Per pcp orders for covid and flu placed.

## 2024-07-24 RX ORDER — HYDROCHLOROTHIAZIDE 25 MG/1
25 TABLET ORAL DAILY
Qty: 30 TABLET | Refills: 0 | Status: SHIPPED | OUTPATIENT
Start: 2024-07-24

## 2024-07-30 DIAGNOSIS — K21.9 GASTROESOPHAGEAL REFLUX DISEASE, UNSPECIFIED WHETHER ESOPHAGITIS PRESENT: ICD-10-CM

## 2024-07-30 DIAGNOSIS — H66.002 ACUTE SUPPURATIVE OTITIS MEDIA OF LEFT EAR WITHOUT SPONTANEOUS RUPTURE OF TYMPANIC MEMBRANE, RECURRENCE NOT SPECIFIED: ICD-10-CM

## 2024-07-30 RX ORDER — BROMPHENIRAMINE MALEATE, PSEUDOEPHEDRINE HYDROCHLORIDE, AND DEXTROMETHORPHAN HYDROBROMIDE 2; 30; 10 MG/5ML; MG/5ML; MG/5ML
SYRUP ORAL
Qty: 140 ML | Refills: 1 | Status: SHIPPED | OUTPATIENT
Start: 2024-07-30

## 2024-07-30 RX ORDER — OMEPRAZOLE 40 MG/1
CAPSULE, DELAYED RELEASE ORAL
Qty: 90 CAPSULE | Refills: 1 | Status: SHIPPED | OUTPATIENT
Start: 2024-07-30

## 2024-07-31 ENCOUNTER — TELEPHONE (OUTPATIENT)
Dept: FAMILY MEDICINE CLINIC | Age: 54
End: 2024-07-31

## 2024-07-31 ENCOUNTER — CARE COORDINATION (OUTPATIENT)
Dept: CARE COORDINATION | Age: 54
End: 2024-07-31

## 2024-07-31 NOTE — CARE COORDINATION
Received call from patient with update re: her ankle surgery.  Patient shared she continues to recover from recent surgery and is following activity restrictions as directed.  Patient reported she remains in a cast and plan is to re-check and maybe remove the cast on 8/12.  Patient shared she has the walking boot available when appropriate to transition.  Patient denied any concerns re: her ankle at this time.  ACM reviewed the importance of following instructions as directed to promote healing and prevent complications and patient verbalized understanding.  Patient shared  is scheduled to see GI tomorrow and ACM encouraged them to bring any questions/concerns they have to the appointment as well as a current medication list as there were recent changes.  Patient acknowledged understanding.  Patient denied any other questions, concerns, or needs.

## 2024-07-31 NOTE — TELEPHONE ENCOUNTER
Pt called stating that her pulse is 59 and o2 is 94% she has a headache and states she feels a little SOB  Pt tested herself for covid and negative  Advised increased hydration and tylenol for the headache.  Also advised the vitals are normal

## 2024-08-02 DIAGNOSIS — H66.002 ACUTE SUPPURATIVE OTITIS MEDIA OF LEFT EAR WITHOUT SPONTANEOUS RUPTURE OF TYMPANIC MEMBRANE, RECURRENCE NOT SPECIFIED: ICD-10-CM

## 2024-08-02 RX ORDER — BROMPHENIRAMINE MALEATE, PSEUDOEPHEDRINE HYDROCHLORIDE, AND DEXTROMETHORPHAN HYDROBROMIDE 2; 30; 10 MG/5ML; MG/5ML; MG/5ML
SYRUP ORAL
Qty: 140 ML | Refills: 0 | OUTPATIENT
Start: 2024-08-02

## 2024-08-05 ENCOUNTER — TELEPHONE (OUTPATIENT)
Dept: FAMILY MEDICINE CLINIC | Age: 54
End: 2024-08-05

## 2024-08-05 NOTE — TELEPHONE ENCOUNTER
Pt calling asking for refills of her trazodone 100mg dose .    It is not on her medication list.   She states she is taking 2-150mg tablets at night and adding 2-100mg tablets with it.  She states she sleeps great and is not tired in the morning at all.

## 2024-08-05 NOTE — TELEPHONE ENCOUNTER
500 mg of Trazodone is a lot and I do not recommend taking more than 300 mg.  Does she want to do a sleep study to see if there is anything else causing her insomnia?

## 2024-08-06 ENCOUNTER — TELEPHONE (OUTPATIENT)
Dept: FAMILY MEDICINE CLINIC | Age: 54
End: 2024-08-06

## 2024-08-06 NOTE — TELEPHONE ENCOUNTER
Patient calling and stating sometimes when she is really tired, she stutters and feels like she can't get her words out straight. States sometimes it goes on all night and sometimes it only lasts 30 minutes. States this usually happens at night time. Pt wanted to make an appointment to discuss. Please advise.

## 2024-08-06 NOTE — TELEPHONE ENCOUNTER
It's probably because she's taking too much Trazodone and I think we need to work on cutting back on it.

## 2024-08-09 DIAGNOSIS — H66.002 ACUTE SUPPURATIVE OTITIS MEDIA OF LEFT EAR WITHOUT SPONTANEOUS RUPTURE OF TYMPANIC MEMBRANE, RECURRENCE NOT SPECIFIED: ICD-10-CM

## 2024-08-09 RX ORDER — BROMPHENIRAMINE MALEATE, PSEUDOEPHEDRINE HYDROCHLORIDE, AND DEXTROMETHORPHAN HYDROBROMIDE 2; 30; 10 MG/5ML; MG/5ML; MG/5ML
SYRUP ORAL
Qty: 140 ML | Refills: 1 | OUTPATIENT
Start: 2024-08-09

## 2024-08-14 ENCOUNTER — TELEPHONE (OUTPATIENT)
Dept: FAMILY MEDICINE CLINIC | Age: 54
End: 2024-08-14

## 2024-08-14 DIAGNOSIS — H66.002 ACUTE SUPPURATIVE OTITIS MEDIA OF LEFT EAR WITHOUT SPONTANEOUS RUPTURE OF TYMPANIC MEMBRANE, RECURRENCE NOT SPECIFIED: ICD-10-CM

## 2024-08-14 RX ORDER — BROMPHENIRAMINE MALEATE, PSEUDOEPHEDRINE HYDROCHLORIDE, AND DEXTROMETHORPHAN HYDROBROMIDE 2; 30; 10 MG/5ML; MG/5ML; MG/5ML
SYRUP ORAL
Qty: 140 ML | Refills: 1 | OUTPATIENT
Start: 2024-08-14

## 2024-08-14 NOTE — TELEPHONE ENCOUNTER
Patient aware and voiced understanding, no concerns voiced at this time.   Pt is going to try adult cough medication

## 2024-08-14 NOTE — TELEPHONE ENCOUNTER
Patient called in asking if her cough syrup was called in because she has been coughing pretty bad. Informed her it looks like it was discontinued so will need to ask Dr Rebolledo what else she suggests. She has been taking her allergy pill every day and using her nasal wash. She said she used kids cough syrup last night and it helped ease the coughing a little bit. Still coughing today. Please advise. Uses Tuckers. Call patient to let her know.

## 2024-08-14 NOTE — TELEPHONE ENCOUNTER
It was denied because she's been using it too much.  OK to try OTC Delsym or a different cough medication.

## 2024-08-16 ENCOUNTER — TELEPHONE (OUTPATIENT)
Dept: FAMILY MEDICINE CLINIC | Age: 54
End: 2024-08-16

## 2024-08-16 NOTE — TELEPHONE ENCOUNTER
Pt state she got her cast off and now has multiple sores on her leg/ankle. States she has been putting antibiotic cream on the areas and it's not really doing anything. Recommended calling [facility]. Please advise.

## 2024-08-19 ENCOUNTER — TELEPHONE (OUTPATIENT)
Dept: FAMILY MEDICINE CLINIC | Age: 54
End: 2024-08-19

## 2024-08-19 RX ORDER — HYDROXYZINE 50 MG/1
50 TABLET, FILM COATED ORAL EVERY 8 HOURS PRN
Qty: 90 TABLET | Refills: 0 | Status: SHIPPED | OUTPATIENT
Start: 2024-08-19

## 2024-08-19 NOTE — TELEPHONE ENCOUNTER
Patient called stating that she got an antibiotic called Leia from Dr. Ventura for a rash on her leg she had her cast on for x 10 weeks. She is now in a boot and she has open sores on her leg by her ankle that she wears the boot on. She states she does not know if the antibiotic has helped with the rash/sores she has or not. She states Dr. Ventura wants her to be seen by pcp this week to check out the rash since she cannot see him for a few weeks yet. No available appts for time that patient had asked for. Please advise.

## 2024-08-19 NOTE — TELEPHONE ENCOUNTER
Calling back with MRI results and follow up. She is doing much better- not 100% yet. We don't need to do any further work up unless things get worse. Follow. reassured    Patient is already taking Tylenol PM, Trazodone, and Seroquel. States she was on the Temazepam for 6 years and when she came back here she got it taken away from her. States that worked good for her. Please advise.

## 2024-08-20 ENCOUNTER — HOSPITAL ENCOUNTER (OUTPATIENT)
Dept: ULTRASOUND IMAGING | Age: 54
Discharge: HOME | End: 2024-08-20
Payer: MEDICARE

## 2024-08-20 ENCOUNTER — LAB (OUTPATIENT)
Dept: LAB | Age: 54
End: 2024-08-20

## 2024-08-20 DIAGNOSIS — R73.09 ELEVATED GLUCOSE: ICD-10-CM

## 2024-08-20 DIAGNOSIS — M79.605 LOWER EXTREMITY PAIN, LEFT: ICD-10-CM

## 2024-08-20 DIAGNOSIS — M79.89 SWELLING OF LEFT EXTREMITY: ICD-10-CM

## 2024-08-20 PROCEDURE — 93971 EXTREMITY STUDY: CPT

## 2024-08-21 LAB
DEPRECATED MEAN GLUCOSE BLD GHB EST-ACNC: 99 MG/DL (ref 70–126)
HBA1C MFR BLD HPLC: 5.3 % (ref 4.4–6.4)

## 2024-08-26 NOTE — PROGRESS NOTES
spray 1 spray by Each Nostril route daily  Liane Ferris, APRN - CNP   melatonin 3 MG TABS tablet Take 10 mg by mouth nightly as needed  Matteo Modi MD   docusate sodium (COLACE) 100 MG capsule Take 1 capsule by mouth 2 times daily  Matteo Modi MD   LORazepam (ATIVAN) 2 MG tablet Take 1 tablet by mouth every 8 hours as needed.  Matteo Modi MD   risperiDONE (RISPERDAL) 4 MG tablet Take 1 tablet by mouth 2 times daily 2mg am 2 mg pm  Matteo Modi MD       Social History     Tobacco Use    Smoking status: Never    Smokeless tobacco: Never   Vaping Use    Vaping status: Never Used   Substance Use Topics    Alcohol use: Not Currently    Drug use: No        Allergies   Allergen Reactions    Msg Shortness Of Breath    Codeine Other (See Comments)     Messes with my mental health    Cyclobenzaprine Other (See Comments)    Lithium Hives    Adhesive Tape Itching and Rash    Doxycycline Hives, Itching and Rash    Other Palpitations     msg   ,   Past Medical History:   Diagnosis Date    Acalculous cholecystitis     Anxiety     Arthritis     hands and hips    Asthma     inhaler prn    Blood clotting disorder (HCC)     2017, 2021    Bradycardia 03/02/2017    Flexural eczema     GERD (gastroesophageal reflux disease)     LV dysfunction 03/01/2017    Paranoid schizophrenia ([facility])        PHYSICAL EXAMINATION:    Constitutional: [x] Appears well-developed and well-nourished [x] No apparent distress      [] Abnormal-   Mental status  [x] Alert and awake  [] Oriented to person/place/time []Able to follow commands      Eyes:  EOM    [x]  Normal  [] Abnormal-  Sclera  [x]  Normal  [] Abnormal -         Discharge [x]  None visible  [] Abnormal -    HENT:   [x] Normocephalic, atraumatic.  [] Abnormal   [x] Mouth/Throat: Mucous membranes are moist.     External Ears [] Normal  [] Abnormal-     Neck: [] No visualized mass     Pulmonary/Chest: [x] Respiratory effort normal.  [x] No visualized signs  you are not or unsure, please re-schedule the visit: Yes, I confirm.          --Flavia Rebolledo MD on 8/29/2024 at 12:30 PM    An electronic signature was used to authenticate this note.

## 2024-08-29 ENCOUNTER — CARE COORDINATION (OUTPATIENT)
Dept: CARE COORDINATION | Age: 54
End: 2024-08-29

## 2024-08-29 ENCOUNTER — TELEMEDICINE (OUTPATIENT)
Dept: FAMILY MEDICINE CLINIC | Age: 54
End: 2024-08-29

## 2024-08-29 ENCOUNTER — TELEPHONE (OUTPATIENT)
Dept: FAMILY MEDICINE CLINIC | Age: 54
End: 2024-08-29

## 2024-08-29 DIAGNOSIS — E66.01 MORBID OBESITY WITH BMI OF 45.0-49.9, ADULT (HCC): ICD-10-CM

## 2024-08-29 DIAGNOSIS — F51.01 PRIMARY INSOMNIA: ICD-10-CM

## 2024-08-29 DIAGNOSIS — R00.0 TACHYCARDIA: ICD-10-CM

## 2024-08-29 DIAGNOSIS — Z86.39 HISTORY OF DIABETES MELLITUS: Primary | ICD-10-CM

## 2024-08-29 RX ORDER — SEMAGLUTIDE 1.34 MG/ML
0.25 INJECTION, SOLUTION SUBCUTANEOUS WEEKLY
Qty: 1 ADJUSTABLE DOSE PRE-FILLED PEN SYRINGE | Refills: 0 | Status: SHIPPED | OUTPATIENT
Start: 2024-08-29

## 2024-08-29 ASSESSMENT — ENCOUNTER SYMPTOMS
SHORTNESS OF BREATH: 1
COLOR CHANGE: 0

## 2024-08-29 NOTE — TELEPHONE ENCOUNTER
Pt states she had a prescription sent to the pharmacy for ozempic but it was going to be over $200. Asking if there is a patient assistance program for this. Please advise.

## 2024-08-29 NOTE — TELEPHONE ENCOUNTER
I have reached out to Shakila Plascencia and we are taking care of it so disregard the Rx sent in.

## 2024-08-30 DIAGNOSIS — K21.9 GASTROESOPHAGEAL REFLUX DISEASE, UNSPECIFIED WHETHER ESOPHAGITIS PRESENT: ICD-10-CM

## 2024-08-30 RX ORDER — METOPROLOL TARTRATE 25 MG/1
25 TABLET, FILM COATED ORAL 2 TIMES DAILY
Qty: 180 TABLET | Refills: 0 | Status: SHIPPED | OUTPATIENT
Start: 2024-08-30

## 2024-08-30 RX ORDER — OMEPRAZOLE 40 MG/1
CAPSULE, DELAYED RELEASE ORAL DAILY
Qty: 30 CAPSULE | Refills: 0 | Status: SHIPPED | OUTPATIENT
Start: 2024-08-30

## 2024-08-30 NOTE — CARE COORDINATION
I was able to fax  the application for Ozempic for assistance for the patient.      Shakila Plascencia [facility]  CC   Medication Assistance  [facility] [place] [facility]    (P) [phone]  (F) [phone]

## 2024-08-30 NOTE — TELEPHONE ENCOUNTER
Last visit- 8/29/2024  Next visit- Visit date not found    Requested Prescriptions     Pending Prescriptions Disp Refills    omeprazole (PRILOSEC) 40 MG delayed release capsule [Pharmacy Med Name: OMEPRAZOLE DR 40MG  CAP] 30 capsule 0     Sig: Take 1 capsule by mouth once daily

## 2024-08-30 NOTE — TELEPHONE ENCOUNTER
Last visit- 8/29/2024  Next visit- Visit date not found    Requested Prescriptions     Pending Prescriptions Disp Refills    metoprolol tartrate (LOPRESSOR) 25 MG tablet [Pharmacy Med Name: Metoprolol Tartrate 25 MG Oral Tablet] 180 tablet 0     Sig: Take 1 tablet by mouth twice daily

## 2024-09-03 ENCOUNTER — CARE COORDINATION (OUTPATIENT)
Dept: CARE COORDINATION | Age: 54
End: 2024-09-03

## 2024-09-03 NOTE — CARE COORDINATION
Spoke with the patient and let her know I will fax her application to the office. She is going to go in and sign it on Friday.      Shakila Plascencia [facility]  CC   Medication Assistance  [place], and [place]    (P) [phone]  (F) [phone]

## 2024-09-05 ENCOUNTER — TELEPHONE (OUTPATIENT)
Dept: FAMILY MEDICINE CLINIC | Age: 54
End: 2024-09-05

## 2024-09-05 NOTE — TELEPHONE ENCOUNTER
Pt c/o drainage and coughing. States she is using nasal saline and washes and is still having a lot of drainage. Pt states this has been going on for a few days. Pt not taking anything OTC for symptoms. Please advise.

## 2024-09-09 ENCOUNTER — HOSPITAL ENCOUNTER (OUTPATIENT)
Dept: PHYSICAL THERAPY | Age: 54
Setting detail: THERAPIES SERIES
Discharge: HOME | End: 2024-09-09
Payer: MEDICARE

## 2024-09-09 ENCOUNTER — HOSPITAL ENCOUNTER (OUTPATIENT)
Dept: CT IMAGING | Age: 54
Discharge: HOME | End: 2024-09-09
Attending: ORTHOPAEDIC SURGERY
Payer: MEDICARE

## 2024-09-09 DIAGNOSIS — Z47.89: ICD-10-CM

## 2024-09-09 PROCEDURE — 97033 APP MDLTY 1+IONTPHRSIS EA 15: CPT

## 2024-09-09 PROCEDURE — 97162 PT EVAL MOD COMPLEX 30 MIN: CPT

## 2024-09-09 PROCEDURE — 73700 CT LOWER EXTREMITY W/O DYE: CPT

## 2024-09-10 RX ORDER — GABAPENTIN 300 MG/1
300 CAPSULE ORAL 3 TIMES DAILY
Qty: 270 CAPSULE | Refills: 0 | Status: SHIPPED | OUTPATIENT
Start: 2024-09-10 | End: 2024-12-09

## 2024-09-10 ASSESSMENT — ENCOUNTER SYMPTOMS
SHORTNESS OF BREATH: 0
WHEEZING: 0
SINUS PRESSURE: 1
COUGH: 1

## 2024-09-12 ENCOUNTER — OFFICE VISIT (OUTPATIENT)
Dept: FAMILY MEDICINE CLINIC | Age: 54
End: 2024-09-12
Payer: MEDICARE

## 2024-09-12 ENCOUNTER — CARE COORDINATION (OUTPATIENT)
Dept: CARE COORDINATION | Age: 54
End: 2024-09-12

## 2024-09-12 VITALS
DIASTOLIC BLOOD PRESSURE: 70 MMHG | RESPIRATION RATE: 20 BRPM | HEIGHT: 65 IN | OXYGEN SATURATION: 96 % | BODY MASS INDEX: 45.32 KG/M2 | TEMPERATURE: 98.7 F | WEIGHT: 272 LBS | HEART RATE: 84 BPM | SYSTOLIC BLOOD PRESSURE: 134 MMHG

## 2024-09-12 DIAGNOSIS — F51.01 PRIMARY INSOMNIA: ICD-10-CM

## 2024-09-12 DIAGNOSIS — J01.90 ACUTE BACTERIAL SINUSITIS: Primary | ICD-10-CM

## 2024-09-12 DIAGNOSIS — B96.89 ACUTE BACTERIAL SINUSITIS: Primary | ICD-10-CM

## 2024-09-12 PROCEDURE — 3078F DIAST BP <80 MM HG: CPT | Performed by: FAMILY MEDICINE

## 2024-09-12 PROCEDURE — 3075F SYST BP GE 130 - 139MM HG: CPT | Performed by: FAMILY MEDICINE

## 2024-09-12 PROCEDURE — 99213 OFFICE O/P EST LOW 20 MIN: CPT | Performed by: FAMILY MEDICINE

## 2024-09-12 RX ORDER — AZITHROMYCIN 250 MG/1
TABLET, FILM COATED ORAL
Qty: 6 TABLET | Refills: 0 | Status: SHIPPED | OUTPATIENT
Start: 2024-09-12

## 2024-09-12 RX ORDER — TRAZODONE HYDROCHLORIDE 100 MG/1
100 TABLET ORAL NIGHTLY PRN
Qty: 90 TABLET | Refills: 1 | Status: SHIPPED | OUTPATIENT
Start: 2024-09-12

## 2024-09-12 RX ORDER — METHYLPREDNISOLONE 4 MG
TABLET, DOSE PACK ORAL
Qty: 1 KIT | Refills: 0 | Status: SHIPPED | OUTPATIENT
Start: 2024-09-12 | End: 2024-09-18

## 2024-09-12 ASSESSMENT — ENCOUNTER SYMPTOMS
EYE DISCHARGE: 0
COLOR CHANGE: 1
EYE REDNESS: 0

## 2024-09-16 ENCOUNTER — TELEPHONE (OUTPATIENT)
Dept: FAMILY MEDICINE CLINIC | Age: 54
End: 2024-09-16

## 2024-09-16 DIAGNOSIS — H92.01 RIGHT EAR PAIN: Primary | ICD-10-CM

## 2024-09-17 ENCOUNTER — CARE COORDINATION (OUTPATIENT)
Dept: CARE COORDINATION | Age: 54
End: 2024-09-17

## 2024-09-17 DIAGNOSIS — E78.00 PURE HYPERCHOLESTEROLEMIA: ICD-10-CM

## 2024-09-17 RX ORDER — SIMVASTATIN 40 MG
40 TABLET ORAL DAILY
Qty: 90 TABLET | Refills: 0 | Status: SHIPPED | OUTPATIENT
Start: 2024-09-17

## 2024-09-18 ENCOUNTER — APPOINTMENT (OUTPATIENT)
Dept: PHYSICAL THERAPY | Age: 54
End: 2024-09-18
Payer: MEDICARE

## 2024-09-19 ENCOUNTER — HOSPITAL ENCOUNTER (OUTPATIENT)
Dept: PHYSICAL THERAPY | Age: 54
Setting detail: THERAPIES SERIES
End: 2024-09-19
Payer: MEDICARE

## 2024-09-23 ENCOUNTER — TELEPHONE (OUTPATIENT)
Dept: FAMILY MEDICINE CLINIC | Age: 54
End: 2024-09-23

## 2024-09-23 ENCOUNTER — CARE COORDINATION (OUTPATIENT)
Dept: CARE COORDINATION | Age: 54
End: 2024-09-23

## 2024-09-25 ENCOUNTER — HOSPITAL ENCOUNTER (OUTPATIENT)
Dept: PHYSICAL THERAPY | Age: 54
Setting detail: THERAPIES SERIES
End: 2024-09-25
Payer: MEDICARE

## 2024-09-27 ENCOUNTER — APPOINTMENT (OUTPATIENT)
Dept: PHYSICAL THERAPY | Age: 54
End: 2024-09-27
Payer: MEDICARE

## 2024-09-30 RX ORDER — BROMPHENIRAMINE MALEATE, PSEUDOEPHEDRINE HYDROCHLORIDE, AND DEXTROMETHORPHAN HYDROBROMIDE 2; 30; 10 MG/5ML; MG/5ML; MG/5ML
5 SYRUP ORAL 4 TIMES DAILY PRN
Qty: 140 ML | Refills: 0 | Status: SHIPPED | OUTPATIENT
Start: 2024-09-30

## 2024-09-30 NOTE — TELEPHONE ENCOUNTER
Pt called stating that she has had a cough and congestion. She stated that the the med pended is the only thing that helps. She is wanting a refill on it.

## 2024-10-01 ENCOUNTER — CARE COORDINATION (OUTPATIENT)
Dept: CARE COORDINATION | Age: 54
End: 2024-10-01

## 2024-10-01 NOTE — CARE COORDINATION
Former Care Coordination patient who called ACM with an update of her health journey.  Patient shared she was able to stop using her walking boot briefly s/p recent ankle surgery but re-developed pain t/o her foot with the use of a good tennis shoe so she is back in the walking boot and awaiting approval for a bone stimulator.  Patient shared she is scheduled to f/u with ortho again later this week.  ACM encouraged patient to discuss any questions/concerns with ortho at her appointment and reviewed the importance of following instructions and activity restrictions as provided.  Patient verbalized understanding.  Patient is aware of upcoming PCP appointment and she denied any concerns.  Patient denied any other questions, concerns, or needs and she was encouraged to call with any that may develop.

## 2024-10-04 RX ORDER — IBUPROFEN 800 MG/1
800 TABLET, FILM COATED ORAL 3 TIMES DAILY PRN
Qty: 90 TABLET | Refills: 0 | Status: SHIPPED | OUTPATIENT
Start: 2024-10-04

## 2024-10-04 RX ORDER — AMITRIPTYLINE HYDROCHLORIDE 150 MG/1
300 TABLET ORAL NIGHTLY
Qty: 60 TABLET | Refills: 0 | Status: SHIPPED | OUTPATIENT
Start: 2024-10-04

## 2024-10-04 NOTE — TELEPHONE ENCOUNTER
Last visit- 9/12/2024  Next visit- 10/9/2024    Requested Prescriptions     Pending Prescriptions Disp Refills    amitriptyline (ELAVIL) 150 MG tablet [Pharmacy Med Name: Amitriptyline HCl 150 MG Oral Tablet] 60 tablet 0     Sig: TAKE 2 TABLETS BY MOUTH AT BEDTIME    ibuprofen (ADVIL;MOTRIN) 800 MG tablet [Pharmacy Med Name: Ibuprofen 800 MG Oral Tablet] 90 tablet 0     Sig: TAKE 1 TABLET BY MOUTH THREE TIMES DAILY AS NEEDED FOR PAIN

## 2024-10-07 ENCOUNTER — TELEPHONE (OUTPATIENT)
Dept: FAMILY MEDICINE CLINIC | Age: 54
End: 2024-10-07

## 2024-10-07 ENCOUNTER — HOSPITAL ENCOUNTER (OUTPATIENT)
Dept: PHYSICAL THERAPY | Age: 54
Setting detail: THERAPIES SERIES
Discharge: HOME | End: 2024-10-07
Payer: MEDICARE

## 2024-10-07 PROCEDURE — 97110 THERAPEUTIC EXERCISES: CPT

## 2024-10-07 RX ORDER — PREDNISONE 20 MG/1
40 TABLET ORAL DAILY
Qty: 10 TABLET | Refills: 0 | Status: SHIPPED | OUTPATIENT
Start: 2024-10-07

## 2024-10-07 NOTE — PROGRESS NOTES
with home program to perform all daily activity with minimal to no pain or difficulty  [] Goal Met [x] Goal Not Met [x] Continue Goal [] Discontinue Goal  [] Revise Goal  Goal Assessment: Patient has been on hold with therapy in one month      Patient Education:   [x]  HEP/Education Completed: POC and HEP. Bring shoe next session and bring in brace once gets it.  [place] Access Code:  []  No new Education completed  [x]  Reviewed Prior HEP      [x]  Patient verbalized and/or demonstrated understanding of education provided.  []  Patient unable to verbalize and/or demonstrate understanding of education provided.  Will continue education.  []  Barriers to learning:     PLAN:  []  Plan of care initiated.  Plan to see patient 2 times per week for 8 weeks to address the treatment planned outlined above.  []  Continue with current plan of care  [x]  Modify plan of care as follows:  Start 2x/week for 8 more weeks  []  Hold pending physician visit  []  Discharge    Time In 1045   Time Out 1130   Timed Code Minutes: 45 min   Total Treatment Time: 45 min       Electronically Signed by: [place] [id number]

## 2024-10-07 NOTE — TELEPHONE ENCOUNTER
Pt wants a stronger cough syrup.    She has had a cough for over 1 week. She Is taking robitussin but its barley working . Coughing up yellow stuff now  no fever, but sweating at night.

## 2024-10-09 ENCOUNTER — HOSPITAL ENCOUNTER (OUTPATIENT)
Dept: PHYSICAL THERAPY | Age: 54
Setting detail: THERAPIES SERIES
Discharge: HOME | End: 2024-10-09
Payer: MEDICARE

## 2024-10-09 DIAGNOSIS — K21.9 GASTROESOPHAGEAL REFLUX DISEASE, UNSPECIFIED WHETHER ESOPHAGITIS PRESENT: ICD-10-CM

## 2024-10-09 PROCEDURE — 97110 THERAPEUTIC EXERCISES: CPT

## 2024-10-09 NOTE — TELEPHONE ENCOUNTER
Last visit- 9/12/2024  Next visit- 10/31/2024    Requested Prescriptions     Pending Prescriptions Disp Refills    omeprazole (PRILOSEC) 40 MG delayed release capsule [Pharmacy Med Name: OMEPRAZOLE DR 40MG  CAP] 30 capsule 0     Sig: Take 1 capsule by mouth once daily

## 2024-10-09 NOTE — PROGRESS NOTES
[facility]  PHYSICAL THERAPY  [] EVALUATION  [x] DAILY NOTE (LAND) [] DAILY NOTE (AQUATIC ) [] PROGRESS NOTE [] DISCHARGE NOTE    [] OUTPATIENT REHABILITATION [facility]   [] [facility]    [x] [facility]   [] [facility]    Date: 10/9/2024  Patient Name:  Donna Waters  : 1970  MRN: [id number]  CSN: [id number]    Referring Practitioner Ghulam Ventura MD [id number]      Diagnosis Pain in left ankle and joints of left foot  Weakness  Difficulty in walking, not elsewhere classified  Encounter for other orthopedic aftercare   Treatment Diagnosis M25.572 Pain in left ankle  R53.1 Weakness  R26.2 Difficulty in walking  Z47.89 Encounter for orthopedic aftercare   Date of Evaluation 24   Additional Pertinent History Donna Waters has a past medical history of Acalculous cholecystitis, Anxiety, Arthritis, Asthma, Blood clotting disorder (HCC), Bradycardia, Flexural eczema, GERD (gastroesophageal reflux disease), LV dysfunction, and Paranoid schizophrenia ([facility]).  she has a past surgical history that includes Ankle surgery (Left, ); Foot surgery (Right, ); shoulder surgery (Right, age 13); Dental surgery; Cholecystectomy, laparoscopic (2015); Upper gastrointestinal endoscopy (10/23/2015); Colonoscopy (); bladder suspension (); Cardiac catheterization; Hysterectomy (2023); and Cardiac procedure (N/A, 2024).     Allergies Allergies   Allergen Reactions    Msg Shortness Of Breath    Codeine Other (See Comments)     Messes with my mental health    Cyclobenzaprine Other (See Comments)    Lithium Hives    Adhesive Tape Itching and Rash    Doxycycline Hives, Itching and Rash    Other Palpitations     msg      Medications   Current Outpatient Medications:     predniSONE (DELTASONE) 20 MG tablet, Take 2 tablets by mouth daily, Disp: 10 tablet, Rfl: 0    amitriptyline (ELAVIL) 150 MG tablet, TAKE 2 TABLETS BY MOUTH AT BEDTIME,

## 2024-10-10 ENCOUNTER — APPOINTMENT (OUTPATIENT)
Dept: PHYSICAL THERAPY | Age: 54
End: 2024-10-10
Payer: MEDICARE

## 2024-10-10 RX ORDER — OMEPRAZOLE 40 MG/1
CAPSULE, DELAYED RELEASE ORAL DAILY
Qty: 30 CAPSULE | Refills: 0 | Status: SHIPPED | OUTPATIENT
Start: 2024-10-10

## 2024-10-14 ENCOUNTER — CARE COORDINATION (OUTPATIENT)
Dept: CARE COORDINATION | Age: 54
End: 2024-10-14

## 2024-10-14 NOTE — CARE COORDINATION
Previous Care Coordination patient who called ACM this AM to shared she has started OP PT at [facility] for her ankle per her ortho surgeon.  Patient shared she has had concerns with increased eczema on her back recently and she was able to schedule f/u with her dermatologist in approx 1 month.  ACM reviewed need to call for an earlier appt with any new/worsening of her symptoms and patient verbalized understanding.  Patient shared she is taking hydroxyzine TID to assist with itching symptoms. PCP updated per patient's request.  ACM briefly reviewed importance of taking medications as directed and calling with any medication questions or concerns.  Patient denied any other questions, concerns, or needs and she was encouraged to call with any that may develop.

## 2024-10-14 NOTE — CARE COORDINATION
[id number] patients ID through [email or web address]. I called to check on her shipment and it seems [employer] tried to deliver it on a Sunday and it was returned. They are processing another refill for her now. Leaving the patient a message now .        Shakila Plascencia [facility]  CC   Medication Assistance  [place], and [facility]    (P) [phone]  (F) [phone]

## 2024-10-15 ENCOUNTER — HOSPITAL ENCOUNTER (OUTPATIENT)
Dept: PHYSICAL THERAPY | Age: 54
Setting detail: THERAPIES SERIES
Discharge: HOME | End: 2024-10-15
Payer: MEDICARE

## 2024-10-15 PROCEDURE — 97110 THERAPEUTIC EXERCISES: CPT

## 2024-10-15 RX ORDER — HYDROXYZINE HYDROCHLORIDE 50 MG/1
50 TABLET, FILM COATED ORAL EVERY 8 HOURS PRN
Qty: 90 TABLET | Refills: 0 | Status: SHIPPED | OUTPATIENT
Start: 2024-10-15

## 2024-10-15 NOTE — TELEPHONE ENCOUNTER
Last visit- 9/12/2024  Next visit- 10/31/2024    Requested Prescriptions     Pending Prescriptions Disp Refills    hydrOXYzine HCl (ATARAX) 50 MG tablet [Pharmacy Med Name: hydrOXYzine HCl 50 MG Oral Tablet] 90 tablet 0     Sig: TAKE 1 TABLET BY MOUTH EVERY 8 HOURS AS NEEDED FOR ITCHING

## 2024-10-15 NOTE — PROGRESS NOTES
release capsule, Take 1 capsule by mouth once daily, Disp: 30 capsule, Rfl: 0    predniSONE (DELTASONE) 20 MG tablet, Take 2 tablets by mouth daily, Disp: 10 tablet, Rfl: 0    amitriptyline (ELAVIL) 150 MG tablet, TAKE 2 TABLETS BY MOUTH AT BEDTIME, Disp: 60 tablet, Rfl: 0    ibuprofen (ADVIL;MOTRIN) 800 MG tablet, TAKE 1 TABLET BY MOUTH THREE TIMES DAILY AS NEEDED FOR PAIN, Disp: 90 tablet, Rfl: 0    brompheniramine-pseudoephedrine-DM 2-30-10 MG/5ML syrup, Take 5 mLs by mouth 4 times daily as needed for Congestion or Cough, Disp: 140 mL, Rfl: 0    simvastatin (ZOCOR) 40 MG tablet, Take 1 tablet by mouth once daily, Disp: 90 tablet, Rfl: 0    traZODone (DESYREL) 100 MG tablet, Take 1 tablet by mouth nightly as needed for Sleep, Disp: 90 tablet, Rfl: 1    azithromycin (ZITHROMAX Z-EVETTE) 250 MG tablet, Take two (2) tablets by mouth on day 1, then take one (1) tablet by mouth daily for four (4) days., Disp: 6 tablet, Rfl: 0    gabapentin (NEURONTIN) 300 MG capsule, Take 1 capsule by mouth 3 times daily for 90 days., Disp: 270 capsule, Rfl: 0    metoprolol tartrate (LOPRESSOR) 25 MG tablet, Take 1 tablet by mouth twice daily, Disp: 180 tablet, Rfl: 0    hydroCHLOROthiazide (HYDRODIURIL) 25 MG tablet, Take 1 tablet by mouth daily, Disp: 30 tablet, Rfl: 0    traZODone (DESYREL) 300 MG tablet, Take 1 tablet by mouth nightly, Disp: 90 tablet, Rfl: 1    levocetirizine (XYZAL) 5 MG tablet, Take 1 tablet by mouth nightly, Disp: 90 tablet, Rfl: 1    albuterol sulfate HFA (VENTOLIN HFA) 108 (90 Base) MCG/ACT inhaler, Inhale 2 puffs into the lungs 4 times daily as needed for Wheezing, Disp: 18 g, Rfl: 5    nystatin (MYCOSTATIN) 041478 UNIT/GM powder, APPLY  POWDER TOPICALLY TO AFFECTED AREA THREE TIMES DAILY, Disp: 60 g, Rfl: 2    apixaban (ELIQUIS) 2.5 MG TABS tablet, Take 1 tablet by mouth 2 times daily, Disp: 180 tablet, Rfl: 3    QUEtiapine (SEROQUEL) 100 MG tablet, Take 1 tablet by mouth daily Taking half a pill once a day.

## 2024-10-17 ENCOUNTER — TELEPHONE (OUTPATIENT)
Dept: FAMILY MEDICINE CLINIC | Age: 54
End: 2024-10-17

## 2024-10-17 ENCOUNTER — HOSPITAL ENCOUNTER (OUTPATIENT)
Dept: PHYSICAL THERAPY | Age: 54
Setting detail: THERAPIES SERIES
Discharge: HOME | End: 2024-10-17
Payer: MEDICARE

## 2024-10-17 PROCEDURE — 97110 THERAPEUTIC EXERCISES: CPT

## 2024-10-17 NOTE — PROGRESS NOTES
[facility]  PHYSICAL THERAPY  [] EVALUATION  [x] DAILY NOTE (LAND) [] DAILY NOTE (AQUATIC ) [] PROGRESS NOTE [] DISCHARGE NOTE    [] OUTPATIENT REHABILITATION [facility]   [] [place] [facility]    [x] [facility]   [] [facility]ONICAL[facility]    Date: 10/17/2024  Patient Name:  Donna Waters  : 1970  MRN: [id number]  CSN: [id number]    Referring Practitioner Ghulam Ventura MD [id number]      Diagnosis Pain in left ankle and joints of left foot  Weakness  Difficulty in walking, not elsewhere classified  Encounter for other orthopedic aftercare   Treatment Diagnosis M25.572 Pain in left ankle  R53.1 Weakness  R26.2 Difficulty in walking  Z47.89 Encounter for orthopedic aftercare   Date of Evaluation 24   Additional Pertinent History Donna Waters has a past medical history of Acalculous cholecystitis, Anxiety, Arthritis, Asthma, Blood clotting disorder (HCC), Bradycardia, Flexural eczema, GERD (gastroesophageal reflux disease), LV dysfunction, and Paranoid schizophrenia ([facility]).  she has a past surgical history that includes Ankle surgery (Left, ); Foot surgery (Right, ); shoulder surgery (Right, age 13); Dental surgery; Cholecystectomy, laparoscopic (2015); Upper gastrointestinal endoscopy (10/23/2015); Colonoscopy (); bladder suspension (); Cardiac catheterization; Hysterectomy (2023); and Cardiac procedure (N/A, 2024).     Allergies Allergies   Allergen Reactions    Msg Shortness Of Breath    Codeine Other (See Comments)     Messes with my mental health    Cyclobenzaprine Other (See Comments)    Lithium Hives    Adhesive Tape Itching and Rash    Doxycycline Hives, Itching and Rash    Other Palpitations     msg      Medications   Current Outpatient Medications:     hydrOXYzine HCl (ATARAX) 50 MG tablet, TAKE 1 TABLET BY MOUTH EVERY 8 HOURS AS NEEDED FOR ITCHING, Disp: 90 tablet, Rfl: 0    omeprazole (PRILOSEC) 40 MG delayed

## 2024-10-17 NOTE — TELEPHONE ENCOUNTER
Pt called stating that she has had drainage in her throat, nose, and ears. She has been using mucinex, robitussin, and OTC allergy meds without relief. Please advise on what else she could do.

## 2024-10-17 NOTE — TELEPHONE ENCOUNTER
How long has she had the symptoms?  Most of what we're seeing right now is viral and can take more than a week to improve.

## 2024-10-18 ENCOUNTER — CARE COORDINATION (OUTPATIENT)
Dept: CARE COORDINATION | Age: 54
End: 2024-10-18

## 2024-10-18 NOTE — CARE COORDINATION
Received call from patient as she was frustrated with a recent phone interaction.  Active listening and support provided to patient t/o our call.  Patient declined speaking with pt relations/manager to further discuss her frustration.  Patient reported she continues with cold/allergy symptoms and is now taking OTC Mucinex and Robitussin.  ACM reviewed signs/symptoms to report ad importance of early symptom recognition and follow up.  Patient verbalized understanding and denied any further questions, concerns, or needs.

## 2024-10-21 ENCOUNTER — APPOINTMENT (OUTPATIENT)
Dept: PHYSICAL THERAPY | Age: 54
End: 2024-10-21
Payer: MEDICARE

## 2024-10-22 RX ORDER — CEFDINIR 300 MG/1
300 CAPSULE ORAL 2 TIMES DAILY
Qty: 14 CAPSULE | Refills: 0 | Status: SHIPPED | OUTPATIENT
Start: 2024-10-22 | End: 2024-10-29

## 2024-10-23 ENCOUNTER — CARE COORDINATION (OUTPATIENT)
Dept: CARE COORDINATION | Age: 54
End: 2024-10-23

## 2024-10-23 NOTE — CARE COORDINATION
Previous Care Coordination patient who reached out to shared she continues with cough and she is taking her antibiotic as directed and prescribed.  ACM reviewed importance of completing the entire course of treatment as prescribed and reviewed signs/symptoms patient should report as well as the importance of early symptom recognition and follow up.  Patient acknowledged understanding.  Patient reported she continues to f/u with ortho and has 6 screws in her ankle.  Patient continues to attend OP PT as scheduled and she is now wearing a \"[place] Brace.\"  Patient stated brace is proving good support and her ankle is improving.  Patient shared she then had to go as someone was at the door.  Patient denied any additional questions, concerns, or needs and she was encouraged to call with any that may develop.     Patient called ACM back and shared for over the last year she has had episodes of feeling \"off\" and having trouble thinking at times b/w meals.  ACM discussed possible hypoglycemia symptoms as patient states symptoms resolve after eating.  Hypoglycemia prevention, signs/symptoms, and treatment education were reviewed and patient verbalized understanding.  Patient shared she wishes to discuss with PCP at her future appointments.

## 2024-10-24 ENCOUNTER — HOSPITAL ENCOUNTER (OUTPATIENT)
Dept: PHYSICAL THERAPY | Age: 54
Setting detail: THERAPIES SERIES
End: 2024-10-24
Payer: MEDICARE

## 2024-10-24 ENCOUNTER — CARE COORDINATION (OUTPATIENT)
Dept: CARE COORDINATION | Age: 54
End: 2024-10-24

## 2024-10-24 NOTE — CARE COORDINATION
Donna reached out stating she hasn't gotten her ozempic yet. I called the  and they are still working on shipping it out to her. I called Donna back to let her know.        Shakila Plascencia [facility]  CC   Medication Assistance  [place], and [place] [facility]    (P) [phone]  (F) [phone]

## 2024-10-25 ENCOUNTER — CARE COORDINATION (OUTPATIENT)
Dept: CARE COORDINATION | Age: 54
End: 2024-10-25

## 2024-10-25 NOTE — CARE COORDINATION
Donna reached out regarding PAP on her Eliquis, I reminded her its getting pretty late in the year to submit an application but would still try.       Shakila Plascencia [facility]  CC   Medication Assistance  [place], and [employer]    (P) [phone]  (F) [phone]

## 2024-10-28 ENCOUNTER — APPOINTMENT (OUTPATIENT)
Dept: PHYSICAL THERAPY | Age: 54
End: 2024-10-28
Payer: MEDICARE

## 2024-10-28 ENCOUNTER — HOSPITAL ENCOUNTER (OUTPATIENT)
Dept: PHYSICAL THERAPY | Age: 54
Setting detail: THERAPIES SERIES
Discharge: HOME | End: 2024-10-28
Payer: MEDICARE

## 2024-10-28 PROCEDURE — 97110 THERAPEUTIC EXERCISES: CPT

## 2024-10-28 PROCEDURE — 97112 NEUROMUSCULAR REEDUCATION: CPT

## 2024-10-28 RX ORDER — AMITRIPTYLINE HYDROCHLORIDE 150 MG/1
300 TABLET ORAL NIGHTLY
Qty: 180 TABLET | Refills: 1 | Status: SHIPPED | OUTPATIENT
Start: 2024-10-28

## 2024-10-28 NOTE — PROGRESS NOTES
[facility]  PHYSICAL THERAPY  [] EVALUATION  [x] DAILY NOTE (LAND) [] DAILY NOTE (AQUATIC ) [] PROGRESS NOTE [] DISCHARGE NOTE    [] OUTPATIENT REHABILITATION [facility]   [] [place] [facility]    [x] [facility]   [] [facility]    Date: 10/28/2024  Patient Name:  Donna Waters  : 1970  MRN: [id number]  CSN: [id number]    Referring Practitioner Ghulam Ventura MD [id number]      Diagnosis Pain in left ankle and joints of left foot  Weakness  Difficulty in walking, not elsewhere classified  Encounter for other orthopedic aftercare   Treatment Diagnosis M25.572 Pain in left ankle  R53.1 Weakness  R26.2 Difficulty in walking  Z47.89 Encounter for orthopedic aftercare   Date of Evaluation 24   Additional Pertinent History Donna Waters has a past medical history of Acalculous cholecystitis, Anxiety, Arthritis, Asthma, Blood clotting disorder (HCC), Bradycardia, Flexural eczema, GERD (gastroesophageal reflux disease), LV dysfunction, and Paranoid schizophrenia ([facility]).  she has a past surgical history that includes Ankle surgery (Left, ); Foot surgery (Right, ); shoulder surgery (Right, age 13); Dental surgery; Cholecystectomy, laparoscopic (2015); Upper gastrointestinal endoscopy (10/23/2015); Colonoscopy (); bladder suspension (); Cardiac catheterization; Hysterectomy (2023); and Cardiac procedure (N/A, 2024).     Allergies Allergies   Allergen Reactions    Msg Shortness Of Breath    Codeine Other (See Comments)     Messes with my mental health    Cyclobenzaprine Other (See Comments)    Lithium Hives    Adhesive Tape Itching and Rash    Doxycycline Hives, Itching and Rash    Other Palpitations     msg      Medications   Current Outpatient Medications:     amitriptyline (ELAVIL) 150 MG tablet, TAKE 2 TABLETS BY MOUTH AT BEDTIME, Disp: 180 tablet, Rfl: 1    cefdinir (OMNICEF) 300 MG capsule, Take 1 capsule by mouth 2

## 2024-10-31 ENCOUNTER — HOSPITAL ENCOUNTER (OUTPATIENT)
Dept: PHYSICAL THERAPY | Age: 54
Setting detail: THERAPIES SERIES
End: 2024-10-31
Payer: MEDICARE

## 2024-11-02 DIAGNOSIS — K21.9 GASTROESOPHAGEAL REFLUX DISEASE, UNSPECIFIED WHETHER ESOPHAGITIS PRESENT: ICD-10-CM

## 2024-11-02 DIAGNOSIS — R00.0 TACHYCARDIA: ICD-10-CM

## 2024-11-04 RX ORDER — OMEPRAZOLE 40 MG/1
CAPSULE, DELAYED RELEASE ORAL DAILY
Qty: 30 CAPSULE | Refills: 0 | Status: SHIPPED | OUTPATIENT
Start: 2024-11-04

## 2024-11-04 RX ORDER — METOPROLOL TARTRATE 25 MG/1
25 TABLET, FILM COATED ORAL 2 TIMES DAILY
Qty: 180 TABLET | Refills: 0 | Status: SHIPPED | OUTPATIENT
Start: 2024-11-04

## 2024-11-04 NOTE — TELEPHONE ENCOUNTER
Last visit- 9/12/2024  Next visit- 11/18/2024    Requested Prescriptions     Pending Prescriptions Disp Refills    omeprazole (PRILOSEC) 40 MG delayed release capsule [Pharmacy Med Name: OMEPRAZOLE DR 40MG  CAP] 30 capsule 0     Sig: Take 1 capsule by mouth once daily    metoprolol tartrate (LOPRESSOR) 25 MG tablet [Pharmacy Med Name: Metoprolol Tartrate 25 MG Oral Tablet] 180 tablet 0     Sig: Take 1 tablet by mouth twice daily

## 2024-11-05 ENCOUNTER — HOSPITAL ENCOUNTER (OUTPATIENT)
Dept: PHYSICAL THERAPY | Age: 54
Setting detail: THERAPIES SERIES
End: 2024-11-05
Payer: MEDICARE

## 2024-11-07 ENCOUNTER — CARE COORDINATION (OUTPATIENT)
Dept: CARE COORDINATION | Age: 54
End: 2024-11-07

## 2024-11-07 ENCOUNTER — HOSPITAL ENCOUNTER (OUTPATIENT)
Dept: PHYSICAL THERAPY | Age: 54
Setting detail: THERAPIES SERIES
Discharge: HOME | End: 2024-11-07
Payer: MEDICARE

## 2024-11-07 ENCOUNTER — NURSE ONLY (OUTPATIENT)
Dept: FAMILY MEDICINE CLINIC | Age: 54
End: 2024-11-07

## 2024-11-07 DIAGNOSIS — Z23 NEED FOR IMMUNIZATION AGAINST INFLUENZA: Primary | ICD-10-CM

## 2024-11-07 PROCEDURE — 97112 NEUROMUSCULAR REEDUCATION: CPT

## 2024-11-07 PROCEDURE — 97110 THERAPEUTIC EXERCISES: CPT

## 2024-11-07 NOTE — CARE COORDINATION
I received the patients information for assistance on Eliquis but haven't gotten the MDs portion back yet. I faxed that again to [facility] office.      Shakila Plascencia [facility]  CC   Medication Assistance  [place], and [employer]    (P) [phone]  (F) [phone]

## 2024-11-07 NOTE — PROGRESS NOTES
Immunizations Administered       Name Date Dose Route    Influenza, FLUCELVAX, (age 6 mo+) IM, Trivalent PF, 0.5mL 11/7/2024 0.5 mL Intramuscular    Site: Deltoid- Left    Lot: [id number]    NDC: 00831-384-57        Patient tolerated well.  Aurea Hui CMA.           Vaccine Information Sheet, \"Influenza - Inactivated\"  given to Donna Waters, or parent/legal guardian of  Donna Waters and verbalized understanding.    Patient responses:    Have you ever had a reaction to a flu vaccine? No  Do you have an allergy to eggs, neomycin or polymixin?  No  Do you have an allergy to Thimerosal, contact lens solution, or Merthiolate? No  Have you ever had Guillian Hayward Syndrome?  No  Do you have any current illness?  No  Do you have a temperature above 100 degrees? No  Are you pregnant? No  If pregnant, permission obtained from physician? No  Do you have an active neurological disorder? No      Flu vaccine given per order. Please see immunization tab.

## 2024-11-07 NOTE — PROGRESS NOTES
[facility]  PHYSICAL THERAPY  [] EVALUATION  [x] DAILY NOTE (LAND) [] DAILY NOTE (AQUATIC ) [] PROGRESS NOTE [] DISCHARGE NOTE    [] OUTPATIENT REHABILITATION [facility]   [] [place] [facility]    [x] [facility]   [] [facility]    Date: 2024  Patient Name:  Donna Waters  : 1970  MRN: [id number]  CSN: [id number]    Referring Practitioner Ghulam Ventura MD [id number]      Diagnosis Pain in left ankle and joints of left foot  Weakness  Difficulty in walking, not elsewhere classified  Encounter for other orthopedic aftercare   Treatment Diagnosis M25.572 Pain in left ankle  R53.1 Weakness  R26.2 Difficulty in walking  Z47.89 Encounter for orthopedic aftercare   Date of Evaluation 24   Additional Pertinent History Donna Waters has a past medical history of Acalculous cholecystitis, Anxiety, Arthritis, Asthma, Blood clotting disorder (HCC), Bradycardia, Flexural eczema, GERD (gastroesophageal reflux disease), LV dysfunction, and Paranoid schizophrenia ([facility]).  she has a past surgical history that includes Ankle surgery (Left, ); Foot surgery (Right, ); shoulder surgery (Right, age 13); Dental surgery; Cholecystectomy, laparoscopic (2015); Upper gastrointestinal endoscopy (10/23/2015); Colonoscopy (); bladder suspension (); Cardiac catheterization; Hysterectomy (2023); and Cardiac procedure (N/A, 2024).     Allergies Allergies   Allergen Reactions    Msg Shortness Of Breath    Codeine Other (See Comments)     Messes with my mental health    Cyclobenzaprine Other (See Comments)    Lithium Hives    Adhesive Tape Itching and Rash    Doxycycline Hives, Itching and Rash    Other Palpitations     msg      Medications   Current Outpatient Medications:     omeprazole (PRILOSEC) 40 MG delayed release capsule, Take 1 capsule by mouth once daily, Disp: 30 capsule, Rfl: 0    metoprolol tartrate (LOPRESSOR) 25 MG tablet, Take

## 2024-11-11 RX ORDER — HYDROXYZINE HYDROCHLORIDE 50 MG/1
50 TABLET, FILM COATED ORAL EVERY 8 HOURS PRN
Qty: 90 TABLET | Refills: 0 | Status: SHIPPED | OUTPATIENT
Start: 2024-11-11

## 2024-11-12 ENCOUNTER — HOSPITAL ENCOUNTER (OUTPATIENT)
Dept: PHYSICAL THERAPY | Age: 54
Setting detail: THERAPIES SERIES
Discharge: HOME | End: 2024-11-12
Payer: MEDICARE

## 2024-11-12 ENCOUNTER — OFFICE VISIT (OUTPATIENT)
Dept: FAMILY MEDICINE CLINIC | Age: 54
End: 2024-11-12
Payer: MEDICARE

## 2024-11-12 ENCOUNTER — CARE COORDINATION (OUTPATIENT)
Dept: CARE COORDINATION | Age: 54
End: 2024-11-12

## 2024-11-12 VITALS
TEMPERATURE: 97.9 F | RESPIRATION RATE: 16 BRPM | HEART RATE: 87 BPM | BODY MASS INDEX: 46.59 KG/M2 | DIASTOLIC BLOOD PRESSURE: 70 MMHG | SYSTOLIC BLOOD PRESSURE: 120 MMHG | WEIGHT: 280 LBS

## 2024-11-12 DIAGNOSIS — J01.90 ACUTE BACTERIAL SINUSITIS: Primary | ICD-10-CM

## 2024-11-12 DIAGNOSIS — B96.89 ACUTE BACTERIAL SINUSITIS: Primary | ICD-10-CM

## 2024-11-12 PROCEDURE — 99213 OFFICE O/P EST LOW 20 MIN: CPT | Performed by: FAMILY MEDICINE

## 2024-11-12 PROCEDURE — 3078F DIAST BP <80 MM HG: CPT | Performed by: FAMILY MEDICINE

## 2024-11-12 PROCEDURE — 3074F SYST BP LT 130 MM HG: CPT | Performed by: FAMILY MEDICINE

## 2024-11-12 RX ORDER — BROMPHENIRAMINE MALEATE, PSEUDOEPHEDRINE HYDROCHLORIDE, AND DEXTROMETHORPHAN HYDROBROMIDE 2; 30; 10 MG/5ML; MG/5ML; MG/5ML
5 SYRUP ORAL 4 TIMES DAILY PRN
Qty: 140 ML | Refills: 0 | Status: SHIPPED | OUTPATIENT
Start: 2024-11-12

## 2024-11-12 ASSESSMENT — ENCOUNTER SYMPTOMS
NAUSEA: 0
SHORTNESS OF BREATH: 0
WHEEZING: 0
DIARRHEA: 0
CONSTIPATION: 0
VOMITING: 0
COUGH: 1
SINUS PRESSURE: 1

## 2024-11-12 NOTE — PROGRESS NOTES
tablet, Take 1 tablet by mouth nightly, Disp: 90 tablet, Rfl: 1    albuterol sulfate HFA (VENTOLIN HFA) 108 (90 Base) MCG/ACT inhaler, Inhale 2 puffs into the lungs 4 times daily as needed for Wheezing, Disp: 18 g, Rfl: 5    nystatin (MYCOSTATIN) 800992 UNIT/GM powder, APPLY  POWDER TOPICALLY TO AFFECTED AREA THREE TIMES DAILY, Disp: 60 g, Rfl: 2    apixaban (ELIQUIS) 2.5 MG TABS tablet, Take 1 tablet by mouth 2 times daily, Disp: 180 tablet, Rfl: 3    QUEtiapine (SEROQUEL) 100 MG tablet, Take 1 tablet by mouth daily Taking half a pill once a day. (Patient taking differently: Take 2 tablets by mouth at bedtime), Disp: 90 tablet, Rfl: 1    triamcinolone (KENALOG) 0.1 % cream, Apply topically 2 times daily for 7-10 days., Disp: 30 g, Rfl: 0    blood glucose test strips (ASCENSIA AUTODISC VI;ONE TOUCH ULTRA TEST VI) strip, 1 each by In Vitro route 2 times daily DISPENSE ONE TOUCH ULTRA 2 STRIPS TO TEST ONCE DAILY, Disp: 100 each, Rfl: 5    pimecrolimus (ELIDEL) 1 % cream, Apply topically 2 times daily., Disp: 100 g, Rfl: 5    Blood Glucose Monitoring Suppl (ONE TOUCH ULTRA 2) w/Device KIT, 1 kit by Does not apply route daily, Disp: 1 kit, Rfl: 0    Cariprazine HCl (VRAYLAR) 6 MG CAPS capsule, Take 1 capsule by mouth daily, Disp: 90 capsule, Rfl: 1    fluticasone (FLONASE) 50 MCG/ACT nasal spray, 1 spray by Each Nostril route daily, Disp: 32 g, Rfl: 1    melatonin 3 MG TABS tablet, Take 10 mg by mouth nightly as needed, Disp: , Rfl:     docusate sodium (COLACE) 100 MG capsule, Take 1 capsule by mouth 2 times daily, Disp: , Rfl:     LORazepam (ATIVAN) 2 MG tablet, Take 1 tablet by mouth every 8 hours as needed., Disp: , Rfl:     risperiDONE (RISPERDAL) 4 MG tablet, Take 1 tablet by mouth 2 times daily 2mg am 2 mg pm, Disp: , Rfl:     traZODone (DESYREL) 100 MG tablet, Take 1 tablet by mouth nightly as needed for Sleep, Disp: 90 tablet, Rfl: 1    traZODone (DESYREL) 300 MG tablet, Take 1 tablet by mouth nightly, Disp: 90

## 2024-11-12 NOTE — PROGRESS NOTES
COMMUNICATION NOTE:    Patient cancelled appt today due to not feeling well. This was supposed to be patient's re-assessment so will be unable to update her goals. Patient sees Dr. Ventura for a follow up on 11-13-24. PT feels patient is not following through with her HEP and she is continuing to report high levels of pain and swelling but she is continuing to travel places and go shopping. Patient has cancelled 7 of her last 11 therapy appts. PT does not feel patient is getting any changes with therapy and is recommending discharge at this time. Please call therapist at [phone] regarding results of follow up with doctor to let know if agreeable to discharge. Thank you!    Nguyen Gonzalez, PT

## 2024-11-12 NOTE — CARE COORDINATION
Patient has been approved into the PAP program for her Eliquis until the end of this year. I also called [employer] to check on her shipment of Ozempic. The program has had the office zip code in their program wrong. They updated this information and will send another shipment to [facility] office. I was able to get a voucher from them to help her get a month supply locally for no charge. I am waiting on the office to send in a scrip to her retail pharmacy for them to bill.      Shakila Plascencia [facility]  CC   Medication Assistance  [place], and [place] [facility]    P) [phone] (F) [phone]

## 2024-11-13 DIAGNOSIS — E66.01 MORBID OBESITY WITH BMI OF 45.0-49.9, ADULT: Primary | ICD-10-CM

## 2024-11-13 RX ORDER — SEMAGLUTIDE 1.34 MG/ML
0.25 INJECTION, SOLUTION SUBCUTANEOUS WEEKLY
Qty: 1 ADJUSTABLE DOSE PRE-FILLED PEN SYRINGE | Refills: 0 | Status: SHIPPED | OUTPATIENT
Start: 2024-11-13

## 2024-11-13 NOTE — CARE COORDINATION
Called [facility] with the voucher for her Ozempic and it went through as a zero copay. I left the patient a message to call me back.      Patient called me back and I let her know what is going on.

## 2024-11-13 NOTE — PROGRESS NOTES
General: She is not in acute distress.     Appearance: She is well-developed.   HENT:      Head: Normocephalic and atraumatic.      Nose: Nose normal.   Eyes:      Conjunctiva/sclera: Conjunctivae normal.   Cardiovascular:      Rate and Rhythm: Normal rate and regular rhythm.      Heart sounds: Normal heart sounds.   Pulmonary:      Effort: Pulmonary effort is normal. No respiratory distress.      Breath sounds: Normal breath sounds. No wheezing.   Abdominal:      General: Bowel sounds are normal. There is no distension.      Palpations: Abdomen is soft.      Tenderness: There is no abdominal tenderness.   Musculoskeletal:      Cervical back: Normal range of motion and neck supple.   Skin:     General: Skin is warm and dry.   Neurological:      Mental Status: She is alert and oriented to person, place, and time.   Psychiatric:         Behavior: Behavior normal.       Assessment/Plan:     Donna was seen today for medication check, other, breast pain and insomnia.    Diagnoses and all orders for this visit:    Word finding difficulty        -    She is at home with her  but does not have much other interaction.  There is no evidence of dementia today.  She was encouraged to find more people to communicate with and to use synonyms when she can't think of a word.    Pain of right breast  -     PIPER DIGITAL DIAGNOSTIC AUGMENTED BILATERAL; Future  -     US BREAST COMPLETE RIGHT; Future    Primary insomnia        -     She was encouraged to take her Trazodone and Amitriptyline at least an hour before bed to see if it can be more effective.      Return if symptoms worsen or fail to improve.    Electronically signed by Flavia Rebolledo MD on 11/18/2024 at 11:03 AM

## 2024-11-14 ENCOUNTER — APPOINTMENT (OUTPATIENT)
Dept: PHYSICAL THERAPY | Age: 54
End: 2024-11-14
Payer: MEDICARE

## 2024-11-18 ENCOUNTER — OFFICE VISIT (OUTPATIENT)
Dept: FAMILY MEDICINE CLINIC | Age: 54
End: 2024-11-18
Payer: MEDICARE

## 2024-11-18 VITALS
WEIGHT: 277 LBS | HEART RATE: 79 BPM | TEMPERATURE: 96.9 F | RESPIRATION RATE: 16 BRPM | BODY MASS INDEX: 46.15 KG/M2 | HEIGHT: 65 IN | SYSTOLIC BLOOD PRESSURE: 130 MMHG | DIASTOLIC BLOOD PRESSURE: 70 MMHG | OXYGEN SATURATION: 98 %

## 2024-11-18 DIAGNOSIS — R47.89 WORD FINDING DIFFICULTY: Primary | ICD-10-CM

## 2024-11-18 DIAGNOSIS — N64.4 PAIN OF RIGHT BREAST: ICD-10-CM

## 2024-11-18 DIAGNOSIS — F51.01 PRIMARY INSOMNIA: ICD-10-CM

## 2024-11-18 PROCEDURE — 99214 OFFICE O/P EST MOD 30 MIN: CPT | Performed by: FAMILY MEDICINE

## 2024-11-18 PROCEDURE — 3078F DIAST BP <80 MM HG: CPT | Performed by: FAMILY MEDICINE

## 2024-11-18 PROCEDURE — 3075F SYST BP GE 130 - 139MM HG: CPT | Performed by: FAMILY MEDICINE

## 2024-11-18 RX ORDER — BROMPHENIRAMINE MALEATE, PSEUDOEPHEDRINE HYDROCHLORIDE, AND DEXTROMETHORPHAN HYDROBROMIDE 2; 30; 10 MG/5ML; MG/5ML; MG/5ML
5 SYRUP ORAL 4 TIMES DAILY PRN
Qty: 140 ML | Refills: 0 | Status: SHIPPED | OUTPATIENT
Start: 2024-11-18

## 2024-11-19 ENCOUNTER — TELEPHONE (OUTPATIENT)
Dept: FAMILY MEDICINE CLINIC | Age: 54
End: 2024-11-19

## 2024-11-19 DIAGNOSIS — F51.01 PRIMARY INSOMNIA: ICD-10-CM

## 2024-11-19 RX ORDER — TRAZODONE HYDROCHLORIDE 100 MG/1
100 TABLET ORAL NIGHTLY PRN
Qty: 90 TABLET | Refills: 1 | Status: SHIPPED | OUTPATIENT
Start: 2024-11-19 | End: 2024-11-19 | Stop reason: SDUPTHER

## 2024-11-19 RX ORDER — TRAZODONE HYDROCHLORIDE 100 MG/1
TABLET ORAL
Qty: 180 TABLET | Refills: 1 | Status: SHIPPED | OUTPATIENT
Start: 2024-11-19

## 2024-11-19 NOTE — TELEPHONE ENCOUNTER
Daniel at  pharmacy calling. Pt is already taking trazodone 300mg at night.      Today you wrote for trazodone 100MG , 1-2 tablets at night.   Is this in addition to what she is already taking or a new rx for a new dose?    If this is addition to what she is already taking, pt plans on taking 2 of the 100mg which would equal 500mg of trazodone at night. Is this what you want her to do?    Call daniel at  with clarification.

## 2024-11-19 NOTE — TELEPHONE ENCOUNTER
Spoke with the pharmacist. The script that was sent over is for trazadone 100mg once nightly. They need a new script for trazadone 100mg 1-2 times nightly sent to them. They cancelled her prescription for the trazadone 150mg since you only want her to have up to 200mg nightly.

## 2024-11-19 NOTE — TELEPHONE ENCOUNTER
Donna told me yesterday she was taking Trazodone 200 mg/night.  I do not want her taking more than that.

## 2024-11-19 NOTE — TELEPHONE ENCOUNTER
Spoke with melany, she has two different bottles of trazodone at home. One for 100mg and one for 150mg.  Advise her she is to take no more than 20mg.

## 2024-11-19 NOTE — TELEPHONE ENCOUNTER
Last visit- 11/18/2024  Next visit- Visit date not found    Requested Prescriptions     Pending Prescriptions Disp Refills    traZODone (DESYREL) 100 MG tablet 90 tablet 1     Sig: Take 1 tablet by mouth nightly as needed for Sleep Taking 2

## 2024-11-21 ENCOUNTER — HOSPITAL ENCOUNTER (OUTPATIENT)
Dept: WOMENS IMAGING | Age: 54
Discharge: HOME | End: 2024-11-21
Attending: FAMILY MEDICINE
Payer: MEDICARE

## 2024-11-21 DIAGNOSIS — N64.4 PAIN OF RIGHT BREAST: ICD-10-CM

## 2024-11-21 DIAGNOSIS — N64.4 PAIN IN THE BREAST: ICD-10-CM

## 2024-11-21 PROCEDURE — 76642 ULTRASOUND BREAST LIMITED: CPT

## 2024-11-21 PROCEDURE — G0279 TOMOSYNTHESIS, MAMMO: HCPCS

## 2024-11-25 ENCOUNTER — CARE COORDINATION (OUTPATIENT)
Dept: CARE COORDINATION | Age: 54
End: 2024-11-25

## 2024-11-25 NOTE — CARE COORDINATION
Donna called regarding her Ozempic, I let her know it should be on its way soon.      Shakila Plascencia [facility]  CC   Medication Assistance  [place], and [employer]    (P) [phone]  (F) [phone]

## 2024-12-02 ENCOUNTER — TELEPHONE (OUTPATIENT)
Dept: ONCOLOGY | Age: 54
End: 2024-12-02

## 2024-12-02 DIAGNOSIS — K21.9 GASTROESOPHAGEAL REFLUX DISEASE, UNSPECIFIED WHETHER ESOPHAGITIS PRESENT: ICD-10-CM

## 2024-12-02 RX ORDER — BROMPHENIRAMINE MALEATE, PSEUDOEPHEDRINE HYDROCHLORIDE, AND DEXTROMETHORPHAN HYDROBROMIDE 2; 30; 10 MG/5ML; MG/5ML; MG/5ML
SYRUP ORAL
Qty: 140 ML | Refills: 0 | Status: SHIPPED | OUTPATIENT
Start: 2024-12-02

## 2024-12-02 RX ORDER — OMEPRAZOLE 40 MG/1
CAPSULE, DELAYED RELEASE ORAL DAILY
Qty: 30 CAPSULE | Refills: 0 | Status: SHIPPED | OUTPATIENT
Start: 2024-12-02

## 2024-12-02 NOTE — TELEPHONE ENCOUNTER
Last visit- 11/18/2024  Next visit- Visit date not found    Requested Prescriptions     Pending Prescriptions Disp Refills    omeprazole (PRILOSEC) 40 MG delayed release capsule [Pharmacy Med Name: OMEPRAZOLE DR 40MG  CAP] 30 capsule 0     Sig: Take 1 capsule by mouth once daily    brompheniramine-pseudoephedrine-DM 2-30-10 MG/5ML syrup [Pharmacy Med Name: Ijcusmftp-Xkmnyuou-AS 30-2-10 MG/5ML Oral Syrup] 140 mL 0     Sig: TAKE 5 ML BY MOUTH FOUR TIMES DAILY AS NEEDED FOR COUGH OR CONGESTION

## 2024-12-02 NOTE — TELEPHONE ENCOUNTER
Last visit- 11/18/2024  Next visit- Visit date not found    Requested Prescriptions     Pending Prescriptions Disp Refills    brompheniramine-pseudoephedrine-DM 2-30-10 MG/5ML syrup [Pharmacy Med Name: Ioijiwshh-Rgvhwtbj-QY 30-2-10 MG/5ML Oral Syrup] 140 mL 0     Sig: TAKE 5 ML BY MOUTH FOUR TIMES DAILY AS NEEDED FOR COUGH OR CONGESTION

## 2024-12-02 NOTE — TELEPHONE ENCOUNTER
The patient called in stating that she can't apply for her medication assistance program until January she requested we move her appt out until February. Appt has been moved.

## 2024-12-03 ENCOUNTER — TELEPHONE (OUTPATIENT)
Dept: FAMILY MEDICINE CLINIC | Age: 54
End: 2024-12-03

## 2024-12-03 NOTE — TELEPHONE ENCOUNTER
Pt called stating that she has a nose bleed this morning   Advised that she can try a saline nasal spray and that it is probably the cause of cold dry weather  Pt aware and asked if there is anything that she can get prescription wise, writer educated patient that there is nothing to give for a nose bleed that stopped and no other issues

## 2024-12-04 ENCOUNTER — CARE COORDINATION (OUTPATIENT)
Dept: CARE COORDINATION | Age: 54
End: 2024-12-04

## 2024-12-04 NOTE — CARE COORDINATION
I will be working on the applications through the manufacturers for assistance on the high cost medications for 2025. I was able to fax the provider and mail the patient their portion.        Shakila Plascencia [facility]  CC   Medication Assistance  [place] and [place] [facility]    (P) [phone]  (F) [phone]

## 2024-12-10 ENCOUNTER — TELEPHONE (OUTPATIENT)
Dept: FAMILY MEDICINE CLINIC | Age: 54
End: 2024-12-10

## 2024-12-10 RX ORDER — HYDROXYZINE HYDROCHLORIDE 50 MG/1
50 TABLET, FILM COATED ORAL EVERY 8 HOURS PRN
Qty: 90 TABLET | Refills: 0 | Status: SHIPPED | OUTPATIENT
Start: 2024-12-10

## 2024-12-10 NOTE — TELEPHONE ENCOUNTER
Pt called to schedule an appointment to discuss medications. Pt scheduled for 12/17/24 at 11:15am. Pt also informed that her ozempic was shipped to our office and has been ready for pickup. Pt verbalized understanding and states she will  sometime tomorrow.

## 2024-12-10 NOTE — TELEPHONE ENCOUNTER
Last visit- 11/18/2024  Next visit- Visit date not found    Requested Prescriptions     Pending Prescriptions Disp Refills    hydrOXYzine HCl (ATARAX) 50 MG tablet [Pharmacy Med Name: hydrOXYzine HCl 50 MG Oral Tablet] 90 tablet 0     Sig: TAKE 1 TABLET BY MOUTH EVERY 8 HOURS AS NEEDED FOR ITCHING

## 2024-12-11 ENCOUNTER — CARE COORDINATION (OUTPATIENT)
Dept: CARE COORDINATION | Age: 54
End: 2024-12-11

## 2024-12-11 NOTE — CARE COORDINATION
Previous CC patient reached out to [facility] to update that she is now using a bone stimulator 3 hours a day to assist with her ankle healing/pain.  Patient stated she is also increasing her activity as she has been able to go shopping and has returned to doing her laundry.  Patient was encouraged to continue to increase her activity level as she is able, and she verbalized understanding.  Patient shared she recently had her eyes examined and was told she should have some of her medications adjusted to assist with improvement of her vision.  Patient reported her [facility] provider already adjusted her Seroquel (med list updated) and she plans to discuss other medications with PCP at her upcoming appointment.  Patient was instructed to bring medication bottles with her to her PCP appointment for complete review and patient verbalized understanding.  Patient was also encouraged to bring a list of questions/concerns with her to her appointment.  Right Care Right Time education was also reviewed with patient and patient was educated on availability of same day appointments for new/worsening of symptoms.  Patient verbalized understanding and denied any additional questions or concerns.

## 2024-12-15 NOTE — PROGRESS NOTES
[facility]  [address]  [place] [address]  Phone:  [phone]          Name: Donna Waters  : 1970    Chief Complaint   Patient presents with    Discuss Medications    Hip Pain     Right hip pain and stiffness        HPI:     History of Present Illness  The patient is a 54-year-old female who presents today to discuss her medications as well as hip pain.    She has been experiencing persistent right hip pain, which radiates down to her knee. She also reports back pain and discomfort in the sciatic nerve area. There is no pain in the buttocks. Mild numbness and tingling in her leg are present, along with stiffness that impedes her ability to walk straight. The pain does not exacerbate when she bends over to put on her shoes. She has not made any recent changes to her activity level. She expresses concern about the cost of an MRI and wonders if physical therapy might be beneficial. She recalls a previous incident where a physical therapist noticed an issue with her hip while she was undergoing therapy for her right knee. She discontinued the therapy due to a trip to [place]. She plans to start swimming aerobics at the [facility] once she receives clearance from her doctor regarding her ankle. She occasionally takes Tylenol for pain management and resorts to 2 Tylenol PMs at bedtime if she experiences soreness. This regimen has been effective for her back pain, but she remains uncertain about its impact on her hip pain.    Prolonged walking results in ankle pain. She currently uses a TENS unit on her ankle every 3 hours daily and elevates her feet on a recliner. She removes the TENS unit when she needs to use the bathroom.    She has reduced her amitriptyline dosage from 2 tablets to 1 tablet at night. Her Seroquel dosage is 100 mg at night and 50 mg in the morning. She reports improved cognitive function with these adjustments, noting the absence of slurred speech or thinking problems.

## 2024-12-16 ENCOUNTER — TELEPHONE (OUTPATIENT)
Dept: FAMILY MEDICINE CLINIC | Age: 54
End: 2024-12-16

## 2024-12-16 ENCOUNTER — CARE COORDINATION (OUTPATIENT)
Dept: CARE COORDINATION | Age: 54
End: 2024-12-16

## 2024-12-16 RX ORDER — BROMPHENIRAMINE MALEATE, PSEUDOEPHEDRINE HYDROCHLORIDE, AND DEXTROMETHORPHAN HYDROBROMIDE 2; 30; 10 MG/5ML; MG/5ML; MG/5ML
SYRUP ORAL
Qty: 140 ML | Refills: 0 | Status: SHIPPED | OUTPATIENT
Start: 2024-12-16 | End: 2024-12-19

## 2024-12-16 NOTE — TELEPHONE ENCOUNTER
She should not be taking cough medication daily.  It loses it's effectiveness that way and is likely why it's not working.  She can try more natural remedies like honey and allergy medication like Claritin.

## 2024-12-16 NOTE — TELEPHONE ENCOUNTER
Pt called stating that she has been having a cough and productive - asking for cough syrup to be refilled. Stated that it was just refilled two weeks ago, pt states that she takes it every day since she coughs everyday   Please advise

## 2024-12-16 NOTE — CARE COORDINATION
Patient called ACM to share recent frustration and active listening provided.  ACM updated patient of PCP recommendations from PCP office telephone call earlier today.  Patient acknowledged understanding and was encouraged to continue with OTC mucinex.  Patient was encouraged to follow up with any new/worsening of symptoms or questions/concerns.

## 2024-12-16 NOTE — CARE COORDINATION
Patient called to share she remains frustrated with ongoing health concerns in [place], and recent increased medical bills.  Active listening provided t/o our call.  Patient was encouraged to discuss ongoing insurance issues with her  and to f/u with billing and My Chart help desk re: recent/ongoing concerns.  Patient verbalized understanding.  Patient denied any additional questions or concerns.

## 2024-12-19 ENCOUNTER — OFFICE VISIT (OUTPATIENT)
Dept: FAMILY MEDICINE CLINIC | Age: 54
End: 2024-12-19

## 2024-12-19 VITALS
TEMPERATURE: 98.6 F | BODY MASS INDEX: 45.98 KG/M2 | DIASTOLIC BLOOD PRESSURE: 82 MMHG | SYSTOLIC BLOOD PRESSURE: 130 MMHG | WEIGHT: 276 LBS | HEIGHT: 65 IN | RESPIRATION RATE: 16 BRPM | HEART RATE: 78 BPM | OXYGEN SATURATION: 99 %

## 2024-12-19 DIAGNOSIS — M25.551 CHRONIC RIGHT HIP PAIN: Primary | ICD-10-CM

## 2024-12-19 DIAGNOSIS — F20.0 PARANOID SCHIZOPHRENIA (HCC): ICD-10-CM

## 2024-12-19 DIAGNOSIS — G89.29 CHRONIC RIGHT HIP PAIN: Primary | ICD-10-CM

## 2024-12-19 DIAGNOSIS — F99 INSOMNIA DUE TO OTHER MENTAL DISORDER: ICD-10-CM

## 2024-12-19 DIAGNOSIS — F51.05 INSOMNIA DUE TO OTHER MENTAL DISORDER: ICD-10-CM

## 2024-12-19 RX ORDER — TRAMADOL HYDROCHLORIDE 50 MG/1
50 TABLET ORAL EVERY 6 HOURS PRN
COMMUNITY

## 2024-12-19 RX ORDER — AMITRIPTYLINE HYDROCHLORIDE 150 MG/1
150 TABLET ORAL NIGHTLY
Qty: 180 TABLET | Refills: 1
Start: 2024-12-19

## 2024-12-19 RX ORDER — QUETIAPINE FUMARATE 100 MG/1
TABLET, FILM COATED ORAL
Qty: 90 TABLET | Refills: 1
Start: 2024-12-19

## 2024-12-19 ASSESSMENT — ENCOUNTER SYMPTOMS: BACK PAIN: 1

## 2024-12-24 DIAGNOSIS — K21.9 GASTROESOPHAGEAL REFLUX DISEASE, UNSPECIFIED WHETHER ESOPHAGITIS PRESENT: ICD-10-CM

## 2024-12-24 RX ORDER — OMEPRAZOLE 40 MG/1
CAPSULE, DELAYED RELEASE ORAL DAILY
Qty: 30 CAPSULE | Refills: 0 | Status: SHIPPED | OUTPATIENT
Start: 2024-12-24

## 2024-12-24 NOTE — TELEPHONE ENCOUNTER
Last visit- 12/19/2024  Next visit- Visit date not found    Requested Prescriptions     Pending Prescriptions Disp Refills    omeprazole (PRILOSEC) 40 MG delayed release capsule [Pharmacy Med Name: OMEPRAZOLE DR 40MG  CAP] 30 capsule 0     Sig: Take 1 capsule by mouth once daily

## 2024-12-27 ENCOUNTER — TELEPHONE (OUTPATIENT)
Dept: FAMILY MEDICINE CLINIC | Age: 54
End: 2024-12-27

## 2024-12-27 ENCOUNTER — CARE COORDINATION (OUTPATIENT)
Dept: CARE COORDINATION | Age: 54
End: 2024-12-27

## 2024-12-27 DIAGNOSIS — E78.00 PURE HYPERCHOLESTEROLEMIA: ICD-10-CM

## 2024-12-27 RX ORDER — AMOXICILLIN AND CLAVULANATE POTASSIUM 250; 62.5 MG/5ML; MG/5ML
500 POWDER, FOR SUSPENSION ORAL 2 TIMES DAILY
Qty: 200 ML | Refills: 0 | Status: SHIPPED | OUTPATIENT
Start: 2024-12-27 | End: 2025-01-06

## 2024-12-27 RX ORDER — SIMVASTATIN 40 MG
40 TABLET ORAL DAILY
Qty: 90 TABLET | Refills: 3 | Status: SHIPPED | OUTPATIENT
Start: 2024-12-27

## 2024-12-27 NOTE — TELEPHONE ENCOUNTER
Last visit- 12/19/2024  Next visit- Visit date not found    Requested Prescriptions     Pending Prescriptions Disp Refills    simvastatin (ZOCOR) 40 MG tablet [Pharmacy Med Name: Simvastatin 40 MG Oral Tablet] 90 tablet 0     Sig: Take 1 tablet by mouth once daily

## 2024-12-27 NOTE — TELEPHONE ENCOUNTER
Patient called stating she has been spitting up green phlegm for x3 days and coughing. She states she takes Mucinex and nasal spray daily and isn't helping. She is asking if something needs to be called in? No other symptoms noted. Please advise.

## 2024-12-27 NOTE — TELEPHONE ENCOUNTER
Patient called ACM with concerns swallowing Augmentin pills and is asking if liquid form can be sent to [facility] pharmacy in [place] instead.  Please advise.  Thank you!

## 2024-12-27 NOTE — CARE COORDINATION
Previous Care Coordination patient reached out to ACM to share concerns re: patient's husbands medications - see his chart for details.  Patient shared she also continues with cough and is now \"bringing up green stuff\" and she would like PCP updated of recent change.  Patient denied any other symptoms, concerns, or questions and she was encouraged to call with any that may develop.  PCP updated.

## 2024-12-28 DIAGNOSIS — K21.9 GASTROESOPHAGEAL REFLUX DISEASE, UNSPECIFIED WHETHER ESOPHAGITIS PRESENT: ICD-10-CM

## 2024-12-30 ENCOUNTER — TELEPHONE (OUTPATIENT)
Dept: FAMILY MEDICINE CLINIC | Age: 54
End: 2024-12-30

## 2024-12-30 DIAGNOSIS — M79.604 RIGHT LEG PAIN: ICD-10-CM

## 2024-12-30 DIAGNOSIS — G89.29 CHRONIC RIGHT HIP PAIN: Primary | ICD-10-CM

## 2024-12-30 DIAGNOSIS — M25.551 CHRONIC RIGHT HIP PAIN: Primary | ICD-10-CM

## 2024-12-30 RX ORDER — GABAPENTIN 300 MG/1
300 CAPSULE ORAL 3 TIMES DAILY
Qty: 270 CAPSULE | Refills: 0 | Status: SHIPPED | OUTPATIENT
Start: 2024-12-30 | End: 2025-03-30

## 2024-12-30 RX ORDER — OMEPRAZOLE 40 MG/1
CAPSULE, DELAYED RELEASE ORAL DAILY
Qty: 30 CAPSULE | Refills: 0 | OUTPATIENT
Start: 2024-12-30

## 2024-12-30 RX ORDER — OMEPRAZOLE 40 MG/1
40 CAPSULE, DELAYED RELEASE ORAL DAILY
Qty: 90 CAPSULE | Refills: 1 | Status: SHIPPED | OUTPATIENT
Start: 2024-12-30

## 2024-12-30 NOTE — TELEPHONE ENCOUNTER
Last visit- 12/19/2024  Next visit- 12/29/2024    Requested Prescriptions     Pending Prescriptions Disp Refills    omeprazole (PRILOSEC) 40 MG delayed release capsule 30 capsule 0     Sig: Take by mouth daily     Refused Prescriptions Disp Refills    omeprazole (PRILOSEC) 40 MG delayed release capsule [Pharmacy Med Name: OMEPRAZOLE DR 40MG  CAP] 30 capsule 0     Sig: Take 1 capsule by mouth once daily     Refused By: CHERRY CALDWELL     Reason for Refusal: Duplicate Request

## 2024-12-31 ENCOUNTER — TELEPHONE (OUTPATIENT)
Dept: INFUSION THERAPY | Age: 54
End: 2024-12-31

## 2024-12-31 ENCOUNTER — TELEPHONE (OUTPATIENT)
Dept: FAMILY MEDICINE CLINIC | Age: 54
End: 2024-12-31

## 2024-12-31 NOTE — TELEPHONE ENCOUNTER
I emailed an application from [employer] to the patient for signatures and proof of income concerning financial assistance on the Eliquis.

## 2024-12-31 NOTE — TELEPHONE ENCOUNTER
Patient called stating that she has had chronic right leg  pain that feels like a knot when she presses on.   Pt wanted office to contact Dr. Ventura office to let him know. Advised patient that he only saw her for ankle pain. Advised that she called yesterday and an order for PT was placed   Pt aware and will call PT [facility]

## 2025-01-02 RX ORDER — BROMPHENIRAMINE MALEATE, PSEUDOEPHEDRINE HYDROCHLORIDE, AND DEXTROMETHORPHAN HYDROBROMIDE 2; 30; 10 MG/5ML; MG/5ML; MG/5ML
SYRUP ORAL
Qty: 140 ML | Refills: 0 | Status: SHIPPED | OUTPATIENT
Start: 2025-01-02

## 2025-01-02 NOTE — TELEPHONE ENCOUNTER
Last visit- 12/19/2024  Next visit- Visit date not found    Requested Prescriptions     Pending Prescriptions Disp Refills    brompheniramine-pseudoephedrine-DM 2-30-10 MG/5ML syrup [Pharmacy Med Name: Oxrjirvnw-Mvozufrd-SM 30-2-10 MG/5ML Oral Syrup] 140 mL 0     Sig: TAKE 5 MLS BY MOUTH 4 TIMES DAILY AS NEEDED FOR COUGH AND CONGESTION

## 2025-01-04 DIAGNOSIS — E66.01 MORBID OBESITY WITH BMI OF 45.0-49.9, ADULT: ICD-10-CM

## 2025-01-06 ENCOUNTER — HOSPITAL ENCOUNTER (OUTPATIENT)
Dept: PHYSICAL THERAPY | Age: 55
Setting detail: THERAPIES SERIES
Discharge: HOME | End: 2025-01-06
Payer: MEDICARE

## 2025-01-06 ENCOUNTER — TELEPHONE (OUTPATIENT)
Dept: FAMILY MEDICINE CLINIC | Age: 55
End: 2025-01-06

## 2025-01-06 DIAGNOSIS — M25.551 RIGHT HIP PAIN: Primary | ICD-10-CM

## 2025-01-06 PROCEDURE — 97162 PT EVAL MOD COMPLEX 30 MIN: CPT

## 2025-01-06 RX ORDER — SEMAGLUTIDE 0.68 MG/ML
0.25 INJECTION, SOLUTION SUBCUTANEOUS WEEKLY
Qty: 3 ML | Refills: 0 | Status: SHIPPED | OUTPATIENT
Start: 2025-01-06 | End: 2025-01-07 | Stop reason: SDUPTHER

## 2025-01-06 RX ORDER — HYDROXYZINE HYDROCHLORIDE 50 MG/1
50 TABLET, FILM COATED ORAL EVERY 8 HOURS PRN
Qty: 90 TABLET | Refills: 1 | Status: SHIPPED | OUTPATIENT
Start: 2025-01-06

## 2025-01-06 NOTE — PROGRESS NOTES
walking but just sitting is 4/10   Palpation Moderate tenderness and soreness noted in both anterior thighs, both groin areas, both lateral hips    Observation No swelling noted in right hip or thigh. Iliac crests and knee pits level and even in standing   Posture Fair       Range of Motion Limited ER with pain on right but all other motions in right leg WFL-WNL. Left leg WFL-WNL all motions   Strength Left leg 5/5 throughout except ankle is 4/5. Right leg 4-5/5 throughout with pain in hip during hip flexion and tightness in thigh with other testing   Coordination    Sensation Denies numbness or tingling in right leg but has tightness and pain in right hip and into thigh   Bed Mobility    Transfers    Ambulation Decreased speed, even steps but are short and barely demonstrating step through pattern. Limp on right and waddling pattern.    Stairs Has not attempted any stairs due to has none at home and has trouble with them due to her left ankle damage   Balance No recent falls, is unsteady at times and uses a walker sometimes   Special Tests No pain with hip scouring bilaterally            TREATMENT   Precautions: None   Pain: 4/10 right hip sitting    \"X” in shaded column indicates activity completed today    “*\" next to exercise/intervention indicates progression   Modalities Parameters/  Location  Notes                     Manual Therapy Time/Technique  Notes                     Exercise/Intervention   Notes                                                                                  Specific Interventions Next Treatment: US to right lateral hip, manual therapy, stretching, strengthening, gait and balance    Activity/Treatment Tolerance:  [x]  Patient tolerated treatment well  []  Patient limited by fatigue  []  Patient limited by pain   []  Patient limited by medical complications  []  Other:     Assessment: Patient with right hip pain for years that she has not done anything about due to did not really

## 2025-01-06 NOTE — TELEPHONE ENCOUNTER
Nguyen with PT told patient today that she suggest that PCP orders a XR right hip and leg to see why she is having pain during stretches in her hip and thigh.  Please advise

## 2025-01-07 ENCOUNTER — NURSE ONLY (OUTPATIENT)
Dept: FAMILY MEDICINE CLINIC | Age: 55
End: 2025-01-07

## 2025-01-07 ENCOUNTER — HOSPITAL ENCOUNTER (OUTPATIENT)
Age: 55
Discharge: HOME | End: 2025-01-07
Payer: MEDICARE

## 2025-01-07 ENCOUNTER — CARE COORDINATION (OUTPATIENT)
Dept: CARE COORDINATION | Age: 55
End: 2025-01-07

## 2025-01-07 ENCOUNTER — HOSPITAL ENCOUNTER (OUTPATIENT)
Dept: GENERAL RADIOLOGY | Age: 55
Discharge: HOME | End: 2025-01-07
Attending: FAMILY MEDICINE
Payer: MEDICARE

## 2025-01-07 ENCOUNTER — HOSPITAL ENCOUNTER (OUTPATIENT)
Age: 55
End: 2025-01-07

## 2025-01-07 DIAGNOSIS — M25.551 RIGHT HIP PAIN: ICD-10-CM

## 2025-01-07 DIAGNOSIS — N30.00 ACUTE CYSTITIS WITHOUT HEMATURIA: Primary | ICD-10-CM

## 2025-01-07 DIAGNOSIS — E66.01 MORBID OBESITY WITH BMI OF 45.0-49.9, ADULT: ICD-10-CM

## 2025-01-07 DIAGNOSIS — R35.0 URINARY FREQUENCY: Primary | ICD-10-CM

## 2025-01-07 LAB
BILIRUBIN, POC: NEGATIVE
BLOOD URINE, POC: NEGATIVE
CLARITY, POC: CLEAR
COLOR, POC: YELLOW
GLUCOSE URINE, POC: NEGATIVE MG/DL
KETONES, POC: NEGATIVE MG/DL
LEUKOCYTE EST, POC: NORMAL
NITRITE, POC: NEGATIVE
PH, POC: 7
PROTEIN, POC: NEGATIVE MG/DL
SPECIFIC GRAVITY, POC: 1.02
UROBILINOGEN, POC: 0.2 MG/DL

## 2025-01-07 PROCEDURE — 73502 X-RAY EXAM HIP UNI 2-3 VIEWS: CPT

## 2025-01-07 PROCEDURE — 73552 X-RAY EXAM OF FEMUR 2/>: CPT

## 2025-01-07 RX ORDER — SEMAGLUTIDE 0.68 MG/ML
0.25 INJECTION, SOLUTION SUBCUTANEOUS WEEKLY
Qty: 3 ML | Refills: 0 | Status: SHIPPED | OUTPATIENT
Start: 2025-01-07

## 2025-01-07 RX ORDER — NITROFURANTOIN 25; 75 MG/1; MG/1
100 CAPSULE ORAL 2 TIMES DAILY
Qty: 10 CAPSULE | Refills: 0 | Status: SHIPPED | OUTPATIENT
Start: 2025-01-07 | End: 2025-01-12

## 2025-01-07 NOTE — PROGRESS NOTES
Pt c/o frequent urination, lower abdominal pain, lower back pain, urinary incontinence, and urinary urgency. Pt states this has been going on for two weeks. Pt has several drug allergies. Please see list. Pt uses [facility] in [place] as her pharmacy.

## 2025-01-07 NOTE — TELEPHONE ENCOUNTER
Last visit- 2024  Next visit- Visit date not found    Requested Prescriptions     Pending Prescriptions Disp Refills    Semaglutide,0.25 or 0.5MG/DOS, (OZEMPIC, 0.25 OR 0.5 MG/DOSE,) 2 MG/3ML SOPN 3 mL 0     Si.25 mg Once a week at 5 PM

## 2025-01-07 NOTE — CARE COORDINATION
ACM returned call to patient s/p receiving message asking for a return call.  Patient shared she feels she may have a bladder infection/UTI and plans to bring a urine sample to PCP office today, but she was concerned about the road conditions [place]/t the [place] being under a \"level 2\" and she was unsure of what that means, but has since checked with the 's office and they updated her of the meaning.  ACM reviewed with patient where available [facility] resource information can be found and patient verbalized understanding.  Patient will come today to have urine checked and ordered X-rays completed.  Patient denied any other questions, concerns, or needs.

## 2025-01-07 NOTE — PROGRESS NOTES
Patient c/o frequent urination, lower abdominal pain, lower back pain, urinary incontinence, and urinary urgency for two weeks.     Donna was seen today for urinary frequency, abdominal pain, lower back pain and other.    Diagnoses and all orders for this visit:    Acute cystitis without hematuria  -     POCT Urinalysis No Micro (Auto)  -     nitrofurantoin, macrocrystal-monohydrate, (MACROBID) 100 MG capsule; Take 1 capsule by mouth 2 times daily for 5 days      Electronically signed by Flavia Rebolledo MD on 1/7/25.

## 2025-01-08 ENCOUNTER — TELEMEDICINE (OUTPATIENT)
Dept: FAMILY MEDICINE CLINIC | Age: 55
End: 2025-01-08

## 2025-01-08 DIAGNOSIS — L03.119 CELLULITIS OF HAND: Primary | ICD-10-CM

## 2025-01-08 RX ORDER — CEPHALEXIN 500 MG/1
500 CAPSULE ORAL 2 TIMES DAILY
Qty: 10 CAPSULE | Refills: 0 | Status: SHIPPED | OUTPATIENT
Start: 2025-01-08 | End: 2025-01-13

## 2025-01-08 ASSESSMENT — ENCOUNTER SYMPTOMS: COLOR CHANGE: 1

## 2025-01-08 NOTE — PROGRESS NOTES
[facility]  [address]  [place] [address]  Phone:  [phone]       2025    TELEHEALTH EVALUATION    HPI:    Donna Waters (:  1970) has requested an audio/video evaluation for the following concern(s):    For the past few days her hands have been red and a little swollen.  There was a time when she lived in [place] that she had cellulitis on her hands.  She has dry hands but denies cuts on them.  No fevers.  She is able to move her hands/fevers normally.  No increased swelling in her legs.    Review of Systems   Musculoskeletal:  Negative for joint swelling.   Skin:  Positive for color change and rash.       Prior to Visit Medications    Medication Sig Taking? Authorizing Provider   cephALEXin (KEFLEX) 500 MG capsule Take 1 capsule by mouth 2 times daily for 5 days Yes Flavia Rebolledo MD   nitrofurantoin, macrocrystal-monohydrate, (MACROBID) 100 MG capsule Take 1 capsule by mouth 2 times daily for 5 days  Flavia Rebolledo MD   Semaglutide,0.25 or 0.5MG/DOS, (OZEMPIC, 0.25 OR 0.5 MG/DOSE,) 2 MG/3ML SOPN Inject 0.25 mg into the skin Once a week at 5 PM  Flavia Rebolledo MD   hydrOXYzine HCl (ATARAX) 50 MG tablet TAKE 1 TABLET BY MOUTH EVERY 8 HOURS AS NEEDED FOR ITCHING  Flavia Rebolledo MD   brompheniramine-pseudoephedrine-DM 2-30-10 MG/5ML syrup TAKE 5 MLS BY MOUTH 4 TIMES DAILY AS NEEDED FOR COUGH AND CONGESTION  Flavia Rebolledo MD   gabapentin (NEURONTIN) 300 MG capsule Take 1 capsule by mouth 3 times daily for 90 days.  Flavia Rebolledo MD   omeprazole (PRILOSEC) 40 MG delayed release capsule Take 1 capsule by mouth daily  Flavia Rebolledo MD   simvastatin (ZOCOR) 40 MG tablet Take 1 tablet by mouth once daily  Flavia Rebolledo MD   traMADol (ULTRAM) 50 MG tablet Take 1 tablet by mouth every 6 hours as needed for Pain. Max Daily Amount: 200 mg  ProviderMatteo MD   amitriptyline (ELAVIL) 150 MG tablet Take 1 tablet by mouth

## 2025-01-09 LAB
BACTERIA UR CULT: ABNORMAL
ORGANISM: ABNORMAL

## 2025-01-09 RX ORDER — ACYCLOVIR 400 MG/1
TABLET ORAL
Qty: 7 EACH | Refills: 2 | Status: SHIPPED | OUTPATIENT
Start: 2025-01-09

## 2025-01-09 NOTE — TELEPHONE ENCOUNTER
Last visit- 1/8/2025  Next visit- Visit date not found    Requested Prescriptions     Pending Prescriptions Disp Refills    Continuous Glucose Sensor (DEXCOM G7 SENSOR) MISC       Sig: by Does not apply route

## 2025-01-13 ENCOUNTER — OFFICE VISIT (OUTPATIENT)
Dept: ONCOLOGY | Age: 55
End: 2025-01-13
Payer: MEDICARE

## 2025-01-13 ENCOUNTER — HOSPITAL ENCOUNTER (OUTPATIENT)
Dept: INFUSION THERAPY | Age: 55
Discharge: HOME | End: 2025-01-13
Payer: MEDICARE

## 2025-01-13 VITALS
WEIGHT: 271.8 LBS | RESPIRATION RATE: 16 BRPM | TEMPERATURE: 98 F | SYSTOLIC BLOOD PRESSURE: 107 MMHG | OXYGEN SATURATION: 98 % | HEART RATE: 92 BPM | BODY MASS INDEX: 45.29 KG/M2 | HEIGHT: 65 IN | DIASTOLIC BLOOD PRESSURE: 65 MMHG

## 2025-01-13 DIAGNOSIS — I26.99 OTHER ACUTE PULMONARY EMBOLISM WITHOUT ACUTE COR PULMONALE (HCC): Primary | ICD-10-CM

## 2025-01-13 PROCEDURE — 3078F DIAST BP <80 MM HG: CPT | Performed by: INTERNAL MEDICINE

## 2025-01-13 PROCEDURE — 99213 OFFICE O/P EST LOW 20 MIN: CPT | Performed by: INTERNAL MEDICINE

## 2025-01-13 PROCEDURE — 3074F SYST BP LT 130 MM HG: CPT | Performed by: INTERNAL MEDICINE

## 2025-01-13 PROCEDURE — 99211 OFF/OP EST MAY X REQ PHY/QHP: CPT

## 2025-01-13 ASSESSMENT — ENCOUNTER SYMPTOMS
SINUS PRESSURE: 0
TROUBLE SWALLOWING: 0
VOMITING: 0
DIARRHEA: 0
CHEST TIGHTNESS: 0
SORE THROAT: 0
BLOOD IN STOOL: 0
SINUS PAIN: 1
CONSTIPATION: 0
BACK PAIN: 0
EYES NEGATIVE: 1
COUGH: 0
CHOKING: 0
SHORTNESS OF BREATH: 0
ABDOMINAL PAIN: 0
WHEEZING: 0
NAUSEA: 0

## 2025-01-13 NOTE — PROGRESS NOTES
2    apixaban (ELIQUIS) 2.5 MG TABS tablet Take 1 tablet by mouth 2 times daily 180 tablet 3    triamcinolone (KENALOG) 0.1 % cream Apply topically 2 times daily for 7-10 days. 30 g 0    blood glucose test strips (ASCENSIA AUTODISC VI;ONE TOUCH ULTRA TEST VI) strip 1 each by In Vitro route 2 times daily DISPENSE ONE TOUCH ULTRA 2 STRIPS TO TEST ONCE DAILY 100 each 5    pimecrolimus (ELIDEL) 1 % cream Apply topically 2 times daily. 100 g 5    Blood Glucose Monitoring Suppl (ONE TOUCH ULTRA 2) w/Device KIT 1 kit by Does not apply route daily 1 kit 0    Cariprazine HCl (VRAYLAR) 6 MG CAPS capsule Take 1 capsule by mouth daily 90 capsule 1    fluticasone (FLONASE) 50 MCG/ACT nasal spray 1 spray by Each Nostril route daily 32 g 1    melatonin 3 MG TABS tablet Take 10 mg by mouth nightly as needed      docusate sodium (COLACE) 100 MG capsule Take 1 capsule by mouth 2 times daily      LORazepam (ATIVAN) 2 MG tablet Take 1 tablet by mouth every 8 hours as needed.      risperiDONE (RISPERDAL) 4 MG tablet Take 1 tablet by mouth 2 times daily 2mg am 2 mg pm       No current facility-administered medications for this visit.       OARRS:  Controlled Substance Monitoring:    ECO    DATA:  LAB: Contains abnormal data CBC with Auto Differential                Component  Ref Range & Units 4/10/24 1414 23 1545 10/3/22 1520 22 0813 10/9/17 1128 3/3/17 0609 3/1/17 0721   WBC  4.8 - 10.8 thou/mm3 6.4 7.5 6.6 5.5 6.7     RBC  4.20 - 5.40 mill/mm3 4.21 4.61 R 4.37 3.99 Low  4.46     Hemoglobin  12.0 - 16.0 gm/dl 12.6 13.3 13.4 12.0 13.5     Hematocrit  37.0 - 47.0 % 37.7 40.5 40.2 38.4 39.5     MCV  81 - 99 fL 90 87.9 R 92 96.2 R 88.6 R     MCH  26.0 - 33.0 pg 29.9 28.9 R 30.7 30.1 30.3 R     MCHC  32.2 - 35.5 gm/dl 33.4 32.8 R 33.3 31.3 Low  34.2 R     RDW  11.5 - 14.5 % 14.4 13.4 R 13.7  14.6 High      Platelets  130 - 400 thou/mm3 260 269 287 284 288 256  CM   MPV  9.4 - 12.4 fL 9.2 Low  8.9 Low  9.1 Low  10.3 7.8

## 2025-01-14 ENCOUNTER — TELEPHONE (OUTPATIENT)
Dept: FAMILY MEDICINE CLINIC | Age: 55
End: 2025-01-14

## 2025-01-14 ENCOUNTER — NURSE ONLY (OUTPATIENT)
Dept: FAMILY MEDICINE CLINIC | Age: 55
End: 2025-01-14
Payer: MEDICARE

## 2025-01-14 DIAGNOSIS — R35.0 FREQUENT URINATION: ICD-10-CM

## 2025-01-14 DIAGNOSIS — R30.0 DYSURIA: Primary | ICD-10-CM

## 2025-01-14 LAB
BILIRUBIN, URINE: NEGATIVE
BLOOD URINE, POC: NEGATIVE
CHARACTER, URINE: CLEAR
COLOR, UA: YELLOW
GLUCOSE URINE: NEGATIVE MG/DL
KETONES, URINE: NEGATIVE
LEUKOCYTE CLUMPS, URINE: NEGATIVE
NITRITE, URINE: NEGATIVE
PH, URINE: 6 (ref 5–9)
PROTEIN, URINE: NEGATIVE MG/DL
SPECIFIC GRAVITY UA: 1.01 (ref 1–1.03)
UROBILINOGEN, URINE: 0.2 EU/DL (ref 0–1)

## 2025-01-14 PROCEDURE — 99999 PR OFFICE/OUTPT VISIT,PROCEDURE ONLY: CPT | Performed by: FAMILY MEDICINE

## 2025-01-14 PROCEDURE — 81003 URINALYSIS AUTO W/O SCOPE: CPT | Performed by: FAMILY MEDICINE

## 2025-01-14 NOTE — PROGRESS NOTES
Pt here for nurse visit urine check     Pt finished the Keflex for UTI and possible cellulitis on 1/12 and was symptom free when finished abx.    Pt started 1/13 morning with dysuria and frequent urination.  She took 1 dose of Macrobid (she had left from 1/7) last night and 1 dose this am and is symptom free now    [facility]

## 2025-01-14 NOTE — TELEPHONE ENCOUNTER
Patient called stating she finished antibiotic for for UTI.  Patient c/o painful urination today.  Patient is asking for a refill on the antibiotic.    Please advise

## 2025-01-14 NOTE — TELEPHONE ENCOUNTER
Dexcom G7 sensor    [facility]  Insurance response time is 7-14 business days.

## 2025-01-20 ENCOUNTER — TELEPHONE (OUTPATIENT)
Dept: FAMILY MEDICINE CLINIC | Age: 55
End: 2025-01-20

## 2025-01-20 RX ORDER — PREDNISONE 20 MG/1
40 TABLET ORAL DAILY
Qty: 10 TABLET | Refills: 0 | Status: SHIPPED | OUTPATIENT
Start: 2025-01-20

## 2025-01-20 RX ORDER — BROMPHENIRAMINE MALEATE, PSEUDOEPHEDRINE HYDROCHLORIDE, AND DEXTROMETHORPHAN HYDROBROMIDE 2; 30; 10 MG/5ML; MG/5ML; MG/5ML
SYRUP ORAL
Qty: 140 ML | Refills: 0 | Status: SHIPPED | OUTPATIENT
Start: 2025-01-20

## 2025-01-20 NOTE — TELEPHONE ENCOUNTER
Pt called stating she has had nasal congestion for about 4 days. States she had left over predisone and took one pill yesterday and it made her feel better. Pt asking for a refill of prednisone. Pt uses [facility] in [place]. Please advise.

## 2025-01-20 NOTE — TELEPHONE ENCOUNTER
Last visit- 1/8/2025  Next visit- Visit date not found    Requested Prescriptions     Pending Prescriptions Disp Refills    brompheniramine-pseudoephedrine-DM 2-30-10 MG/5ML syrup [Pharmacy Med Name: Xzyiexffn-Asihiqct-IF 30-2-10 MG/5ML Oral Syrup] 140 mL 0     Sig: TAKE 5 ML BY MOUTH  4 TIMES DAILY AS NEEDED FOR COUGH AND CONGESTION

## 2025-01-22 ENCOUNTER — CARE COORDINATION (OUTPATIENT)
Dept: CARE COORDINATION | Age: 55
End: 2025-01-22

## 2025-01-22 NOTE — CARE COORDINATION
Previous [facility] patient who called to share she has started steroid as recently ordered and she continues with some ongoing cold symptoms. Patient reported she is scheduled to see PCP tomorrow and [facility] encouraged her to discuss ongoing symptoms with PCP at her appointment.  Patient was also instructed to bring any questions/concerns and current medications with her to her appointment.  Patient verbalized understanding.  [facility] reviewed infection prevention techniques, such as frequent hand washing, following healthy diet, and getting plenty of rest, with patient and she verbalized understanding.  Active listening provided to patient t/o our call.  Patient denied any other questions, concerns, or needs and she was encouraged to call with any that may develop.

## 2025-01-23 ENCOUNTER — OFFICE VISIT (OUTPATIENT)
Dept: FAMILY MEDICINE CLINIC | Age: 55
End: 2025-01-23
Payer: MEDICARE

## 2025-01-23 VITALS
HEART RATE: 74 BPM | DIASTOLIC BLOOD PRESSURE: 72 MMHG | WEIGHT: 272 LBS | RESPIRATION RATE: 20 BRPM | BODY MASS INDEX: 45.26 KG/M2 | SYSTOLIC BLOOD PRESSURE: 126 MMHG | TEMPERATURE: 97 F | OXYGEN SATURATION: 98 %

## 2025-01-23 DIAGNOSIS — B96.89 ACUTE BACTERIAL SINUSITIS: Primary | ICD-10-CM

## 2025-01-23 DIAGNOSIS — J01.90 ACUTE BACTERIAL SINUSITIS: Primary | ICD-10-CM

## 2025-01-23 PROCEDURE — 3074F SYST BP LT 130 MM HG: CPT | Performed by: FAMILY MEDICINE

## 2025-01-23 PROCEDURE — 99213 OFFICE O/P EST LOW 20 MIN: CPT | Performed by: FAMILY MEDICINE

## 2025-01-23 PROCEDURE — 3078F DIAST BP <80 MM HG: CPT | Performed by: FAMILY MEDICINE

## 2025-01-23 SDOH — ECONOMIC STABILITY: FOOD INSECURITY: WITHIN THE PAST 12 MONTHS, THE FOOD YOU BOUGHT JUST DIDN'T LAST AND YOU DIDN'T HAVE MONEY TO GET MORE.: NEVER TRUE

## 2025-01-23 SDOH — ECONOMIC STABILITY: FOOD INSECURITY: WITHIN THE PAST 12 MONTHS, YOU WORRIED THAT YOUR FOOD WOULD RUN OUT BEFORE YOU GOT MONEY TO BUY MORE.: NEVER TRUE

## 2025-01-23 ASSESSMENT — PATIENT HEALTH QUESTIONNAIRE - PHQ9: DEPRESSION UNABLE TO ASSESS: URGENT/EMERGENT SITUATION

## 2025-01-23 ASSESSMENT — ENCOUNTER SYMPTOMS
SORE THROAT: 1
COUGH: 1
SHORTNESS OF BREATH: 0
RHINORRHEA: 1

## 2025-01-23 NOTE — PROGRESS NOTES
Nostril route daily, Disp: 32 g, Rfl: 1    melatonin 3 MG TABS tablet, Take 10 mg by mouth nightly as needed, Disp: , Rfl:     docusate sodium (COLACE) 100 MG capsule, Take 1 capsule by mouth 2 times daily, Disp: , Rfl:     LORazepam (ATIVAN) 2 MG tablet, Take 1 tablet by mouth every 8 hours as needed., Disp: , Rfl:     risperiDONE (RISPERDAL) 4 MG tablet, Take 1 tablet by mouth 2 times daily 2mg am 2 mg pm, Disp: , Rfl:     Allergies   Allergen Reactions    Msg Shortness Of Breath    Codeine Other (See Comments)     Messes with my mental health    Cyclobenzaprine Other (See Comments)    Lithium Hives    Adhesive Tape Itching and Rash    Doxycycline Hives, Itching and Rash    Other Palpitations     msg       Subjective:      Review of Systems   Constitutional:  Positive for fatigue. Negative for chills and fever.   HENT:  Positive for congestion, rhinorrhea and sore throat.    Respiratory:  Positive for cough. Negative for shortness of breath.    Musculoskeletal:  Positive for arthralgias and myalgias.   Neurological:  Negative for weakness and numbness.       Objective:     /72 (Site: Right Upper Arm, Position: Sitting, Cuff Size: Large Adult)   Pulse 74   Temp 97 °F (36.1 °C) (Temporal)   Resp 20   Wt 123.4 kg (272 lb)   LMP 02/01/2017 (Approximate)   SpO2 98%   BMI 45.26 kg/m²       Physical Exam  Vitals and nursing note reviewed.   Constitutional:       General: She is not in acute distress.     Appearance: She is well-developed.   HENT:      Head: Normocephalic and atraumatic.      Nose: Congestion present.   Eyes:      Conjunctiva/sclera: Conjunctivae normal.   Cardiovascular:      Rate and Rhythm: Normal rate and regular rhythm.      Heart sounds: Normal heart sounds.   Pulmonary:      Effort: Pulmonary effort is normal. No respiratory distress.      Breath sounds: Normal breath sounds. No wheezing.   Musculoskeletal:      Cervical back: Normal range of motion and neck supple.   Skin:

## 2025-01-26 DIAGNOSIS — R00.0 TACHYCARDIA: ICD-10-CM

## 2025-01-27 DIAGNOSIS — E11.9 TYPE 2 DIABETES MELLITUS WITHOUT COMPLICATION, WITHOUT LONG-TERM CURRENT USE OF INSULIN (HCC): Primary | ICD-10-CM

## 2025-01-27 RX ORDER — ACYCLOVIR 800 MG/1
1 TABLET ORAL
Qty: 2 EACH | Refills: 3 | Status: SHIPPED | OUTPATIENT
Start: 2025-01-27

## 2025-01-27 RX ORDER — KETOROLAC TROMETHAMINE 30 MG/ML
1 INJECTION, SOLUTION INTRAMUSCULAR; INTRAVENOUS DAILY
Qty: 1 EACH | Refills: 0 | Status: SHIPPED | OUTPATIENT
Start: 2025-01-27

## 2025-01-27 RX ORDER — METOPROLOL TARTRATE 25 MG/1
25 TABLET, FILM COATED ORAL 2 TIMES DAILY
Qty: 180 TABLET | Refills: 3 | Status: SHIPPED | OUTPATIENT
Start: 2025-01-27

## 2025-01-27 RX ORDER — KETOROLAC TROMETHAMINE 30 MG/ML
INJECTION, SOLUTION INTRAMUSCULAR; INTRAVENOUS
COMMUNITY
End: 2025-01-27 | Stop reason: SDUPTHER

## 2025-01-27 RX ORDER — ACYCLOVIR 800 MG/1
TABLET ORAL
COMMUNITY
End: 2025-01-27 | Stop reason: SDUPTHER

## 2025-01-27 NOTE — TELEPHONE ENCOUNTER
Since the dexcom was denied by her insurance patient spoke with WM and told her to have the freestyle kayley called in as she could get this without a co-pay. Please advise.

## 2025-01-30 ENCOUNTER — TELEPHONE (OUTPATIENT)
Dept: FAMILY MEDICINE CLINIC | Age: 55
End: 2025-01-30

## 2025-01-30 NOTE — TELEPHONE ENCOUNTER
Pt called wanting to let you know that her BS was high today. She stated it was 155 after eating a pepper and some oatmeal.

## 2025-01-30 NOTE — TELEPHONE ENCOUNTER
If she checks it after eating carbs like oatmeal it can be elevated.  155 is OK for someone with diabetes.

## 2025-02-02 NOTE — PROGRESS NOTES
[facility]  [address]  [place] [address]  Phone:  [phone]          Name: Donna Waters  : 1970    Chief Complaint   Patient presents with    Rash       HPI:     History of Present Illness  The patient is a 54-year-old female who presents today for evaluation of a rash on her hands.  She has been experiencing this rash for several weeks, characterized by itching and a sensation of tightness. Despite the application of a lotion recommended by her dermatologist, specifically designed for extremely dry skin, there has been no improvement in her symptoms. She reports that the rash is not confined to her hands but also extends to her left lower leg.  She thinks the rash on her leg may have been from the cast that was recently removed. Her fingers are notably dry and irritated, and she is uncertain about the potential causes of these symptoms. She has not made any recent changes to her personal care products such as soaps or detergents. Prior to the onset of these symptoms, she had not been using any lotion.         Current Outpatient Medications:     brompheniramine-pseudoephedrine-DM 2-30-10 MG/5ML syrup, Take 5 mLs by mouth 4 times daily as needed for Cough or Congestion, Disp: 140 mL, Rfl: 0    triamcinolone (KENALOG) 0.1 % cream, Apply topically to affected areas 2 times daily for up to 1 week., Disp: 30 g, Rfl: 0    Continuous Glucose  (FREESTYLE ERROL 3 READER) MARYAM, 1 each by Does not apply route daily, Disp: 1 each, Rfl: 0    Continuous Glucose Sensor (FREESTYLE ERROL 3 SENSOR) MISC, 1 each by Does not apply route every 14 days, Disp: 2 each, Rfl: 3    metoprolol tartrate (LOPRESSOR) 25 MG tablet, Take 1 tablet by mouth twice daily, Disp: 180 tablet, Rfl: 3    predniSONE (DELTASONE) 20 MG tablet, Take 2 tablets by mouth daily, Disp: 10 tablet, Rfl: 0    Semaglutide,0.25 or 0.5MG/DOS, (OZEMPIC, 0.25 OR 0.5 MG/DOSE,) 2 MG/3ML SOPN, Inject 0.25 mg into the skin Once a

## 2025-02-03 DIAGNOSIS — E11.9 TYPE 2 DIABETES MELLITUS WITHOUT COMPLICATION, WITHOUT LONG-TERM CURRENT USE OF INSULIN (HCC): ICD-10-CM

## 2025-02-03 RX ORDER — KETOROLAC TROMETHAMINE 30 MG/ML
1 INJECTION, SOLUTION INTRAMUSCULAR; INTRAVENOUS DAILY
Qty: 1 EACH | Refills: 0 | Status: SHIPPED | OUTPATIENT
Start: 2025-02-03

## 2025-02-03 RX ORDER — ACYCLOVIR 800 MG/1
1 TABLET ORAL
Qty: 2 EACH | Refills: 3 | Status: SHIPPED | OUTPATIENT
Start: 2025-02-03

## 2025-02-03 ASSESSMENT — PATIENT HEALTH QUESTIONNAIRE - PHQ9
5. POOR APPETITE OR OVEREATING: NOT AT ALL
1. LITTLE INTEREST OR PLEASURE IN DOING THINGS: MORE THAN HALF THE DAYS
5. POOR APPETITE OR OVEREATING: NOT AT ALL
4. FEELING TIRED OR HAVING LITTLE ENERGY: NOT AT ALL
9. THOUGHTS THAT YOU WOULD BE BETTER OFF DEAD, OR OF HURTING YOURSELF: NOT AT ALL
1. LITTLE INTEREST OR PLEASURE IN DOING THINGS: MORE THAN HALF THE DAYS
7. TROUBLE CONCENTRATING ON THINGS, SUCH AS READING THE NEWSPAPER OR WATCHING TELEVISION: MORE THAN HALF THE DAYS
SUM OF ALL RESPONSES TO PHQ9 QUESTIONS 1 & 2: 4
SUM OF ALL RESPONSES TO PHQ QUESTIONS 1-9: 10
8. MOVING OR SPEAKING SO SLOWLY THAT OTHER PEOPLE COULD HAVE NOTICED. OR THE OPPOSITE - BEING SO FIDGETY OR RESTLESS THAT YOU HAVE BEEN MOVING AROUND A LOT MORE THAN USUAL: NOT AT ALL
7. TROUBLE CONCENTRATING ON THINGS, SUCH AS READING THE NEWSPAPER OR WATCHING TELEVISION: MORE THAN HALF THE DAYS
6. FEELING BAD ABOUT YOURSELF - OR THAT YOU ARE A FAILURE OR HAVE LET YOURSELF OR YOUR FAMILY DOWN: MORE THAN HALF THE DAYS
SUM OF ALL RESPONSES TO PHQ QUESTIONS 1-9: 10
SUM OF ALL RESPONSES TO PHQ QUESTIONS 1-9: 10
10. IF YOU CHECKED OFF ANY PROBLEMS, HOW DIFFICULT HAVE THESE PROBLEMS MADE IT FOR YOU TO DO YOUR WORK, TAKE CARE OF THINGS AT HOME, OR GET ALONG WITH OTHER PEOPLE: NOT DIFFICULT AT ALL
SUM OF ALL RESPONSES TO PHQ QUESTIONS 1-9: 10
3. TROUBLE FALLING OR STAYING ASLEEP: MORE THAN HALF THE DAYS
2. FEELING DOWN, DEPRESSED OR HOPELESS: MORE THAN HALF THE DAYS
6. FEELING BAD ABOUT YOURSELF - OR THAT YOU ARE A FAILURE OR HAVE LET YOURSELF OR YOUR FAMILY DOWN: MORE THAN HALF THE DAYS
8. MOVING OR SPEAKING SO SLOWLY THAT OTHER PEOPLE COULD HAVE NOTICED. OR THE OPPOSITE, BEING SO FIGETY OR RESTLESS THAT YOU HAVE BEEN MOVING AROUND A LOT MORE THAN USUAL: NOT AT ALL
10. IF YOU CHECKED OFF ANY PROBLEMS, HOW DIFFICULT HAVE THESE PROBLEMS MADE IT FOR YOU TO DO YOUR WORK, TAKE CARE OF THINGS AT HOME, OR GET ALONG WITH OTHER PEOPLE: NOT DIFFICULT AT ALL
2. FEELING DOWN, DEPRESSED OR HOPELESS: MORE THAN HALF THE DAYS
SUM OF ALL RESPONSES TO PHQ QUESTIONS 1-9: 10
3. TROUBLE FALLING OR STAYING ASLEEP: MORE THAN HALF THE DAYS
4. FEELING TIRED OR HAVING LITTLE ENERGY: NOT AT ALL
9. THOUGHTS THAT YOU WOULD BE BETTER OFF DEAD, OR OF HURTING YOURSELF: NOT AT ALL

## 2025-02-04 ENCOUNTER — CARE COORDINATION (OUTPATIENT)
Dept: CARE COORDINATION | Age: 55
End: 2025-02-04

## 2025-02-05 ENCOUNTER — OFFICE VISIT (OUTPATIENT)
Dept: FAMILY MEDICINE CLINIC | Age: 55
End: 2025-02-05
Payer: MEDICARE

## 2025-02-05 ENCOUNTER — CARE COORDINATION (OUTPATIENT)
Dept: CARE COORDINATION | Age: 55
End: 2025-02-05

## 2025-02-05 VITALS
TEMPERATURE: 98 F | DIASTOLIC BLOOD PRESSURE: 68 MMHG | SYSTOLIC BLOOD PRESSURE: 122 MMHG | HEART RATE: 81 BPM | WEIGHT: 270 LBS | BODY MASS INDEX: 44.93 KG/M2 | OXYGEN SATURATION: 98 % | RESPIRATION RATE: 18 BRPM

## 2025-02-05 DIAGNOSIS — I50.32 CHRONIC DIASTOLIC CONGESTIVE HEART FAILURE (HCC): ICD-10-CM

## 2025-02-05 DIAGNOSIS — L30.9 HAND DERMATITIS: Primary | ICD-10-CM

## 2025-02-05 DIAGNOSIS — E11.9 TYPE 2 DIABETES MELLITUS WITHOUT COMPLICATION, WITHOUT LONG-TERM CURRENT USE OF INSULIN (HCC): ICD-10-CM

## 2025-02-05 DIAGNOSIS — F20.0 PARANOID SCHIZOPHRENIA (HCC): ICD-10-CM

## 2025-02-05 LAB — HBA1C MFR BLD: 5.4 %

## 2025-02-05 PROCEDURE — 3074F SYST BP LT 130 MM HG: CPT | Performed by: FAMILY MEDICINE

## 2025-02-05 PROCEDURE — 3078F DIAST BP <80 MM HG: CPT | Performed by: FAMILY MEDICINE

## 2025-02-05 PROCEDURE — 83036 HEMOGLOBIN GLYCOSYLATED A1C: CPT | Performed by: FAMILY MEDICINE

## 2025-02-05 PROCEDURE — 3044F HG A1C LEVEL LT 7.0%: CPT | Performed by: FAMILY MEDICINE

## 2025-02-05 PROCEDURE — 99214 OFFICE O/P EST MOD 30 MIN: CPT | Performed by: FAMILY MEDICINE

## 2025-02-05 RX ORDER — TRIAMCINOLONE ACETONIDE 1 MG/G
CREAM TOPICAL
Qty: 30 G | Refills: 0 | Status: SHIPPED | OUTPATIENT
Start: 2025-02-05

## 2025-02-05 RX ORDER — BROMPHENIRAMINE MALEATE, PSEUDOEPHEDRINE HYDROCHLORIDE, AND DEXTROMETHORPHAN HYDROBROMIDE 2; 30; 10 MG/5ML; MG/5ML; MG/5ML
5 SYRUP ORAL 4 TIMES DAILY PRN
Qty: 140 ML | Refills: 0 | Status: SHIPPED | OUTPATIENT
Start: 2025-02-05

## 2025-02-05 NOTE — CARE COORDINATION
Donna called stating she does not have any of the paperwork I sent them for re enrollment into the to PAP programs I sent 2 months ago. I told her I will just fax [facility] office all the paperwork and they can go in and sign that way.        Shakila Plascencia [facility]  CC   Medication Assistance  [place]    (P) [phone]  (F) [phone]

## 2025-02-05 NOTE — CARE COORDINATION
Patient signed her copies of the ozempic henry while in the office. I faxed the application to the company for renewal.

## 2025-02-06 ENCOUNTER — CARE COORDINATION (OUTPATIENT)
Dept: CARE COORDINATION | Age: 55
End: 2025-02-06

## 2025-02-06 NOTE — CARE COORDINATION
I called [employer] to check on her application for Ozempic and was told her application was active until 9/2025.       Shakila Plascencia [facility]  CC   Medication Assistance  [facility] [place] [facility]    (P) [phone]  (F) [phone]

## 2025-02-08 DIAGNOSIS — E11.9 TYPE 2 DIABETES MELLITUS WITHOUT COMPLICATION, WITHOUT LONG-TERM CURRENT USE OF INSULIN: ICD-10-CM

## 2025-02-10 RX ORDER — SENNA AND DOCUSATE SODIUM 50; 8.6 MG/1; MG/1
2 TABLET, FILM COATED ORAL NIGHTLY
COMMUNITY
End: 2025-02-10 | Stop reason: SDUPTHER

## 2025-02-10 RX ORDER — ACYCLOVIR 400 MG/1
TABLET ORAL
Qty: 7 EACH | Refills: 0 | OUTPATIENT
Start: 2025-02-10

## 2025-02-10 RX ORDER — SENNA AND DOCUSATE SODIUM 50; 8.6 MG/1; MG/1
2 TABLET, FILM COATED ORAL NIGHTLY
Qty: 90 TABLET | Refills: 1 | Status: SHIPPED | OUTPATIENT
Start: 2025-02-10

## 2025-02-10 NOTE — TELEPHONE ENCOUNTER
Pt requesting a refill of a medication she received after her hysterectomy for constipation  Med is Eqsenna    Pt states she takes 2 tablets hs  She has enough meds to get her until Dr REYES returns on Thurs 2/5/2025  Visit date not found    Script entered    Pt also states she has runny nose and watery eyes. No other symptoms. Instructed to continue taking her allergy meds and tx symptomatically

## 2025-02-10 NOTE — TELEPHONE ENCOUNTER
Ro from [facility] [phone] states the 1/23/2025 office notes that we sent does not include insulin treatment or recurrent level 2 hypoglycemic or hx of level 3 hypoglycemic    She is requesting an addended office note    Informed Dr REYES would not be back into the office until 2/13.  She asked that this be addressed ASAP when Dr REYES returns.. Told her I would [facility]    Fax to [phone]

## 2025-02-14 ENCOUNTER — TELEPHONE (OUTPATIENT)
Dept: FAMILY MEDICINE CLINIC | Age: 55
End: 2025-02-14

## 2025-02-14 ENCOUNTER — CARE COORDINATION (OUTPATIENT)
Dept: CARE COORDINATION | Age: 55
End: 2025-02-14

## 2025-02-14 NOTE — TELEPHONE ENCOUNTER
Called Mayi to ask about her pain, she states her pain today is a 7 this am. She has been taking 500mg of Tylenol every 8hrs as stated on bottle. Last dose was 5am, still not cutting the pain.  Using saline spray. Writer will confer with  and return recommendations.   I also spoke with patient today. She told me that she called the eye doctor, they offered her an appointment today and she declined because it was valentines day and her anniversary and she wanted to spend the day with her .  She told me she scheduled for Monday. I also already advised pt to get OTC eye drops as directed by her PCP.

## 2025-02-14 NOTE — CARE COORDINATION
Patient returned call and shared she is unable to f/u with her eye dr as she has to pay $60 up front and total bill may be over $100 and she is unable to afford medication.  Patient is asking for PCP appointment on Monday.  Please advise and assist with scheduling.

## 2025-02-14 NOTE — CARE COORDINATION
Patient called ACM with concerns regarding right eye being \"puffy and blood shot\" and then has increased watery drainage with the drainage being worse at night.  Patient shared eye is also \"itchy and sore at times.\"  Patient shared she has only tried visine for dry/itchy eyes and her allergy medications with no relief.  Patient stated she also tried children's benadryl with some minimal relief.  ACM encouraged patient to try warm compress and to f/u with her eye Dr (sees provider at [facility] and they do not open until 9) to see if she can have eye looked at to be sure there is nothing in the eye or any scratches.  Patient verbalized understanding and will call if she is able to schedule an appointment.  Patient denied any other questions, concerns, or needs.

## 2025-02-14 NOTE — TELEPHONE ENCOUNTER
Pt called stating that her eye has been watering the last 5 days. She has been using visine and her allergy medication without relief. Writer advised her to call her eye doctor, but she wanted a message sent to you to see what you would recommend. Please advise.

## 2025-02-14 NOTE — CARE COORDINATION
Patient called asking for an update as if she goes to the Eye Dr she has to pay a co-pay.  ACM updated patient of PCP recommendations to try artificial tears as watery eyes may be caused by dry eyes.  Patient verbalized understanding and will start artificial tears if she is unable to f/u with the eye Dr.

## 2025-02-17 RX ORDER — IBUPROFEN 800 MG/1
800 TABLET, FILM COATED ORAL 3 TIMES DAILY PRN
Qty: 90 TABLET | Refills: 0 | Status: SHIPPED | OUTPATIENT
Start: 2025-02-17

## 2025-02-17 ASSESSMENT — ENCOUNTER SYMPTOMS
EYE REDNESS: 0
EYE PAIN: 0
EYE DISCHARGE: 1

## 2025-02-17 NOTE — TELEPHONE ENCOUNTER
Last visit- 2/5/2025  Next visit- 2/19/2025    Requested Prescriptions     Pending Prescriptions Disp Refills    ibuprofen (ADVIL;MOTRIN) 800 MG tablet [Pharmacy Med Name: Ibuprofen 800 MG Oral Tablet] 90 tablet 0     Sig: TAKE 1 TABLET BY MOUTH THREE TIMES DAILY AS NEEDED FOR PAIN

## 2025-02-17 NOTE — PROGRESS NOTES
[facility]  [address]  [place] [address]  Phone:  [phone]          Name: Donna Waters  : 1970    Chief Complaint   Patient presents with    Other     -Watery eyes, itchy, and swollen   -Left ankle swelling  -Right side pain       HPI:     History of Present Illness  The patient is a 54-year-old female who presents today to discuss watery eyes that are itchy and swollen, left ankle swelling, right-sided pain, and nasal congestion.    She has been experiencing persistent lacrimation for approximately one week. She reports no ocular discomfort or redness but notes an episode of facial edema. There is no evidence of ocular discharge or crusting. She is not currently using any ophthalmic solutions.    She reports edema in her left ankle, which has been present for about a week. She does not recall any recent trauma to the area. She had a surgical intervention on the same ankle on 2024, during which six screws were inserted. Postoperatively, she was immobilized in a cast for a duration of 12 weeks. Despite the swelling, she retains mobility in the ankle. The contralateral ankle remains unaffected. She was scheduled to complete physical therapy but discontinued due to financial constraints. She possesses compression stockings but reports they are too small for her current needs.    She experiences intermittent right-sided abdominal pain, which she has been experiencing for about a week. She has previously undergone cholecystectomy and hysterectomy. Her bowel movements are regular, although not as frequent as she would prefer. She is currently on medication for constipation, which was prescribed post-hysterectomy. She has exhausted her refills for this medication. She took three doses of Senokot the previous night without any resultant bowel movement. Her last dose of MiraLAX was on Monday night.    MEDICATIONS  Current: MiraLAX, Senokot        Current Outpatient Medications:

## 2025-02-18 ENCOUNTER — CARE COORDINATION (OUTPATIENT)
Dept: CARE COORDINATION | Age: 55
End: 2025-02-18

## 2025-02-18 NOTE — CARE COORDINATION
Patient called ACM to share she was unable to f/u with her eye dr yesterday d/t her sister had a fall and broke her leg and ankle.  Patient shared she was unable to go to appointment as she had to be at the hospital with her sister for her surgery.  Patient reported she is scheduled to see PCP tomorrow and plans to discuss ongoing eye infection concerns as well as recent c/o ear pain and green sinus drainage.  ACM educated patient on the importance of keeping appointment as scheduled for evaluation and updated PCP of ongoing concerns.  Patient encouraged to continue with use of warm compresses as needed.  Patient verbalized understanding and denied any other questions, concerns, or needs.

## 2025-02-19 ENCOUNTER — OFFICE VISIT (OUTPATIENT)
Dept: FAMILY MEDICINE CLINIC | Age: 55
End: 2025-02-19
Payer: MEDICARE

## 2025-02-19 VITALS
SYSTOLIC BLOOD PRESSURE: 120 MMHG | HEIGHT: 65 IN | DIASTOLIC BLOOD PRESSURE: 76 MMHG | HEART RATE: 70 BPM | TEMPERATURE: 97.3 F | WEIGHT: 279 LBS | RESPIRATION RATE: 16 BRPM | BODY MASS INDEX: 46.48 KG/M2 | OXYGEN SATURATION: 100 %

## 2025-02-19 DIAGNOSIS — H04.123 DRY EYES: Primary | ICD-10-CM

## 2025-02-19 DIAGNOSIS — F51.01 PRIMARY INSOMNIA: ICD-10-CM

## 2025-02-19 DIAGNOSIS — M25.472 LEFT ANKLE SWELLING: ICD-10-CM

## 2025-02-19 PROCEDURE — 3078F DIAST BP <80 MM HG: CPT | Performed by: FAMILY MEDICINE

## 2025-02-19 PROCEDURE — 99214 OFFICE O/P EST MOD 30 MIN: CPT | Performed by: FAMILY MEDICINE

## 2025-02-19 PROCEDURE — 3074F SYST BP LT 130 MM HG: CPT | Performed by: FAMILY MEDICINE

## 2025-02-19 RX ORDER — TRAZODONE HYDROCHLORIDE 100 MG/1
TABLET ORAL
Qty: 180 TABLET | Refills: 1 | Status: SHIPPED | OUTPATIENT
Start: 2025-02-19

## 2025-02-19 ASSESSMENT — ENCOUNTER SYMPTOMS: CONSTIPATION: 1

## 2025-02-24 ENCOUNTER — LAB (OUTPATIENT)
Dept: LAB | Age: 55
End: 2025-02-24

## 2025-02-24 DIAGNOSIS — E11.9 TYPE 2 DIABETES MELLITUS WITHOUT COMPLICATION, WITHOUT LONG-TERM CURRENT USE OF INSULIN (HCC): ICD-10-CM

## 2025-02-24 LAB
ALBUMIN SERPL BCG-MCNC: 4.4 G/DL (ref 3.5–5.1)
ALP SERPL-CCNC: 154 U/L (ref 38–126)
ALT SERPL W/O P-5'-P-CCNC: 21 U/L (ref 11–66)
ANION GAP SERPL CALC-SCNC: 13 MEQ/L (ref 8–16)
AST SERPL-CCNC: 19 U/L (ref 5–40)
BILIRUB SERPL-MCNC: 0.4 MG/DL (ref 0.3–1.2)
BUN SERPL-MCNC: 11 MG/DL (ref 7–22)
CALCIUM SERPL-MCNC: 9.8 MG/DL (ref 8.2–9.6)
CHLORIDE SERPL-SCNC: 97 MEQ/L (ref 98–111)
CHOLEST SERPL-MCNC: 173 MG/DL (ref 100–199)
CO2 SERPL-SCNC: 28 MEQ/L (ref 23–33)
CREAT SERPL-MCNC: 0.7 MG/DL (ref 0.4–1.2)
CREAT UR-MCNC: 150.5 MG/DL
GFR SERPL CREATININE-BSD FRML MDRD: > 90 ML/MIN/1.73M2
GLUCOSE SERPL-MCNC: 98 MG/DL (ref 70–108)
HDLC SERPL-MCNC: 63 MG/DL
LDLC SERPL CALC-MCNC: 81 MG/DL
MICROALBUMIN UR-MCNC: 2.04 MG/DL
MICROALBUMIN/CREAT RATIO PNL UR: 14 MG/G (ref 0–30)
POTASSIUM SERPL-SCNC: 4.5 MEQ/L (ref 3.5–5.2)
PROT SERPL-MCNC: 7 G/DL (ref 6.1–8)
SODIUM SERPL-SCNC: 138 MEQ/L (ref 135–145)
TRIGL SERPL-MCNC: 145 MG/DL (ref 0–199)

## 2025-02-26 ENCOUNTER — TELEPHONE (OUTPATIENT)
Dept: FAMILY MEDICINE CLINIC | Age: 55
End: 2025-02-26

## 2025-02-26 NOTE — TELEPHONE ENCOUNTER
Was she having any chest pain or dizziness at the time, or does she think it was just from her blood sugar dropping quickly?  If she thinks it was her sugar, suggest frequent protein-filled snacks to help keep her BG stable.

## 2025-02-26 NOTE — TELEPHONE ENCOUNTER
Patient called stating she was visiting her sister [place] yesterday and she blacked out while pushing her in a wheelchair.  Patient states she did not fall when she blacked out.  Patient states after she got home she check her BS and it was 86.  Patient states she did eat lunch with her sister that day.  Patient states this morning her BS was 144 she checked it after taking her medications and eating a yogurt. Patient states it then dropped to 88.

## 2025-02-27 NOTE — TELEPHONE ENCOUNTER
This is the second time patient called concerned why CHF is in her chart. It is in her office visit from 2/5/25 and said that she is asymptomatic but will Rochelle to monitor

## 2025-02-27 NOTE — TELEPHONE ENCOUNTER
Patient called back. She just thinks its her sugar dropping too quickly, so informed her of the frequent protein filled snacks. She stated understanding. She also asked if the CHF diagnosis in her chart is true? She saw it on her chart and on her AVS after her appts. Please address and clarify this for patient.

## 2025-02-28 ENCOUNTER — CARE COORDINATION (OUTPATIENT)
Dept: CARE COORDINATION | Age: 55
End: 2025-02-28

## 2025-02-28 DIAGNOSIS — R73.03 PREDIABETES: ICD-10-CM

## 2025-02-28 NOTE — CARE COORDINATION
Previous Care Coordination patient called ACM to talk and provide update.  Patient shared she had episode a couple of days ago while visiting her sister at local SNF.  Patient describes near syncopal episode/hypoglycemia symptoms.  Patient shared she was pushing her sister from the dining room to her sister's room and noted her \"heart to be beating hard/fast\"  and then she hit the wall with her sister's wheelchair and things \"turned black.\"  Patient denied any c/o increased/worsening SOB or dizziness being present.  Patient denied any return of symptoms since that episode.  Patient reported she is scheduled to PCP in f/u on 3/6.  Patient also concerned as she feels her BS \"drop quickly\" and she is frustrated her insurance will not cover CGM.  ACM reviewed CGM information with patient and provided active listening.  Patient shared BS was 145 upon awakening and then dropped quickly to 86 after drinking just coffee.  ACM shared BS information with patient and educated that 145 is not a significantly high BS reading.  Patient was also educated on DM diet information and importance of routine eating and including protein with each meal as well as a protein rich snack prior to bed.  Patient acknowledged understanding.  Patient encouraged to discuss recent symptoms and concerns with PCP at her upcoming appointment.  ACM also reviewed importance of following up with any new or return of symptoms.  Patient verbalized understanding.  Patient denied any other questions, concerns, or needs and she was encouraged to call with any that may develop.

## 2025-03-01 DIAGNOSIS — L30.9 HAND DERMATITIS: ICD-10-CM

## 2025-03-03 RX ORDER — BLOOD SUGAR DIAGNOSTIC
STRIP MISCELLANEOUS
Qty: 200 EACH | Refills: 0 | Status: SHIPPED | OUTPATIENT
Start: 2025-03-03

## 2025-03-03 RX ORDER — TRIAMCINOLONE ACETONIDE 1 MG/G
CREAM TOPICAL
Qty: 30 G | Refills: 0 | Status: SHIPPED | OUTPATIENT
Start: 2025-03-03

## 2025-03-03 RX ORDER — HYDROXYZINE HYDROCHLORIDE 50 MG/1
50 TABLET, FILM COATED ORAL EVERY 8 HOURS PRN
Qty: 90 TABLET | Refills: 1 | Status: SHIPPED | OUTPATIENT
Start: 2025-03-03

## 2025-03-03 NOTE — PROGRESS NOTES
supplements will be provided to maintain her potassium levels, which can be affected by Bumex. Both prescriptions will be sent to [facility].    2. Rash on the chest.  The etiology of the rash is uncertain, with potential causes being either fungal or bacterial in nature. A topical cream will be prescribed for application twice daily to the affected area. She is advised not to apply the cream across her entire chest, but rather to target the specific areas of concern.      Orders:  Left ankle swelling  -     potassium chloride (KLOR-CON M) 20 MEQ extended release tablet; Take 1 tablet by mouth daily  -     bumetanide (BUMEX) 1 MG tablet; Take 1 tablet by mouth daily    Rash and nonspecific skin eruption  -     clotrimazole-betamethasone (LOTRISONE) 1-0.05 % cream; Apply topically 2 times daily for up to 1 week.      The patient (or guardian, if applicable) and other individuals in attendance with the patient were advised that Artificial Intelligence will be utilized during this visit to record, process the conversation to generate a clinical note, and support improvement of the AI technology. The patient (or guardian, if applicable) and other individuals in attendance at the appointment consented to the use of AI, including the recording.        Return if symptoms worsen or fail to improve.    Electronically signed by Flavia Rebolledo MD on 3/6/2025 at 10:20 AM

## 2025-03-06 ENCOUNTER — TELEPHONE (OUTPATIENT)
Dept: FAMILY MEDICINE CLINIC | Age: 55
End: 2025-03-06

## 2025-03-06 ENCOUNTER — OFFICE VISIT (OUTPATIENT)
Dept: FAMILY MEDICINE CLINIC | Age: 55
End: 2025-03-06
Payer: MEDICARE

## 2025-03-06 VITALS
TEMPERATURE: 97.9 F | SYSTOLIC BLOOD PRESSURE: 122 MMHG | DIASTOLIC BLOOD PRESSURE: 78 MMHG | RESPIRATION RATE: 15 BRPM | WEIGHT: 273 LBS | HEART RATE: 71 BPM | BODY MASS INDEX: 45.43 KG/M2

## 2025-03-06 DIAGNOSIS — M25.472 LEFT ANKLE SWELLING: Primary | ICD-10-CM

## 2025-03-06 DIAGNOSIS — R21 RASH AND NONSPECIFIC SKIN ERUPTION: ICD-10-CM

## 2025-03-06 PROCEDURE — 99214 OFFICE O/P EST MOD 30 MIN: CPT | Performed by: FAMILY MEDICINE

## 2025-03-06 PROCEDURE — 3078F DIAST BP <80 MM HG: CPT | Performed by: FAMILY MEDICINE

## 2025-03-06 PROCEDURE — 3074F SYST BP LT 130 MM HG: CPT | Performed by: FAMILY MEDICINE

## 2025-03-06 RX ORDER — POTASSIUM CHLORIDE 1500 MG/1
20 TABLET, EXTENDED RELEASE ORAL DAILY
Qty: 90 TABLET | Refills: 0 | Status: SHIPPED | OUTPATIENT
Start: 2025-03-06

## 2025-03-06 RX ORDER — BUMETANIDE 1 MG/1
1 TABLET ORAL DAILY
Qty: 90 TABLET | Refills: 0 | Status: SHIPPED | OUTPATIENT
Start: 2025-03-06

## 2025-03-06 RX ORDER — GENTAMICIN SULFATE 3 MG/ML
1 SOLUTION/ DROPS OPHTHALMIC EVERY 4 HOURS
Qty: 5 ML | Refills: 0 | Status: SHIPPED | OUTPATIENT
Start: 2025-03-06 | End: 2025-03-16

## 2025-03-06 RX ORDER — CLOTRIMAZOLE AND BETAMETHASONE DIPROPIONATE 10; .64 MG/G; MG/G
CREAM TOPICAL
Qty: 15 G | Refills: 0 | Status: SHIPPED | OUTPATIENT
Start: 2025-03-06

## 2025-03-06 ASSESSMENT — ENCOUNTER SYMPTOMS: SHORTNESS OF BREATH: 0

## 2025-03-06 NOTE — TELEPHONE ENCOUNTER
Patient called in asking if the antibiotic eye drops were called in since [facility] did not have them. Patient states she spoke to pcp about this at her visit today. Please advise.

## 2025-03-12 DIAGNOSIS — E78.00 PURE HYPERCHOLESTEROLEMIA: ICD-10-CM

## 2025-03-12 DIAGNOSIS — R00.0 TACHYCARDIA: ICD-10-CM

## 2025-03-12 DIAGNOSIS — F51.01 PRIMARY INSOMNIA: ICD-10-CM

## 2025-03-12 DIAGNOSIS — K21.9 GASTROESOPHAGEAL REFLUX DISEASE, UNSPECIFIED WHETHER ESOPHAGITIS PRESENT: ICD-10-CM

## 2025-03-12 DIAGNOSIS — M25.472 LEFT ANKLE SWELLING: ICD-10-CM

## 2025-03-12 DIAGNOSIS — J30.2 SEASONAL ALLERGIES: ICD-10-CM

## 2025-03-12 NOTE — TELEPHONE ENCOUNTER
Pt is wanting all pended meds sent to [facility] in [place]. Pharmacy entered. She stated they do pill packs which would be easier for her.

## 2025-03-13 ENCOUNTER — CARE COORDINATION (OUTPATIENT)
Dept: CARE COORDINATION | Age: 55
End: 2025-03-13

## 2025-03-13 RX ORDER — SIMVASTATIN 40 MG
40 TABLET ORAL DAILY
Qty: 90 TABLET | Refills: 3 | Status: SHIPPED | OUTPATIENT
Start: 2025-03-13

## 2025-03-13 RX ORDER — BUMETANIDE 1 MG/1
1 TABLET ORAL DAILY
Qty: 90 TABLET | Refills: 0 | Status: SHIPPED | OUTPATIENT
Start: 2025-03-13

## 2025-03-13 RX ORDER — AMITRIPTYLINE HYDROCHLORIDE 150 MG/1
150 TABLET ORAL NIGHTLY
Qty: 180 TABLET | Refills: 1 | Status: SHIPPED | OUTPATIENT
Start: 2025-03-13

## 2025-03-13 RX ORDER — GABAPENTIN 300 MG/1
300 CAPSULE ORAL 3 TIMES DAILY
Qty: 270 CAPSULE | Refills: 0 | Status: SHIPPED | OUTPATIENT
Start: 2025-03-13 | End: 2025-06-11

## 2025-03-13 RX ORDER — TRAZODONE HYDROCHLORIDE 100 MG/1
TABLET ORAL
Qty: 180 TABLET | Refills: 1 | Status: SHIPPED | OUTPATIENT
Start: 2025-03-13

## 2025-03-13 RX ORDER — OMEPRAZOLE 40 MG/1
40 CAPSULE, DELAYED RELEASE ORAL DAILY
Qty: 90 CAPSULE | Refills: 1 | Status: SHIPPED | OUTPATIENT
Start: 2025-03-13

## 2025-03-13 RX ORDER — HYDROXYZINE HYDROCHLORIDE 50 MG/1
50 TABLET, FILM COATED ORAL EVERY 8 HOURS PRN
Qty: 90 TABLET | Refills: 1 | Status: SHIPPED | OUTPATIENT
Start: 2025-03-13

## 2025-03-13 RX ORDER — LEVOCETIRIZINE DIHYDROCHLORIDE 5 MG/1
5 TABLET, FILM COATED ORAL NIGHTLY
Qty: 90 TABLET | Refills: 1 | Status: SHIPPED | OUTPATIENT
Start: 2025-03-13

## 2025-03-13 RX ORDER — POTASSIUM CHLORIDE 1500 MG/1
20 TABLET, EXTENDED RELEASE ORAL DAILY
Qty: 90 TABLET | Refills: 0 | Status: SHIPPED | OUTPATIENT
Start: 2025-03-13

## 2025-03-13 RX ORDER — METOPROLOL TARTRATE 25 MG/1
25 TABLET, FILM COATED ORAL 2 TIMES DAILY
Qty: 180 TABLET | Refills: 3 | Status: SHIPPED | OUTPATIENT
Start: 2025-03-13

## 2025-03-13 RX ORDER — IBUPROFEN 800 MG/1
800 TABLET, FILM COATED ORAL 3 TIMES DAILY PRN
Qty: 90 TABLET | Refills: 0 | Status: SHIPPED | OUTPATIENT
Start: 2025-03-13

## 2025-03-13 NOTE — CARE COORDINATION
Patient called ACM to share she is going to be receiving Pill Packets from [facility].  ACM educated patient on the importance of taking medications as directed and being sure any medication changes get forwarded to patient's pharmacy so packets can be updated.  Patient verbalized understanding.  Patient shared she continues with thick sputum production and ACM encouraged patient to push fluids and take her Mucinex as prescribed for ongoing congestion concerns.  Patient acknowledged understanding.  ACM reviewed signs/symptoms to report and need to call for earlier appointments with any new/worsening of symptoms.  Patient acknowledged understanding.  Patient shared she also has concerns re: her  - see his chart for information.  Patient denied any other questions, concerns, or needs and she was encouraged to call with any that may develop.

## 2025-03-18 ENCOUNTER — CARE COORDINATION (OUTPATIENT)
Dept: CARE COORDINATION | Age: 55
End: 2025-03-18

## 2025-03-18 DIAGNOSIS — R50.9 FEVER, UNSPECIFIED FEVER CAUSE: Primary | ICD-10-CM

## 2025-03-18 NOTE — CARE COORDINATION
Patient called M d/t \"not feeling well.\"  Patient shared she is having symptoms of sore throat, cough, and chills.  Patient unable to come to office tomorrow and per office no openings available today.  Patient is asking if flu/COVID-19 swab orders can be placed and she will go have completed at [facility] when she is able to later today.  PCP updated.  Patient denied any other questions, concerns, or needs and she was instructed to call with any that may develop.

## 2025-03-18 NOTE — PROGRESS NOTES
GLUCOSE TWICE DAILY AS DIRECTED Yes Flavia Rebolledo MD   triamcinolone (KENALOG) 0.1 % cream APPLY TOPICALLY TO AFFECTED AREA 2 TIMES DAILY FOR UP TO 1 WEEK Yes Flavia Rebolledo MD   Compression Stockings MISC by Does not apply route Dispense 1 pair of compression stockings with 30-40 mmHg Yes Flavia Rebolledo MD   sennosides-docusate sodium (SENOKOT-S) 8.6-50 MG tablet Take 2 tablets by mouth nightly Yes Syed Figueroa APRN - CNP   brompheniramine-pseudoephedrine-DM 2-30-10 MG/5ML syrup Take 5 mLs by mouth 4 times daily as needed for Cough or Congestion Yes Flavia Rebolledo MD   Semaglutide,0.25 or 0.5MG/DOS, (OZEMPIC, 0.25 OR 0.5 MG/DOSE,) 2 MG/3ML SOPN Inject 0.25 mg into the skin Once a week at 5 PM Yes Flavia Rebolledo MD   traMADol (ULTRAM) 50 MG tablet Take 1 tablet by mouth every 6 hours as needed for Pain. Yes Matteo Modi MD   QUEtiapine (SEROQUEL) 100 MG tablet Take 50 mg in the morning and 100 mg at night. Yes Flavia Rebolledo MD   triamcinolone (KENALOG) 0.1 % cream Apply topically 2 times daily for 7-10 days. Yes Flavia Rebolledo MD   pimecrolimus (ELIDEL) 1 % cream Apply topically 2 times daily. Yes Laurel Yhe MD   Blood Glucose Monitoring Suppl (ONE TOUCH ULTRA 2) w/Device KIT 1 kit by Does not apply route daily Yes Flavia Rebolledo MD   fluticasone (FLONASE) 50 MCG/ACT nasal spray 1 spray by Each Nostril route daily Yes Liane Ferris APRN - CNP   melatonin 3 MG TABS tablet Take 10 mg by mouth nightly as needed Yes Matteo Modi MD   docusate sodium (COLACE) 100 MG capsule Take 1 capsule by mouth 2 times daily Yes Matteo Modi MD   LORazepam (ATIVAN) 2 MG tablet Take 1 tablet by mouth every 8 hours as needed. Yes Matteo Modi MD   risperiDONE (RISPERDAL) 4 MG tablet Take 1 tablet by mouth 2 times daily 2mg am 2 mg pm Yes Provider, MD Matteo   Continuous Glucose  (FREESTYLE ERROL 3 READER) MARYAM 1 each

## 2025-03-19 ENCOUNTER — TELEPHONE (OUTPATIENT)
Dept: FAMILY MEDICINE CLINIC | Age: 55
End: 2025-03-19

## 2025-03-19 RX ORDER — GENTAMICIN SULFATE 3 MG/ML
1 SOLUTION/ DROPS OPHTHALMIC EVERY 4 HOURS
Qty: 5 ML | Refills: 0 | Status: SHIPPED | OUTPATIENT
Start: 2025-03-19 | End: 2025-03-29

## 2025-03-19 NOTE — TELEPHONE ENCOUNTER
Pt called stating that her right eye is still having drainage and wanting a refill of the antibiotic eye drops

## 2025-03-20 PROBLEM — I50.32 CHRONIC DIASTOLIC CONGESTIVE HEART FAILURE (HCC): Status: RESOLVED | Noted: 2025-02-05 | Resolved: 2025-03-20

## 2025-03-22 SDOH — HEALTH STABILITY: PHYSICAL HEALTH: ON AVERAGE, HOW MANY MINUTES DO YOU ENGAGE IN EXERCISE AT THIS LEVEL?: 20 MIN

## 2025-03-22 SDOH — HEALTH STABILITY: PHYSICAL HEALTH: ON AVERAGE, HOW MANY DAYS PER WEEK DO YOU ENGAGE IN MODERATE TO STRENUOUS EXERCISE (LIKE A BRISK WALK)?: 2 DAYS

## 2025-03-22 ASSESSMENT — PATIENT HEALTH QUESTIONNAIRE - PHQ9
SUM OF ALL RESPONSES TO PHQ QUESTIONS 1-9: 10
6. FEELING BAD ABOUT YOURSELF - OR THAT YOU ARE A FAILURE OR HAVE LET YOURSELF OR YOUR FAMILY DOWN: SEVERAL DAYS
8. MOVING OR SPEAKING SO SLOWLY THAT OTHER PEOPLE COULD HAVE NOTICED. OR THE OPPOSITE, BEING SO FIGETY OR RESTLESS THAT YOU HAVE BEEN MOVING AROUND A LOT MORE THAN USUAL: SEVERAL DAYS
SUM OF ALL RESPONSES TO PHQ QUESTIONS 1-9: 10
5. POOR APPETITE OR OVEREATING: MORE THAN HALF THE DAYS
10. IF YOU CHECKED OFF ANY PROBLEMS, HOW DIFFICULT HAVE THESE PROBLEMS MADE IT FOR YOU TO DO YOUR WORK, TAKE CARE OF THINGS AT HOME, OR GET ALONG WITH OTHER PEOPLE: NOT DIFFICULT AT ALL
3. TROUBLE FALLING OR STAYING ASLEEP: NOT AT ALL
4. FEELING TIRED OR HAVING LITTLE ENERGY: MORE THAN HALF THE DAYS
SUM OF ALL RESPONSES TO PHQ QUESTIONS 1-9: 10
9. THOUGHTS THAT YOU WOULD BE BETTER OFF DEAD, OR OF HURTING YOURSELF: NOT AT ALL
1. LITTLE INTEREST OR PLEASURE IN DOING THINGS: MORE THAN HALF THE DAYS
2. FEELING DOWN, DEPRESSED OR HOPELESS: MORE THAN HALF THE DAYS
SUM OF ALL RESPONSES TO PHQ QUESTIONS 1-9: 10
7. TROUBLE CONCENTRATING ON THINGS, SUCH AS READING THE NEWSPAPER OR WATCHING TELEVISION: NOT AT ALL

## 2025-03-22 ASSESSMENT — LIFESTYLE VARIABLES
HOW MANY STANDARD DRINKS CONTAINING ALCOHOL DO YOU HAVE ON A TYPICAL DAY: 0
HOW OFTEN DO YOU HAVE A DRINK CONTAINING ALCOHOL: 1
HOW OFTEN DO YOU HAVE A DRINK CONTAINING ALCOHOL: NEVER
HOW OFTEN DO YOU HAVE SIX OR MORE DRINKS ON ONE OCCASION: 1
HOW MANY STANDARD DRINKS CONTAINING ALCOHOL DO YOU HAVE ON A TYPICAL DAY: PATIENT DOES NOT DRINK

## 2025-03-24 ENCOUNTER — CARE COORDINATION (OUTPATIENT)
Dept: CARE COORDINATION | Age: 55
End: 2025-03-24

## 2025-03-24 NOTE — CARE COORDINATION
Previous Care Coordination patient who reached out to [facility] this AM with concerns of recent c/o headaches that occur approx 2 x a week.  Patient shared when headaches occur she experiences blurred vision in both eyes.  Patient denies any history of migraines or c/o weakness/confusion being present.  Patient is scheduled for AWV tomorrow and she was instructed to discuss concerns with PCP at the time of her appointment.  Patient verbalized understanding and denied any further questions or concerns.

## 2025-03-25 ENCOUNTER — OFFICE VISIT (OUTPATIENT)
Dept: FAMILY MEDICINE CLINIC | Age: 55
End: 2025-03-25

## 2025-03-25 VITALS
DIASTOLIC BLOOD PRESSURE: 78 MMHG | HEIGHT: 65 IN | BODY MASS INDEX: 46.15 KG/M2 | HEART RATE: 73 BPM | RESPIRATION RATE: 16 BRPM | WEIGHT: 277 LBS | TEMPERATURE: 97.7 F | OXYGEN SATURATION: 100 % | SYSTOLIC BLOOD PRESSURE: 110 MMHG

## 2025-03-25 DIAGNOSIS — H53.8 BLURRY VISION: ICD-10-CM

## 2025-03-25 DIAGNOSIS — G89.29 CHRONIC NECK PAIN: ICD-10-CM

## 2025-03-25 DIAGNOSIS — M54.2 CHRONIC NECK PAIN: ICD-10-CM

## 2025-03-25 DIAGNOSIS — Z00.00 ENCOUNTER FOR MEDICARE ANNUAL WELLNESS EXAM: Primary | ICD-10-CM

## 2025-03-25 DIAGNOSIS — R51.9 ACUTE NONINTRACTABLE HEADACHE, UNSPECIFIED HEADACHE TYPE: ICD-10-CM

## 2025-03-25 DIAGNOSIS — E66.813 OBESITY, CLASS 3: ICD-10-CM

## 2025-03-25 RX ORDER — NYSTATIN 100000 [USP'U]/G
POWDER TOPICAL
Qty: 60 G | Refills: 2 | Status: SHIPPED | OUTPATIENT
Start: 2025-03-25

## 2025-03-25 NOTE — PATIENT INSTRUCTIONS
events. Examples of long-term stress include long-term health problems, ongoing problems at work, and conflicts in your family. Long-term stress can harm your health.  Mindfulness is a focus only on things happening in the present moment. It's a process of purposefully paying attention to and being aware of your surroundings, your emotions, your thoughts, and how your body feels. You are aware of these things, but you aren't judging these experiences as \"good\" or \"bad.\" Mindfulness can help you learn to calm your mind and body to help you cope with illness, pain, and stress.  How does mindfulness help to relieve stress?  Mindfulness can help quiet your mind and relax your body. Studies show that it can help some people sleep better, feel less anxious, and bring their blood pressure down. And it's been shown to help some people live and cope better with certain health problems like heart disease, depression, chronic pain, and cancer.  How do you practice mindfulness?  To be mindful is to pay attention, to be present, and to be accepting. Like any new skill or habit, being mindful can take practice.  When you're mindful, you do just one thing and you pay close attention to that one thing. For example, you may sit quietly and notice your emotions or how your food tastes and smells.  When you're present, you focus on the things that are happening right now. You let go of your thoughts about the past and the future. When you dwell on the past or the future, you miss moments that can heal and strengthen you. You may miss moments like hearing a child laugh or seeing a friendly face when you think you're all alone.  When you're accepting, you don't  the present moment. Instead you accept your thoughts and feelings as they come.  You can practice anytime, anywhere, and in any way you choose. You can practice in many ways. Here are a few ideas:  While doing your chores, like washing the dishes, let your mind focus on

## 2025-03-26 ENCOUNTER — CARE COORDINATION (OUTPATIENT)
Dept: CARE COORDINATION | Age: 55
End: 2025-03-26

## 2025-03-26 RX ORDER — BROMPHENIRAMINE MALEATE, PSEUDOEPHEDRINE HYDROCHLORIDE, AND DEXTROMETHORPHAN HYDROBROMIDE 2; 30; 10 MG/5ML; MG/5ML; MG/5ML
5 SYRUP ORAL 4 TIMES DAILY PRN
Qty: 140 ML | Refills: 0 | Status: SHIPPED | OUTPATIENT
Start: 2025-03-26

## 2025-03-26 NOTE — CARE COORDINATION
Patient called to update ACM of recent PCP visit.  Patient shared she is to be scheduled for MRI of brain and cspine re: recent headache concerns.  Patient denied any other questions re: her visit.  Patient shared she is adjusting well to the pill packs and it has simplified her med management.  ACM reviewed importance of alerting pharmacy promptly of any medication changes so her packets can be adjusted.  Patient verbalized understanding.  Patient denied any other questions, concerns, or needs.

## 2025-03-29 ENCOUNTER — HOSPITAL ENCOUNTER (EMERGENCY)
Age: 55
Discharge: HOME | End: 2025-03-29
Attending: FAMILY MEDICINE
Payer: MEDICARE

## 2025-03-29 VITALS
OXYGEN SATURATION: 98 % | DIASTOLIC BLOOD PRESSURE: 84 MMHG | SYSTOLIC BLOOD PRESSURE: 135 MMHG | TEMPERATURE: 97.7 F | RESPIRATION RATE: 16 BRPM | HEART RATE: 102 BPM

## 2025-03-29 DIAGNOSIS — H60.502 ACUTE OTITIS EXTERNA OF LEFT EAR, UNSPECIFIED TYPE: Primary | ICD-10-CM

## 2025-03-29 PROCEDURE — 99283 EMERGENCY DEPT VISIT LOW MDM: CPT

## 2025-03-29 RX ORDER — ACETAMINOPHEN 500 MG
1000 TABLET ORAL
Status: DISCONTINUED | OUTPATIENT
Start: 2025-03-29 | End: 2025-03-29

## 2025-03-29 RX ORDER — NAPROXEN 250 MG/1
500 TABLET ORAL ONCE
Status: DISCONTINUED | OUTPATIENT
Start: 2025-03-29 | End: 2025-03-29

## 2025-03-29 RX ORDER — CIPROFLOXACIN AND DEXAMETHASONE 3; 1 MG/ML; MG/ML
4 SUSPENSION/ DROPS AURICULAR (OTIC) 2 TIMES DAILY
Qty: 1 EACH | Refills: 0 | Status: SHIPPED | OUTPATIENT
Start: 2025-03-29 | End: 2025-04-05

## 2025-03-29 RX ORDER — IBUPROFEN 200 MG
400 TABLET ORAL ONCE
Status: DISCONTINUED | OUTPATIENT
Start: 2025-03-29 | End: 2025-03-29 | Stop reason: HOSPADM

## 2025-03-29 ASSESSMENT — PAIN - FUNCTIONAL ASSESSMENT: PAIN_FUNCTIONAL_ASSESSMENT: 0-10

## 2025-03-29 ASSESSMENT — PAIN SCALES - GENERAL: PAINLEVEL_OUTOF10: 10
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
## 2025-03-29 NOTE — DISCHARGE INSTRUCTIONS
Use eardrops as prescribed.  Please follow-up with your family doctor if no improvement in the next 2 to 3 days.  If symptoms worsen, you develop numbness, hearing loss, vision changes, or any other concerning symptoms please return to the ED.

## 2025-03-29 NOTE — ED NOTES
Patient presents to the ED via private auto with complaints of left sided ear pain.  States that the pain has been ongoing for about two days.  Voices that the ear hurts to touch.

## 2025-04-02 ENCOUNTER — TELEPHONE (OUTPATIENT)
Dept: FAMILY MEDICINE CLINIC | Age: 55
End: 2025-04-02

## 2025-04-02 ENCOUNTER — CARE COORDINATION (OUTPATIENT)
Dept: CARE COORDINATION | Age: 55
End: 2025-04-02

## 2025-04-02 NOTE — CARE COORDINATION
Patient called and updated.  Patient shared she is scheduled to see ENT in June of this year.  Patient will also try to use humidifier like device that she used in the past to help loosen up the congestion.  Patient denied any other questions, concerns, or needs and she was encouraged to call with any that may develop.

## 2025-04-02 NOTE — TELEPHONE ENCOUNTER
Pt called stating that she has had nasal congestion for a couple of days. She is taking nasonex nasal spray, nasal washes, and claritin   Pt has for prednisone   Writer advised otc medications but will pass on to dr delaney  Please advise

## 2025-04-02 NOTE — TELEPHONE ENCOUNTER
If I wrote her Prednisone every time her nose ran, she'd be on it daily which would not be appropriate.  I recommend antihistamines and Flonase and to f/u with ENT if desired.

## 2025-04-02 NOTE — CARE COORDINATION
Patient reached out to ACM with frustration and concern.  Patient shared she has a congested nose and when she lived in [place] her provider would order her prednisone as treatment.  Patient shared she is using nasonex, nasal saline, and claritin as prescribed.  Patient stated she \"wakes up every morning with a stuffed up nose\" and it does not matter time of year or anything else.  Active listening provided to patient.  ACM reviewed with patient that her current provider may not provide same treatment that she received in [place].  Patient was also encouraged to push fluids, continues with OTC treatments, and to try to obtain and use a humidifer.  Patient acknowledged understanding.  ACM reviewed importance of following up with any new/worsening of symptoms.  PCP updated.  Patient denied any other questions, concerns, or needs and she was encouraged to call with any that may develop.

## 2025-04-09 ENCOUNTER — TELEPHONE (OUTPATIENT)
Dept: FAMILY MEDICINE CLINIC | Age: 55
End: 2025-04-09

## 2025-04-09 NOTE — TELEPHONE ENCOUNTER
Pt called stating Dr. Howie Belcher from [facility] put her on Gabapentin 3x a day and wanted to update you.

## 2025-04-14 ENCOUNTER — TELEPHONE (OUTPATIENT)
Dept: FAMILY MEDICINE CLINIC | Age: 55
End: 2025-04-14

## 2025-04-14 NOTE — TELEPHONE ENCOUNTER
Pt called stating that she is having right side lung pain, she said that it is painful when she takes a breath in, when she pends over, or presses on   She does have a persistent cough   States that this has been ongoing for 5 days   Please advise

## 2025-04-15 ENCOUNTER — OFFICE VISIT (OUTPATIENT)
Dept: FAMILY MEDICINE CLINIC | Age: 55
End: 2025-04-15
Payer: MEDICARE

## 2025-04-15 VITALS
OXYGEN SATURATION: 98 % | HEART RATE: 80 BPM | DIASTOLIC BLOOD PRESSURE: 72 MMHG | HEIGHT: 65 IN | TEMPERATURE: 97.3 F | BODY MASS INDEX: 45.52 KG/M2 | WEIGHT: 273.25 LBS | SYSTOLIC BLOOD PRESSURE: 120 MMHG | RESPIRATION RATE: 20 BRPM

## 2025-04-15 DIAGNOSIS — L23.1 CONTACT DERMATITIS DUE TO ADHESIVES, UNSPECIFIED CONTACT DERMATITIS TYPE: ICD-10-CM

## 2025-04-15 DIAGNOSIS — S29.011A MUSCLE STRAIN OF CHEST WALL, INITIAL ENCOUNTER: Primary | ICD-10-CM

## 2025-04-15 DIAGNOSIS — L30.9 ECZEMA, UNSPECIFIED TYPE: ICD-10-CM

## 2025-04-15 DIAGNOSIS — S73.101A SPRAIN OF RIGHT HIP, INITIAL ENCOUNTER: ICD-10-CM

## 2025-04-15 PROCEDURE — 99214 OFFICE O/P EST MOD 30 MIN: CPT | Performed by: NURSE PRACTITIONER

## 2025-04-15 PROCEDURE — 3074F SYST BP LT 130 MM HG: CPT | Performed by: NURSE PRACTITIONER

## 2025-04-15 PROCEDURE — 3078F DIAST BP <80 MM HG: CPT | Performed by: NURSE PRACTITIONER

## 2025-04-15 RX ORDER — FLUOCINONIDE 0.5 MG/G
OINTMENT TOPICAL
COMMUNITY
Start: 2025-04-10

## 2025-04-15 RX ORDER — MUPIROCIN 20 MG/G
OINTMENT TOPICAL
Qty: 30 G | Refills: 0 | Status: SHIPPED | OUTPATIENT
Start: 2025-04-15 | End: 2025-04-22

## 2025-04-15 RX ORDER — BROMPHENIRAMINE MALEATE, PSEUDOEPHEDRINE HYDROCHLORIDE, AND DEXTROMETHORPHAN HYDROBROMIDE 2; 30; 10 MG/5ML; MG/5ML; MG/5ML
5 SYRUP ORAL 4 TIMES DAILY PRN
Qty: 140 ML | Refills: 0 | Status: SHIPPED | OUTPATIENT
Start: 2025-04-15

## 2025-04-15 ASSESSMENT — ENCOUNTER SYMPTOMS
GASTROINTESTINAL NEGATIVE: 1
EYES NEGATIVE: 1
RESPIRATORY NEGATIVE: 1
BACK PAIN: 1

## 2025-04-15 NOTE — PROGRESS NOTES
no reported issues of high blood pressure.  - Discussed the potential side effects of Sudafed, including elevated blood pressure and heart rate.  - Prescribed cough syrup for symptomatic relief, advised cautious use of Sudafed.       Diagnosis Orders   1. Muscle strain of chest wall, initial encounter  tiZANidine (ZANAFLEX) 4 MG tablet      2. Sprain of right hip, initial encounter  tiZANidine (ZANAFLEX) 4 MG tablet      3. Eczema, unspecified type        4. Contact dermatitis due to adhesives, unspecified contact dermatitis type  mupirocin (BACTROBAN) 2 % ointment          No orders of the defined types were placed in this encounter.    Orders Placed This Encounter   Medications    brompheniramine-pseudoephedrine-DM 2-30-10 MG/5ML syrup     Sig: Take 5 mLs by mouth 4 times daily as needed for Cough or Congestion     Dispense:  140 mL     Refill:  0    tiZANidine (ZANAFLEX) 4 MG tablet     Sig: Take 1 tablet by mouth every 8 hours as needed (pain)     Dispense:  30 tablet     Refill:  1    mupirocin (BACTROBAN) 2 % ointment     Sig: Apply topically 3 times daily.     Dispense:  30 g     Refill:  0      On this date 04/15/25 I have spent 30 minutes reviewing previous notes, test results and face to face with the patient discussing the diagnosis and importance of compliance with the treatment plan as well as documenting on the day of the visit.    The patient (or guardian, if applicable) and other individuals in attendance with the patient were advised that Artificial Intelligence will be utilized during this visit to record, process the conversation to generate a clinical note and to support improvement of the AI technology. The patient (or guardian, if applicable) and other individuals in attendance at the appointment consented to the use of AI, including the recording.      Patient given educational materials - seepatient instructions.  Discussed use, benefit, and side effects of prescribed medications.All patient

## 2025-04-16 ENCOUNTER — TELEPHONE (OUTPATIENT)
Dept: FAMILY MEDICINE CLINIC | Age: 55
End: 2025-04-16

## 2025-04-16 ENCOUNTER — CARE COORDINATION (OUTPATIENT)
Dept: CARE COORDINATION | Age: 55
End: 2025-04-16

## 2025-04-16 DIAGNOSIS — I82.412 ACUTE DEEP VEIN THROMBOSIS (DVT) OF FEMORAL VEIN OF LEFT LOWER EXTREMITY: ICD-10-CM

## 2025-04-16 NOTE — TELEPHONE ENCOUNTER
Refill pending   This is prescribed by [facility] in [place] provider   But pt only sees once a year   Asking for dr delaney to send in

## 2025-04-16 NOTE — CARE COORDINATION
Former CC patient who called [facility] with update.  Patient shared she was able to see PCP office provider yesterday re: a \"pulled muscle in her back.\"  Patient reported she has started muscle relaxer as directed.  ACM educated patient on the importance of taking her medications as prescribed and directed and not combing with any other pain medications or sleep aides/medications that cause drowsiness.  Patient verbalized understanding.  Patient was also educated on signs/symptoms she should report along with the importance of early symptom recognition and follow up.  Patient verbalized understanding.  Patient denied any other questions, concerns, or needs and she was encouraged to call with any that may develop.

## 2025-04-16 NOTE — TELEPHONE ENCOUNTER
Pt was notified by Yaritza that she is due for a pelvic exam. She had a hysterectomy and wants to know if it is still necessary for a pelvic exam. Please advise

## 2025-04-17 ENCOUNTER — TELEPHONE (OUTPATIENT)
Dept: FAMILY MEDICINE CLINIC | Age: 55
End: 2025-04-17

## 2025-04-17 NOTE — TELEPHONE ENCOUNTER
Pt states that her eyes are still watering even with the eye drops.  Asked the patient if she has allergies with this time of eye. States that she does not have allergies and its something else  Please advise what pt should do about watery eyes    Instructions (Optional): SRT Detail Level: Simple

## 2025-04-24 ENCOUNTER — RESULTS FOLLOW-UP (OUTPATIENT)
Dept: FAMILY MEDICINE CLINIC | Age: 55
End: 2025-04-24

## 2025-04-24 ENCOUNTER — HOSPITAL ENCOUNTER (OUTPATIENT)
Age: 55
Discharge: HOME | End: 2025-04-24
Payer: MEDICARE

## 2025-04-24 ENCOUNTER — HOSPITAL ENCOUNTER (OUTPATIENT)
Dept: CT IMAGING | Age: 55
Discharge: HOME | End: 2025-04-24
Payer: MEDICARE

## 2025-04-24 DIAGNOSIS — R10.84 GENERALIZED ABDOMINAL PAIN: ICD-10-CM

## 2025-04-24 LAB
BACTERIA URNS QL MICRO: ABNORMAL /HPF
BILIRUB UR QL STRIP.AUTO: NEGATIVE
CASTS #/AREA URNS LPF: ABNORMAL /LPF
CASTS 2: ABNORMAL /LPF
CHARACTER UR: CLEAR
COLOR, UA: YELLOW
CRYSTALS URNS MICRO: ABNORMAL
EPITHELIAL CELLS, UA: ABNORMAL /HPF
GLUCOSE UR QL STRIP.AUTO: NEGATIVE MG/DL
HGB UR QL STRIP.AUTO: NEGATIVE
KETONES UR QL STRIP.AUTO: NEGATIVE
MISCELLANEOUS 2: ABNORMAL
NITRITE UR QL STRIP: NEGATIVE
PH UR STRIP.AUTO: 7 [PH] (ref 5–9)
PROT UR STRIP.AUTO-MCNC: NEGATIVE MG/DL
RBC URINE: ABNORMAL /HPF
RENAL EPI CELLS #/AREA URNS HPF: ABNORMAL /[HPF]
SP GR UR REFRACT.AUTO: 1.01 (ref 1–1.03)
UROBILINOGEN, URINE: 0.2 EU/DL (ref 0–1)
WBC #/AREA URNS HPF: ABNORMAL /HPF
WBC #/AREA URNS HPF: ABNORMAL /[HPF]
YEAST LIKE FUNGI URNS QL MICRO: ABNORMAL

## 2025-04-24 PROCEDURE — 81001 URINALYSIS AUTO W/SCOPE: CPT

## 2025-04-24 PROCEDURE — 74176 CT ABD & PELVIS W/O CONTRAST: CPT

## 2025-04-25 ENCOUNTER — RESULTS FOLLOW-UP (OUTPATIENT)
Dept: FAMILY MEDICINE CLINIC | Age: 55
End: 2025-04-25

## 2025-04-28 RX ORDER — CEPHALEXIN 500 MG/1
500 CAPSULE ORAL 2 TIMES DAILY
Qty: 10 CAPSULE | Refills: 0 | Status: SHIPPED | OUTPATIENT
Start: 2025-04-28 | End: 2025-05-03

## 2025-04-30 ENCOUNTER — OFFICE VISIT (OUTPATIENT)
Dept: FAMILY MEDICINE CLINIC | Age: 55
End: 2025-04-30
Payer: MEDICARE

## 2025-04-30 VITALS
DIASTOLIC BLOOD PRESSURE: 64 MMHG | RESPIRATION RATE: 16 BRPM | SYSTOLIC BLOOD PRESSURE: 100 MMHG | BODY MASS INDEX: 44.98 KG/M2 | HEIGHT: 65 IN | TEMPERATURE: 97.8 F | OXYGEN SATURATION: 99 % | HEART RATE: 71 BPM | WEIGHT: 270 LBS

## 2025-04-30 DIAGNOSIS — R09.81 NASAL CONGESTION: ICD-10-CM

## 2025-04-30 DIAGNOSIS — R04.0 EPISTAXIS: Primary | ICD-10-CM

## 2025-04-30 DIAGNOSIS — F51.01 PRIMARY INSOMNIA: ICD-10-CM

## 2025-04-30 PROCEDURE — 3078F DIAST BP <80 MM HG: CPT | Performed by: FAMILY MEDICINE

## 2025-04-30 PROCEDURE — 99213 OFFICE O/P EST LOW 20 MIN: CPT | Performed by: FAMILY MEDICINE

## 2025-04-30 PROCEDURE — 3074F SYST BP LT 130 MM HG: CPT | Performed by: FAMILY MEDICINE

## 2025-04-30 RX ORDER — SENNA AND DOCUSATE SODIUM 50; 8.6 MG/1; MG/1
2 TABLET, FILM COATED ORAL NIGHTLY
Qty: 90 TABLET | Refills: 1 | Status: SHIPPED | OUTPATIENT
Start: 2025-04-30

## 2025-05-01 ENCOUNTER — TELEPHONE (OUTPATIENT)
Dept: FAMILY MEDICINE CLINIC | Age: 55
End: 2025-05-01

## 2025-05-01 NOTE — TELEPHONE ENCOUNTER
Patient called and updated of PCP response.  Patient verbalized understanding and denied any additional questions or concerns at this time.

## 2025-05-01 NOTE — TELEPHONE ENCOUNTER
Message left asking patient to please return call to [facility] direct number.  Will continue to work to f/u with patient in the future.

## 2025-05-01 NOTE — TELEPHONE ENCOUNTER
Patient called ACM d/t concerns with requested Trazodone refill.  Patient shared she has 2 Trazodone in her daily packets from [facility] but needs additional prn bottle as she \"occasionally\" takes an additional tablet if she is unable to sleep.     ACM reviewed current prescription states 1-2 tablets nightly for insomnia and most recent script was March 2025 for 180 tablets (90 day supply).  Patient acknowledged understanding but stated she takes 2 every night, and then takes additional tablet if needed.     TASH spoke with Solange at [facility] and she verified the script they have on file for the patient is as follows: Trazodone 100 mg 1-2 tablets nightly as needed.  Solange shared patient gets 2 tablets put in every daily packet and she should be starting a new packet tomorrow.      Please clarify Trazodone script/instructions and update/send new script if appropriate.  Thank you!    Last OV: 4/30/2025  Next OV: no f/u scheduled at this time.

## 2025-05-01 NOTE — TELEPHONE ENCOUNTER
3 tablets is too much and I recommend she continue with her current Rx and use things to help her relax before bed like hot showers and roll-on magnesium.

## 2025-05-05 ENCOUNTER — TELEPHONE (OUTPATIENT)
Dept: FAMILY MEDICINE CLINIC | Age: 55
End: 2025-05-05

## 2025-05-05 DIAGNOSIS — R30.0 DYSURIA: Primary | ICD-10-CM

## 2025-05-05 NOTE — TELEPHONE ENCOUNTER
Patient called c/o dysuria.  Patient states she finished antibiotic-Keflex prescribed for UTI 4/28.  Patient states the burning comes and goes but was worse this morning.

## 2025-05-06 ENCOUNTER — RESULTS FOLLOW-UP (OUTPATIENT)
Dept: FAMILY MEDICINE CLINIC | Age: 55
End: 2025-05-06

## 2025-05-06 ENCOUNTER — CARE COORDINATION (OUTPATIENT)
Dept: CARE COORDINATION | Age: 55
End: 2025-05-06

## 2025-05-06 ENCOUNTER — HOSPITAL ENCOUNTER (OUTPATIENT)
Age: 55
Discharge: HOME | End: 2025-05-06
Payer: MEDICARE

## 2025-05-06 DIAGNOSIS — R82.998 URINE LEUKOCYTES: Primary | ICD-10-CM

## 2025-05-06 PROCEDURE — 87086 URINE CULTURE/COLONY COUNT: CPT

## 2025-05-06 PROCEDURE — 81001 URINALYSIS AUTO W/SCOPE: CPT

## 2025-05-06 NOTE — CARE COORDINATION
Patient called ACM asking if urine sample results from earlier today were available.  ACM updated patient that testing remains in process and office will reach out when results are available and have been reviewed.  Patient verbalized understanding and denied any further questions or concerns.

## 2025-05-06 NOTE — TELEPHONE ENCOUNTER
Called and spoke with mark in urinalysis lab and said to order a culture since it didn't reflex to culture   Order entered

## 2025-05-07 ENCOUNTER — CARE COORDINATION (OUTPATIENT)
Dept: CARE COORDINATION | Age: 55
End: 2025-05-07

## 2025-05-07 NOTE — CARE COORDINATION
Former ACM patient called to share recent frustrations and ACM provided active listening t/o the call.  Patient shared she does have an unhealing wound on her ankle/leg, where her cast was previously, and she was able to schedule PCP appointment for evaluation.  Patient shared  is currently applying triple antibiotic ointment daily with minimal improvement.  Patient encouraged to f/u as scheduled and to monitor for any changes.  Patient verbalized understanding.  Patient then ended call d/t needing to go complete an activity at home.

## 2025-05-08 ENCOUNTER — OFFICE VISIT (OUTPATIENT)
Dept: FAMILY MEDICINE CLINIC | Age: 55
End: 2025-05-08
Payer: MEDICARE

## 2025-05-08 VITALS
BODY MASS INDEX: 44.76 KG/M2 | HEART RATE: 77 BPM | TEMPERATURE: 97.6 F | RESPIRATION RATE: 16 BRPM | DIASTOLIC BLOOD PRESSURE: 80 MMHG | SYSTOLIC BLOOD PRESSURE: 124 MMHG | WEIGHT: 269 LBS

## 2025-05-08 DIAGNOSIS — S73.101A SPRAIN OF RIGHT HIP, INITIAL ENCOUNTER: ICD-10-CM

## 2025-05-08 DIAGNOSIS — S29.011A MUSCLE STRAIN OF CHEST WALL, INITIAL ENCOUNTER: ICD-10-CM

## 2025-05-08 DIAGNOSIS — S81.802A WOUND OF LEFT LOWER EXTREMITY, INITIAL ENCOUNTER: Primary | ICD-10-CM

## 2025-05-08 LAB
BACTERIA UR CULT: ABNORMAL
ORGANISM: ABNORMAL

## 2025-05-08 PROCEDURE — 3079F DIAST BP 80-89 MM HG: CPT | Performed by: FAMILY MEDICINE

## 2025-05-08 PROCEDURE — 3074F SYST BP LT 130 MM HG: CPT | Performed by: FAMILY MEDICINE

## 2025-05-08 PROCEDURE — 99213 OFFICE O/P EST LOW 20 MIN: CPT | Performed by: FAMILY MEDICINE

## 2025-05-08 RX ORDER — MUPIROCIN 20 MG/G
OINTMENT TOPICAL
Qty: 1 G | Refills: 0 | Status: SHIPPED | OUTPATIENT
Start: 2025-05-08 | End: 2025-05-15

## 2025-05-08 NOTE — PROGRESS NOTES
[facility]  [address]  [place] [address]  Phone:  [phone]          Name: Donna Waters  : 1970    Chief Complaint   Patient presents with    Leg Problem       HPI:     History of Present Illness  The patient is a 54-year-old female here today for evaluation of a sore on her left lower leg.  She has been experiencing this sore for approximately one year, coinciding with the removal of her cast. The condition has shown intermittent improvement and worsening over time. She occasionally experiences itching but refrains from scratching the area. She reports no recent fevers or drainage from the sore. She has been applying triple antibiotic ointment to the affected area, although not routinely, just when she thinks of it.        Current Outpatient Medications:     mupirocin (BACTROBAN) 2 % ointment, Apply topically 3 times daily., Disp: 1 g, Rfl: 0    sennosides-docusate sodium (SENOKOT-S) 8.6-50 MG tablet, Take 2 tablets by mouth nightly, Disp: 90 tablet, Rfl: 1    Humidifiers (COOL MIST HUMIDIFIER) MISC, 1 each by Does not apply route at bedtime, Disp: 1 each, Rfl: 0    apixaban (ELIQUIS) 2.5 MG TABS tablet, Take 1 tablet by mouth 2 times daily Indications: resume on 2024, Disp: 180 tablet, Rfl: 3    fluocinonide (LIDEX) 0.05 % ointment, APPLY TOPICALLY TO LIPS TWICE DAILY, Disp: , Rfl:     brompheniramine-pseudoephedrine-DM 2-30-10 MG/5ML syrup, Take 5 mLs by mouth 4 times daily as needed for Cough or Congestion, Disp: 140 mL, Rfl: 0    tiZANidine (ZANAFLEX) 4 MG tablet, Take 1 tablet by mouth every 8 hours as needed (pain), Disp: 30 tablet, Rfl: 1    nystatin (MYCOSTATIN) 243429 UNIT/GM powder, APPLY  POWDER TOPICALLY TO AFFECTED AREA THREE TIMES DAILY, Disp: 60 g, Rfl: 2    amitriptyline (ELAVIL) 150 MG tablet, Take 1 tablet by mouth nightly, Disp: 180 tablet, Rfl: 1    bumetanide (BUMEX) 1 MG tablet, Take 1 tablet by mouth daily, Disp: 90 tablet, Rfl: 0    Cariprazine

## 2025-05-12 ENCOUNTER — CARE COORDINATION (OUTPATIENT)
Dept: CARE COORDINATION | Age: 55
End: 2025-05-12

## 2025-05-12 NOTE — CARE COORDINATION
Former [facility] patient who reached out with concerns re: upcoming MRI of the brain.  Patient shared she is becoming very concerned as she is having trouble \"thinking and I am unable to focus\" at times.  Patient reported she went to [facility] this AM and even had trouble placing her order as it \"took her a long time to think\" of what she wanted to say.  Patient is asking if the MRI could be moved up to an earlier date.  AC instructed patient to reach out to Central Scheduling to see if any earlier appointment times are available or if she can be placed on a waiting list.  Patient verbalized agreement and stated she already has the number in her phone.  Patient denied any other questions, concerns, or needs.  PCP updated.

## 2025-05-15 ENCOUNTER — CARE COORDINATION (OUTPATIENT)
Dept: CARE COORDINATION | Age: 55
End: 2025-05-15

## 2025-05-15 NOTE — CARE COORDINATION
Patient called stating she is almost out of her Ozempic that she gets for no charge through the PAP program. I called [facility] and they need a refill form filled out from her office. They are faxing the office this form now to fill out and send back to them. Patient called me back before I could let her know about what I found out and I informed her of this.         Shakila Plascencia [facility]  CC   Medication Assistance  [place] and [facility]    P) [phone] (F) [phone]

## 2025-05-16 ENCOUNTER — CARE COORDINATION (OUTPATIENT)
Dept: CARE COORDINATION | Age: 55
End: 2025-05-16

## 2025-05-16 RX ORDER — AMITRIPTYLINE HYDROCHLORIDE 150 MG/1
150 TABLET ORAL NIGHTLY
Qty: 180 TABLET | Refills: 1 | OUTPATIENT
Start: 2025-05-16

## 2025-05-16 RX ORDER — AMITRIPTYLINE HYDROCHLORIDE 150 MG/1
300 TABLET ORAL NIGHTLY
Qty: 180 TABLET | Refills: 1 | Status: SHIPPED | OUTPATIENT
Start: 2025-05-16

## 2025-05-16 NOTE — TELEPHONE ENCOUNTER
Pt called stating that she has started taking 2 amitriptyline HS due to still not being able to sleep. Now needs refill. Please send to [facility]     Last visit- 5/8/2025  Next visit- 6/5/2025    Requested Prescriptions     Pending Prescriptions Disp Refills    amitriptyline (ELAVIL) 150 MG tablet 180 tablet 1     Sig: Take 1 tablet by mouth nightly

## 2025-05-16 NOTE — CARE COORDINATION
Former [facility] Coordination patient who reached out to [facility] re: need for Amitriptyline refill.  Patient shared she was not sleeping well s/p recently being told to take her Trazodone as prescribed.  Patient reported she increased her Amitriptyline to 2 tablets at night and has been \"sleeping very good.\"  Patient reported she goes to bed around 830 and sleeps until around 7 AM.  Patient shared she feels \"good\" when she wakes up and feels her mind is clearer.  Patient is requesting refill for Amitriptyline be sent to her [facility] pharmacy for her pill packets.  AC educated patient on the importance of taking medications as directed and not making changes without first discussing with providers.  Patient also education on the risk of not being eligible for a refill if she runs out early d/t not taking mediations as directed.  Patient acknowledged understanding.  PCP updated.

## 2025-05-16 NOTE — CARE COORDINATION
I am not sure if the office received the refill form from [facility] so I went ahead and faxed the office a copy as well just incase.

## 2025-05-19 ENCOUNTER — CARE COORDINATION (OUTPATIENT)
Dept: CARE COORDINATION | Age: 55
End: 2025-05-19

## 2025-05-19 NOTE — CARE COORDINATION
I faxed [facility] the refill form for her Ozempic. The office faxed it in but I needed to fix a few things on it so they will send it out for her.         Shakila Plascencia [facility]  CC   Medication Assistance  [place], and [employer]    (P) [phone]  (F) [phone]

## 2025-05-20 ENCOUNTER — RESULTS FOLLOW-UP (OUTPATIENT)
Dept: FAMILY MEDICINE CLINIC | Age: 55
End: 2025-05-20

## 2025-05-20 ENCOUNTER — HOSPITAL ENCOUNTER (OUTPATIENT)
Dept: MRI IMAGING | Age: 55
Discharge: HOME | End: 2025-05-20
Payer: MEDICARE

## 2025-05-20 DIAGNOSIS — H53.8 BLURRY VISION: ICD-10-CM

## 2025-05-20 DIAGNOSIS — M54.2 CHRONIC NECK PAIN: ICD-10-CM

## 2025-05-20 DIAGNOSIS — G89.29 CHRONIC NECK PAIN: ICD-10-CM

## 2025-05-20 DIAGNOSIS — R51.9 ACUTE NONINTRACTABLE HEADACHE, UNSPECIFIED HEADACHE TYPE: ICD-10-CM

## 2025-05-20 PROCEDURE — 72141 MRI NECK SPINE W/O DYE: CPT

## 2025-05-20 PROCEDURE — 70551 MRI BRAIN STEM W/O DYE: CPT

## 2025-05-21 ENCOUNTER — TELEPHONE (OUTPATIENT)
Dept: FAMILY MEDICINE CLINIC | Age: 55
End: 2025-05-21

## 2025-05-21 NOTE — TELEPHONE ENCOUNTER
She states she has one left and is waiting for it to be processed with [facility]. She told her it may take a week or so.

## 2025-05-27 ENCOUNTER — TELEPHONE (OUTPATIENT)
Dept: FAMILY MEDICINE CLINIC | Age: 55
End: 2025-05-27

## 2025-05-27 DIAGNOSIS — E11.9 TYPE 2 DIABETES MELLITUS WITHOUT COMPLICATION, WITHOUT LONG-TERM CURRENT USE OF INSULIN (HCC): Primary | ICD-10-CM

## 2025-05-27 RX ORDER — ACYCLOVIR 800 MG/1
TABLET ORAL
Qty: 7 EACH | Refills: 5 | Status: SHIPPED | OUTPATIENT
Start: 2025-05-27

## 2025-05-27 NOTE — TELEPHONE ENCOUNTER
Patient came into the office to  Ozempic sample. Asked if Kesha could prescribe her Freestyle Sai sensor and send to [facility]? Her  had an extra one, so she put it on and now wants a prescription for it. Informed her we would call her if we had further questions.

## 2025-05-29 NOTE — PROGRESS NOTES
[facility]  [address]  [place] [address]  Phone:  [phone]          Name: Donna Waters  : 1970    Chief Complaint   Patient presents with    1 Month Follow-Up       HPI:     Donna Waters is a 55 y.o. female who presents today for evaluation of a sore on her left anterior shin.  She's been dealing with pain in her left ankle for a few years.  She had surgery on the ankle in May 2024 and after she had her cast removed she had a lesion to her left shin that has been more red the past few weeks.  No drainage.  No fevers.    She has been on Ozempic for T2DM and feels like she's getting hypoglycemic so would benefit from the Sai sensor.      Current Outpatient Medications:     metoprolol tartrate (LOPRESSOR) 25 MG tablet, Take 1 tablet by mouth 2 times daily, Disp: 180 tablet, Rfl: 3    cephALEXin (KEFLEX) 500 MG capsule, Take 1 capsule by mouth 2 times daily for 7 days, Disp: 14 capsule, Rfl: 0    Continuous Glucose Sensor (FREESTYLE SAI 3 SENSOR) MISC, Change sensor every 14 days., Disp: 7 each, Rfl: 5    Semaglutide,0.25 or 0.5MG/DOS, 2 MG/3ML SOPN, Inject 0.25 mg into the skin every 7 days, Disp: 1 mL, Rfl: 0    amitriptyline (ELAVIL) 150 MG tablet, Take 2 tablets by mouth nightly, Disp: 180 tablet, Rfl: 1    tiZANidine (ZANAFLEX) 4 MG tablet, Take 1 tablet by mouth every 8 hours as needed (pain), Disp: 30 tablet, Rfl: 1    sennosides-docusate sodium (SENOKOT-S) 8.6-50 MG tablet, Take 2 tablets by mouth nightly, Disp: 90 tablet, Rfl: 1    Humidifiers (COOL MIST HUMIDIFIER) MISC, 1 each by Does not apply route at bedtime, Disp: 1 each, Rfl: 0    apixaban (ELIQUIS) 2.5 MG TABS tablet, Take 1 tablet by mouth 2 times daily Indications: resume on 2024, Disp: 180 tablet, Rfl: 3    fluocinonide (LIDEX) 0.05 % ointment, APPLY TOPICALLY TO LIPS TWICE DAILY, Disp: , Rfl:     brompheniramine-pseudoephedrine-DM 2-30-10 MG/5ML syrup, Take 5 mLs by mouth 4 times daily as needed

## 2025-06-02 ENCOUNTER — TELEPHONE (OUTPATIENT)
Dept: FAMILY MEDICINE CLINIC | Age: 55
End: 2025-06-02

## 2025-06-02 NOTE — TELEPHONE ENCOUNTER
Pt on schedule for 6/5 but wanted to let Dr. Rebolledo know that the spot on her leg is not healing.   She states she can't come earlier this week but just wanted to let dr rebolledo be aware of it

## 2025-06-05 ENCOUNTER — OFFICE VISIT (OUTPATIENT)
Dept: FAMILY MEDICINE CLINIC | Age: 55
End: 2025-06-05
Payer: MEDICARE

## 2025-06-05 VITALS
OXYGEN SATURATION: 96 % | DIASTOLIC BLOOD PRESSURE: 74 MMHG | WEIGHT: 269 LBS | BODY MASS INDEX: 44.82 KG/M2 | SYSTOLIC BLOOD PRESSURE: 100 MMHG | RESPIRATION RATE: 16 BRPM | TEMPERATURE: 97.1 F | HEART RATE: 91 BPM | HEIGHT: 65 IN

## 2025-06-05 DIAGNOSIS — E11.9 TYPE 2 DIABETES MELLITUS WITHOUT COMPLICATION, WITHOUT LONG-TERM CURRENT USE OF INSULIN (HCC): ICD-10-CM

## 2025-06-05 DIAGNOSIS — R00.0 TACHYCARDIA: ICD-10-CM

## 2025-06-05 DIAGNOSIS — L03.116 LEFT LEG CELLULITIS: Primary | ICD-10-CM

## 2025-06-05 PROCEDURE — 99214 OFFICE O/P EST MOD 30 MIN: CPT | Performed by: FAMILY MEDICINE

## 2025-06-05 PROCEDURE — 3078F DIAST BP <80 MM HG: CPT | Performed by: FAMILY MEDICINE

## 2025-06-05 PROCEDURE — 3074F SYST BP LT 130 MM HG: CPT | Performed by: FAMILY MEDICINE

## 2025-06-05 PROCEDURE — 3044F HG A1C LEVEL LT 7.0%: CPT | Performed by: FAMILY MEDICINE

## 2025-06-05 RX ORDER — METOPROLOL TARTRATE 25 MG/1
25 TABLET, FILM COATED ORAL 2 TIMES DAILY
Qty: 180 TABLET | Refills: 3 | Status: SHIPPED | OUTPATIENT
Start: 2025-06-05

## 2025-06-05 RX ORDER — CEPHALEXIN 500 MG/1
500 CAPSULE ORAL 2 TIMES DAILY
Qty: 14 CAPSULE | Refills: 0 | Status: SHIPPED | OUTPATIENT
Start: 2025-06-05 | End: 2025-06-12

## 2025-06-05 RX ORDER — ACYCLOVIR 800 MG/1
TABLET ORAL
Qty: 7 EACH | Refills: 5 | Status: SHIPPED | OUTPATIENT
Start: 2025-06-05

## 2025-06-05 ASSESSMENT — ENCOUNTER SYMPTOMS: COLOR CHANGE: 1

## 2025-06-06 ENCOUNTER — CARE COORDINATION (OUTPATIENT)
Dept: CARE COORDINATION | Age: 55
End: 2025-06-06

## 2025-06-06 ENCOUNTER — TELEPHONE (OUTPATIENT)
Dept: FAMILY MEDICINE CLINIC | Age: 55
End: 2025-06-06

## 2025-06-06 DIAGNOSIS — M79.602 LEFT ARM PAIN: Primary | ICD-10-CM

## 2025-06-06 DIAGNOSIS — M79.601 RIGHT ARM PAIN: ICD-10-CM

## 2025-06-06 NOTE — TELEPHONE ENCOUNTER
Patient called today stating she has had left arm pain when moving her arm for several weeks. States she forgot to mention this at her appointment this week. Pt asking if she needs an appointment or what you suggest? Denies any swelling, redness, injury, or fever. Please advise.
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
## 2025-06-07 ENCOUNTER — APPOINTMENT (OUTPATIENT)
Dept: GENERAL RADIOLOGY | Age: 55
End: 2025-06-07
Payer: MEDICARE

## 2025-06-07 ENCOUNTER — HOSPITAL ENCOUNTER (EMERGENCY)
Age: 55
Discharge: HOME | End: 2025-06-07
Attending: EMERGENCY MEDICINE
Payer: MEDICARE

## 2025-06-07 VITALS
HEART RATE: 86 BPM | RESPIRATION RATE: 16 BRPM | BODY MASS INDEX: 44.48 KG/M2 | HEIGHT: 65 IN | OXYGEN SATURATION: 97 % | WEIGHT: 267 LBS | TEMPERATURE: 97.3 F | DIASTOLIC BLOOD PRESSURE: 59 MMHG | SYSTOLIC BLOOD PRESSURE: 90 MMHG

## 2025-06-07 DIAGNOSIS — M25.511 ACUTE PAIN OF RIGHT SHOULDER: Primary | ICD-10-CM

## 2025-06-07 PROCEDURE — 73030 X-RAY EXAM OF SHOULDER: CPT

## 2025-06-07 PROCEDURE — 99283 EMERGENCY DEPT VISIT LOW MDM: CPT

## 2025-06-07 RX ORDER — LIDOCAINE 4 G/G
1 PATCH TOPICAL DAILY
Qty: 30 PATCH | Refills: 0 | Status: SHIPPED | OUTPATIENT
Start: 2025-06-07 | End: 2025-07-07

## 2025-06-07 ASSESSMENT — PAIN DESCRIPTION - LOCATION: LOCATION: ARM

## 2025-06-07 ASSESSMENT — PAIN - FUNCTIONAL ASSESSMENT: PAIN_FUNCTIONAL_ASSESSMENT: 0-10

## 2025-06-07 ASSESSMENT — PAIN SCALES - GENERAL: PAINLEVEL_OUTOF10: 10

## 2025-06-07 ASSESSMENT — PAIN DESCRIPTION - ORIENTATION: ORIENTATION: RIGHT

## 2025-06-07 NOTE — ED NOTES
Pt. Arrives to ED with c/o rt. Arm pain for past week; pt. Reports she spoke to Dr. Rebolledo's office  yesterday and they suggested physical therapy.  C/o numbness in figertips of rt. Hand.  Neurovascular intact.  Pt. Denies any injury or trauma; pt reports had MRI of neck approx. 1 month ago and has \"stenosis\".  Pt. Denies CP, dizziness or shortness of breath.

## 2025-06-07 NOTE — ED PROVIDER NOTES
[facility] Emergency Department  [address]  [facility] [address]  Phone: [phone]  EMERGENCY DEPARTMENT ENCOUNTER      Pt Name: Donna Waters  MRN: [id number]  Birthdate 1970  Date of evaluation: 6/7/2025  Provider: Warren Reyes MD    CHIEF COMPLAINT       Chief Complaint   Patient presents with    Arm Pain     Rt. arm    Numbness         HISTORY OF PRESENT ILLNESS      Donna Waters is a 55 y.o. female who presents to the emergency department with above-noted complaint.  Patient has chronic recurrent shoulder pain and discomfort had some tingling in her hands at time.  She has been diagnosed with some cervical disease.  Denies other new injury trauma or fall.  Denies headache or neck pain per se.        REVIEW OF SYSTEMS     Positive for shoulder pain with twisting and abduction.  Review of Systems  All systems negative except as marked.     PAST MEDICAL HISTORY     Past Medical History:   Diagnosis Date    Acalculous cholecystitis     Anxiety     Arthritis     hands and hips    Asthma     inhaler prn    Bipolar disorder (HCC)     Blood clotting disorder     2017, 2021    Bradycardia 03/02/2017    Depression     Flexural eczema     GERD (gastroesophageal reflux disease)     LV dysfunction 03/01/2017    Neuropathy     Obesity     Paranoid schizophrenia (HCC)     Sleep apnea          SURGICAL HISTORY       Past Surgical History:   Procedure Laterality Date    ANKLE SURGERY Left 2000    BLADDER SUSPENSION  2012    Vaginal Sling    CARDIAC CATHETERIZATION      2017    CARDIAC PROCEDURE N/A 4/25/2024    Left heart cath / coronary angiography performed by Ronda Hammond MD at [facility] CARDIAC CATH LAB    CHOLECYSTECTOMY, LAPAROSCOPIC  05/04/2015    LAPAROSCOPIC CHOLECYSTECTOMY    COLONOSCOPY  2018    DENTAL SURGERY      Teeth removed as child    FOOT SURGERY Right 2007    Plantar Fasciitis    HYSTERECTOMY (CERVIX STATUS UNKNOWN)  08/04/2023    SHOULDER SURGERY Right age 13    Fell of  IMPRESSION      1. Acute pain of right shoulder          DISPOSITION/PLAN     DISPOSITION Decision To Discharge 06/07/2025 05:04:15 PM   DISPOSITION CONDITION Stable           PATIENT REFERRED TO:  Flavia Rebolledo MD  [address]  [address]  [facility] [address]  [phone]    Call   Follow up from ER condition      DISCHARGE MEDICATIONS:  Discharge Medication List as of 6/7/2025  5:04 PM        START taking these medications    Details   lidocaine 4 % external patch Place 1 patch onto the skin daily, TransDERmal, DAILY Starting Sat 6/7/2025, Until Mon 7/7/2025, For 30 days, Disp-30 patch, R-0, Normal             (Please note that portions of this note were completed with a voice recognition program.  Efforts were made to edit the dictations but occasionally words are mis-transcribed.)    Warren Reyes MD (electronically signed)  Attending Emergency Physician                       Warren Reyes MD  06/07/25 [id number]

## 2025-06-07 NOTE — DISCHARGE INSTRUCTIONS
Use Tylenol for pain.  Continue Motrin for inflammation.  Continue other home chronic pain medications.  You may try lidocaine patch to the area.  Set up physical therapy as scheduled.  Follow-up with primary care.  They may need to do other test or advanced imaging of your shoulder to assess for rotator cuff issues or other problems.  This can be done as an outpatient

## 2025-06-09 NOTE — CARE COORDINATION
Patient called sharing she was evaluated in the ED over the week end d/t worsening arm pain and shared it was difficult for her to drive yesterday d/t the pain with raising her arm up.  Patient is asking now that OP OT referral go to [facility] as she knows they are in network and if needed she would like MRI completed (she is aware may need to attend therapy prior to MRI).  Please place referral if still appropriate to new rehab - [facility] and advise.  Thank you!

## 2025-06-10 ENCOUNTER — CARE COORDINATION (OUTPATIENT)
Dept: CARE COORDINATION | Age: 55
End: 2025-06-10

## 2025-06-10 NOTE — CARE COORDINATION
Patient called ACM and shared per OP therapy orders were received for the left arm but patient's pain is in her right arm.  Patient reported new orders/referrals need to be sent to [facility] for the right arm.  Please advise and assist.  Thank you!

## 2025-06-12 ENCOUNTER — CARE COORDINATION (OUTPATIENT)
Dept: CARE COORDINATION | Age: 55
End: 2025-06-12

## 2025-06-12 ENCOUNTER — TELEPHONE (OUTPATIENT)
Dept: FAMILY MEDICINE CLINIC | Age: 55
End: 2025-06-12

## 2025-06-12 NOTE — TELEPHONE ENCOUNTER
Pt asking if her medications need adjusted?   Yesterday and today, she woke up and her blood sugar was 185.   This was right when she woke up. Night before she ate tacos.  Eating eggs and beltre every morning.  Taking ozempic on Sundays.   Please advise.

## 2025-06-12 NOTE — CARE COORDINATION
Patient called [facility] this AM re: concern of elevated fasting BS the past 2 mornings.  Patient reported BS were elevated on the 180s the last 2 days with this AM reading being 185.  Patient denied any recent changes in diet or activity.  [facility] encouraged patient to monitor her diet closely and adhere to DM diet as well as to try and increase activity as she is able.  Patient was also encouraged to monitor BS closely and to f/u with any ongoing concerns.  Patient denied any other questions, concerns, or needs and she was encouraged to call with any that may develop.

## 2025-06-13 ENCOUNTER — CARE COORDINATION (OUTPATIENT)
Dept: CARE COORDINATION | Age: 55
End: 2025-06-13

## 2025-06-13 NOTE — CARE COORDINATION
Patient returned call and updated of PCP recommendations.  Patient verbalized understanding and denied any additional questions, concerns, or needs.

## 2025-06-13 NOTE — CARE COORDINATION
Patient called this ACM this AM as she would like PCP updated that last night and this AM she was \"coughing up green stuff.\"  Patient shared she also has had watery eyes and nasal congestion.  Patient denies any fever and reported she is taking her Xyzal nightly.  ACM encouraged patient to also taker her Flonase as directed and instructions on use were reviewed.  Patient was also encouraged to use a humidifier.  Patient shared she uses [facility] in [place] if prescription needed.  PCP updated.

## 2025-06-16 ENCOUNTER — TELEPHONE (OUTPATIENT)
Dept: FAMILY MEDICINE CLINIC | Age: 55
End: 2025-06-16

## 2025-06-16 ENCOUNTER — CARE COORDINATION (OUTPATIENT)
Dept: CARE COORDINATION | Age: 55
End: 2025-06-16

## 2025-06-16 DIAGNOSIS — R73.03 PREDIABETES: ICD-10-CM

## 2025-06-16 RX ORDER — SULFAMETHOXAZOLE AND TRIMETHOPRIM 800; 160 MG/1; MG/1
1 TABLET ORAL 2 TIMES DAILY
Qty: 20 TABLET | Refills: 0 | Status: SHIPPED | OUTPATIENT
Start: 2025-06-16 | End: 2025-06-26

## 2025-06-16 NOTE — TELEPHONE ENCOUNTER
The patient states she is unable to take a picture and send it through my chart as she doesn't know how. Tried to talk patient into how to do it and states she is unable to do this. Please advise.

## 2025-06-16 NOTE — CARE COORDINATION
Patient called AC as she just found out she is concerned about being able to afford Eliquis going on out (states she will not qualify for ongoing assistance).  Patient shared she was also told her hematology/oncologist would like PCP to take over her scripts as she only follows with them yearly and she was told she would have to be on Eliquis for life.  AC educated patient she will have to discuss this with PCP and encouraged her to discuss all concerns and options with PCP at upcoming appointment on 6/26.  Patient verbalized understanding.  PCP updated.

## 2025-06-16 NOTE — TELEPHONE ENCOUNTER
Patient called stating she was put on an antibiotic for her leg about 11 days ago. States her leg isn't any better and now it's tender to touch. Pt asking what she should do. Please advise. Thank you.

## 2025-06-16 NOTE — CARE COORDINATION
Former CC patient left message asking ACM to return call.  ACM attempted to reach patient and generic voicemail message was left asking patient to please return call if needed.  Per chart patient has been in contact with PCP office.

## 2025-06-17 RX ORDER — BLOOD SUGAR DIAGNOSTIC
STRIP MISCELLANEOUS
Qty: 200 EACH | Refills: 0 | Status: SHIPPED | OUTPATIENT
Start: 2025-06-17

## 2025-06-17 RX ORDER — GABAPENTIN 600 MG/1
600 TABLET ORAL 3 TIMES DAILY
Qty: 270 TABLET | Refills: 0 | Status: SHIPPED | OUTPATIENT
Start: 2025-06-17 | End: 2025-09-15

## 2025-06-20 ENCOUNTER — CARE COORDINATION (OUTPATIENT)
Dept: CARE COORDINATION | Age: 55
End: 2025-06-20

## 2025-06-20 NOTE — CARE COORDINATION
Patient called ACM with billing concerns from recent visits.  Patient encouraged to reach out to financial assistance and re-apply as well as reach out to billing with questions.  Patient verbalized understanding.  Patient shared d/t billing concerns she plans to attend OP PT 3 more visits and then f/u with PCP re: ongoing treatment plan. Patient was encouraged to discuss symptoms and concerns with PCP.  Patient denied any other questions, concerns, or needs and she was encouraged to call with any that may develop.

## 2025-06-25 ENCOUNTER — TELEPHONE (OUTPATIENT)
Dept: FAMILY MEDICINE CLINIC | Age: 55
End: 2025-06-25

## 2025-06-25 NOTE — TELEPHONE ENCOUNTER
Pt called stating she is concerned about her blood sugars being too low for her. Pt states her fasting yesterday was 85. States after she ate breakfast, it went up to 110. Pt states after dinner, it was 110 but states she became concerned and rechecked it a couple hours later and it was 84. States she is concerned about her BS getting too low at times. Please advise.

## 2025-06-26 ENCOUNTER — HOSPITAL ENCOUNTER (OUTPATIENT)
Dept: OCCUPATIONAL THERAPY | Age: 55
Setting detail: THERAPIES SERIES
Discharge: HOME | End: 2025-06-26
Attending: FAMILY MEDICINE
Payer: MEDICARE

## 2025-06-26 PROCEDURE — 97165 OT EVAL LOW COMPLEX 30 MIN: CPT

## 2025-06-26 NOTE — PROGRESS NOTES
[facility]  OCCUPATIONAL THERAPY  [x] EVALUATION  [] DAILY NOTE (LAND) [] DAILY NOTE (AQUATIC ) [] PROGRESS NOTE [] DISCHARGE NOTE    [] OUTPATIENT REHABILITATION CENTER [facility]   [] [place] [facility]    [x] [facility]   [] [facility]    Date: [facility]25  Patient Name:  Donna Waters  : 1970  MRN: [id number]  CSN: [id number]    Referring Practitioner Flavia Rebolledo MD [id number]      Diagnosis  Diagnoses       M79.601 (ICD-10-CM) - Right arm pain           Treatment Diagnosis M25.511  Right Shoulder Pain  M79.601  Right Arm Pain  M79.621  Pain in Right Upper Arm  M25.611 Stiffness of Right Shoulder  Z73.6 Limitation of Activities Due to Disability   Date of Evaluation 25   Additional Pertinent History Donna Waters has a past medical history of Acalculous cholecystitis, Anxiety, Arthritis, Asthma, Bipolar disorder (Formerly [facility]), Blood clotting disorder, Bradycardia, Depression, Flexural eczema, GERD (gastroesophageal reflux disease), LV dysfunction, Neuropathy, Obesity, Paranoid schizophrenia (Formerly [facility]), and Sleep apnea.  she has a past surgical history that includes Ankle surgery (Left, ); Foot surgery (Right, ); shoulder surgery (Right, age 13); Dental surgery; Cholecystectomy, laparoscopic (2015); Upper gastrointestinal endoscopy (10/23/2015); Colonoscopy (); bladder suspension (); Cardiac catheterization; Hysterectomy (2023); and Cardiac procedure (N/A, 2024).     Allergies Allergies   Allergen Reactions    Msg Shortness Of Breath    Codeine Other (See Comments)     Messes with my mental health    Cyclobenzaprine Other (See Comments)    Lasix [Furosemide]     Lithium Hives    Adhesive Tape Itching and Rash    Doxycycline Hives, Itching and Rash    Other Palpitations     msg      Medications   Current Outpatient Medications:     ONETOUCH ULTRA TEST strip, USE 1 STRIP TO CHECK GLUCOSE TWICE DAILY AS DIRECTED, Disp: 200

## 2025-06-30 ENCOUNTER — HOSPITAL ENCOUNTER (OUTPATIENT)
Dept: OCCUPATIONAL THERAPY | Age: 55
Setting detail: THERAPIES SERIES
Discharge: HOME | End: 2025-06-30
Attending: FAMILY MEDICINE
Payer: MEDICARE

## 2025-06-30 ENCOUNTER — TELEPHONE (OUTPATIENT)
Dept: FAMILY MEDICINE CLINIC | Age: 55
End: 2025-06-30

## 2025-06-30 NOTE — TELEPHONE ENCOUNTER
Patient called in stating she cancelled her OT appt at the [facility] for today due to the therapist telling her the reason she has shoulder pain is due to the bras she wears. The therapist advised her that she would want to see what type of bra she has on at her next appt and if it is in correct she would suggest to buy new bras. Patient states she is not going to be buying new bras that cost $60 each. Writer advised patient since she cannot go to another facility due to insurance, to call [facility] therapy dept and ask to have another therapist or to go to another [facility] location. Patient stated she will call that dept and let office know if anything is needed here. Any other suggestions?

## 2025-06-30 NOTE — CARE COORDINATION
[facility]  Therapy Contact Note      Date: 2025  Patient Name: Donna Waters        MRN: [id number]    : 1970  (55 y.o.)  Gender: female   Physician: Flavia Rebolledo MD  Diagnosis: Right arm pain [M79.601]    Patient called to cancel today's appointment but didn't leave reason for cancellation. Therapist called patient to inform her of her next appointment as patient is unaware of that appointment. Left message for patient to contact therapy department this date to confirm tomorrow's appointment.     Robyn Zuniga, OTR/L #[id number]

## 2025-07-01 ENCOUNTER — TELEPHONE (OUTPATIENT)
Dept: FAMILY MEDICINE CLINIC | Age: 55
End: 2025-07-01

## 2025-07-01 NOTE — TELEPHONE ENCOUNTER
FYI - Pt called to let you know, Biopsy on left leg came back as cancer. Getting it removed in August.

## 2025-07-01 NOTE — TELEPHONE ENCOUNTER
Patient called back stating she called derm back and told them the spot is spreading and getting bigger. They are seeing her 7/17/25 instead now. They are faxing over the derm notes to the office.

## 2025-07-09 ENCOUNTER — TELEPHONE (OUTPATIENT)
Dept: FAMILY MEDICINE CLINIC | Age: 55
End: 2025-07-09

## 2025-07-09 NOTE — TELEPHONE ENCOUNTER
Symptoms are likely from allergies or are viral.  OK to take OTC medication PRN and monitor symptoms.  If not improved in the next week she can contact our office.

## 2025-07-09 NOTE — TELEPHONE ENCOUNTER
Pt started last night that she was very hot in bed. This morning she woke up feeling ok.  She is having nasal congestion and coughing at night only - fine during the day   She has been using a saline nasal spray and childrens cough syrup ( she said she doesn't take adult so it doesn't inter with mediations)

## 2025-07-10 NOTE — PROGRESS NOTES
[facility]  [address]  [place] [address]  Phone:  [phone]          Name: Donna Waters  : 1970    Chief Complaint   Patient presents with    Other     Hand twitching for years    Knee Pain     Right knee pain, cannot afford PT       HPI:     History of Present Illness  The patient presents for evaluation of hand twitching, balance issues, and right knee pain.    She reports experiencing intermittent episodes of hand twitching, which she describes as sudden and uncontrollable. These episodes are not limited to her hands but also affect her entire body. She also mentions occasional instances where her hands twitch even when she is at rest.    She has been experiencing balance issues, the cause of which remains unknown to her.    She has been dealing with persistent right knee pain, which she attributes to an earlier injury to her left foot. She recalls a period when she was wheelchair-bound due to her left ankle being in a cast. During this time, she would scoot herself to the bathroom, a task that caused significant discomfort in her right knee. She expresses concern about the cost of physical therapy, which she finds unaffordable despite her insurance coverage.    She is not taking tizanidine regularly anymore. She takes trazodone and amitriptyline to help her sleep. Sometimes she takes Benadryl to help her sleep.    Sleep: She takes trazodone and amitriptyline to help her sleep. Sometimes she takes Benadryl to help her sleep.        Current Outpatient Medications:     ONETOUCH ULTRA TEST strip, USE 1 STRIP TO CHECK GLUCOSE TWICE DAILY AS DIRECTED, Disp: 200 each, Rfl: 0    gabapentin (NEURONTIN) 600 MG tablet, Take 1 tablet by mouth 3 times daily for 90 days., Disp: 270 tablet, Rfl: 0    metoprolol tartrate (LOPRESSOR) 25 MG tablet, Take 1 tablet by mouth 2 times daily, Disp: 180 tablet, Rfl: 3    Continuous Glucose Sensor (FREESTYLE ERROL 3 SENSOR) [facility], Change sensor every

## 2025-07-15 ENCOUNTER — OFFICE VISIT (OUTPATIENT)
Dept: FAMILY MEDICINE CLINIC | Age: 55
End: 2025-07-15
Payer: MEDICARE

## 2025-07-15 VITALS
HEART RATE: 83 BPM | OXYGEN SATURATION: 97 % | BODY MASS INDEX: 45.48 KG/M2 | RESPIRATION RATE: 20 BRPM | WEIGHT: 273 LBS | HEIGHT: 65 IN | SYSTOLIC BLOOD PRESSURE: 130 MMHG | TEMPERATURE: 97.1 F | DIASTOLIC BLOOD PRESSURE: 68 MMHG

## 2025-07-15 DIAGNOSIS — M25.561 CHRONIC PAIN OF RIGHT KNEE: Primary | ICD-10-CM

## 2025-07-15 DIAGNOSIS — G89.29 CHRONIC PAIN OF RIGHT KNEE: Primary | ICD-10-CM

## 2025-07-15 DIAGNOSIS — R25.2 HAND OR FOOT SPASMS: ICD-10-CM

## 2025-07-15 PROCEDURE — 3078F DIAST BP <80 MM HG: CPT | Performed by: FAMILY MEDICINE

## 2025-07-15 PROCEDURE — 3075F SYST BP GE 130 - 139MM HG: CPT | Performed by: FAMILY MEDICINE

## 2025-07-15 PROCEDURE — 99214 OFFICE O/P EST MOD 30 MIN: CPT | Performed by: FAMILY MEDICINE

## 2025-07-16 ENCOUNTER — TELEPHONE (OUTPATIENT)
Dept: FAMILY MEDICINE CLINIC | Age: 55
End: 2025-07-16

## 2025-07-16 ENCOUNTER — CARE COORDINATION (OUTPATIENT)
Dept: CARE COORDINATION | Age: 55
End: 2025-07-16

## 2025-07-16 NOTE — CARE COORDINATION
Former Care Coordination patient who reached out to [facility] to discuss recent concerns.  Patient shared she continues with ongoing congestion, sore throat, and hoarse voice and she has already been in contact with PCP office.  Patient reported she is also scheduled to have some skin cancer removed from her leg tomorrow by her dermatologist in [facility] (typically follows with dermatology in [facility]).  Patient denied any questions re: the procedure or pre op instructions.  Active listening provided to patient t/o our call.  Patient denied any other questions, concerns, or needs.

## 2025-07-16 NOTE — TELEPHONE ENCOUNTER
Nasal congestion is not an indication for oral steroids. Please recommend OTC medication such as Mucinex.

## 2025-07-16 NOTE — TELEPHONE ENCOUNTER
Patient called in stating she has a very hoarse voice and nasal congestion. She has this every morning but she still has it this afternoon. She takes an allergy pill, nasal spray, and cough drops with nor much relief. She states she knows she has nasal congestion due to taking an allergy tab daily. Patient is requesting steroid so she can breath through her nose. Please advise.

## 2025-07-17 ENCOUNTER — CARE COORDINATION (OUTPATIENT)
Dept: CARE COORDINATION | Age: 55
End: 2025-07-17

## 2025-07-17 NOTE — CARE COORDINATION
Patient called [facility] to share she used her kayleen pot today and did not take Benadryl last night and she is feeling better today and congestion has improved.  Patent shared she is nervous about her upcoming leg incision procedure for possible skin cancer, later today, and active listening and support provided to patient t/o our call.  Patient denied any other questions, concerns, or needs and she was encouraged to call with any that may develop.

## 2025-07-20 ENCOUNTER — HOSPITAL ENCOUNTER (OUTPATIENT)
Age: 55
Discharge: HOME | End: 2025-07-20
Payer: MEDICARE

## 2025-07-20 ENCOUNTER — HOSPITAL ENCOUNTER (OUTPATIENT)
Dept: GENERAL RADIOLOGY | Age: 55
Discharge: HOME | End: 2025-07-20
Payer: MEDICARE

## 2025-07-20 DIAGNOSIS — G89.29 CHRONIC PAIN OF RIGHT KNEE: ICD-10-CM

## 2025-07-20 DIAGNOSIS — M25.561 CHRONIC PAIN OF RIGHT KNEE: ICD-10-CM

## 2025-07-20 PROCEDURE — 73564 X-RAY EXAM KNEE 4 OR MORE: CPT

## 2025-07-21 ENCOUNTER — CARE COORDINATION (OUTPATIENT)
Dept: CARE COORDINATION | Age: 55
End: 2025-07-21

## 2025-07-21 NOTE — CARE COORDINATION
Former Care Coordination patient who reached out to [facility] this AM with concerns re: urinary frequency.  Patient shared she feels she no longer needs the Bumex and would like to stop the medication as she is \"going to the bathroom all the time.\"  Patient denies any c/o swelling/bloating being present.  Patient reported she believes medication was originally started by [facility] provider to help prevent swelling in her ankle.  Patient shared she drinks a cup of coffee, bottled water, and vitamin water and tried to stop/decrease her drinking after 5 PM to help with decreased nighttime urination, but this doesn't seem to make a difference.  Please advise.     Patient also with c/o \"sweating all the time\" and is frustrated now as HUD required the apartment complex remove all window AC units so she is not going to bed until 1230 when the bedroom is cooler.  Patient also with c/o ongoing \"forgetfulness.\"  Patient is aware neuro consult was ordered in the past but states she is unable to afford the copay at this time.

## 2025-07-21 NOTE — CARE COORDINATION
Patient called and updated and verbalized understanding. Patient denied any other questions, concerns, or needs.

## 2025-07-28 ENCOUNTER — TELEPHONE (OUTPATIENT)
Dept: ONCOLOGY | Age: 55
End: 2025-07-28

## 2025-07-28 NOTE — TELEPHONE ENCOUNTER
Patient is calling in because she can no longer afford eliquis and is wanting to let us know that she will be stopping it because of this. I encouraged her to reach out to Dr. Rebolledo's office since they took over that prescription to see if there is any additional financial assistance programs that could help. She is wondering if she needs to move up her appointment that is currently scheduled in October with us. Please advise.

## 2025-07-30 ENCOUNTER — TELEPHONE (OUTPATIENT)
Dept: INFUSION THERAPY | Age: 55
End: 2025-07-30

## 2025-07-30 NOTE — TELEPHONE ENCOUNTER
I explained that her PCP can submit the BMS application/ pharmacy retail receipts for financial assistance related to the Eliquis. I will email the BMS blank application to the patient now.

## 2025-07-31 NOTE — TELEPHONE ENCOUNTER
Left message for patient to call office back.  Samples up front. Sample pended for signature. Lot [id number] ex jan 2026.

## 2025-07-31 NOTE — TELEPHONE ENCOUNTER
Pt is on 2.5 mg of eliquis. She stated she is going to be out in 2 weeks. The medication costs her $150. We have 2 sample boxes. Okay to give her those? Please advise.

## 2025-08-05 ENCOUNTER — CARE COORDINATION (OUTPATIENT)
Dept: CARE COORDINATION | Age: 55
End: 2025-08-05

## 2025-08-06 ENCOUNTER — TELEPHONE (OUTPATIENT)
Dept: FAMILY MEDICINE CLINIC | Age: 55
End: 2025-08-06

## 2025-08-11 DIAGNOSIS — E78.00 PURE HYPERCHOLESTEROLEMIA: ICD-10-CM

## 2025-08-11 DIAGNOSIS — K21.9 GASTROESOPHAGEAL REFLUX DISEASE, UNSPECIFIED WHETHER ESOPHAGITIS PRESENT: ICD-10-CM

## 2025-08-11 DIAGNOSIS — I82.412 ACUTE DEEP VEIN THROMBOSIS (DVT) OF FEMORAL VEIN OF LEFT LOWER EXTREMITY (HCC): ICD-10-CM

## 2025-08-11 RX ORDER — OMEPRAZOLE 40 MG/1
40 CAPSULE, DELAYED RELEASE ORAL DAILY
Qty: 90 CAPSULE | Refills: 1 | Status: SHIPPED | OUTPATIENT
Start: 2025-08-11

## 2025-08-11 RX ORDER — SIMVASTATIN 40 MG
40 TABLET ORAL DAILY
Qty: 90 TABLET | Refills: 3 | Status: SHIPPED | OUTPATIENT
Start: 2025-08-11

## 2025-08-11 RX ORDER — GABAPENTIN 600 MG/1
600 TABLET ORAL 3 TIMES DAILY
Qty: 270 TABLET | Refills: 0 | Status: SHIPPED | OUTPATIENT
Start: 2025-08-11 | End: 2025-11-09

## 2025-08-14 ENCOUNTER — TELEPHONE (OUTPATIENT)
Dept: FAMILY MEDICINE CLINIC | Age: 55
End: 2025-08-14

## 2025-08-14 DIAGNOSIS — I82.412 ACUTE DEEP VEIN THROMBOSIS (DVT) OF FEMORAL VEIN OF LEFT LOWER EXTREMITY (HCC): Primary | ICD-10-CM

## 2025-08-18 ENCOUNTER — LAB (OUTPATIENT)
Dept: LAB | Age: 55
End: 2025-08-18

## 2025-08-18 DIAGNOSIS — I82.412 ACUTE DEEP VEIN THROMBOSIS (DVT) OF FEMORAL VEIN OF LEFT LOWER EXTREMITY (HCC): ICD-10-CM

## 2025-08-18 LAB — INR BLD: 1.04 (ref 0.85–1.13)

## 2025-08-20 ENCOUNTER — OFFICE VISIT (OUTPATIENT)
Dept: FAMILY MEDICINE CLINIC | Age: 55
End: 2025-08-20
Payer: MEDICARE

## 2025-08-20 ENCOUNTER — LAB (OUTPATIENT)
Dept: LAB | Age: 55
End: 2025-08-20

## 2025-08-20 VITALS
OXYGEN SATURATION: 97 % | SYSTOLIC BLOOD PRESSURE: 130 MMHG | DIASTOLIC BLOOD PRESSURE: 86 MMHG | WEIGHT: 267 LBS | BODY MASS INDEX: 44.48 KG/M2 | TEMPERATURE: 98.6 F | HEART RATE: 72 BPM | RESPIRATION RATE: 20 BRPM | HEIGHT: 65 IN

## 2025-08-20 DIAGNOSIS — I82.412 ACUTE DEEP VEIN THROMBOSIS (DVT) OF FEMORAL VEIN OF LEFT LOWER EXTREMITY (HCC): ICD-10-CM

## 2025-08-20 DIAGNOSIS — Z79.01 CHRONIC ANTICOAGULATION: Primary | ICD-10-CM

## 2025-08-20 DIAGNOSIS — S81.802A WOUND OF LEFT LOWER EXTREMITY, INITIAL ENCOUNTER: ICD-10-CM

## 2025-08-20 LAB — INR BLD: 1.1 (ref 0.85–1.13)

## 2025-08-20 PROCEDURE — 3075F SYST BP GE 130 - 139MM HG: CPT | Performed by: FAMILY MEDICINE

## 2025-08-20 PROCEDURE — 3079F DIAST BP 80-89 MM HG: CPT | Performed by: FAMILY MEDICINE

## 2025-08-20 PROCEDURE — 99214 OFFICE O/P EST MOD 30 MIN: CPT | Performed by: FAMILY MEDICINE

## 2025-08-20 RX ORDER — NEOMYCIN/BACITRACIN/POLYMYXINB 3.5-400-5K
OINTMENT (GRAM) TOPICAL
Qty: 14 G | Refills: 0 | Status: SHIPPED | OUTPATIENT
Start: 2025-08-20

## 2025-08-20 RX ORDER — CLOBETASOL PROPIONATE 0.5 MG/G
CREAM TOPICAL
COMMUNITY
Start: 2025-08-06

## 2025-08-20 RX ORDER — WARFARIN SODIUM 2 MG/1
6 TABLET ORAL DAILY
COMMUNITY
Start: 2025-08-15

## 2025-08-20 RX ORDER — HYDROXYZINE HYDROCHLORIDE 50 MG/1
50 TABLET, FILM COATED ORAL EVERY 8 HOURS PRN
Qty: 90 TABLET | Refills: 0 | Status: SHIPPED | OUTPATIENT
Start: 2025-08-20

## 2025-08-22 ENCOUNTER — LAB (OUTPATIENT)
Dept: LAB | Age: 55
End: 2025-08-22

## 2025-08-22 ENCOUNTER — ANTI-COAG VISIT (OUTPATIENT)
Dept: FAMILY MEDICINE CLINIC | Age: 55
End: 2025-08-22
Payer: MEDICARE

## 2025-08-22 DIAGNOSIS — Z79.01 CHRONIC ANTICOAGULATION: Primary | ICD-10-CM

## 2025-08-22 DIAGNOSIS — J30.2 SEASONAL ALLERGIES: ICD-10-CM

## 2025-08-22 DIAGNOSIS — I82.412 ACUTE DEEP VEIN THROMBOSIS (DVT) OF FEMORAL VEIN OF LEFT LOWER EXTREMITY (HCC): ICD-10-CM

## 2025-08-22 LAB — INR BLD: 1.72 (ref 0.85–1.13)

## 2025-08-22 PROCEDURE — 93793 ANTICOAG MGMT PT WARFARIN: CPT | Performed by: FAMILY MEDICINE

## 2025-08-25 RX ORDER — LEVOCETIRIZINE DIHYDROCHLORIDE 5 MG/1
5 TABLET, FILM COATED ORAL NIGHTLY
Qty: 90 TABLET | Refills: 1 | Status: SHIPPED | OUTPATIENT
Start: 2025-08-25

## 2025-08-26 ENCOUNTER — LAB (OUTPATIENT)
Dept: LAB | Age: 55
End: 2025-08-26

## 2025-08-26 ENCOUNTER — ANTI-COAG VISIT (OUTPATIENT)
Dept: FAMILY MEDICINE CLINIC | Age: 55
End: 2025-08-26
Payer: MEDICARE

## 2025-08-26 DIAGNOSIS — I82.412 ACUTE DEEP VEIN THROMBOSIS (DVT) OF FEMORAL VEIN OF LEFT LOWER EXTREMITY (HCC): ICD-10-CM

## 2025-08-26 DIAGNOSIS — Z79.01 CHRONIC ANTICOAGULATION: Primary | ICD-10-CM

## 2025-08-26 LAB — INR BLD: 3.13 (ref 0.85–1.13)

## 2025-08-26 PROCEDURE — 93793 ANTICOAG MGMT PT WARFARIN: CPT | Performed by: FAMILY MEDICINE

## 2025-08-27 ENCOUNTER — TELEPHONE (OUTPATIENT)
Dept: FAMILY MEDICINE CLINIC | Age: 55
End: 2025-08-27

## 2025-09-02 ENCOUNTER — ANTI-COAG VISIT (OUTPATIENT)
Dept: FAMILY MEDICINE CLINIC | Age: 55
End: 2025-09-02
Payer: MEDICARE

## 2025-09-02 ENCOUNTER — LAB (OUTPATIENT)
Dept: LAB | Age: 55
End: 2025-09-02

## 2025-09-02 DIAGNOSIS — I82.412 ACUTE DEEP VEIN THROMBOSIS (DVT) OF FEMORAL VEIN OF LEFT LOWER EXTREMITY (HCC): ICD-10-CM

## 2025-09-02 DIAGNOSIS — Z79.01 CHRONIC ANTICOAGULATION: Primary | ICD-10-CM

## 2025-09-02 LAB — INR BLD: 4.51 (ref 0.85–1.13)

## 2025-09-02 PROCEDURE — 93793 ANTICOAG MGMT PT WARFARIN: CPT | Performed by: FAMILY MEDICINE

## 2025-09-04 ENCOUNTER — ANTI-COAG VISIT (OUTPATIENT)
Dept: FAMILY MEDICINE CLINIC | Age: 55
End: 2025-09-04

## 2025-09-04 ENCOUNTER — LAB (OUTPATIENT)
Dept: LAB | Age: 55
End: 2025-09-04

## 2025-09-04 DIAGNOSIS — I82.412 ACUTE DEEP VEIN THROMBOSIS (DVT) OF FEMORAL VEIN OF LEFT LOWER EXTREMITY (HCC): ICD-10-CM

## 2025-09-04 LAB — INR BLD: 4.22 (ref 0.85–1.13)

## (undated) DEVICE — Device: Brand: NOMOLINE™ LH ADULT NASAL CO2 CANNULA WITH O2 4M

## (undated) DEVICE — KIT ANGIO W/ AT P65 PREM HND CTRL FOR CNTRST DEL ANGIOTOUCH

## (undated) DEVICE — DRAPE KIT RAMAPR RADIATION SHIELD

## (undated) DEVICE — CARDIAC CATH LAB PACK LF

## (undated) DEVICE — TIBURON NEONATAL DRAPE: Brand: CONVERTORS

## (undated) DEVICE — DRESSING TRNSPAR W5XL4.5IN FLM SHT SEMIPERMEABLE WIND

## (undated) DEVICE — KIT MFLD ISOLATN NACL CNTRST PRT TBNG SPIK W/ PRSS TRNSDUC

## (undated) DEVICE — CATHETER 5FR JR4 MEDTRONIC 100CM

## (undated) DEVICE — GLIDESHEATH SLENDER ACCESS KIT: Brand: GLIDESHEATH SLENDER

## (undated) DEVICE — PROVE COVER: Brand: UNBRANDED

## (undated) DEVICE — CATHETER DIAG 5FR L100CM LUMN ID0.047IN JL3.5 CRV 0 SIDE H

## (undated) DEVICE — VASC-BAND LONG

## (undated) DEVICE — WIRE .035 3MM J TIP 260CM